# Patient Record
Sex: FEMALE | Race: BLACK OR AFRICAN AMERICAN | NOT HISPANIC OR LATINO | Employment: OTHER | URBAN - METROPOLITAN AREA
[De-identification: names, ages, dates, MRNs, and addresses within clinical notes are randomized per-mention and may not be internally consistent; named-entity substitution may affect disease eponyms.]

---

## 2017-01-25 ENCOUNTER — TELEPHONE (OUTPATIENT)
Dept: FAMILY MEDICINE | Facility: CLINIC | Age: 67
End: 2017-01-25

## 2017-01-25 NOTE — TELEPHONE ENCOUNTER
----- Message from Hanh Britton sent at 1/24/2017 10:37 AM CST -----  Contact: self  [phone]  Pt is requesting to speak to you, she is wanting a work note. Pls call pt [phone]. Thanks.......Erika

## 2017-01-25 NOTE — TELEPHONE ENCOUNTER
Spoke with patient. Work excuse from 11-28-16 and 12- reprinted     She will  letters from the office

## 2017-01-26 ENCOUNTER — TELEPHONE (OUTPATIENT)
Dept: FAMILY MEDICINE | Facility: CLINIC | Age: 67
End: 2017-01-26

## 2017-01-26 NOTE — TELEPHONE ENCOUNTER
----- Message from Lia Matamoros sent at 1/26/2017 12:35 PM CST -----  Contact: self  Pt is requesting for the letter to be faxed to her.     [phone] fn [phone] ph

## 2017-04-13 ENCOUNTER — OFFICE VISIT (OUTPATIENT)
Dept: FAMILY MEDICINE | Facility: CLINIC | Age: 67
End: 2017-04-13
Payer: MEDICARE

## 2017-04-13 ENCOUNTER — TELEPHONE (OUTPATIENT)
Dept: FAMILY MEDICINE | Facility: CLINIC | Age: 67
End: 2017-04-13

## 2017-04-13 ENCOUNTER — APPOINTMENT (OUTPATIENT)
Dept: RADIOLOGY | Facility: HOSPITAL | Age: 67
End: 2017-04-13
Attending: FAMILY MEDICINE
Payer: MEDICARE

## 2017-04-13 VITALS
BODY MASS INDEX: 30.1 KG/M2 | HEART RATE: 73 BPM | OXYGEN SATURATION: 98 % | HEIGHT: 62 IN | SYSTOLIC BLOOD PRESSURE: 150 MMHG | TEMPERATURE: 98 F | DIASTOLIC BLOOD PRESSURE: 78 MMHG | WEIGHT: 163.56 LBS

## 2017-04-13 DIAGNOSIS — M79.674 TOE PAIN, RIGHT: ICD-10-CM

## 2017-04-13 DIAGNOSIS — M79.674 TOE PAIN, RIGHT: Primary | ICD-10-CM

## 2017-04-13 PROCEDURE — 73630 X-RAY EXAM OF FOOT: CPT | Mod: TC,PN,RT

## 2017-04-13 PROCEDURE — 1160F RVW MEDS BY RX/DR IN RCRD: CPT | Mod: S$GLB,,, | Performed by: FAMILY MEDICINE

## 2017-04-13 PROCEDURE — 1125F AMNT PAIN NOTED PAIN PRSNT: CPT | Mod: S$GLB,,, | Performed by: FAMILY MEDICINE

## 2017-04-13 PROCEDURE — 73630 X-RAY EXAM OF FOOT: CPT | Mod: 26,RT,, | Performed by: RADIOLOGY

## 2017-04-13 PROCEDURE — 99214 OFFICE O/P EST MOD 30 MIN: CPT | Mod: S$GLB,,, | Performed by: FAMILY MEDICINE

## 2017-04-13 PROCEDURE — 1159F MED LIST DOCD IN RCRD: CPT | Mod: S$GLB,,, | Performed by: FAMILY MEDICINE

## 2017-04-13 PROCEDURE — 99999 PR PBB SHADOW E&M-EST. PATIENT-LVL III: CPT | Mod: PBBFAC,,, | Performed by: FAMILY MEDICINE

## 2017-04-13 RX ORDER — IBUPROFEN 800 MG/1
800 TABLET ORAL EVERY 6 HOURS PRN
Qty: 90 TABLET | Refills: 0 | Status: SHIPPED | OUTPATIENT
Start: 2017-04-13 | End: 2017-09-01

## 2017-04-13 NOTE — TELEPHONE ENCOUNTER
----- Message from Thee Chow MD sent at 4/13/2017 12:36 PM CDT -----  Please let patient know that there is no fracture in her toe.      Thanks,  Dr. Chow

## 2017-04-13 NOTE — MR AVS SNAPSHOT
[facility]  [address]  [place] [address]  Phone: [phone]                  Zohra Paulino   2017 10:00 AM   Office Visit    Description:  Female : 1950   Provider:  Thee Cohw MD   Department:  [facility]           Reason for Visit     Toe Pain           Diagnoses this Visit        Comments    Toe pain, right    -  Primary            To Do List           Goals (5 Years of Data)     None      Follow-Up and Disposition     Return if symptoms worsen or fail to improve.       These Medications        Disp Refills Start End    ibuprofen (ADVIL,MOTRIN) 800 MG tablet 90 tablet 0 2017     Take 1 tablet (800 mg total) by mouth every 6 (six) hours as needed for Pain. - Oral    Pharmacy: [facility] [address] #: [phone]         [facility]     [facility] On [facility] Nurse Care Line -  Assistance  Unless otherwise directed by your provider, please contact [facility], our nurse care line that is available for  assistance.     Registered nurses in the [facility] provide: appointment scheduling, clinical advisement, health education, and other advisory services.  Call: [phone] (toll free)               Medications           Message regarding Medications     Verify the changes and/or additions to your medication regime listed below are the same as discussed with your clinician today.  If any of these changes or additions are incorrect, please notify your healthcare provider.        START taking these NEW medications        Refills    ibuprofen (ADVIL,MOTRIN) 800 MG tablet 0    Sig: Take 1 tablet (800 mg total) by mouth every 6 (six) hours as needed for Pain.    Class: Normal    Route: Oral      STOP taking these medications     fluticasone (FLONASE) 50 mcg/actuation nasal spray 1 spray by Each Nare route 2 (two) times daily.           Verify that the below list of  "medications is an accurate representation of the medications you are currently taking.  If none reported, the list may be blank. If incorrect, please contact your healthcare provider. Carry this list with you in case of emergency.           Current Medications     cetirizine (ZYRTEC) 10 MG tablet Take 1 tablet (10 mg total) by mouth once daily.    ibuprofen (ADVIL,MOTRIN) 800 MG tablet Take 1 tablet (800 mg total) by mouth every 6 (six) hours as needed for Pain.           Clinical Reference Information           Your Vitals Were     BP Pulse Temp Height Weight SpO2    150/78 (BP Location: Left arm, Patient Position: Sitting, BP Method: Manual) 73 98.1 °F (36.7 °C) (Oral) 5' 2" (1.575 m) 74.2 kg (163 lb 9.3 oz) 98%    BMI                29.92 kg/m2          Blood Pressure          Most Recent Value    BP  (!)  150/78      Allergies as of 4/13/2017     Codeine      Immunizations Administered on Date of Encounter - 4/13/2017     None      Orders Placed During Today's Visit     Future Labs/Procedures Expected by Expires    X-Ray Foot Complete 3 view Right  4/13/2017 4/13/2018      Language Assistance Services     ATTENTION: Language assistance services are available, free of charge. Please call [phone].      ATENCIÓN: Si habla pj, tiene a katz disposición servicios gratuitos de asistencia lingüística. Llame al [phone].     CHÚ Ý: N?u b?n nói Ti?ng Vi?t, có các d?ch v? h? tr? ngôn ng? mi?n phí dành cho b?n. G?i s? [phone].         [facility] complies with applicable Federal civil rights laws and does not discriminate on the basis of race, color, national origin, age, disability, or sex.        "

## 2017-04-30 NOTE — PROGRESS NOTES
Routine Office Visit    Patient Name: Zohra Paulino    : 1950  MRN: [id number]    Subjective:  Zohra is a 67 y.o. female who presents today for:    1. Right toe pain  Patient presenting today for right foot pain that occurred spontaneously.  There was no trauma to the foot.  She states that the pain is an aching pain that is worse when she walks.  The pain is improved with rest.  There has been no swelling or redness.  She has never been diagnosed with gout.      Past Medical History  History reviewed. No pertinent past medical history.    Past Surgical History  Past Surgical History:   Procedure Laterality Date    CERVICAL BIOPSY  W/ LOOP ELECTRODE EXCISION      [facility]         Family History  Family History   Problem Relation Age of Onset    No Known Problems Mother        Social History  Social History     Social History    Marital status: Single     Spouse name: N/A    Number of children: N/A    Years of education: N/A     Occupational History    Not on file.     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       Current Medications  Current Outpatient Prescriptions on File Prior to Visit   Medication Sig Dispense Refill    cetirizine (ZYRTEC) 10 MG tablet Take 1 tablet (10 mg total) by mouth once daily. 30 tablet 2     No current facility-administered medications on file prior to visit.        Allergies   Review of patient's allergies indicates:   Allergen Reactions    Codeine Palpitations       Review of Systems (Pertinent positives)  Review of Systems   Constitutional: Negative.    HENT: Negative.    Eyes: Negative.    Respiratory: Negative.    Cardiovascular: Negative.    Gastrointestinal: Negative.   "  Genitourinary: Negative.    Musculoskeletal: Positive for joint pain and myalgias.   Skin: Negative.          BP (!) 150/78 (BP Location: Left arm, Patient Position: Sitting, BP Method: Manual)  Pulse 73  Temp 98.1 °F (36.7 °C) (Oral)   Ht 5' 2" (1.575 m)  Wt 74.2 kg (163 lb 9.3 oz)  SpO2 98%  BMI 29.92 kg/m2    GENERAL APPEARANCE: in no apparent distress and well developed and well nourished  HEENT: PERRL, EOMI, Sclera clear, anicteric, Oropharynx clear, no lesions, Neck supple with midline trachea  NECK: normal, supple, no adenopathy, thyroid normal in size  RESPIRATORY: appears well, vitals normal, no respiratory distress, acyanotic, normal RR, chest clear, no wheezing, crepitations, rhonchi, normal symmetric air entry  HEART: regular rate and rhythm, S1, S2 normal, no murmur, click, rub or gallop.    ABDOMEN: abdomen is soft without tenderness, no masses, no hernias, no organomegaly, no rebound, no guarding. Suprapubic tenderness absent. No CVA tenderness.  MS  Mild pain on palpation of right lateral foot.  No redness or swelling noted  SKIN: no rashes, no wounds, no other lesions  PSYCH: Alert, oriented x 3, thought content appropriate, speech normal, pleasant and cooperative, good eye contact, well groomed    Assessment/Plan:  Zohra Paulino is a 67 y.o. female who presents today for :    Zohra was seen today for toe pain.    Diagnoses and all orders for this visit:    Toe pain, right  -     X-Ray Foot Complete 3 view Right; Future  -     ibuprofen (ADVIL,MOTRIN) 800 MG tablet; Take 1 tablet (800 mg total) by mouth every 6 (six) hours as needed for Pain.    Other orders  -     Cancel: DXA Bone Density Spine And Hip; Future  -     Cancel: Mammo Digital Screening Bilateral With CAD; Future      1.  Musculoskeletal pain  2.  Ibuprofen for pain  3.  Xray to r/o stress fx  4.  Follow up as needed  5.  Patient will follow up with PCP about mammogram and dexa scan    Thee Chow MD    "

## 2017-06-12 ENCOUNTER — HOSPITAL ENCOUNTER (OUTPATIENT)
Dept: RADIOLOGY | Facility: HOSPITAL | Age: 67
Discharge: HOME | End: 2017-06-12
Attending: OBSTETRICS & GYNECOLOGY
Payer: MEDICARE

## 2017-06-12 VITALS — WEIGHT: 163.19 LBS | HEIGHT: 62 IN | BODY MASS INDEX: 30.03 KG/M2

## 2017-06-12 DIAGNOSIS — Z12.31 VISIT FOR SCREENING MAMMOGRAM: ICD-10-CM

## 2017-06-12 PROCEDURE — 77063 BREAST TOMOSYNTHESIS BI: CPT | Mod: 26,,, | Performed by: RADIOLOGY

## 2017-06-12 PROCEDURE — 77067 SCR MAMMO BI INCL CAD: CPT | Mod: 26,,, | Performed by: RADIOLOGY

## 2017-06-12 PROCEDURE — 77067 SCR MAMMO BI INCL CAD: CPT | Mod: TC

## 2017-07-13 ENCOUNTER — OFFICE VISIT (OUTPATIENT)
Dept: INTERNAL MEDICINE | Facility: CLINIC | Age: 67
End: 2017-07-13
Payer: MEDICARE

## 2017-07-13 VITALS
BODY MASS INDEX: 27.85 KG/M2 | HEIGHT: 63 IN | SYSTOLIC BLOOD PRESSURE: 132 MMHG | OXYGEN SATURATION: 98 % | RESPIRATION RATE: 20 BRPM | HEART RATE: 60 BPM | DIASTOLIC BLOOD PRESSURE: 80 MMHG | WEIGHT: 157.19 LBS

## 2017-07-13 DIAGNOSIS — Z01.00 ROUTINE EYE EXAM: Primary | ICD-10-CM

## 2017-07-13 DIAGNOSIS — Z00.00 ENCOUNTER FOR PREVENTIVE HEALTH EXAMINATION: ICD-10-CM

## 2017-07-13 DIAGNOSIS — Z78.0 POST-MENOPAUSAL: ICD-10-CM

## 2017-07-13 PROCEDURE — G0439 PPPS, SUBSEQ VISIT: HCPCS | Mod: S$GLB,,, | Performed by: NURSE PRACTITIONER

## 2017-07-13 PROCEDURE — 99999 PR PBB SHADOW E&M-EST. PATIENT-LVL IV: CPT | Mod: PBBFAC,,, | Performed by: NURSE PRACTITIONER

## 2017-07-13 NOTE — PROGRESS NOTES
"Zohra Paulino presented for a  Medicare AWV and comprehensive Health Risk Assessment today. The following components were reviewed and updated:    · Medical history  · Family History  · Social history  · Allergies and Current Medications  · Health Risk Assessment  · Health Maintenance  · Care Team     ** See Completed Assessments for Annual Wellness Visit within the encounter summary.**       The following assessments were completed:  · Living Situation  · CAGE  · Depression Screening  · Timed Get Up and Go  · Whisper Test  · Cognitive Function Screening  ·   · Nutrition Screening  · ADL Screening  · PAQ Screening    Vitals:    07/13/17 1040   BP: 132/80   BP Location: Left arm   Patient Position: Sitting   BP Method: Manual   Pulse: 60   Resp: 20   SpO2: 98%   Weight: 71.3 kg (157 lb 3 oz)   Height: 5' 3" (1.6 m)     Body mass index is 27.84 kg/m².  Physical Exam   Constitutional: She is oriented to person, place, and time. She appears well-developed and well-nourished. No distress.   HENT:   Head: Normocephalic.   Right Ear: External ear normal.   Left Ear: External ear normal.   Nose: Nose normal.   Eyes: Conjunctivae are normal. No scleral icterus.   Neck: Normal range of motion. Neck supple.   Cardiovascular: Normal rate, regular rhythm, normal heart sounds and intact distal pulses.  Exam reveals no gallop and no friction rub.    No murmur heard.  Pulmonary/Chest: Effort normal and breath sounds normal. No respiratory distress. She has no wheezes. She has no rales. She exhibits no tenderness.   Abdominal: Soft. Bowel sounds are normal.   Musculoskeletal: Normal range of motion. She exhibits no edema.   Neurological: She is alert and oriented to person, place, and time.   Skin: Skin is warm and dry. She is not diaphoretic. No erythema.   Psychiatric: She has a normal mood and affect. Her behavior is normal. Judgment and thought content normal.   Vitals reviewed.        Diagnoses and health risks identified " today and associated recommendations/orders:    1. Encounter for preventive health examination  Annual Health Risk Assessment (HRA) visit today.  Counseling and referral of health maintenance and preventative health measures performed.  Patient given annual wellness paperwork to take home.  Encouraged to return in 1 year for subsequent HRA visit.  - Declined pneumococcal, zoster, and tetanus immunizations.  - DXA Bone Density ordered and scheduled.    - Up to date on all other health maintenance measures.     2. Routine eye exam  She has not had an eye exam in the past year.  Encouraged her to go for routine eye exam.  She declined scheduling today due to copay.  She stated she will contact her outside optometrist to schedule.    - Ambulatory consult to Optometry    3. Post-menopausal  - DXA Bone Density Spine And Hip; Future  - Scheduled    Provided Zohra with a 5-10 year written screening schedule and personal prevention plan. Recommendations were developed using the USPSTF age appropriate recommendations. Education, counseling, and referrals were provided as needed. After Visit Summary printed and given to patient which includes a list of additional screenings\tests needed.    Return in about 1 year (around 7/13/2018) for your next Health Risk Assessment visit.    Nasima Gar, NP

## 2017-07-13 NOTE — PATIENT INSTRUCTIONS
Exercise for a Healthier Heart  You may wonder how you can improve the health of your heart. If youre thinking about exercise, youre on the right track. You dont need to become an athlete, but you do need a certain amount of brisk exercise to help strengthen your heart. If you have been diagnosed with a heart condition, your doctor may recommend exercise to help stabilize your condition. To help make exercise a habit, choose safe, fun activities.     Exercise with a friend. When activity is fun, you're more likely to stick with it.     Be sure to check with your healthcare provider before starting an exercise program.   Why exercise?  Exercising regularly offers many healthy rewards. It can help you do all of the following:  · Improve your blood cholesterol level to help prevent further heart trouble  · Lower your blood pressure to help prevent a stroke or heart attack  · Control diabetes, or reduce your risk of getting this disease  · Improve your heart and lung function  · Reach and maintain a healthy weight  · Make your muscles stronger and more limber so you can stay active  · Prevent falls and fractures by slowing the loss of bone mass (osteoporosis)  · Manage stress better  · Reduce your blood pressure  · Improve your sense of self and your body image  Exercise tips  Ease into your routine. Set small goals. Then build on them.  Exercise on most days. Aim for a total of 150 or more minutes of moderate to  vigorous intensity activity each week. Consider 40 minutes, 3 to 4 times a week. For best results, activity should last for 40 minutes on average. It is OK to work up to the 40 minute period over time. Examples of moderate-intensity activity is walking 1 mile in 15 minutes or 30 to 45 minutes of yard work.  Step up your daily activity level. Along with your exercise program, try being more active throughout the day. Walk instead of drive. Do more household tasks or yard work.  Choose one or more  activities you enjoy. Walking is one of the easiest things you can do. You can also try swimming, riding a bike, dancing, or taking an exercise class.  Stop exercising and call your doctor if you:  · Have chest pain or feel dizzy or lightheaded  · Feel burning, tightness, pressure, or heaviness in your chest, neck, shoulders, back, or arms  · Have unusual shortness of breath  · Have increased joint or muscle pain  · Have palpitations or an irregular heartbeat   Date Last Reviewed: 5/1/2016  © 7420-1725 [employer]. [address] [place] [address]. All rights reserved. This information is not intended as a substitute for professional medical care. Always follow your healthcare professional's instructions.          Counseling and Referral of Other Preventative  (Italic type indicates deductible and co-insurance are waived)    Patient Name: Zohra Paulino  Today's Date: 7/13/2017      SERVICE LIMITATIONS RECOMMENDATION    Vaccines    · Pneumococcal (once after 65)    · Influenza (annually)    · Hepatitis B (if medium/high risk)    · Prevnar 13      Hepatitis B medium/high risk factors:       - End-stage renal disease       - Hemophiliacs who received Factor VII or         IX concentrates       - Clients of institutions for the mentally             retarded       - Persons who live in the same house as          a HepB carrier       - Homosexual men       - Illicit injectable drug abusers     Pneumococcal: Recommended to patient, declined     Influenza: N/A     Hepatitis B: N/A     Prevnar 13: Recommended to patient, declined    Mammogram (biennial age 50-74)  Annually (age 40 or over)  Done this year, repeat every year    Pap (up to age 70 and after 70 if unknown history or abnormal study last 10 years)    Last done 6/2016, recommend to repeat as recommended by Gynecology.     The USPSTF recommends against screening for cervical cancer in women older than age 65 years who have had adequate  prior screening and are not otherwise at high risk for cervical cancer.      Colorectal cancer screening (to age 75)    · Fecal occult blood test (annual)  · Flexible sigmoidoscopy (5y)  · Screening colonoscopy (10y)  · Barium enema   Last done 2/2014, recommend to repeat every 5-10  years or as recommended by provider.    Diabetes self-management training (no USPSTF recommendations)  Requires referral by treating physician for patient with diabetes or renal disease. 10 hours of initial DSMT sessions of no less than 30 minutes each in a continuous 12-month period. 2 hours of follow-up DSMT in subsequent years.  N/A    Bone mass measurements (age 65 & older, biennial)  Requires diagnosis related to osteoporosis or estrogen deficiency. Biennial benefit unless patient has history of long-term glucocorticoid  Scheduled, see appointments    Glaucoma screening (no USPSTF recommendation)  Diabetes mellitus, family history   , age 50 or over    American, age 65 or over  N/A  She will call to schedule to see her outside optometrist.    Medical nutrition therapy for diabetes or renal disease (no recommended schedule)  Requires referral by treating physician for patient with diabetes or renal disease or kidney transplant within the past 3 years.  Can be provided in same year as diabetes self-management training (DSMT), and CMS recommends medical nutrition therapy take place after DSMT. Up to 3 hours for initial year and 2 hours in subsequent years.  N/A    Cardiovascular screening blood tests (every 5 years)  · Fasting lipid panel  Order as a panel if possible  Last done 11/2016, recommend to repeat every 5  years    Diabetes screening tests (at least every 3 years, Medicare covers annually or at 6-month intervals for prediabetic patients)  · Fasting blood sugar (FBS) or glucose tolerance test (GTT)  Patient must be diagnosed with one of the following:       - Hypertension       - Dyslipidemia       -  Obesity (BMI 30kg/m2)       - Previous elevated impaired FBS or GTT       ... or any two of the following:       - Overweight (BMI 25 but <30)       - Family history of diabetes       - Age 65 or older       - History of gestational diabetes or birth of baby weighing more than 9 pounds  Last done 1/2016, recommend to repeat every 3  years    Abdominal aortic aneurysm screening (once)  · Sonogram   Limited to patients who meet one of the following criteria:       - Men who are 65-75 years old and have smoked more than 100 cigarette in their lifetime       - Anyone with a family history of abdominal aortic aneurysm       - Anyone recommended for screening by the USPSTF  N/A    HIV screening (annually for increased risk patients)  · HIV-1 and HIV-2 by EIA, or JANEL, rapid antibody test or oral mucosa transudate  Patients must be at increased risk for HIV infection per USPSTF guidelines or pregnant. Tests covered annually for patient at increased risk or as requested by the patient. Pregnant patients may receive up to 3 tests during pregnancy.  Risks discussed, screening is not recommended    Smoking cessation counseling (up to 8 sessions per year)  Patients must be asymptomatic of tobacco-related conditions to receive as a preventative service.  Non-smoker    Subsequent annual wellness visit  At least 12 months since last AWV  Return in one year     The following information is provided to all patients.  This information is to help you find resources for any of the problems found today that may be affecting your health:                Living healthy guide: www.[facility].[place].gov      Understanding Diabetes: www.diabetes.org      Eating healthy: www.cdc.gov/healthyweight      CDC home safety checklist: www.cdc.gov/steadi/patient.html      Agency on Aging: www.goea.[place].[facility]      Alcoholics anonymous (AA): www.aa.org      Physical Activity: www.sheyla.nih.gov/oj6utkx      Tobacco use: www.quitwithusla.org

## 2017-07-18 ENCOUNTER — OFFICE VISIT (OUTPATIENT)
Dept: FAMILY MEDICINE | Facility: CLINIC | Age: 67
End: 2017-07-18
Payer: MEDICARE

## 2017-07-18 VITALS
SYSTOLIC BLOOD PRESSURE: 110 MMHG | OXYGEN SATURATION: 98 % | HEIGHT: 63 IN | BODY MASS INDEX: 28.29 KG/M2 | HEART RATE: 60 BPM | DIASTOLIC BLOOD PRESSURE: 78 MMHG | RESPIRATION RATE: 17 BRPM | TEMPERATURE: 98 F | WEIGHT: 159.63 LBS

## 2017-07-18 DIAGNOSIS — D25.1 INTRAMURAL LEIOMYOMA OF UTERUS: Primary | ICD-10-CM

## 2017-07-18 LAB
BILIRUB SERPL-MCNC: NORMAL MG/DL
BLOOD URINE, POC: 50
COLOR, POC UA: NORMAL
GLUCOSE UR QL STRIP: NORMAL
KETONES UR QL STRIP: NORMAL
LEUKOCYTE ESTERASE URINE, POC: NORMAL
NITRITE, POC UA: NORMAL
PH, POC UA: 5
PROTEIN, POC: NORMAL
SPECIFIC GRAVITY, POC UA: 1020
UROBILINOGEN, POC UA: NORMAL

## 2017-07-18 PROCEDURE — 1159F MED LIST DOCD IN RCRD: CPT | Mod: S$GLB,,, | Performed by: INTERNAL MEDICINE

## 2017-07-18 PROCEDURE — 99999 PR PBB SHADOW E&M-EST. PATIENT-LVL III: CPT | Mod: PBBFAC,,, | Performed by: INTERNAL MEDICINE

## 2017-07-18 PROCEDURE — 1125F AMNT PAIN NOTED PAIN PRSNT: CPT | Mod: S$GLB,,, | Performed by: INTERNAL MEDICINE

## 2017-07-18 PROCEDURE — 99214 OFFICE O/P EST MOD 30 MIN: CPT | Mod: 25,S$GLB,, | Performed by: INTERNAL MEDICINE

## 2017-07-18 PROCEDURE — 81002 URINALYSIS NONAUTO W/O SCOPE: CPT | Mod: S$GLB,,, | Performed by: INTERNAL MEDICINE

## 2017-07-18 NOTE — PROGRESS NOTES
"Subjective:       Patient ID: Zohra Paulino is a 67 y.o. female.    Chief Complaint: Abdominal Pain (lower left side)    Discuss abdominal pain    HPI: 66 y/o presents to discuss intermittent left lower quadrent abdominal pain. Describes pain as "burning" occurs every few days for last two months no change with PO intake no associated nausea or vomitting. No change in bowel or bladder denies constipation or diarrhea no melena or BRBPR. Does have history of uterine fibroids by ultrasound two years ago denies any vaginal bleeding/spotting or discharge. Weight is unchanged. Pain only last a few seconds and self resolves      Review of Systems   Constitutional: Negative for activity change, appetite change, fatigue, fever and unexpected weight change.   HENT: Negative for ear pain, rhinorrhea and sore throat.    Eyes: Negative for discharge and visual disturbance.   Respiratory: Negative for chest tightness, shortness of breath and wheezing.    Cardiovascular: Negative for chest pain, palpitations and leg swelling.   Gastrointestinal: Negative for abdominal pain, constipation and diarrhea.   Endocrine: Negative for cold intolerance and heat intolerance.   Genitourinary: Negative for dysuria and hematuria.   Musculoskeletal: Negative for joint swelling and neck stiffness.   Skin: Negative for rash.   Neurological: Negative for dizziness, syncope, weakness and headaches.   Psychiatric/Behavioral: Negative for suicidal ideas.       Objective:     Vitals:    07/18/17 1152   BP: 110/78   BP Location: Left arm   Patient Position: Sitting   BP Method: Manual   Pulse: 60   Resp: 17   Temp: 98.3 °F (36.8 °C)   TempSrc: Oral   SpO2: 98%   Weight: 72.4 kg (159 lb 9.8 oz)   Height: 5' 3" (1.6 m)          Physical Exam   Constitutional: She is oriented to person, place, and time. She appears well-developed and well-nourished.   HENT:   Head: Normocephalic and atraumatic.   Eyes: Conjunctivae are normal. Pupils are equal, " round, and reactive to light.   Neck: Normal range of motion.   Cardiovascular: Normal rate and regular rhythm.  Exam reveals no gallop and no friction rub.    No murmur heard.  Pulmonary/Chest: Effort normal and breath sounds normal. She has no wheezes. She has no rales.   Abdominal: Soft. Bowel sounds are normal. There is no tenderness. There is no rebound and no guarding.   Non palpable spleen. No rebound appreciated with deep palpation   Musculoskeletal: Normal range of motion. She exhibits no edema or tenderness.   Neurological: She is alert and oriented to person, place, and time. No cranial nerve deficit.   Skin: Skin is warm and dry.   Psychiatric: She has a normal mood and affect.       Assessment:       1. Intramural leiomyoma of uterus        Plan:    1. begnin abdominal exam pain is chronic in nature wtihout localizing features, check transvaginal ultrasound for evidence of progression of previous fibroid.

## 2017-07-26 ENCOUNTER — HOSPITAL ENCOUNTER (OUTPATIENT)
Dept: RADIOLOGY | Facility: HOSPITAL | Age: 67
Discharge: HOME | End: 2017-07-26
Attending: INTERNAL MEDICINE
Payer: MEDICARE

## 2017-07-26 ENCOUNTER — HOSPITAL ENCOUNTER (OUTPATIENT)
Dept: RADIOLOGY | Facility: HOSPITAL | Age: 67
Discharge: HOME | End: 2017-07-26
Attending: NURSE PRACTITIONER
Payer: MEDICARE

## 2017-07-26 DIAGNOSIS — D25.1 INTRAMURAL LEIOMYOMA OF UTERUS: ICD-10-CM

## 2017-07-26 DIAGNOSIS — Z78.0 POST-MENOPAUSAL: ICD-10-CM

## 2017-08-01 ENCOUNTER — TELEPHONE (OUTPATIENT)
Dept: FAMILY MEDICINE | Facility: CLINIC | Age: 67
End: 2017-08-01

## 2017-08-01 ENCOUNTER — HOSPITAL ENCOUNTER (OUTPATIENT)
Dept: RADIOLOGY | Facility: OTHER | Age: 67
Discharge: HOME | End: 2017-08-01
Attending: INTERNAL MEDICINE
Payer: MEDICARE

## 2017-08-01 ENCOUNTER — HOSPITAL ENCOUNTER (OUTPATIENT)
Dept: RADIOLOGY | Facility: OTHER | Age: 67
Discharge: HOME | End: 2017-08-01
Attending: NURSE PRACTITIONER
Payer: MEDICARE

## 2017-08-01 DIAGNOSIS — M85.80 OSTEOPENIA, UNSPECIFIED LOCATION: Primary | ICD-10-CM

## 2017-08-01 PROCEDURE — 76856 US EXAM PELVIC COMPLETE: CPT | Mod: TC

## 2017-08-01 PROCEDURE — 77080 DXA BONE DENSITY AXIAL: CPT | Mod: TC

## 2017-08-01 PROCEDURE — 76856 US EXAM PELVIC COMPLETE: CPT | Mod: 26,,, | Performed by: RADIOLOGY

## 2017-08-01 PROCEDURE — 76830 TRANSVAGINAL US NON-OB: CPT | Mod: 26,,, | Performed by: RADIOLOGY

## 2017-08-01 PROCEDURE — 77080 DXA BONE DENSITY AXIAL: CPT | Mod: 26,,, | Performed by: RADIOLOGY

## 2017-08-01 RX ORDER — ALENDRONATE SODIUM 70 MG/1
70 TABLET ORAL
Qty: 4 TABLET | Refills: 2 | Status: SHIPPED | OUTPATIENT
Start: 2017-08-01 | End: 2017-09-01

## 2017-09-01 ENCOUNTER — OFFICE VISIT (OUTPATIENT)
Dept: OBSTETRICS AND GYNECOLOGY | Facility: CLINIC | Age: 67
End: 2017-09-01
Payer: MEDICARE

## 2017-09-01 VITALS
WEIGHT: 156.88 LBS | DIASTOLIC BLOOD PRESSURE: 74 MMHG | BODY MASS INDEX: 27.8 KG/M2 | SYSTOLIC BLOOD PRESSURE: 138 MMHG | HEIGHT: 63 IN

## 2017-09-01 DIAGNOSIS — R93.89 THICKENED ENDOMETRIUM: ICD-10-CM

## 2017-09-01 DIAGNOSIS — N89.8 VAGINAL DISCHARGE: ICD-10-CM

## 2017-09-01 DIAGNOSIS — K64.4 ANAL SKIN TAG: ICD-10-CM

## 2017-09-01 DIAGNOSIS — D25.2 SUBSEROUS LEIOMYOMA OF UTERUS: Primary | ICD-10-CM

## 2017-09-01 DIAGNOSIS — N94.89 OTHER SPECIFIED CONDITIONS ASSOCIATED WITH FEMALE GENITAL ORGANS AND MENSTRUAL CYCLE: ICD-10-CM

## 2017-09-01 PROCEDURE — 99214 OFFICE O/P EST MOD 30 MIN: CPT | Mod: S$GLB,,, | Performed by: OBSTETRICS & GYNECOLOGY

## 2017-09-01 PROCEDURE — 87480 CANDIDA DNA DIR PROBE: CPT

## 2017-09-01 PROCEDURE — 1126F AMNT PAIN NOTED NONE PRSNT: CPT | Mod: S$GLB,,, | Performed by: OBSTETRICS & GYNECOLOGY

## 2017-09-01 PROCEDURE — 99999 PR PBB SHADOW E&M-EST. PATIENT-LVL III: CPT | Mod: PBBFAC,,, | Performed by: OBSTETRICS & GYNECOLOGY

## 2017-09-01 PROCEDURE — 87660 TRICHOMONAS VAGIN DIR PROBE: CPT

## 2017-09-01 PROCEDURE — 3008F BODY MASS INDEX DOCD: CPT | Mod: S$GLB,,, | Performed by: OBSTETRICS & GYNECOLOGY

## 2017-09-01 PROCEDURE — 87591 N.GONORRHOEAE DNA AMP PROB: CPT

## 2017-09-01 PROCEDURE — 1159F MED LIST DOCD IN RCRD: CPT | Mod: S$GLB,,, | Performed by: OBSTETRICS & GYNECOLOGY

## 2017-09-01 NOTE — PROGRESS NOTES
"SUBJECTIVE:   67 y.o. female   for f/u on pelvic US.     Pt reported she has always had fibroids all her life.   H/o heavy menstrual bleeding during premenopausal years.  Denied any hormonal use to control bleeding.  Currently had lower abdominal pain, both lower abdomen.  Pain onset 2 years ago. Pain is intermittent, lasting 30 minutes.  "burning sensation."  Pain 8/10. No radiation. RLQ > LLQ.  Pain is self limited. Not sure if ibuprofen alleviated the pain as pain was self-limited sometimes.  Pain aggravated by nothing.   Denied n/v/changes in bowel habits.   Menopause in her mid 50s.  Denied vasomotor symptoms.  Denies HRT use.    Reported h/o PMB few years ago.  Was supposed to have EMB at [facility] but  " I have scar tissue, they were not able to do it."  Per Dr. Priyank Waggoner's note, pt had D&C in  but not enough tissue obtained.    Reported rectal bleeding last week - very sure the bleeding came from the rectum.  Saw blood in her stool.  Last colonoscopy was within the last 4 years, normal per pt.  Denies h/o constipation.     Also noticed intermittent clear vaginal discharge, associated with itching.  Ho chlamydia infection.     History: Uterine leiomyoma.    Procedure: Transabdominal and transvaginal sonographic images of the pelvis are performed.    Comparison study: Ultrasound 3/18/2015.    Findings:    There is a anteverted uterus that measures approximately 6.4 x 4 x 4.5 cm (previously 6.4 cm).  The endometrial stripe measures 6.7 mm and is within normal limits.    At least 4 uterine fibroids are visualized.  In the fundus of the uterus s a left there is a uterine fibroid that measures 1 x 0.9 x 0.9 cm (previously 1 cm).  There is a submucosal fibroid measuring 2.3 x 1.9 x 1.9 cm (previously 2. 3 cm).  There is also a posterior body subserosal fibroid that measures 2.3 x 2.5 cm slightly larger when compared to previous study (previously 1.2 cm).  The 4th uterine fibroid along the body " and is without significant change and measures approximately 1 cm.    The right ovary measures 1.7 x 1.2 x 1.9 cm with normal vascular flow.    Left ovary not visualized on the current study.  There is a trace amount of free fluid in the cul-de-sac.   Impression         Slight enlargement of one of the uterine fibroids along the posterior body of the uterus when compared with the previous study.  Rest of the examination without change.      Electronically signed by: REKHA HENRIQUEZ MD  Date: 17  Time: 10:43          OB History    Para Term  AB Living   5 4 4   1 4   SAB TAB Ectopic Multiple Live Births   1       4      # Outcome Date GA Lbr Prakash/2nd Weight Sex Delivery Anes PTL Lv   5 Term 04/10/77 40w0d  3.232 kg (7 lb 2 oz) M Vag-Spont EPI N ROMY   4 Term 12/15/75 40w0d  3.175 kg (7 lb) F Vag-Spont EPI N ROMY   3 Term 73 40w0d  3.317 kg (7 lb 5 oz) F Vag-Spont EPI N ROMY   2 SAB 1970           1 Term 69 40w0d  2.892 kg (6 lb 6 oz) M Vag-Spont None N ROMY      Obstetric Comments   Reg menses. Heavy. H/o fibroid   Denies abnl pap   Denies abnl MMG   Denies abnl c-scope     Past Medical History:   Diagnosis Date    Uterine fibroid      Past Surgical History:   Procedure Laterality Date    CERVICAL BIOPSY  W/ LOOP ELECTRODE EXCISION      [facility]      TUBAL LIGATION       Social History     Social History    Marital status: Single     Spouse name: N/A    Number of children: N/A    Years of education: N/A     Occupational History    Paraprofessional      Elementary school     Social History Main Topics    Smoking status: Never Smoker    Smokeless tobacco: Never Used    Alcohol use Yes      Comment: Rarely - a few drinks per year    Drug use: No    Sexual activity: Yes     Partners: Male     Birth control/ protection: None     Other Topics Concern    Not on file     Social History Narrative    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     Family History   Problem Relation Age of Onset  "   No Known Problems Mother     No Known Problems Father     Breast cancer Cousin 20    Arthritis Sister     No Known Problems Maternal Grandmother     No Known Problems Maternal Grandfather     Pancreatic cancer Paternal Grandmother 80    No Known Problems Paternal Grandfather     Diabetes Son     No Known Problems Son     No Known Problems Daughter     No Known Problems Daughter          Current Outpatient Prescriptions   Medication Sig Dispense Refill    alendronate (FOSAMAX) 70 MG tablet Take 1 tablet (70 mg total) by mouth every 7 days. 4 tablet 2    cetirizine (ZYRTEC) 10 MG tablet Take 1 tablet (10 mg total) by mouth once daily. (Patient taking differently: Take 10 mg by mouth daily as needed. ) 30 tablet 2    ibuprofen (ADVIL,MOTRIN) 800 MG tablet Take 1 tablet (800 mg total) by mouth every 6 (six) hours as needed for Pain. 90 tablet 0     No current facility-administered medications for this visit.      Allergies: Codeine     ROS:  GENERAL: Denies weight gain or weight loss. Feeling well overall.   SKIN: Denies rash or lesions.   HEAD: Denies head injury or headache.   NODES: Denies enlarged lymph nodes.   CHEST: Denies chest pain or shortness of breath.   CARDIOVASCULAR: Denies palpitations or left sided chest pain.   ABDOMEN: No constipation, diarrhea, nausea, vomiting . + rectal bleeding, + abdominal pain.  URINARY: No frequency, dysuria, hematuria, or burning on urination.  REPRODUCTIVE: Denies vaginal discharge, abnormal vaginal bleeding, lesions, pelvic pain  BREASTS: The patient performs breast self-examination and denies pain, lumps, or nipple discharge.   HEMATOLOGIC: No easy bruisability or excessive bleeding.  MUSCULOSKELETAL: Denies joint pain or swelling.   NEUROLOGIC: Denies syncope or weakness.   PSYCHIATRIC: Denies depression, anxiety or mood swings.      OBJECTIVE:   /74   Ht 5' 3" (1.6 m)   Wt 71.2 kg (156 lb 13.7 oz)   LMP  (LMP Unknown)   BMI 27.79 kg/m²     The " patient appears well, alert, oriented x 3, in no distress.  NECK: negative, no thyromegaly, trachea midline  SKIN: normal  BREAST EXAM: breasts appear normal, no suspicious masses, no skin or nipple changes or axillary nodes  ABDOMEN: soft, non-tender; bowel sounds normal; no masses,  no organomegaly and no hernias, masses, or hepatosplenomegaly  GENITALIA: atrophic external genitalia with no lesions  URETHRA: normal appearing urethra with no masses, tenderness or lesions and normal urethra, normal urethral meatus  VAGINA: Normal  CERVIX: no lesions or cervical motion tenderness, stenotic appearance and flushed with vaginal sidewall  UTERUS: mobile and slightly enlarged  ADNEXA: no mass, fullness, tenderness      ASSESSMENT:   1. Given patient with history of failed EMB/D&C,  thickened endometrium and increasing fibroid size in postmenopausal female, I recommend to have hysterectomy with BSO for diagnosis.  This can either be done with gynonc or her at [facility] with gyn onc to stand by, possible staging. Will send specimen off for frozen section. Pt verbalized understanding.   2.  Vaginal discharge:  GC/Ct and affirm sent. Suspect atrophic vaginitis.       Total time spent with patient 25 minutes, with >50% spent on counseling and coordinating of care.

## 2017-09-02 LAB
CANDIDA RRNA VAG QL PROBE: NEGATIVE
G VAGINALIS RRNA GENITAL QL PROBE: NEGATIVE
T VAGINALIS RRNA GENITAL QL PROBE: NEGATIVE

## 2017-09-03 LAB
C TRACH DNA SPEC QL NAA+PROBE: NOT DETECTED
N GONORRHOEA DNA SPEC QL NAA+PROBE: NOT DETECTED

## 2017-09-06 NOTE — PROGRESS NOTES
There is no gyn onc service on the [facility] location.  Discussed with patient over the phone that I will refer her to GYNONC at [facility].  Pt agreed.  All questions answered.

## 2017-09-19 ENCOUNTER — TELEPHONE (OUTPATIENT)
Dept: OBSTETRICS AND GYNECOLOGY | Facility: CLINIC | Age: 67
End: 2017-09-19

## 2017-09-19 NOTE — TELEPHONE ENCOUNTER
Pt stated she was given a referral to gyn/ onc and never received a call to be seen. Pt given number to call so that she can schedule an appointment [phone]. Pt will call our office if she needs help scheduling appointment.

## 2017-09-19 NOTE — TELEPHONE ENCOUNTER
----- Message from Jennifer Shaffer sent at 9/19/2017 12:59 PM CDT -----  Patient states she was told to call Dr Crisostomo back in 2 weeks. Please call at [phone] Thank you!

## 2017-09-25 ENCOUNTER — TELEPHONE (OUTPATIENT)
Dept: GYNECOLOGIC ONCOLOGY | Facility: CLINIC | Age: 67
End: 2017-09-25

## 2017-09-25 ENCOUNTER — INITIAL CONSULT (OUTPATIENT)
Dept: GYNECOLOGIC ONCOLOGY | Facility: CLINIC | Age: 67
End: 2017-09-25
Payer: MEDICARE

## 2017-09-25 VITALS
DIASTOLIC BLOOD PRESSURE: 77 MMHG | WEIGHT: 154 LBS | HEART RATE: 66 BPM | SYSTOLIC BLOOD PRESSURE: 141 MMHG | BODY MASS INDEX: 27.28 KG/M2

## 2017-09-25 DIAGNOSIS — N95.0 PMB (POSTMENOPAUSAL BLEEDING): Primary | ICD-10-CM

## 2017-09-25 DIAGNOSIS — R93.89 THICKENED ENDOMETRIUM: Primary | ICD-10-CM

## 2017-09-25 PROCEDURE — 1159F MED LIST DOCD IN RCRD: CPT | Mod: S$GLB,,, | Performed by: OBSTETRICS & GYNECOLOGY

## 2017-09-25 PROCEDURE — 99205 OFFICE O/P NEW HI 60 MIN: CPT | Mod: S$GLB,,, | Performed by: OBSTETRICS & GYNECOLOGY

## 2017-09-25 PROCEDURE — 3008F BODY MASS INDEX DOCD: CPT | Mod: S$GLB,,, | Performed by: OBSTETRICS & GYNECOLOGY

## 2017-09-25 PROCEDURE — 99999 PR PBB SHADOW E&M-EST. PATIENT-LVL II: CPT | Mod: PBBFAC,,, | Performed by: OBSTETRICS & GYNECOLOGY

## 2017-09-25 PROCEDURE — 1126F AMNT PAIN NOTED NONE PRSNT: CPT | Mod: S$GLB,,, | Performed by: OBSTETRICS & GYNECOLOGY

## 2017-09-25 NOTE — LETTER
September 25, 2017      Davin Crisostomo MD  [address] [facility] [address]  [address] [place] [address]           [facility]  [address], [address]  [facility] [address]  Phone: [phone]  Fax: [phone]          Patient: Zohra Paulino   MR Number: [id number]   YOB: 1950   Date of Visit: 9/25/2017       Dear Dr. Davin Crisostomo:    Thank you for referring Zohra Paulino to me for evaluation. Attached you will find relevant portions of my assessment and plan of care.    If you have questions, please do not hesitate to call me. I look forward to following Zohra Paulino along with you.    Sincerely,    Xavier Ornelas MD    Enclosure  CC:  No Recipients    If you would like to receive this communication electronically, please contact [email or web address] or [phone] to request more information on [employer] access.    For providers and/or their staff who would like to refer a patient to [facility], please contact us through our one-stop-shop provider referral line, [facility], at [phone].    If you feel you have received this communication in error or would no longer like to receive these types of communications, please e-mail [email or web address]

## 2017-09-25 NOTE — PROGRESS NOTES
Subjective:      Patient ID: Zohra Paulino is a 67 y.o. female.    Chief Complaint: No chief complaint on file.      HPI  Presents today due to thickened endometrial stripe and enlarging fibroids in the post-menopausal state.  Patient has a known h/o fibroids.  States  since her early 50's.  Has had a known thickened endometrium and is s/p D&C in 2014 with no endometrial tissue obtained.  Denies PMB currently.  TVUS showed multiple small fibroids and a 6 cm uterus with 6.7 mm endometrial stripe.  Ovaries were normal.  PSH significant for BTL and questionable Dx scope for endometriosis.  Denies FH of gyn cancers.  Review of Systems   Constitutional: Negative for activity change, appetite change, chills, fatigue and fever.   HENT: Negative for hearing loss, mouth sores, nosebleeds, sore throat and tinnitus.    Eyes: Negative for visual disturbance.   Respiratory: Negative for cough, chest tightness, shortness of breath and wheezing.    Cardiovascular: Negative for chest pain and leg swelling.   Gastrointestinal: Negative for abdominal distention, abdominal pain, blood in stool, constipation, diarrhea, nausea and vomiting.   Genitourinary: Negative for dysuria, flank pain, frequency, hematuria, pelvic pain, vaginal bleeding, vaginal discharge and vaginal pain.   Musculoskeletal: Negative for arthralgias and back pain.   Skin: Negative for rash.   Neurological: Negative for dizziness, seizures, syncope, weakness and numbness.   Hematological: Does not bruise/bleed easily.   Psychiatric/Behavioral: Negative for confusion and sleep disturbance. The patient is not nervous/anxious.         Past Medical History:   Diagnosis Date    Uterine fibroid      Past Surgical History:   Procedure Laterality Date    CERVICAL BIOPSY  W/ LOOP ELECTRODE EXCISION      [facility]  [facility]    TUBAL LIGATION       Family History   Problem Relation Age of Onset    No Known Problems Mother     No Known Problems Father     Breast cancer  Cousin 20    Arthritis Sister     No Known Problems Maternal Grandmother     No Known Problems Maternal Grandfather     Pancreatic cancer Paternal Grandmother 80    No Known Problems Paternal Grandfather     Diabetes Son     No Known Problems Son     No Known Problems Daughter     No Known Problems Daughter      Social History     Social History    Marital status: Single     Spouse name: N/A    Number of children: N/A    Years of education: N/A     Occupational History    Paraprofessional      Elementary school     Social History Main Topics    Smoking status: Never Smoker    Smokeless tobacco: Never Used    Alcohol use Yes      Comment: Rarely - a few drinks per year    Drug use: No    Sexual activity: Yes     Partners: Male     Birth control/ protection: None     Other Topics Concern    Not on file     Social History Narrative    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     Current Outpatient Prescriptions   Medication Sig    cetirizine (ZYRTEC) 10 MG tablet Take 1 tablet (10 mg total) by mouth once daily. (Patient taking differently: Take 10 mg by mouth daily as needed. )     No current facility-administered medications for this visit.      Review of patient's allergies indicates:   Allergen Reactions    Codeine Palpitations       Objective:   Physical Exam:   Constitutional: She is oriented to person, place, and time. She appears well-developed and well-nourished. No distress.    HENT:   Head: Normocephalic and atraumatic.    Eyes: No scleral icterus.    Neck: Normal range of motion. Neck supple.    Cardiovascular: Normal rate and intact distal pulses.  Exam reveals no cyanosis and no edema.     Pulmonary/Chest: Effort normal. No respiratory distress. She exhibits no tenderness.        Abdominal: Soft. Normal appearance. She exhibits no distension, no fluid wave, no ascites and no mass. There is no tenderness. There is no rigidity, no rebound and no guarding. No hernia.         Genitourinary:  Rectum normal, vagina normal and uterus normal. Pelvic exam was performed with patient supine. There is no rash, tenderness or lesion on the right labia. There is no rash, tenderness or lesion on the left labia. Uterus is hosting fibroids. Uterus is not deviated, not enlarged, not fixed, not tender and not experiencing uterine prolapse. Cervix is normal. Right adnexum displays no mass, no tenderness and no fullness. Left adnexum displays no mass, no tenderness and no fullness. No bleeding in the vagina. No vaginal discharge found. Labial bartholins normal.Cervix exhibits no motion tenderness, no discharge and no friability.        Uterus Size: 8 cm   Musculoskeletal: Normal range of motion and moves all extremeties. She exhibits no edema.      Lymphadenopathy:     She has no cervical adenopathy.        Right: No inguinal adenopathy present.        Left: No inguinal adenopathy present.    Neurological: She is alert and oriented to person, place, and time.    Skin: Skin is warm. No rash noted. No cyanosis or erythema.    Psychiatric: She has a normal mood and affect. Thought content normal.       Assessment:     1. PMB (postmenopausal bleeding)        Plan:       Counseled the patient on the need for hyst.  Recommended RALH/BSO/Poss staging depending on what the endometrium demonstrated at surgery.  The risks, benefits, and indications of the procedure were discussed with the patient .  These included bleeding, infection, damage to surrounding tissues, conversion to laparotomy, and the possibility of major complications including death.  She voiced understanding, all questions were answered and consents were signed.  Would like to do closer to Thanksgiving.

## 2017-11-06 ENCOUNTER — TELEPHONE (OUTPATIENT)
Dept: GYNECOLOGIC ONCOLOGY | Facility: CLINIC | Age: 67
End: 2017-11-06

## 2017-11-06 NOTE — TELEPHONE ENCOUNTER
----- Message from Peg Sunshine sent at 11/6/2017  1:17 PM CST -----  x_  1st Request  _  2nd Request  _  3rd Request        Who: chris    Why: pt.calling to see if office received fax. Please call to discuss    What Number to Call Back:[phone]    When to Expect a call back: (Before the end of the day)   -- if the call is after 12:00, the call back will be tomorrow.

## 2017-11-09 ENCOUNTER — ANESTHESIA EVENT (OUTPATIENT)
Dept: SURGERY | Facility: OTHER | Age: 67
DRG: 742 | End: 2017-11-09
Payer: MEDICARE

## 2017-11-09 ENCOUNTER — HOSPITAL ENCOUNTER (OUTPATIENT)
Dept: PREADMISSION TESTING | Facility: OTHER | Age: 67
Discharge: HOME | End: 2017-11-09
Attending: OBSTETRICS & GYNECOLOGY
Payer: MEDICARE

## 2017-11-09 VITALS
WEIGHT: 139.75 LBS | HEIGHT: 62 IN | OXYGEN SATURATION: 98 % | HEART RATE: 65 BPM | BODY MASS INDEX: 25.72 KG/M2 | DIASTOLIC BLOOD PRESSURE: 66 MMHG | SYSTOLIC BLOOD PRESSURE: 126 MMHG | TEMPERATURE: 98 F

## 2017-11-09 LAB
ABO + RH BLD: NORMAL
BLD GP AB SCN CELLS X3 SERPL QL: NORMAL
HCT VFR BLD AUTO: 38.1 %
HGB BLD-MCNC: 12.4 G/DL

## 2017-11-09 PROCEDURE — 36415 COLL VENOUS BLD VENIPUNCTURE: CPT

## 2017-11-09 PROCEDURE — 85014 HEMATOCRIT: CPT

## 2017-11-09 PROCEDURE — 85018 HEMOGLOBIN: CPT

## 2017-11-09 PROCEDURE — 86900 BLOOD TYPING SEROLOGIC ABO: CPT

## 2017-11-09 PROCEDURE — 86850 RBC ANTIBODY SCREEN: CPT

## 2017-11-09 RX ORDER — OXYCODONE HYDROCHLORIDE 5 MG/1
5 TABLET ORAL ONCE AS NEEDED
Status: CANCELLED | OUTPATIENT
Start: 2017-11-09 | End: 2017-11-09

## 2017-11-09 RX ORDER — LIDOCAINE HYDROCHLORIDE 10 MG/ML
1 INJECTION, SOLUTION EPIDURAL; INFILTRATION; INTRACAUDAL; PERINEURAL ONCE
Status: CANCELLED | OUTPATIENT
Start: 2017-11-09 | End: 2017-11-09

## 2017-11-09 RX ORDER — FAMOTIDINE 20 MG/1
20 TABLET, FILM COATED ORAL
Status: CANCELLED | OUTPATIENT
Start: 2017-11-09 | End: 2017-11-09

## 2017-11-09 RX ORDER — MIDAZOLAM HYDROCHLORIDE 5 MG/ML
4 INJECTION INTRAMUSCULAR; INTRAVENOUS
Status: CANCELLED | OUTPATIENT
Start: 2017-11-09

## 2017-11-09 RX ORDER — CHOLECALCIFEROL (VITAMIN D3) 25 MCG
1000 TABLET ORAL DAILY
COMMUNITY

## 2017-11-09 RX ORDER — SODIUM CHLORIDE, SODIUM LACTATE, POTASSIUM CHLORIDE, CALCIUM CHLORIDE 600; 310; 30; 20 MG/100ML; MG/100ML; MG/100ML; MG/100ML
INJECTION, SOLUTION INTRAVENOUS CONTINUOUS
Status: CANCELLED | OUTPATIENT
Start: 2017-11-09

## 2017-11-09 NOTE — DISCHARGE INSTRUCTIONS
PRE-ADMIT TESTING -  [phone]    [address]  [place] [facility]          Your surgery has been scheduled at [facility]. We are pleased to have the opportunity to serve you. For Further Information please call [phone].    On the day of surgery please report to the Information Desk on the 1st floor.    · CONTACT YOUR PHYSICIAN'S OFFICE THE DAY PRIOR TO YOUR SURGERY TO OBTAIN YOUR ARRIVAL TIME.     · The evening before surgery do not eat anything after 9 p.m. ( this includes hard candy, chewing gum and mints).  You may only have GATORADE, POWERADE AND WATER  from 9 p.m. until you leave your home.   DO NOT DRINK ANY LIQUIDS ON THE WAY TO THE HOSPITAL.      SPECIAL MEDICATION INSTRUCTIONS: TAKE medications checked off by the Anesthesiologist on your Medication List.    Angiogram Patients: Take medications as instructed by your physician, including aspirin.     Surgery Patients:    If you take ASPIRIN - Your PHYSICIAN/SURGEON will need to inform you IF/OR when you need to stop taking aspirin prior to your surgery.     Do Not take any medications containing IBUPROFEN.  Do Not Wear any make-up or dark nail polish   (especially eye make-up) to surgery. If you come to surgery with makeup on you will be required to remove the makeup or nail polish.    Do not shave your surgical area at least 5 days prior to your surgery. The surgical prep will be performed at the hospital according to Infection Control regulations.    Leave all valuables at home.   Do Not wear any jewelry or watches, including any metal in body piercings.  Contact Lens must be removed before surgery. Either do not wear the contact lens or bring a case and solution for storage.  Please bring a container for eyeglasses or dentures as required.  Bring any paperwork your physician has provided, such as consent forms,  history and physicals, doctor's orders, etc.   Bring comfortable clothes that are loose fitting to wear upon  discharge. Take into consideration the type of surgery being performed.  Maintain your diet as advised per your physician the day prior to surgery.      Adequate rest the night before surgery is advised.   Park in the Parking lot behind the hospital or in the [facility] across the street from the parking lot. Parking is complimentary.  If you will be discharged the same day as your procedure, please arrange for a responsible adult to drive you home or to accompany you if traveling by taxi.   YOU WILL NOT BE PERMITTED TO DRIVE OR TO LEAVE THE HOSPITAL ALONE AFTER SURGERY.   It is strongly recommended that you arrange for someone to remain with you for the first 24 hrs following your surgery.       Thank you for your cooperation.  The Staff of [facility].        Bathing Instructions                                                                 Please shower the evening before and morning of your procedure with    ANTIBACTERIAL SOAP. ( DIAL, etc )  Concentrate on the surgical area   for at least 3 minutes and rinse completely. Dry off as usual.   Do not use any deodorant, powder, body lotions, perfume, after shave or    cologne.

## 2017-11-09 NOTE — ANESTHESIA PREPROCEDURE EVALUATION
11/09/2017  Zohra Paulino is a 67 y.o., female.    Anesthesia Evaluation    I have reviewed the Patient Summary Reports.    I have reviewed the Nursing Notes.   I have reviewed the Medications.     Review of Systems  Anesthesia Hx:  No problems with previous Anesthesia    Social:  Social Alcohol Use, Non-Smoker    Hematology/Oncology:  Hematology Normal   Oncology Normal     EENT/Dental:EENT/Dental Normal   Cardiovascular:  Cardiovascular Normal Exercise tolerance: good     Pulmonary:  Pulmonary Normal    Renal/:  Renal/ Normal     Hepatic/GI:  Hepatic/GI Normal    Musculoskeletal:  Musculoskeletal Normal    Neurological:  Neurology Normal    Endocrine:  Endocrine Normal    Dermatological:  Skin Normal    Psych:  Psychiatric Normal           Physical Exam  General:  Well nourished    Airway/Jaw/Neck:  Airway Findings: Tongue: Normal General Airway Assessment: Adult, Good  Mallampati: I  TM Distance: Normal, at least 6 cm  Jaw/Neck Findings:  Neck ROM: Normal ROM      Dental:  Dental Findings: Upper Dentures        Mental Status:  Mental Status Findings:  Cooperative, Alert and Oriented         Anesthesia Plan  Type of Anesthesia, risks & benefits discussed:  Anesthesia Type:  general  Patient's Preference:   Intra-op Monitoring Plan: standard ASA monitors  Intra-op Monitoring Plan Comments:   Post Op Pain Control Plan:   Post Op Pain Control Plan Comments:   Induction:    Beta Blocker:         Informed Consent: Patient understands risks and agrees with Anesthesia plan.  Questions answered. Anesthesia consent signed with patient.  ASA Score: 1     Day of Surgery Review of History & Physical:    H&P update referred to the surgeon.     Anesthesia Plan Notes: H/H and T&S.        Ready For Surgery From Anesthesia Perspective.

## 2017-11-13 ENCOUNTER — TELEPHONE (OUTPATIENT)
Dept: GYNECOLOGIC ONCOLOGY | Facility: CLINIC | Age: 67
End: 2017-11-13

## 2017-11-13 NOTE — TELEPHONE ENCOUNTER
11/13/17 advised pt papers rec'd and will be sent to disability dept to process. Verfied arrival time for surg on 11/19/17 is 5:30 am [facility]. Pt verbalixed understanding. TA/MA

## 2017-11-13 NOTE — TELEPHONE ENCOUNTER
----- Message from Sarath Garcia sent at 11/13/2017 11:17 AM CST -----  X  1st Request  _  2nd Request  _  3rd Request        Who: KRISTINA JOHNSON [[id number]]    Why: Requesting a call back in regards to her leave paperwork that was faxed over. She would like to know if the form was received and sent over to her job. She also states her surgery date is 11/16 and she doesn't know what time she needs to arrive to the hospital. Please call to discuss.    What Number to Call Back:[phone]    When to Expect a call back: (Within 24 hours)    Please return the call at earliest convenience. Thanks!

## 2017-11-14 DIAGNOSIS — M81.0 OSTEOPOROSIS, UNSPECIFIED OSTEOPOROSIS TYPE, UNSPECIFIED PATHOLOGICAL FRACTURE PRESENCE: Primary | ICD-10-CM

## 2017-11-14 RX ORDER — CETIRIZINE HYDROCHLORIDE 10 MG/1
10 TABLET ORAL DAILY
Qty: 90 TABLET | Refills: 3 | Status: SHIPPED | OUTPATIENT
Start: 2017-11-14 | End: 2017-11-17 | Stop reason: SDUPTHER

## 2017-11-14 NOTE — TELEPHONE ENCOUNTER
----- Message from Opal Valerio sent at 11/14/2017 10:25 AM CST -----  Contact: self  Refill request for---- alendronate (FOSAMAX) 40 mg tablet---and--cetirizine (ZYRTEC) 10 MG tablet--- to [facility] in [place].  Pt call back  [phone]

## 2017-11-15 ENCOUNTER — TELEPHONE (OUTPATIENT)
Dept: GYNECOLOGIC ONCOLOGY | Facility: CLINIC | Age: 67
End: 2017-11-15

## 2017-11-15 NOTE — TELEPHONE ENCOUNTER
11/15/17 advised pt to check with disability department to check status of disability paper work. TA/MA

## 2017-11-15 NOTE — TELEPHONE ENCOUNTER
----- Message from April Raymond sent at 11/15/2017  2:15 PM CST -----  Contact: KRISTINA JOHNSON [[id number]]  x_  1st Request  _  2nd Request  _  3rd Request        Who: KRISTINA JOHNSON [[id number]]    Why: patient states she would like to know if Dr. Ornelas completed her work leave form for her surgery. Please advise     What Number to Call Back: [phone]    When to Expect a call back: (Before the end of the day)   -- if call after 3:00 call back will be tomorrow.

## 2017-11-16 ENCOUNTER — ANESTHESIA (OUTPATIENT)
Dept: SURGERY | Facility: OTHER | Age: 67
DRG: 742 | End: 2017-11-16
Payer: MEDICARE

## 2017-11-16 ENCOUNTER — HOSPITAL ENCOUNTER (INPATIENT)
Facility: OTHER | Age: 67
LOS: 1 days | Discharge: HOME | DRG: 742 | End: 2017-11-17
Attending: OBSTETRICS & GYNECOLOGY | Admitting: OBSTETRICS & GYNECOLOGY
Payer: MEDICARE

## 2017-11-16 DIAGNOSIS — Z90.710 S/P HYSTERECTOMY WITH OOPHORECTOMY: ICD-10-CM

## 2017-11-16 DIAGNOSIS — N95.0 PMB (POSTMENOPAUSAL BLEEDING): Primary | ICD-10-CM

## 2017-11-16 DIAGNOSIS — Z90.721 S/P HYSTERECTOMY WITH OOPHORECTOMY: ICD-10-CM

## 2017-11-16 DIAGNOSIS — D21.9 FIBROIDS: ICD-10-CM

## 2017-11-16 LAB — POCT GLUCOSE: 91 MG/DL (ref 70–110)

## 2017-11-16 PROCEDURE — 37000008 HC ANESTHESIA 1ST 15 MINUTES: Performed by: OBSTETRICS & GYNECOLOGY

## 2017-11-16 PROCEDURE — 36000712 HC OR TIME LEV V 1ST 15 MIN: Performed by: OBSTETRICS & GYNECOLOGY

## 2017-11-16 PROCEDURE — 63600175 PHARM REV CODE 636 W HCPCS: Performed by: OBSTETRICS & GYNECOLOGY

## 2017-11-16 PROCEDURE — 58571 TLH W/T/O 250 G OR LESS: CPT | Mod: ,,, | Performed by: OBSTETRICS & GYNECOLOGY

## 2017-11-16 PROCEDURE — 63600175 PHARM REV CODE 636 W HCPCS: Performed by: NURSE ANESTHETIST, CERTIFIED REGISTERED

## 2017-11-16 PROCEDURE — 0UT24ZZ RESECTION OF BILATERAL OVARIES, PERCUTANEOUS ENDOSCOPIC APPROACH: ICD-10-PCS | Performed by: OBSTETRICS & GYNECOLOGY

## 2017-11-16 PROCEDURE — 0UT74ZZ RESECTION OF BILATERAL FALLOPIAN TUBES, PERCUTANEOUS ENDOSCOPIC APPROACH: ICD-10-PCS | Performed by: OBSTETRICS & GYNECOLOGY

## 2017-11-16 PROCEDURE — 63600175 PHARM REV CODE 636 W HCPCS: Performed by: SPECIALIST

## 2017-11-16 PROCEDURE — 25000003 PHARM REV CODE 250: Performed by: STUDENT IN AN ORGANIZED HEALTH CARE EDUCATION/TRAINING PROGRAM

## 2017-11-16 PROCEDURE — 11000001 HC ACUTE MED/SURG PRIVATE ROOM

## 2017-11-16 PROCEDURE — 25000003 PHARM REV CODE 250: Performed by: SPECIALIST

## 2017-11-16 PROCEDURE — 36000713 HC OR TIME LEV V EA ADD 15 MIN: Performed by: OBSTETRICS & GYNECOLOGY

## 2017-11-16 PROCEDURE — 88307 TISSUE EXAM BY PATHOLOGIST: CPT | Mod: 26,,, | Performed by: PATHOLOGY

## 2017-11-16 PROCEDURE — 71000039 HC RECOVERY, EACH ADD'L HOUR: Performed by: OBSTETRICS & GYNECOLOGY

## 2017-11-16 PROCEDURE — 58571 TLH W/T/O 250 G OR LESS: CPT | Mod: 80,,, | Performed by: NURSE PRACTITIONER

## 2017-11-16 PROCEDURE — 0UT94ZZ RESECTION OF UTERUS, PERCUTANEOUS ENDOSCOPIC APPROACH: ICD-10-PCS | Performed by: OBSTETRICS & GYNECOLOGY

## 2017-11-16 PROCEDURE — 82962 GLUCOSE BLOOD TEST: CPT | Performed by: OBSTETRICS & GYNECOLOGY

## 2017-11-16 PROCEDURE — 27201423 OPTIME MED/SURG SUP & DEVICES STERILE SUPPLY: Performed by: OBSTETRICS & GYNECOLOGY

## 2017-11-16 PROCEDURE — 71000033 HC RECOVERY, INTIAL HOUR: Performed by: OBSTETRICS & GYNECOLOGY

## 2017-11-16 PROCEDURE — 8E0W4CZ ROBOTIC ASSISTED PROCEDURE OF TRUNK REGION, PERCUTANEOUS ENDOSCOPIC APPROACH: ICD-10-PCS | Performed by: OBSTETRICS & GYNECOLOGY

## 2017-11-16 PROCEDURE — 88307 TISSUE EXAM BY PATHOLOGIST: CPT | Performed by: PATHOLOGY

## 2017-11-16 PROCEDURE — 37000009 HC ANESTHESIA EA ADD 15 MINS: Performed by: OBSTETRICS & GYNECOLOGY

## 2017-11-16 PROCEDURE — 25000003 PHARM REV CODE 250: Performed by: NURSE ANESTHETIST, CERTIFIED REGISTERED

## 2017-11-16 RX ORDER — OXYCODONE HYDROCHLORIDE 5 MG/1
5 TABLET ORAL ONCE AS NEEDED
Status: DISCONTINUED | OUTPATIENT
Start: 2017-11-16 | End: 2017-11-16 | Stop reason: HOSPADM

## 2017-11-16 RX ORDER — DIPHENHYDRAMINE HCL 25 MG
25 CAPSULE ORAL EVERY 4 HOURS PRN
Status: DISCONTINUED | OUTPATIENT
Start: 2017-11-16 | End: 2017-11-17 | Stop reason: HOSPADM

## 2017-11-16 RX ORDER — ROCURONIUM BROMIDE 10 MG/ML
INJECTION, SOLUTION INTRAVENOUS
Status: DISCONTINUED | OUTPATIENT
Start: 2017-11-16 | End: 2017-11-16

## 2017-11-16 RX ORDER — PROPOFOL 10 MG/ML
VIAL (ML) INTRAVENOUS
Status: DISCONTINUED | OUTPATIENT
Start: 2017-11-16 | End: 2017-11-16

## 2017-11-16 RX ORDER — DEXAMETHASONE SODIUM PHOSPHATE 4 MG/ML
INJECTION, SOLUTION INTRA-ARTICULAR; INTRALESIONAL; INTRAMUSCULAR; INTRAVENOUS; SOFT TISSUE
Status: DISCONTINUED | OUTPATIENT
Start: 2017-11-16 | End: 2017-11-16

## 2017-11-16 RX ORDER — LIDOCAINE HYDROCHLORIDE 10 MG/ML
1 INJECTION, SOLUTION EPIDURAL; INFILTRATION; INTRACAUDAL; PERINEURAL ONCE
Status: DISCONTINUED | OUTPATIENT
Start: 2017-11-16 | End: 2017-11-16 | Stop reason: HOSPADM

## 2017-11-16 RX ORDER — FENTANYL CITRATE 50 UG/ML
25 INJECTION, SOLUTION INTRAMUSCULAR; INTRAVENOUS EVERY 5 MIN PRN
Status: DISCONTINUED | OUTPATIENT
Start: 2017-11-16 | End: 2017-11-16 | Stop reason: HOSPADM

## 2017-11-16 RX ORDER — MEPERIDINE HYDROCHLORIDE 50 MG/ML
12.5 INJECTION INTRAMUSCULAR; INTRAVENOUS; SUBCUTANEOUS ONCE AS NEEDED
Status: DISCONTINUED | OUTPATIENT
Start: 2017-11-16 | End: 2017-11-16 | Stop reason: HOSPADM

## 2017-11-16 RX ORDER — GLYCOPYRROLATE 0.2 MG/ML
INJECTION INTRAMUSCULAR; INTRAVENOUS
Status: DISCONTINUED | OUTPATIENT
Start: 2017-11-16 | End: 2017-11-16

## 2017-11-16 RX ORDER — BISACODYL 10 MG
10 SUPPOSITORY, RECTAL RECTAL DAILY PRN
Status: DISCONTINUED | OUTPATIENT
Start: 2017-11-16 | End: 2017-11-17 | Stop reason: HOSPADM

## 2017-11-16 RX ORDER — ONDANSETRON 2 MG/ML
4 INJECTION INTRAMUSCULAR; INTRAVENOUS DAILY PRN
Status: DISCONTINUED | OUTPATIENT
Start: 2017-11-16 | End: 2017-11-16 | Stop reason: HOSPADM

## 2017-11-16 RX ORDER — FAMOTIDINE 20 MG/1
20 TABLET, FILM COATED ORAL
Status: COMPLETED | OUTPATIENT
Start: 2017-11-16 | End: 2017-11-16

## 2017-11-16 RX ORDER — DEXTROSE MONOHYDRATE, SODIUM CHLORIDE, AND POTASSIUM CHLORIDE 50; 1.49; 9 G/1000ML; G/1000ML; G/1000ML
INJECTION, SOLUTION INTRAVENOUS CONTINUOUS
Status: DISCONTINUED | OUTPATIENT
Start: 2017-11-16 | End: 2017-11-17

## 2017-11-16 RX ORDER — ACETAMINOPHEN 10 MG/ML
INJECTION, SOLUTION INTRAVENOUS
Status: DISCONTINUED | OUTPATIENT
Start: 2017-11-16 | End: 2017-11-16

## 2017-11-16 RX ORDER — HYDROCODONE BITARTRATE AND ACETAMINOPHEN 5; 325 MG/1; MG/1
1 TABLET ORAL EVERY 4 HOURS PRN
Status: DISCONTINUED | OUTPATIENT
Start: 2017-11-16 | End: 2017-11-17 | Stop reason: HOSPADM

## 2017-11-16 RX ORDER — OXYCODONE HYDROCHLORIDE 5 MG/1
5 TABLET ORAL
Status: DISCONTINUED | OUTPATIENT
Start: 2017-11-16 | End: 2017-11-16 | Stop reason: HOSPADM

## 2017-11-16 RX ORDER — DIPHENHYDRAMINE HYDROCHLORIDE 50 MG/ML
25 INJECTION INTRAMUSCULAR; INTRAVENOUS EVERY 4 HOURS PRN
Status: DISCONTINUED | OUTPATIENT
Start: 2017-11-16 | End: 2017-11-17 | Stop reason: HOSPADM

## 2017-11-16 RX ORDER — ONDANSETRON 2 MG/ML
INJECTION INTRAMUSCULAR; INTRAVENOUS
Status: DISCONTINUED | OUTPATIENT
Start: 2017-11-16 | End: 2017-11-16

## 2017-11-16 RX ORDER — CEFAZOLIN SODIUM 2 G/50ML
2 SOLUTION INTRAVENOUS
Status: COMPLETED | OUTPATIENT
Start: 2017-11-16 | End: 2017-11-16

## 2017-11-16 RX ORDER — MIDAZOLAM HYDROCHLORIDE 5 MG/ML
4 INJECTION INTRAMUSCULAR; INTRAVENOUS
Status: DISCONTINUED | OUTPATIENT
Start: 2017-11-16 | End: 2017-11-16 | Stop reason: HOSPADM

## 2017-11-16 RX ORDER — LIDOCAINE HCL/PF 100 MG/5ML
SYRINGE (ML) INTRAVENOUS
Status: DISCONTINUED | OUTPATIENT
Start: 2017-11-16 | End: 2017-11-16

## 2017-11-16 RX ORDER — DIPHENHYDRAMINE HYDROCHLORIDE 50 MG/ML
25 INJECTION INTRAMUSCULAR; INTRAVENOUS EVERY 6 HOURS PRN
Status: DISCONTINUED | OUTPATIENT
Start: 2017-11-16 | End: 2017-11-16 | Stop reason: HOSPADM

## 2017-11-16 RX ORDER — SODIUM CHLORIDE, SODIUM LACTATE, POTASSIUM CHLORIDE, CALCIUM CHLORIDE 600; 310; 30; 20 MG/100ML; MG/100ML; MG/100ML; MG/100ML
INJECTION, SOLUTION INTRAVENOUS CONTINUOUS
Status: DISCONTINUED | OUTPATIENT
Start: 2017-11-16 | End: 2017-11-16

## 2017-11-16 RX ORDER — NEOSTIGMINE METHYLSULFATE 1 MG/ML
INJECTION, SOLUTION INTRAVENOUS
Status: DISCONTINUED | OUTPATIENT
Start: 2017-11-16 | End: 2017-11-16

## 2017-11-16 RX ORDER — IBUPROFEN 600 MG/1
600 TABLET ORAL EVERY 6 HOURS
Status: DISCONTINUED | OUTPATIENT
Start: 2017-11-16 | End: 2017-11-17 | Stop reason: HOSPADM

## 2017-11-16 RX ORDER — ONDANSETRON 8 MG/1
8 TABLET, ORALLY DISINTEGRATING ORAL EVERY 8 HOURS PRN
Status: DISCONTINUED | OUTPATIENT
Start: 2017-11-16 | End: 2017-11-17 | Stop reason: HOSPADM

## 2017-11-16 RX ORDER — HYDROMORPHONE HYDROCHLORIDE 2 MG/ML
0.4 INJECTION, SOLUTION INTRAMUSCULAR; INTRAVENOUS; SUBCUTANEOUS EVERY 5 MIN PRN
Status: DISCONTINUED | OUTPATIENT
Start: 2017-11-16 | End: 2017-11-16 | Stop reason: HOSPADM

## 2017-11-16 RX ORDER — FENTANYL CITRATE 50 UG/ML
INJECTION, SOLUTION INTRAMUSCULAR; INTRAVENOUS
Status: DISCONTINUED | OUTPATIENT
Start: 2017-11-16 | End: 2017-11-16

## 2017-11-16 RX ORDER — FAMOTIDINE 20 MG/1
20 TABLET, FILM COATED ORAL 2 TIMES DAILY
Status: DISCONTINUED | OUTPATIENT
Start: 2017-11-16 | End: 2017-11-17 | Stop reason: HOSPADM

## 2017-11-16 RX ORDER — SODIUM CHLORIDE 0.9 % (FLUSH) 0.9 %
3 SYRINGE (ML) INJECTION
Status: DISCONTINUED | OUTPATIENT
Start: 2017-11-16 | End: 2017-11-16

## 2017-11-16 RX ORDER — HYDROCODONE BITARTRATE AND ACETAMINOPHEN 10; 325 MG/1; MG/1
1 TABLET ORAL EVERY 4 HOURS PRN
Status: DISCONTINUED | OUTPATIENT
Start: 2017-11-16 | End: 2017-11-17 | Stop reason: HOSPADM

## 2017-11-16 RX ADMIN — SODIUM CHLORIDE, SODIUM LACTATE, POTASSIUM CHLORIDE, AND CALCIUM CHLORIDE: 600; 310; 30; 20 INJECTION, SOLUTION INTRAVENOUS at 06:11

## 2017-11-16 RX ADMIN — FAMOTIDINE 20 MG: 20 TABLET, FILM COATED ORAL at 06:11

## 2017-11-16 RX ADMIN — GLYCOPYRROLATE 0.2 MG: 0.2 INJECTION, SOLUTION INTRAMUSCULAR; INTRAVENOUS at 07:11

## 2017-11-16 RX ADMIN — FAMOTIDINE 20 MG: 20 TABLET, FILM COATED ORAL at 09:11

## 2017-11-16 RX ADMIN — ONDANSETRON 4 MG: 2 INJECTION INTRAMUSCULAR; INTRAVENOUS at 08:11

## 2017-11-16 RX ADMIN — IBUPROFEN 600 MG: 600 TABLET, FILM COATED ORAL at 12:11

## 2017-11-16 RX ADMIN — ROCURONIUM BROMIDE 50 MG: 10 INJECTION, SOLUTION INTRAVENOUS at 07:11

## 2017-11-16 RX ADMIN — OXYCODONE HYDROCHLORIDE 5 MG: 5 TABLET ORAL at 09:11

## 2017-11-16 RX ADMIN — NEOSTIGMINE METHYLSULFATE 5 MG: 1 INJECTION INTRAVENOUS at 08:11

## 2017-11-16 RX ADMIN — FENTANYL CITRATE 100 MCG: 50 INJECTION, SOLUTION INTRAMUSCULAR; INTRAVENOUS at 07:11

## 2017-11-16 RX ADMIN — GLYCOPYRROLATE 0.8 MG: 0.2 INJECTION, SOLUTION INTRAMUSCULAR; INTRAVENOUS at 08:11

## 2017-11-16 RX ADMIN — DEXTROSE MONOHYDRATE, SODIUM CHLORIDE, AND POTASSIUM CHLORIDE: 50; 9; 1.49 INJECTION, SOLUTION INTRAVENOUS at 12:11

## 2017-11-16 RX ADMIN — IBUPROFEN 600 MG: 600 TABLET, FILM COATED ORAL at 06:11

## 2017-11-16 RX ADMIN — FENTANYL CITRATE 25 MCG: 50 INJECTION INTRAMUSCULAR; INTRAVENOUS at 09:11

## 2017-11-16 RX ADMIN — ACETAMINOPHEN 1000 MG: 10 INJECTION, SOLUTION INTRAVENOUS at 07:11

## 2017-11-16 RX ADMIN — MIDAZOLAM HYDROCHLORIDE 4 MG: 5 INJECTION, SOLUTION INTRAMUSCULAR; INTRAVENOUS at 06:11

## 2017-11-16 RX ADMIN — CARBOXYMETHYLCELLULOSE SODIUM 2 DROP: 2.5 SOLUTION/ DROPS OPHTHALMIC at 07:11

## 2017-11-16 RX ADMIN — SODIUM CHLORIDE, SODIUM LACTATE, POTASSIUM CHLORIDE, AND CALCIUM CHLORIDE: 600; 310; 30; 20 INJECTION, SOLUTION INTRAVENOUS at 08:11

## 2017-11-16 RX ADMIN — HYDROCODONE BITARTRATE AND ACETAMINOPHEN 1 TABLET: 5; 325 TABLET ORAL at 06:11

## 2017-11-16 RX ADMIN — DEXAMETHASONE SODIUM PHOSPHATE 8 MG: 4 INJECTION, SOLUTION INTRAMUSCULAR; INTRAVENOUS at 07:11

## 2017-11-16 RX ADMIN — PROPOFOL 200 MG: 10 INJECTION, EMULSION INTRAVENOUS at 07:11

## 2017-11-16 RX ADMIN — CEFAZOLIN SODIUM 2 G: 2 SOLUTION INTRAVENOUS at 07:11

## 2017-11-16 RX ADMIN — LIDOCAINE HYDROCHLORIDE 100 MG: 20 INJECTION, SOLUTION INTRAVENOUS at 06:11

## 2017-11-16 NOTE — H&P
"[facility]  Obstetrics & Gynecology  History & Physical    Patient Name: Zohra Paulino  MRN: [id number]  Admission Date: 2017  Primary Care Provider: Narendra Moreno MD    Subjective:     Chief Complaint/Reason for Admission: Scheduled Surgery    History of Present Illness:  Presents today for planned procedure.    From last progress note: " Patient presents today due to thickened endometrial stripe and enlarging fibroids in the post-menopausal state.  Patient has a known h/o fibroids.  States  since her early 50's.  Has had a known thickened endometrium and is s/p D&C in  with no endometrial tissue obtained.  Denies PMB currently.  TVUS showed multiple small fibroids and a 6 cm uterus with 6.7 mm endometrial stripe.  Ovaries were normal.  PSH significant for BTL and questionable Dx scope for endometriosis.  Denies FH of gyn cancers."        Obstetric History       T4      L4     SAB1   TAB0   Ectopic0   Multiple0   Live Births4       # Outcome Date GA Lbr Prakash/2nd Weight Sex Delivery Anes PTL Lv   5 Term 04/10/77 40w0d  3.232 kg (7 lb 2 oz) M Vag-Spont EPI N ROMY      Name: Jacquelin   4 Term 12/15/75 40w0d  3.175 kg (7 lb) F Vag-Spont EPI N ROMY      Name: Danny   3 Term 73 40w0d  3.317 kg (7 lb 5 oz) F Vag-Spont EPI N ROMY      Name: Jose Roberto   2 SAB 1970           1 Term 69 40w0d  2.892 kg (6 lb 6 oz) M Vag-Spont None N ROMY      Name: George      Obstetric Comments   Reg menses. Heavy. H/o fibroid   H/o chlamydia.   Denies abnl pap   Denies abnl MMG   Denies abnl c-scope     Past Medical History:   Diagnosis Date    Bronchitis     Fibroids     Osteopetrosis     Uterine fibroid      Past Surgical History:   Procedure Laterality Date    CERVICAL BIOPSY  W/ LOOP ELECTRODE EXCISION      ckc  2004    TUBAL LIGATION         PTA Medications   Medication Sig    cetirizine (ZYRTEC) 10 MG tablet Take 1 tablet (10 mg total) by mouth once daily.    vitamin D 1000 " units Tab Take 1,000 Units by mouth once daily.    alendronate (FOSAMAX) 40 mg tablet Take 1 tablet (40 mg total) by mouth once a week.       Review of patient's allergies indicates:   Allergen Reactions    Codeine Palpitations        Family History     Problem Relation (Age of Onset)    Arthritis Sister    Breast cancer Cousin (20)    Diabetes Son    No Known Problems Mother, Father, Maternal Grandmother, Maternal Grandfather, Paternal Grandfather, Son, Daughter, Daughter    Pancreatic cancer Paternal Grandmother (80)        Social History Main Topics    Smoking status: Never Smoker    Smokeless tobacco: Never Used    Alcohol use Yes      Comment: Rarely - a few drinks per year    Drug use: No    Sexual activity: Yes     Partners: Male     Birth control/ protection: None     Review of SystemsConstitutional: Negative for activity change, appetite change, chills, fatigue and fever.   HENT: Negative for hearing loss, mouth sores, nosebleeds, sore throat and tinnitus.    Eyes: Negative for visual disturbance.   Respiratory: Negative for cough, chest tightness, shortness of breath and wheezing.    Cardiovascular: Negative for chest pain and leg swelling.   Gastrointestinal: Negative for abdominal distention, abdominal pain, blood in stool, constipation, diarrhea, nausea and vomiting.   Genitourinary: Negative for dysuria, flank pain, frequency, hematuria, pelvic pain, vaginal bleeding, vaginal discharge and vaginal pain.   Musculoskeletal: Negative for arthralgias and back pain.   Skin: Negative for rash.   Neurological: Negative for dizziness, seizures, syncope, weakness and numbness.   Hematological: Does not bruise/bleed easily.   Psychiatric/Behavioral: Negative for confusion and sleep disturbance. The patient is not nervous/anxious.    Objective:     Vital Signs (Most Recent):  Temp: 98.3 °F (36.8 °C) (11/16/17 0600)  Pulse: 76 (11/16/17 0600)  Resp: 18 (11/16/17 0600)  BP: (!) 141/69 (11/16/17 0600)  SpO2:  99 % (11/16/17 0600) Vital Signs (24h Range):  Temp:  [98.3 °F (36.8 °C)] 98.3 °F (36.8 °C)  Pulse:  [76] 76  Resp:  [18] 18  SpO2:  [99 %] 99 %  BP: (141)/(69) 141/69     Weight: 63.4 kg (139 lb 12.4 oz)  Body mass index is 25.56 kg/m².    No LMP recorded (lmp unknown). Patient is postmenopausal.    Physical Exam  Constitutional: She is oriented to person, place, and time. She appears well-developed and well-nourished. No distress.    HENT:   Head: Normocephalic and atraumatic.    Eyes: No scleral icterus.    Neck: Normal range of motion. Neck supple.    Cardiovascular: Normal rate and intact distal pulses.  Exam reveals no cyanosis and no edema.     Pulmonary/Chest: Effort normal. No respiratory distress. She exhibits no tenderness.      Abdominal: Soft. Normal appearance. She exhibits no distension, no fluid wave, no ascites and no mass. There is no tenderness. There is no rigidity, no rebound and no guarding. No hernia.    Laboratory:  CBC: No results for input(s): WBC, RBC, HGB, HCT, PLT, MCV, MCH, MCHC in the last 48 hours.  CMP: No results for input(s): GLU, CALCIUM, ALBUMIN, PROT, NA, K, CO2, CL, BUN, CREATININE, ALKPHOS, ALT, AST, BILITOT in the last 48 hours.    Diagnostic Results:  US: Reviewed    Assessment/Plan:     PMB (postmenopausal bleeding)    To OR today for RATLH/BSO/possible staging            Carly Turner MD  Obstetrics & Gynecology  [facility]

## 2017-11-16 NOTE — TRANSFER OF CARE
"Anesthesia Transfer of Care Note    Patient: Zohra Paulino    Procedure(s) Performed: Procedure(s) (LRB):  XI ROBOTIC ASSISTED LAPAROSCOPIC HYSTERECTOMY (N/A)  XI ROBOT ASSISTED LAPAROSCOPIC SALPINGO-OOPHERECTOMY (Bilateral)    Patient location: PACU    Anesthesia Type: general    Transport from OR: Transported from OR on 2-3 L/min O2 by NC with adequate spontaneous ventilation    Post pain: adequate analgesia    Post assessment: no apparent anesthetic complications and tolerated procedure well    Post vital signs: stable    Level of consciousness: awake    Nausea/Vomiting: no nausea/vomiting    Complications: none    Transfer of care protocol was followed      Last vitals:   Visit Vitals  BP (!) 141/69 (BP Location: Right arm, Patient Position: Lying)   Pulse 76   Temp 36.8 °C (98.3 °F) (Oral)   Resp 18   Ht 5' 2" (1.575 m)   Wt 63.4 kg (139 lb 12.4 oz)   LMP  (LMP Unknown)   SpO2 99%   Breastfeeding? No   BMI 25.56 kg/m²     "

## 2017-11-16 NOTE — OP NOTE
DATE OF PROCEDURE:  11/16/2017    SURGEON:  Xavier Ornelas M.D.    ASSISTANTS:  Carly Turner M.D. (RES) and Jazmyne Enamorado, nurse practitioner   served as first assistant.    PREOPERATIVE DIAGNOSES:  Questionable enlarging uterine fibroids in the   postmenopausal state.    POSTOPERATIVE DIAGNOSIS:  Uterine fibroids.    PROCEDURE:  Robotic-assisted laparoscopic hysterectomy, bilateral   salpingo-oophorectomy.    COMPLICATIONS:  None.    ESTIMATED BLOOD LOSS:  Less than 50 mL    ANESTHESIA:  General.    INTRAOPERATIVE FINDINGS:  Included normal appearing bilateral tubes and ovaries,   an 8 cm uterus.  The patient had otherwise normal appearing abdominal and   pelvic anatomy.    PROCEDURE IN DETAIL:  After informed consent was obtained, the patient was taken   to the Operating Suite.  General anesthesia was administered and once this was   felt to be adequate, she was placed in dorsal lithotomy position with arms   tucked.  The abdomen and pelvis were prepped and draped in the usual sterile   fashion and a speculum was placed in the vagina.  Cervix was visualized, grasped   with single-tooth tenaculum and the cervix was stenotic, so it was initially   dilated.  It sounded to approximately 6 cm.  Serial dilation was performed and a   medium VCare manipulator was placed without difficulty.  I suspected that I   perforated the fundus of the uterus and this was confirmed upon intraperitoneal   placement of the camera.  A Wang catheter was placed to gravity drainage and   attention was turned to the abdominal portion of the procedure.  Veress needle   was placed through the umbilicus.  Opening pressure was noted to be 2.    Pneumoperitoneum was obtained with carbon dioxide and once this was felt to be   adequate, an 8 mm incision was made above the umbilicus and a robotic trocar was   placed through this.  Intraperitoneal placement was confirmed with the camera   and lateral robotic trocars x3 were placed through 8 mm  incisions.  A right   upper quadrant AirSeal 8 mm accessory port was placed and the patient was placed   in steep Trendelenburg.  The robot was docked and instruments were passed in   the operative field.  The above stated findings were noted.  The patient did   have a perforation through the uterine fundus.  The colon was inspected and   noted to be fine.  Attention was first turned to the left side where the left   round ligament was isolated, sacrificed with bipolar cautery and transected.    The anterior and posterior leaflets of the broad ligament were opened.    Pararectal space was developed.  The ureter was identified and a window was made   beneath the infundibulopelvic ligament.  This was sacrificed with bipolar   cautery and transected.  The medial fold of the peritoneum was then taken down   to the uterosacral.  The right side was then dissected in an identical manner.    Anteriorly, the vesicouterine peritoneum was incised and the bladder was   mobilized inferiorly below the ring.  A 10 o'clock to 2 o'clock colpotomy was   performed.  Posteriorly, a 4 o'clock to 8 o'clock colpotomy was performed.    Bilateral cardinal ligaments and uterine vessels skeletonized their peritoneal   attachments sacrificed with bipolar cautery and transected.  The circumferential   colpotomy was then completed.  Uterus, cervix, bilateral tubes and ovaries were   removed.  Vaginal cuff was made hemostatic and was closed with a running   nonlocking 0 Vicryl suture tied in the midline.  Pelvic structures were then   inspected and the pelvis was irrigated.  All pedicles were seen and noted to be   hemostatic.  Vitagel was applied.  At this point in time, the instruments were   removed, the robot was undocked and pneumoperitoneum was evacuated.  The patient   was flattened and all trocars were removed.  Port sites were inspected and made   hemostatic with electrocautery and closed with subcuticular 4-0 Monocryl   suture.   Steri-Strips were applied, and the patient was awoken and taken to   Recovery Room in stable condition.  Of note, I was present for and performed all   key aspects of procedure.  Jazmyne Enamorado's expertise was needed as there was   no qualified resident available.      SEFERINO/IN  dd: 11/16/2017 08:33:44 (CST)  td: 11/16/2017 09:33:11 (CST)  Doc ID   #[id number]  Job ID #[id number]    CC:

## 2017-11-16 NOTE — PLAN OF CARE
Problem: Patient Care Overview  Goal: Plan of Care Review  Outcome: Ongoing (interventions implemented as appropriate)  Pt on RA sat's 95% oxygen not needed.

## 2017-11-16 NOTE — SUBJECTIVE & OBJECTIVE
Obstetric History       T4      L4     SAB1   TAB0   Ectopic0   Multiple0   Live Births4       # Outcome Date GA Lbr Prakash/2nd Weight Sex Delivery Anes PTL Lv   5 Term 04/10/77 40w0d  3.232 kg (7 lb 2 oz) M Vag-Spont EPI N ROMY      Name: Jacquelin   4 Term 12/15/75 40w0d  3.175 kg (7 lb) F Vag-Spont EPI N ROMY      Name: Danny   3 Term 73 40w0d  3.317 kg (7 lb 5 oz) F Vag-Spont EPI N ROMY      Name: Jose Roberto   2 SAB 1970           1 Term 69 40w0d  2.892 kg (6 lb 6 oz) M Vag-Spont None N ROMY      Name: George      Obstetric Comments   Reg menses. Heavy. H/o fibroid   H/o chlamydia.   Denies abnl pap   Denies abnl MMG   Denies abnl c-scope     Past Medical History:   Diagnosis Date    Bronchitis     Fibroids     Osteopetrosis     Uterine fibroid      Past Surgical History:   Procedure Laterality Date    CERVICAL BIOPSY  W/ LOOP ELECTRODE EXCISION      ckc      TUBAL LIGATION         PTA Medications   Medication Sig    cetirizine (ZYRTEC) 10 MG tablet Take 1 tablet (10 mg total) by mouth once daily.    vitamin D 1000 units Tab Take 1,000 Units by mouth once daily.    alendronate (FOSAMAX) 40 mg tablet Take 1 tablet (40 mg total) by mouth once a week.       Review of patient's allergies indicates:   Allergen Reactions    Codeine Palpitations        Family History     Problem Relation (Age of Onset)    Arthritis Sister    Breast cancer Cousin (20)    Diabetes Son    No Known Problems Mother, Father, Maternal Grandmother, Maternal Grandfather, Paternal Grandfather, Son, Daughter, Daughter    Pancreatic cancer Paternal Grandmother (80)        Social History Main Topics    Smoking status: Never Smoker    Smokeless tobacco: Never Used    Alcohol use Yes      Comment: Rarely - a few drinks per year    Drug use: No    Sexual activity: Yes     Partners: Male     Birth control/ protection: None     Review of SystemsConstitutional: Negative for activity change, appetite change, chills, fatigue  and fever.   HENT: Negative for hearing loss, mouth sores, nosebleeds, sore throat and tinnitus.    Eyes: Negative for visual disturbance.   Respiratory: Negative for cough, chest tightness, shortness of breath and wheezing.    Cardiovascular: Negative for chest pain and leg swelling.   Gastrointestinal: Negative for abdominal distention, abdominal pain, blood in stool, constipation, diarrhea, nausea and vomiting.   Genitourinary: Negative for dysuria, flank pain, frequency, hematuria, pelvic pain, vaginal bleeding, vaginal discharge and vaginal pain.   Musculoskeletal: Negative for arthralgias and back pain.   Skin: Negative for rash.   Neurological: Negative for dizziness, seizures, syncope, weakness and numbness.   Hematological: Does not bruise/bleed easily.   Psychiatric/Behavioral: Negative for confusion and sleep disturbance. The patient is not nervous/anxious.    Objective:     Vital Signs (Most Recent):  Temp: 98.3 °F (36.8 °C) (11/16/17 0600)  Pulse: 76 (11/16/17 0600)  Resp: 18 (11/16/17 0600)  BP: (!) 141/69 (11/16/17 0600)  SpO2: 99 % (11/16/17 0600) Vital Signs (24h Range):  Temp:  [98.3 °F (36.8 °C)] 98.3 °F (36.8 °C)  Pulse:  [76] 76  Resp:  [18] 18  SpO2:  [99 %] 99 %  BP: (141)/(69) 141/69     Weight: 63.4 kg (139 lb 12.4 oz)  Body mass index is 25.56 kg/m².    No LMP recorded (lmp unknown). Patient is postmenopausal.    Physical Exam  Constitutional: She is oriented to person, place, and time. She appears well-developed and well-nourished. No distress.    HENT:   Head: Normocephalic and atraumatic.    Eyes: No scleral icterus.    Neck: Normal range of motion. Neck supple.    Cardiovascular: Normal rate and intact distal pulses.  Exam reveals no cyanosis and no edema.     Pulmonary/Chest: Effort normal. No respiratory distress. She exhibits no tenderness.      Abdominal: Soft. Normal appearance. She exhibits no distension, no fluid wave, no ascites and no mass. There is no tenderness. There is no  rigidity, no rebound and no guarding. No hernia.    Laboratory:  CBC: No results for input(s): WBC, RBC, HGB, HCT, PLT, MCV, MCH, MCHC in the last 48 hours.  CMP: No results for input(s): GLU, CALCIUM, ALBUMIN, PROT, NA, K, CO2, CL, BUN, CREATININE, ALKPHOS, ALT, AST, BILITOT in the last 48 hours.    Diagnostic Results:  US: Reviewed

## 2017-11-16 NOTE — HPI
"Presents today for planned procedure.    From last progress note: " Patient presents today due to thickened endometrial stripe and enlarging fibroids in the post-menopausal state.  Patient has a known h/o fibroids.  States  since her early 50's.  Has had a known thickened endometrium and is s/p D&C in 2014 with no endometrial tissue obtained.  Denies PMB currently.  TVUS showed multiple small fibroids and a 6 cm uterus with 6.7 mm endometrial stripe.  Ovaries were normal.  PSH significant for BTL and questionable Dx scope for endometriosis.  Denies FH of gyn cancers."  "

## 2017-11-16 NOTE — ANESTHESIA POSTPROCEDURE EVALUATION
"Anesthesia Post Evaluation    Patient: Zohra Paulino    Procedure(s) Performed: Procedure(s) (LRB):  XI ROBOTIC ASSISTED LAPAROSCOPIC HYSTERECTOMY (N/A)  XI ROBOT ASSISTED LAPAROSCOPIC SALPINGO-OOPHERECTOMY (Bilateral)    Final Anesthesia Type: general  Patient location during evaluation: PACU  Patient participation: Yes- Able to Participate  Level of consciousness: awake and alert  Post-procedure vital signs: reviewed and stable  Pain management: adequate  Airway patency: patent  PONV status at discharge: No PONV  Anesthetic complications: no      Cardiovascular status: blood pressure returned to baseline  Respiratory status: unassisted, spontaneous ventilation and room air  Hydration status: euvolemic  Follow-up not needed.        Visit Vitals  BP (!) 151/66   Pulse 65   Temp 36.7 °C (98 °F) (Oral)   Resp 18   Ht 5' 2" (1.575 m)   Wt 63.4 kg (139 lb 12.4 oz)   LMP  (LMP Unknown)   SpO2 100%   Breastfeeding? No   BMI 25.56 kg/m²       Pain/Suzanna Score: Pain Assessment Performed: Yes (11/16/2017  5:55 AM)  Presence of Pain: denies (11/16/2017  5:55 AM)  Pain Rating Prior to Med Admin: 4 (11/16/2017  9:54 AM)      "

## 2017-11-16 NOTE — BRIEF OP NOTE
[facility]  Brief Operative Note    SUMMARY     Surgery Date: 11/16/2017     Surgeon(s) and Role:     * Xavier Ornelas MD - Primary     * Carly Turner MD - Resident - Assisting        Pre-op Diagnosis:  Thickened endometrium [R93.8]    Post-op Diagnosis:  Post-Op Diagnosis Codes:     * Thickened endometrium [R93.8]    Procedure(s) (LRB):  XI ROBOTIC ASSISTED LAPAROSCOPIC HYSTERECTOMY (N/A)  XI ROBOT ASSISTED LAPAROSCOPIC SALPINGO-OOPHERECTOMY (Bilateral)    Anesthesia: General    Description of Procedure: Uterus, tubes, ovaries are normal in appearance. Survey of upper abdomen with normal appearing liver, gallbladder, stomach. Uterine perforation with manipulator on placement.     Estimated Blood Loss: 5 mL         Specimens:   Specimen (12h ago through future)    Start     Ordered    11/16/17 0819  Specimen to Pathology - Surgery  Once     Comments:  1. UTERUS CERVIX BILATERAL TUBES AND OVARIES      11/16/17 0820

## 2017-11-17 VITALS
TEMPERATURE: 98 F | BODY MASS INDEX: 25.72 KG/M2 | OXYGEN SATURATION: 100 % | RESPIRATION RATE: 18 BRPM | DIASTOLIC BLOOD PRESSURE: 63 MMHG | HEART RATE: 74 BPM | HEIGHT: 62 IN | WEIGHT: 139.75 LBS | SYSTOLIC BLOOD PRESSURE: 134 MMHG

## 2017-11-17 DIAGNOSIS — M81.0 OSTEOPOROSIS, UNSPECIFIED OSTEOPOROSIS TYPE, UNSPECIFIED PATHOLOGICAL FRACTURE PRESENCE: ICD-10-CM

## 2017-11-17 PROBLEM — R03.0 ELEVATED BLOOD PRESSURE READING: Status: ACTIVE | Noted: 2017-11-17

## 2017-11-17 PROCEDURE — 25000003 PHARM REV CODE 250: Performed by: STUDENT IN AN ORGANIZED HEALTH CARE EDUCATION/TRAINING PROGRAM

## 2017-11-17 RX ORDER — CETIRIZINE HYDROCHLORIDE 10 MG/1
10 TABLET ORAL DAILY
Qty: 90 TABLET | Refills: 3 | Status: SHIPPED | OUTPATIENT
Start: 2017-11-17 | End: 2018-05-16 | Stop reason: SDUPTHER

## 2017-11-17 RX ORDER — HYDROCODONE BITARTRATE AND ACETAMINOPHEN 5; 325 MG/1; MG/1
1 TABLET ORAL EVERY 4 HOURS PRN
Qty: 30 TABLET | Refills: 0 | Status: SHIPPED | OUTPATIENT
Start: 2017-11-17 | End: 2017-12-22

## 2017-11-17 RX ORDER — IBUPROFEN 600 MG/1
600 TABLET ORAL EVERY 6 HOURS
Qty: 30 TABLET | Refills: 0 | Status: SHIPPED | OUTPATIENT
Start: 2017-11-17 | End: 2018-05-01 | Stop reason: ALTCHOICE

## 2017-11-17 RX ADMIN — DEXTROSE MONOHYDRATE, SODIUM CHLORIDE, AND POTASSIUM CHLORIDE: 50; 9; 1.49 INJECTION, SOLUTION INTRAVENOUS at 03:11

## 2017-11-17 RX ADMIN — FAMOTIDINE 20 MG: 20 TABLET, FILM COATED ORAL at 09:11

## 2017-11-17 RX ADMIN — IBUPROFEN 600 MG: 600 TABLET, FILM COATED ORAL at 06:11

## 2017-11-17 RX ADMIN — IBUPROFEN 600 MG: 600 TABLET, FILM COATED ORAL at 12:11

## 2017-11-17 RX ADMIN — HYDROCODONE BITARTRATE AND ACETAMINOPHEN 1 TABLET: 5; 325 TABLET ORAL at 12:11

## 2017-11-17 RX ADMIN — HYDROCODONE BITARTRATE AND ACETAMINOPHEN 1 TABLET: 5; 325 TABLET ORAL at 06:11

## 2017-11-17 NOTE — NURSING
Pt left unit via wc with family, pt aaox3, verbalized understanding to f/u with dr in 4 weeks, rx given. Pt in no s/s of distress

## 2017-11-17 NOTE — NURSING
Pt pain well controlled with PO pain medicine, voiding clear yellow urine to sofia to gravity, no new drainage to dressings, bed in low locked position, call light within reach, family member at bedside, pt has slept most of the shift and tolerated diet well.  Purposeful hourly rounding performed throughout shift.

## 2017-11-17 NOTE — PROGRESS NOTES
"[facility]  Obstetrics & Gynecology  Progress Note    Patient Name: Zohra Paulino  MRN: [id number]  Admission Date: 11/16/2017  Primary Care Provider: Narendra Moreno MD  Principal Problem: <principal problem not specified>    Subjective:     HPI:  Presents today for planned procedure.    From last progress note: " Patient presents today due to thickened endometrial stripe and enlarging fibroids in the post-menopausal state.  Patient has a known h/o fibroids.  States  since her early 50's.  Has had a known thickened endometrium and is s/p D&C in 2014 with no endometrial tissue obtained.  Denies PMB currently.  TVUS showed multiple small fibroids and a 6 cm uterus with 6.7 mm endometrial stripe.  Ovaries were normal.  PSH significant for BTL and questionable Dx scope for endometriosis.  Denies FH of gyn cancers."    Interval History: POD#1. Pt is tolerating a regular diet, fish and peaches for dinner, without N/V. She has not ambulated or voided. Pain is well controlled.    Scheduled Meds:   famotidine  20 mg Oral BID    ibuprofen  600 mg Oral Q6H     Continuous Infusions:   dextrose 5 % and 0.9 % NaCl with KCl 20 mEq 125 mL/hr at 11/17/17 0305     PRN Meds:bisacodyl, diphenhydrAMINE, diphenhydrAMINE, hydrocodone-acetaminophen 10-325mg, hydrocodone-acetaminophen 5-325mg, ondansetron, promethazine (PHENERGAN) IVPB    Review of patient's allergies indicates:   Allergen Reactions    Codeine Palpitations       Objective:     Vital Signs (Most Recent):  Temp: 98.7 °F (37.1 °C) (11/17/17 0508)  Pulse: 77 (11/17/17 0508)  Resp: 18 (11/16/17 2133)  BP: 139/65 (11/17/17 0508)  SpO2: 95 % (11/17/17 0508) Vital Signs (24h Range):  Temp:  [97.9 °F (36.6 °C)-98.7 °F (37.1 °C)] 98.7 °F (37.1 °C)  Pulse:  [65-86] 77  Resp:  [14-18] 18  SpO2:  [95 %-100 %] 95 %  BP: (139-166)/(62-82) 139/65     Weight: 63.4 kg (139 lb 12.4 oz)  Body mass index is 25.56 kg/m².  No LMP recorded (lmp unknown). Patient is " postmenopausal.    I&O (Last 24H):      Intake/Output Summary (Last 24 hours) at 11/17/17 0643  Last data filed at 11/17/17 0624    <table CELLSPACING=0 CELLPADDING=0 BORDER=1>  <tr><td ZKFEY=335%></td>  <td ZPNAO=332%><b>Gross per 24 hour</b></td></tr>  <tr><td ARMYU=841%>Intake</td>  <td REPQA=473%>             3425 ml</td></tr>  <tr><td VULMG=452%>Output</td>  <td KONED=876%>             2385 ml</td></tr>  <tr><td HILNL=164%><b>Net</b></td>  <td WPDGR=597%>             1040 ml</td></tr>  </table>      Laboratory:  CBC: No results for input(s): WBC, RBC, HGB, HCT, PLT, MCV, MCH, MCHC in the last 48 hours.  CMP: No results for input(s): GLU, CALCIUM, ALBUMIN, PROT, NA, K, CO2, CL, BUN, CREATININE, ALKPHOS, ALT, AST, BILITOT in the last 48 hours.    Diagnostic Results:  None    Physical Exam:   Constitutional: She is oriented to person, place, and time. She appears well-developed and well-nourished. No distress.    HENT:   Head: Normocephalic and atraumatic.     Neck: Normal range of motion. Neck supple.    Cardiovascular: Normal rate, regular rhythm and normal heart sounds.     Pulmonary/Chest: Effort normal and breath sounds normal. No respiratory distress. She has no wheezes. She has no rales.        Abdominal: Soft. Bowel sounds are normal. She exhibits abdominal incision (c/d/i x5 port sites). She exhibits no distension. There is no tenderness. There is no guarding.             Musculoskeletal: She exhibits no edema or tenderness.       Neurological: She is alert and oriented to person, place, and time.    Skin: Skin is warm and dry.    Psychiatric: She has a normal mood and affect. Her behavior is normal.       Assessment/Plan:     Elevated blood pressure reading    - possibly undiagnosed HTN, follow up outpatient        S/P hysterectomy with oophorectomy, RATLH/BSO 11/16/17    POD#1 s/p RATLH/BSO  - Continue to ADAT  - d/c IVF  - Pain control with Ibuprofen and NORCO PRN, dilaudid for BTP  - Zofran/Phenergan  PRN nausea  - spontaneous void trial this am  - plan for dc today              Carly Turner MD  Obstetrics & Gynecology  [facility]

## 2017-11-17 NOTE — SUBJECTIVE & OBJECTIVE
Interval History: POD#1. Pt is tolerating a regular diet, fish and peaches for dinner, without N/V. She has not ambulated or voided. Pain is well controlled.    Scheduled Meds:   famotidine  20 mg Oral BID    ibuprofen  600 mg Oral Q6H     Continuous Infusions:   dextrose 5 % and 0.9 % NaCl with KCl 20 mEq 125 mL/hr at 11/17/17 0305     PRN Meds:bisacodyl, diphenhydrAMINE, diphenhydrAMINE, hydrocodone-acetaminophen 10-325mg, hydrocodone-acetaminophen 5-325mg, ondansetron, promethazine (PHENERGAN) IVPB    Review of patient's allergies indicates:   Allergen Reactions    Codeine Palpitations       Objective:     Vital Signs (Most Recent):  Temp: 98.7 °F (37.1 °C) (11/17/17 0508)  Pulse: 77 (11/17/17 0508)  Resp: 18 (11/16/17 2133)  BP: 139/65 (11/17/17 0508)  SpO2: 95 % (11/17/17 0508) Vital Signs (24h Range):  Temp:  [97.9 °F (36.6 °C)-98.7 °F (37.1 °C)] 98.7 °F (37.1 °C)  Pulse:  [65-86] 77  Resp:  [14-18] 18  SpO2:  [95 %-100 %] 95 %  BP: (139-166)/(62-82) 139/65     Weight: 63.4 kg (139 lb 12.4 oz)  Body mass index is 25.56 kg/m².  No LMP recorded (lmp unknown). Patient is postmenopausal.    I&O (Last 24H):      Intake/Output Summary (Last 24 hours) at 11/17/17 0643  Last data filed at 11/17/17 0624    <table CELLSPACING=0 CELLPADDING=0 BORDER=1>  <tr><td XQTLG=766%></td>  <td WXCMG=717%><b>Gross per 24 hour</b></td></tr>  <tr><td OTVKN=170%>Intake</td>  <td EDWSJ=587%>             3425 ml</td></tr>  <tr><td DXTGK=846%>Output</td>  <td PFXUS=970%>             2385 ml</td></tr>  <tr><td ZYTRQ=300%><b>Net</b></td>  <td PTBQK=885%>             1040 ml</td></tr>  </table>      Laboratory:  CBC: No results for input(s): WBC, RBC, HGB, HCT, PLT, MCV, MCH, MCHC in the last 48 hours.  CMP: No results for input(s): GLU, CALCIUM, ALBUMIN, PROT, NA, K, CO2, CL, BUN, CREATININE, ALKPHOS, ALT, AST, BILITOT in the last 48 hours.    Diagnostic Results:  None    Physical Exam:   Constitutional: She is oriented to person,  place, and time. She appears well-developed and well-nourished. No distress.    HENT:   Head: Normocephalic and atraumatic.     Neck: Normal range of motion. Neck supple.    Cardiovascular: Normal rate, regular rhythm and normal heart sounds.     Pulmonary/Chest: Effort normal and breath sounds normal. No respiratory distress. She has no wheezes. She has no rales.        Abdominal: Soft. Bowel sounds are normal. She exhibits abdominal incision (c/d/i x5 port sites). She exhibits no distension. There is no tenderness. There is no guarding.             Musculoskeletal: She exhibits no edema or tenderness.       Neurological: She is alert and oriented to person, place, and time.    Skin: Skin is warm and dry.    Psychiatric: She has a normal mood and affect. Her behavior is normal.

## 2017-11-17 NOTE — HOSPITAL COURSE
11/16/2017: Pt underwent uncomplicated RATLH/BSO and was transferred to the floor in stable condition.  11/17/2017: Pt meeting routine milestones and is ready for discharge.

## 2017-11-17 NOTE — ASSESSMENT & PLAN NOTE
POD#1 s/p RATLH/BSO  - Continue to ADAT  - d/c IVF  - Pain control with Ibuprofen and NORCO PRN, dilaudid for BTP  - Zofran/Phenergan PRN nausea  - spontaneous void trial this am  - plan for dc today

## 2017-11-17 NOTE — PLAN OF CARE
Problem: Patient Care Overview  Goal: Plan of Care Review  Outcome: Ongoing (interventions implemented as appropriate)  Plan of care reviewed with pt. NAD noted at this time. Denies any cp or sob. Resp even and unlabored. Incentive spirometer encouraged while awake. VSS, afebrile. Denies N/V. AAO x 4. PPP alex. SCD's remain in place. Pain controlled with current pain meds. Wang to bedside drainage. Remains free from injury. Safety precautions remain in place. Call light in reach. Will continue to monitor.

## 2017-11-17 NOTE — TELEPHONE ENCOUNTER
----- Message from Lelia Dodge sent at 11/17/2017 11:29 AM CST -----  Contact: self  Refill request for -- Zyrtec & Fosamax .   [facility] .     pts # [phone]     LL

## 2017-11-17 NOTE — PLAN OF CARE
SW met with pt to complete discharge assessment, daughterDanny present.  Pt lives with adult sons, 40 and 48 yo. No needs identified at this time.     11/17/17 0818   Discharge Assessment   Assessment Type Discharge Planning Assessment   Confirmed/corrected address and phone number on facesheet? Yes   Assessment information obtained from? Patient   Communicated expected length of stay with patient/caregiver no   Prior to hospitilization cognitive status: Alert/Oriented   Prior to hospitalization functional status: Independent   Current cognitive status: Alert/Oriented   Current Functional Status: Independent   Lives With child(antoni), adult  (sons, 40, 48 yo)   Able to Return to Prior Arrangements yes   Is patient able to care for self after discharge? Yes   Patient's perception of discharge disposition home or selfcare   Readmission Within The Last 30 Days no previous admission in last 30 days   Patient currently being followed by outpatient case management? No   Patient currently receives any other outside agency services? No   Equipment Currently Used at Home none   Do you have any problems affording any of your prescribed medications? No   Is the patient taking medications as prescribed? yes   Does the patient have transportation home? Yes   Transportation Available family or friend will provide   Does the patient receive services at the [facility]? No   Discharge Plan A Home   Patient/Family In Agreement With Plan yes

## 2017-11-18 ENCOUNTER — NURSE TRIAGE (OUTPATIENT)
Dept: ADMINISTRATIVE | Facility: CLINIC | Age: 67
End: 2017-11-18

## 2017-11-18 NOTE — TELEPHONE ENCOUNTER
Reason for Disposition   [1] Abdominal pain, moderate-severe AND [2] female   [1] SEVERE pain (e.g., excruciating) AND [2] present > 1 hour    Protocols used: ST GI MULTIPLE SYMPTOMS - GUIDELINE [id number],  ABDOMINAL PAIN - FEMALE-A-

## 2017-11-20 NOTE — OPERATIVE NOTE ADDENDUM
Certification of Assistant at Surgery       Surgery Date: 11/16/2017     Participating Surgeons:  Surgeon(s) and Role:     * Xavier Ornelas MD - Primary        Jazmyne Enamorado, ILEANA-C, First Assist     * Carly Turner MD - Resident - Assisting    Procedures:  Procedure(s) (LRB):  XI ROBOTIC ASSISTED LAPAROSCOPIC HYSTERECTOMY (N/A)  XI ROBOT ASSISTED LAPAROSCOPIC SALPINGO-OOPHERECTOMY (Bilateral)    Assistant Surgeon's Certification of Necessity:  I understand that section 1842 (b) (6) (d) of the Social Security Act generally prohibits Medicare Part B reasonable charge payment for the services of assistants at surgery in teaching hospitals when qualified residents are available to furnish such services. I certify that the services for which payment is claimed were medically necessary, and that no qualified resident was available to perform the services. I further understand that these services are subject to post-payment review by the Medicare carrier.      Jazmyne Enamorado NP    11/20/2017  12:48 PM

## 2017-11-21 ENCOUNTER — TELEPHONE (OUTPATIENT)
Dept: GYNECOLOGIC ONCOLOGY | Facility: CLINIC | Age: 67
End: 2017-11-21

## 2017-11-22 NOTE — DISCHARGE SUMMARY
"[facility]  Obstetrics & Gynecology  Discharge Summary    Patient Name: Zohra Paulino  MRN: [id number]  Admission Date: 11/16/2017  Hospital Length of Stay: 1 days  Discharge Date and Time:  11/22/2017 8:33 AM  Attending Physician: No att. providers found   Discharging Provider: Carly Turner MD  Primary Care Provider: Narendra Moreno MD    HPI:  Presents today for planned procedure.    From last progress note: " Patient presents today due to thickened endometrial stripe and enlarging fibroids in the post-menopausal state.  Patient has a known h/o fibroids.  States  since her early 50's.  Has had a known thickened endometrium and is s/p D&C in 2014 with no endometrial tissue obtained.  Denies PMB currently.  TVUS showed multiple small fibroids and a 6 cm uterus with 6.7 mm endometrial stripe.  Ovaries were normal.  PSH significant for BTL and questionable Dx scope for endometriosis.  Denies FH of gyn cancers."    Hospital Course:  11/16/2017: Pt underwent uncomplicated RATLH/BSO and was transferred to the floor in stable condition.  11/17/2017: Pt meeting routine milestones and is ready for discharge.    Procedure(s) (LRB):  XI ROBOTIC ASSISTED LAPAROSCOPIC HYSTERECTOMY (N/A)  XI ROBOT ASSISTED LAPAROSCOPIC SALPINGO-OOPHERECTOMY (Bilateral)         Significant Diagnostic Studies: Labs: CBC No results for input(s): WBC, HGB, HCT, PLT in the last 48 hours.    Pending Diagnostic Studies:     None        Final Active Diagnoses:    Diagnosis Date Noted POA    PRINCIPAL PROBLEM:  S/P hysterectomy with oophorectomy, RATLH/BSO 11/16/17 [Z90.710, Z90.721] 11/16/2017 No    Elevated blood pressure reading [R03.0] 11/17/2017 Yes    Fibroids [D25.9] 11/16/2017 Yes      Problems Resolved During this Admission:    Diagnosis Date Noted Date Resolved POA    Fibroids [D25.9] 11/16/2017 11/16/2017 Yes    PMB (postmenopausal bleeding) [N95.0] 09/25/2017 11/16/2017 Yes        Discharged Condition: " good    Disposition: Home or Self Care    Follow Up:  Follow-up Information     Xavier Ornelas MD In 4 weeks.    Specialties:  Obstetrics, Gynecology, Gynecologic Oncology  Why:  Post op visit  Contact information:  [address]  [facility] [address] [phone]                 Patient Instructions:     Diet general     Weight bearing restrictions (specify)   Order Comments: May not lift anything heavier than 10 pounds until 6 week post op visit     Other restrictions (specify):   Order Comments: Pelvic rest for at least 6 weeks, until post op visit, at which time we may recommend additional pelvic rest up to 10-12 weeks total, pelvic rest means nothing in the vagina, no tampons, no intercourse, no douching, etc.     Call MD for:  temperature >100.4     Call MD for:  persistent nausea and vomiting or diarrhea     Call MD for:  severe uncontrolled pain     Call MD for:  redness, tenderness, or signs of infection (pain, swelling, redness, odor or green/yellow discharge around incision site)     Call MD for:  difficulty breathing or increased cough     Call MD for:  severe persistent headache     Call MD for:  worsening rash     Call MD for:  persistent dizziness, light-headedness, or visual disturbances     Call MD for:  increased confusion or weakness     No dressing needed       Medications:  Reconciled Home Medications:   Discharge Medication List as of 11/17/2017 12:49 PM      START taking these medications    Details   hydrocodone-acetaminophen 5-325mg (NORCO) 5-325 mg per tablet Take 1 tablet by mouth every 4 (four) hours as needed., Starting Fri 11/17/2017, Print      ibuprofen (ADVIL,MOTRIN) 600 MG tablet Take 1 tablet (600 mg total) by mouth every 6 (six) hours., Starting Fri 11/17/2017, Normal         CONTINUE these medications which have NOT CHANGED    Details   vitamin D 1000 units Tab Take 1,000 Units by mouth once daily., Historical Med      alendronate (FOSAMAX) 40 mg tablet Take 1 tablet (40 mg  total) by mouth once a week., Starting Tue 11/14/2017, Normal      cetirizine (ZYRTEC) 10 MG tablet Take 1 tablet (10 mg total) by mouth once daily., Starting Tue 11/14/2017, Until Wed 11/14/2018, Normal             Carly Turner MD  Obstetrics & Gynecology  [facility]

## 2017-12-22 ENCOUNTER — TELEPHONE (OUTPATIENT)
Dept: GYNECOLOGIC ONCOLOGY | Facility: CLINIC | Age: 67
End: 2017-12-22

## 2017-12-22 ENCOUNTER — OFFICE VISIT (OUTPATIENT)
Dept: GYNECOLOGIC ONCOLOGY | Facility: CLINIC | Age: 67
End: 2017-12-22
Payer: MEDICARE

## 2017-12-22 VITALS
HEART RATE: 85 BPM | SYSTOLIC BLOOD PRESSURE: 129 MMHG | WEIGHT: 152 LBS | DIASTOLIC BLOOD PRESSURE: 60 MMHG | BODY MASS INDEX: 27.8 KG/M2

## 2017-12-22 DIAGNOSIS — Z90.710 S/P HYSTERECTOMY WITH OOPHORECTOMY: ICD-10-CM

## 2017-12-22 DIAGNOSIS — Z90.721 S/P HYSTERECTOMY WITH OOPHORECTOMY: ICD-10-CM

## 2017-12-22 DIAGNOSIS — D25.0 SUBMUCOUS LEIOMYOMA OF UTERUS: Primary | ICD-10-CM

## 2017-12-22 PROCEDURE — 99999 PR PBB SHADOW E&M-EST. PATIENT-LVL III: CPT | Mod: PBBFAC,,, | Performed by: OBSTETRICS & GYNECOLOGY

## 2017-12-22 PROCEDURE — 99024 POSTOP FOLLOW-UP VISIT: CPT | Mod: S$GLB,,, | Performed by: OBSTETRICS & GYNECOLOGY

## 2017-12-22 RX ORDER — ALENDRONATE SODIUM 70 MG/1
TABLET ORAL
COMMUNITY
Start: 2017-11-14 | End: 2018-01-16 | Stop reason: SDUPTHER

## 2017-12-22 NOTE — PROGRESS NOTES
Dictation #1  MRN:[id number]  [facility]:[id number]  Subjective:      Patient ID: Zohra Paulino is a 67 y.o. female.    Chief Complaint: Post-op Evaluation      HPI  Here today for post-op check after RALH/BSO for benign fibroids on 11/16/17.  Has recovered well from surgery.  Denies VB, F/C, N/V.  Reports normal bladder and bowel function.  Review of Systems   Constitutional: Negative for activity change, appetite change, chills, fatigue and fever.   HENT: Negative for hearing loss, mouth sores, nosebleeds, sore throat and tinnitus.    Eyes: Negative for visual disturbance.   Respiratory: Negative for cough, chest tightness, shortness of breath and wheezing.    Cardiovascular: Negative for chest pain and leg swelling.   Gastrointestinal: Negative for abdominal distention, abdominal pain, blood in stool, constipation, diarrhea, nausea and vomiting.   Genitourinary: Negative for dysuria, flank pain, frequency, hematuria, pelvic pain, vaginal bleeding, vaginal discharge and vaginal pain.   Musculoskeletal: Negative for arthralgias and back pain.   Skin: Negative for rash.   Neurological: Negative for dizziness, seizures, syncope, weakness and numbness.   Hematological: Does not bruise/bleed easily.   Psychiatric/Behavioral: Negative for confusion and sleep disturbance. The patient is not nervous/anxious.        Objective:   Physical Exam:   Constitutional: She appears well-developed and well-nourished. No distress.    HENT:   Head: Normocephalic and atraumatic.    Eyes: No scleral icterus.    Neck: Normal range of motion. Neck supple.    Cardiovascular: Normal rate and intact distal pulses.  Exam reveals no cyanosis and no edema.     Pulmonary/Chest: Effort normal. No respiratory distress. She exhibits no tenderness.        Abdominal: Soft. She exhibits no distension (incisions healing well), no fluid wave, no ascites and no mass. There is no tenderness. There is no rebound and no guarding. No hernia.     Genitourinary:  Rectum normal and vagina normal. Pelvic exam was performed with patient supine. There is no rash, tenderness or lesion on the right labia. There is no rash, tenderness or lesion on the left labia. Uterus is absent. There is an absent adnexa. Right adnexum displays no mass, no tenderness and no fullness. Left adnexum displays no mass, no tenderness and no fullness. No bleeding (cuff healing well) or unspecified prolapse of vaginal walls in the vagina. No vaginal discharge found. Vaginal cuff normal.Labial bartholins normal.Cervix exhibits absence.              Lymphadenopathy:     She has no cervical adenopathy.        Right: No inguinal adenopathy present.        Left: No inguinal adenopathy present.     Skin: No cyanosis.        Assessment:     1. Submucous leiomyoma of uterus    2. S/P hysterectomy with oophorectomy, RATLH/BSO 11/16/17        Plan:       Doing well post op. Pathology again relayed to her.  Ok to return to routine gyn care.  RTC prn.

## 2018-01-08 ENCOUNTER — TELEPHONE (OUTPATIENT)
Dept: GYNECOLOGIC ONCOLOGY | Facility: CLINIC | Age: 68
End: 2018-01-08

## 2018-01-08 NOTE — TELEPHONE ENCOUNTER
----- Message from Haydee Gauthier sent at 1/8/2018  8:20 AM CST -----  Zohra Paulino is needing note to return to work/pt said she can be reached at [phone]       [facility]# [id number]

## 2018-01-09 ENCOUNTER — TELEPHONE (OUTPATIENT)
Dept: GYNECOLOGIC ONCOLOGY | Facility: CLINIC | Age: 68
End: 2018-01-09

## 2018-01-09 NOTE — TELEPHONE ENCOUNTER
----- Message from uQe Kat sent at 1/9/2018  8:41 AM CST -----  Contact: Pt  X_ 1st Request  _ 2nd Request  _ 3rd Request    Who: KRISTINA JOHNSON [[id number]]    Why: Patient would like to speak with staff in regards to needing a return to work letter and would like to pick it up.     What Number to Call Back: [phone]    When to Expect a call back: (Before the end of the day)  -- if call after 3:00 call back will be tomorrow.

## 2018-01-12 ENCOUNTER — TELEPHONE (OUTPATIENT)
Dept: GYNECOLOGIC ONCOLOGY | Facility: CLINIC | Age: 68
End: 2018-01-12

## 2018-01-12 NOTE — TELEPHONE ENCOUNTER
----- Message from Josafat Shaffer LPN sent at 1/12/2018  2:05 PM CST -----   Please contact patient  ----- Message -----  From: Theo Rawls  Sent: 1/12/2018   2:02 PM  To: [facility] Staff    Pt says she drop a insurance form off on last Friday  when can she pick it up [phone]

## 2018-01-16 ENCOUNTER — OFFICE VISIT (OUTPATIENT)
Dept: OBSTETRICS AND GYNECOLOGY | Facility: CLINIC | Age: 68
End: 2018-01-16
Payer: MEDICARE

## 2018-01-16 VITALS
BODY MASS INDEX: 28.2 KG/M2 | SYSTOLIC BLOOD PRESSURE: 120 MMHG | HEIGHT: 62 IN | DIASTOLIC BLOOD PRESSURE: 78 MMHG | WEIGHT: 153.25 LBS | TEMPERATURE: 99 F

## 2018-01-16 DIAGNOSIS — Z01.419 WELL WOMAN EXAM WITH ROUTINE GYNECOLOGICAL EXAM: Primary | ICD-10-CM

## 2018-01-16 DIAGNOSIS — Z78.0 MENOPAUSE: ICD-10-CM

## 2018-01-16 DIAGNOSIS — Z12.31 VISIT FOR SCREENING MAMMOGRAM: ICD-10-CM

## 2018-01-16 PROCEDURE — G0101 CA SCREEN;PELVIC/BREAST EXAM: HCPCS | Mod: S$GLB,,, | Performed by: OBSTETRICS & GYNECOLOGY

## 2018-01-16 PROCEDURE — 99999 PR PBB SHADOW E&M-EST. PATIENT-LVL III: CPT | Mod: PBBFAC,,, | Performed by: OBSTETRICS & GYNECOLOGY

## 2018-01-16 NOTE — PROGRESS NOTES
Subjective:       Patient ID: Zohra Paulino is a 67 y.o. female.    Chief Complaint:  Follow-up (8 wk 5 day post Surgery)      History of Present Illness  HPI  Annual Exam-Postmenopausal  Patient presents for annual exam. The patient has no complaints today. The patient is not currently sexually active. GYN screening history: last pap: approximate date 10/20/2016 and was normal and last mammogram: approximate date 2017 and was normal. The patient is not taking hormone replacement therapy. Patient denies post-menopausal vaginal bleeding. The patient wears seatbelts: yes. The patient participates in regular exercise: no. Has the patient ever been transfused or tattooed?: no. The patient reports that there is not domestic violence in her life.    She is now status post robotic assisted laparoscopic hysterectomy with bilateral salpingo-oophorectomy 2017.       GYN & OB History  No LMP recorded (lmp unknown). Patient is postmenopausal.   Date of Last Pap: 10/27/2016    OB History    Para Term  AB Living   5 4 4   1 4   SAB TAB Ectopic Multiple Live Births   1       4      # Outcome Date GA Lbr Prakash/2nd Weight Sex Delivery Anes PTL Lv   5 Term 04/10/77 40w0d  3.232 kg (7 lb 2 oz) M Vag-Spont EPI N ROMY   4 Term 12/15/75 40w0d  3.175 kg (7 lb) F Vag-Spont EPI N ROMY   3 Term 73 40w0d  3.317 kg (7 lb 5 oz) F Vag-Spont EPI N ROMY   2 SAB 1970           1 Term 69 40w0d  2.892 kg (6 lb 6 oz) M Vag-Spont None N ROMY      Obstetric Comments   Reg menses. Heavy. H/o fibroid   H/o chlamydia.   Denies abnl pap   Denies abnl MMG   Denies abnl c-scope     Past Medical History:   Diagnosis Date    Bronchitis     Fibroids     Osteopetrosis     Uterine fibroid        Past Surgical History:   Procedure Laterality Date    CERVICAL BIOPSY  W/ LOOP ELECTRODE EXCISION      [facility]  2004    HYSTERECTOMY      ND REMOVAL OF OVARY/TUBE(S)      TUBAL LIGATION         Family History   Problem Relation  Age of Onset    No Known Problems Mother     No Known Problems Father     Breast cancer Cousin 20    Arthritis Sister     No Known Problems Maternal Grandmother     No Known Problems Maternal Grandfather     Pancreatic cancer Paternal Grandmother 80    No Known Problems Paternal Grandfather     Diabetes Son     No Known Problems Son     No Known Problems Daughter     No Known Problems Daughter        Social History     Social History    Marital status: Single     Spouse name: N/A    Number of children: N/A    Years of education: N/A     Occupational History    Paraprofessional      Elementary school     Social History Main Topics    Smoking status: Never Smoker    Smokeless tobacco: Never Used    Alcohol use Yes      Comment: Rarely - a few drinks per year    Drug use: No    Sexual activity: Yes     Partners: Male     Birth control/ protection: None     Other Topics Concern    None     Social History Narrative    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       Current Outpatient Prescriptions   Medication Sig Dispense Refill    alendronate (FOSAMAX) 40 mg tablet Take 1 tablet (40 mg total) by mouth once a week. 12 tablet 3    ibuprofen (ADVIL,MOTRIN) 600 MG tablet Take 1 tablet (600 mg total) by mouth every 6 (six) hours. 30 tablet 0    vitamin D 1000 units Tab Take 1,000 Units by mouth once daily.      cetirizine (ZYRTEC) 10 MG tablet Take 1 tablet (10 mg total) by mouth once daily. 90 tablet 3     No current facility-administered medications for this visit.        Review of patient's allergies indicates:   Allergen Reactions    Codeine Palpitations       Review of Systems  Review of Systems   Constitutional: Negative for activity change, appetite change, chills, fatigue, fever and unexpected weight change.   HENT: Negative for mouth sores.    Respiratory: Negative for cough, shortness of breath and wheezing.    Cardiovascular: Negative for chest pain and palpitations.   Gastrointestinal:  Negative for abdominal pain, bloating, blood in stool, constipation, nausea and vomiting.   Endocrine: Negative for diabetes and hot flashes.   Genitourinary: Negative for dyspareunia, dysuria, frequency, hematuria, menorrhagia, menstrual problem, pelvic pain, urgency, vaginal bleeding, vaginal discharge, vaginal pain, dysmenorrhea, urinary incontinence, postcoital bleeding and vaginal odor.   Musculoskeletal: Negative for back pain and myalgias.   Skin:  Negative for rash.   Neurological: Negative for seizures and headaches.   Psychiatric/Behavioral: Negative for depression and sleep disturbance. The patient is not nervous/anxious.    Breast: Negative for breast mass, breast pain and nipple discharge          Objective:    Physical Exam:   Constitutional: She appears well-developed and well-nourished. No distress.    HENT:   Head: Normocephalic and atraumatic.    Eyes: EOM are normal.    Neck: Normal range of motion.     Pulmonary/Chest: Effort normal. No respiratory distress.   Breasts: Non-tender, no engorgement, no masses, no retraction, no discharge. Negative for lymphadenopathy.         Abdominal: Soft. She exhibits no distension. There is no tenderness. There is no rebound and no guarding.   Trochar sites healed well     Genitourinary: Vagina normal. No vaginal discharge found.   Genitourinary Comments: Atrophy.  Vulva without any obvious lesions.  Vaginal vault with good support.  Minimal red discharge noted.  No obvious lesion.  Vaginal cuff well-supported with some granulation tissue.  Cervix and Uterus are surgically absent.  Adnexa is without any masses or tenderness.           Musculoskeletal: Normal range of motion.       Neurological: She is alert.    Skin: Skin is warm and dry.    Psychiatric: She has a normal mood and affect.          Assessment:        1. Well woman exam with routine gynecological exam    2. Menopause    3. Visit for screening mammogram              Plan:          I have discussed  with the patient her condition.  Monthly breast examination was instructed, discussed, and encouraged.  Patient was encouraged to consume a low-calorie, low fat diet, and to increase of physical activity.  Healthy habits encouraged.  A Pap smear was NOT performed.  Mammogram was ordered because of the combination of her age and risk factors.  Gonorrhea and Chlamydia testing not performed.  HIV test ordered.  Patient is to continue her medications as prescribed.  She will come back to see me in one year for her annual visit.  She can come back to see me sooner as necessary.  All of her questions were answered appropriately to her satisfaction.     No current need for hormone replacement  AgNO3 applied to granulation on vagina    Back as needed

## 2018-01-26 ENCOUNTER — TELEPHONE (OUTPATIENT)
Dept: GYNECOLOGIC ONCOLOGY | Facility: CLINIC | Age: 68
End: 2018-01-26

## 2018-01-26 NOTE — TELEPHONE ENCOUNTER
Spoke with patient regarding disability forms gave patient the number to disability to call in regards to forms [phone]. Thanks

## 2018-01-26 NOTE — TELEPHONE ENCOUNTER
----- Message from Gisele Hooks sent at 1/26/2018 12:37 PM CST -----  Contact: pt  _  1st Request  _  2nd Request  _  3rd Request        Who: pt    Why: Requesting a call back in regards to insurance form that was supposed to be filled out. Please call pt     What Number to Call Back:[phone]    When to Expect a call back: (Within 24 hours)    Please return the call at earliest convenience. Thanks!

## 2018-02-14 ENCOUNTER — TELEPHONE (OUTPATIENT)
Dept: OBSTETRICS AND GYNECOLOGY | Facility: CLINIC | Age: 68
End: 2018-02-14

## 2018-02-14 NOTE — TELEPHONE ENCOUNTER
----- Message from Royal Tineo sent at 2/14/2018 12:16 PM CST -----  Contact: Self/[phone]  Patient called stating that she's having a lot of problems regarding her surgery. She's requesting a sooner appointment. Thank you.    Patient has appt on 2/21/2018 to follow  Up on pelvic discomfort. sal

## 2018-02-21 ENCOUNTER — OFFICE VISIT (OUTPATIENT)
Dept: OBSTETRICS AND GYNECOLOGY | Facility: CLINIC | Age: 68
End: 2018-02-21
Payer: MEDICARE

## 2018-02-21 VITALS
SYSTOLIC BLOOD PRESSURE: 132 MMHG | WEIGHT: 158.5 LBS | HEIGHT: 62 IN | BODY MASS INDEX: 29.17 KG/M2 | TEMPERATURE: 99 F | DIASTOLIC BLOOD PRESSURE: 74 MMHG

## 2018-02-21 DIAGNOSIS — R10.9 ABDOMINAL PAIN, UNSPECIFIED ABDOMINAL LOCATION: Primary | ICD-10-CM

## 2018-02-21 DIAGNOSIS — Z90.710 STATUS POST HYSTERECTOMY: ICD-10-CM

## 2018-02-21 PROCEDURE — 99213 OFFICE O/P EST LOW 20 MIN: CPT | Mod: S$GLB,,, | Performed by: OBSTETRICS & GYNECOLOGY

## 2018-02-21 PROCEDURE — 99999 PR PBB SHADOW E&M-EST. PATIENT-LVL III: CPT | Mod: PBBFAC,,, | Performed by: OBSTETRICS & GYNECOLOGY

## 2018-02-21 PROCEDURE — 3008F BODY MASS INDEX DOCD: CPT | Mod: S$GLB,,, | Performed by: OBSTETRICS & GYNECOLOGY

## 2018-02-21 PROCEDURE — 1159F MED LIST DOCD IN RCRD: CPT | Mod: S$GLB,,, | Performed by: OBSTETRICS & GYNECOLOGY

## 2018-02-21 PROCEDURE — 1126F AMNT PAIN NOTED NONE PRSNT: CPT | Mod: S$GLB,,, | Performed by: OBSTETRICS & GYNECOLOGY

## 2018-02-22 NOTE — PROGRESS NOTES
Subjective:       Patient ID: Zohra Paulino is a 67 y.o. female.    Chief Complaint:  Pelvic Pain (Pt concern of pelvic pain that comes and goes for past 2 wks)      History of Present Illness  HPI  Patient comes in today complaining of occasional abdominal and pelvic pain  Status post robotically-assisted laparoscopic hysterectomy for ?enlarging uterine fibroids postmenopausal  Procedure performed by Dr Ornelas, GYN Onc on 2017 without any difficulty.    Having bilateral pain mid-abdomen occasionally.  4/10.  Resolves spontaneously.  No obvious aggravating factors.  No radiation.    Menopausal.  Not on ERT.        GYN & OB History  No LMP recorded (lmp unknown). Patient is postmenopausal.   Date of Last Pap: 10/27/2016    OB History    Para Term  AB Living   5 4 4   1 4   SAB TAB Ectopic Multiple Live Births   1       4      # Outcome Date GA Lbr Prakash/2nd Weight Sex Delivery Anes PTL Lv   5 Term 04/10/77 40w0d  3.232 kg (7 lb 2 oz) M Vag-Spont EPI N ROMY   4 Term 12/15/75 40w0d  3.175 kg (7 lb) F Vag-Spont EPI N ROMY   3 Term 73 40w0d  3.317 kg (7 lb 5 oz) F Vag-Spont EPI N ROMY   2 SAB 1970           1 Term 69 40w0d  2.892 kg (6 lb 6 oz) M Vag-Spont None N ROMY      Obstetric Comments   Reg menses. Heavy. H/o fibroid   H/o chlamydia.   Denies abnl pap   Denies abnl MMG   Denies abnl c-scope     Past Medical History:   Diagnosis Date    Bronchitis     Fibroids     Osteopetrosis     Uterine fibroid        Past Surgical History:   Procedure Laterality Date    CERVICAL BIOPSY  W/ LOOP ELECTRODE EXCISION      [facility]  2004    HYSTERECTOMY      MD REMOVAL OF OVARY/TUBE(S)      TUBAL LIGATION         Family History   Problem Relation Age of Onset    No Known Problems Mother     No Known Problems Father     Breast cancer Cousin 20    Arthritis Sister     No Known Problems Maternal Grandmother     No Known Problems Maternal Grandfather     Pancreatic cancer Paternal Grandmother  80    No Known Problems Paternal Grandfather     Diabetes Son     No Known Problems Son     No Known Problems Daughter     No Known Problems Daughter        Social History     Social History    Marital status: Single     Spouse name: N/A    Number of children: N/A    Years of education: N/A     Occupational History    Paraprofessional      Elementary school     Social History Main Topics    Smoking status: Never Smoker    Smokeless tobacco: Never Used    Alcohol use Yes      Comment: Rarely - a few drinks per year    Drug use: No    Sexual activity: Yes     Partners: Male     Birth control/ protection: None     Other Topics Concern    None     Social History Narrative    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       Current Outpatient Prescriptions   Medication Sig Dispense Refill    alendronate (FOSAMAX) 40 mg tablet Take 1 tablet (40 mg total) by mouth once a week. 12 tablet 3    cetirizine (ZYRTEC) 10 MG tablet Take 1 tablet (10 mg total) by mouth once daily. 90 tablet 3    ibuprofen (ADVIL,MOTRIN) 600 MG tablet Take 1 tablet (600 mg total) by mouth every 6 (six) hours. 30 tablet 0    vitamin D 1000 units Tab Take 1,000 Units by mouth once daily.       No current facility-administered medications for this visit.        Review of patient's allergies indicates:   Allergen Reactions    Codeine Palpitations       Review of Systems  Review of Systems   Constitutional: Negative for activity change, appetite change, chills, fatigue, fever and unexpected weight change.   HENT: Negative for mouth sores.    Respiratory: Negative for cough, shortness of breath and wheezing.    Cardiovascular: Negative for chest pain and palpitations.   Gastrointestinal: Positive for abdominal pain. Negative for bloating, blood in stool, constipation, nausea and vomiting.   Endocrine: Negative for diabetes and hot flashes.   Genitourinary: Positive for pelvic pain. Negative for dyspareunia, dysuria, frequency, hematuria,  menorrhagia, menstrual problem, urgency, vaginal bleeding, vaginal discharge, vaginal pain, dysmenorrhea, urinary incontinence, postcoital bleeding and vaginal odor.        Atrophy   Musculoskeletal: Negative for back pain and myalgias.   Skin:  Negative for rash.   Neurological: Negative for seizures and headaches.   Psychiatric/Behavioral: Negative for depression and sleep disturbance. The patient is not nervous/anxious.    Breast: Negative for breast mass, breast pain and nipple discharge          Objective:    Physical Exam:   Constitutional: She appears well-developed and well-nourished. No distress.    HENT:   Head: Normocephalic and atraumatic.    Eyes: EOM are normal.    Neck: Normal range of motion.     Pulmonary/Chest: Effort normal. No respiratory distress.        Abdominal: Soft. She exhibits no distension. There is no tenderness. There is no rebound and no guarding.     Genitourinary: Vagina normal. No vaginal discharge found.   Genitourinary Comments: Significant atrophy.  Vulva without any obvious lesions.  Vaginal vault with good support.  Minimal discharge noted.  No obvious lesion.  Vaginal cuff is intact and well-supported.  Cervix and Uterus are surgically absent.  Adnexa is without any masses or tenderness.    No obvious point tenderness.           Musculoskeletal: Normal range of motion.       Neurological: She is alert.    Skin: Skin is warm and dry.    Psychiatric: She has a normal mood and affect.          Assessment:        1. Abdominal pain, unspecified abdominal location    2. Status post hysterectomy    3.  Menopause         Plan:      I have extensively discussed with the patient regarding her condition  She does NOT have any pain today  Her exam is normal  Evidence of significant genital atrophy.  But she does not want to be on ERT    Patient would need colonoscopy soon. Does not remember when her last colonoscopy was  She will get in contact with her PCP to schedule this    Back as  needed.

## 2018-03-25 ENCOUNTER — OFFICE VISIT (OUTPATIENT)
Dept: URGENT CARE | Facility: CLINIC | Age: 68
End: 2018-03-25
Payer: MEDICARE

## 2018-03-25 VITALS
RESPIRATION RATE: 16 BRPM | HEART RATE: 84 BPM | DIASTOLIC BLOOD PRESSURE: 66 MMHG | WEIGHT: 158 LBS | HEIGHT: 62 IN | TEMPERATURE: 101 F | OXYGEN SATURATION: 95 % | BODY MASS INDEX: 29.08 KG/M2 | SYSTOLIC BLOOD PRESSURE: 113 MMHG

## 2018-03-25 DIAGNOSIS — J40 BRONCHITIS: Primary | ICD-10-CM

## 2018-03-25 LAB
CTP QC/QA: YES
FLUAV AG NPH QL: NEGATIVE
FLUBV AG NPH QL: NEGATIVE

## 2018-03-25 PROCEDURE — 99214 OFFICE O/P EST MOD 30 MIN: CPT | Mod: S$GLB,,, | Performed by: FAMILY MEDICINE

## 2018-03-25 PROCEDURE — 87804 INFLUENZA ASSAY W/OPTIC: CPT | Mod: 59,QW,S$GLB, | Performed by: FAMILY MEDICINE

## 2018-03-25 RX ORDER — DOXYCYCLINE 100 MG/1
100 CAPSULE ORAL EVERY 12 HOURS
Qty: 14 CAPSULE | Refills: 0 | Status: SHIPPED | OUTPATIENT
Start: 2018-03-25 | End: 2018-04-01

## 2018-03-25 RX ORDER — PROMETHAZINE HYDROCHLORIDE AND DEXTROMETHORPHAN HYDROBROMIDE 6.25; 15 MG/5ML; MG/5ML
5 SYRUP ORAL
Qty: 100 ML | Refills: 0 | Status: SHIPPED | OUTPATIENT
Start: 2018-03-25 | End: 2019-01-14

## 2018-03-25 NOTE — PROGRESS NOTES
Subjective:       Patient ID: Zohra Paulino is a 68 y.o. female.    Vitals:    03/25/18 1514   BP: 113/66   Pulse: 84   Resp: 16   Temp: (!) 100.9 °F (38.3 °C)   O2 95%    Chief Complaint: Cough    Pt states productive cough x 2 weeks.      Cough   This is a new problem. The current episode started 1 to 4 weeks ago. The problem has been unchanged. The cough is productive of sputum. Associated symptoms include chills. Pertinent negatives include no chest pain, ear pain, eye redness, fever, headaches, myalgias, sore throat, shortness of breath or wheezing. Nothing aggravates the symptoms. Treatments tried: thera flu, tylenol. The treatment provided mild relief. Her past medical history is significant for bronchitis and pneumonia.     Review of Systems   Constitution: Positive for chills. Negative for fever and malaise/fatigue.   HENT: Positive for congestion. Negative for ear pain, hoarse voice and sore throat.    Eyes: Negative for discharge and redness.   Cardiovascular: Negative for chest pain, dyspnea on exertion and leg swelling.   Respiratory: Positive for cough and sputum production. Negative for shortness of breath and wheezing.    Musculoskeletal: Negative for myalgias.   Gastrointestinal: Negative for abdominal pain and nausea.   Neurological: Negative for headaches.       Objective:      Physical Exam   Constitutional: She is oriented to person, place, and time. She appears well-developed and well-nourished. She is cooperative.  Non-toxic appearance. She does not appear ill. No distress.   HENT:   Head: Normocephalic and atraumatic.   Right Ear: Hearing, tympanic membrane, external ear and ear canal normal.   Left Ear: Hearing, tympanic membrane, external ear and ear canal normal.   Nose: Mucosal edema present. No rhinorrhea or nasal deformity. No epistaxis. Right sinus exhibits no maxillary sinus tenderness and no frontal sinus tenderness. Left sinus exhibits no maxillary sinus tenderness and no  frontal sinus tenderness.   Mouth/Throat: Uvula is midline, oropharynx is clear and moist and mucous membranes are normal. No trismus in the jaw. Normal dentition. No uvula swelling. No posterior oropharyngeal erythema.   Nasally sounding voice   Eyes: Conjunctivae and lids are normal. No scleral icterus.   Sclera clear bilat   Neck: Trachea normal, full passive range of motion without pain and phonation normal. Neck supple.   Cardiovascular: Normal rate, regular rhythm, normal heart sounds, intact distal pulses and normal pulses.    Pulmonary/Chest: Effort normal. No respiratory distress. She has decreased breath sounds in the right lower field.   Abdominal: Soft. Normal appearance and bowel sounds are normal. She exhibits no distension. There is no tenderness.   Musculoskeletal: Normal range of motion. She exhibits no edema or deformity.   Lymphadenopathy:     She has no cervical adenopathy.   Neurological: She is alert and oriented to person, place, and time. She exhibits normal muscle tone. Coordination normal.   Skin: Skin is warm, dry and intact. She is not diaphoretic. No pallor.   Psychiatric: She has a normal mood and affect. Her speech is normal and behavior is normal. Judgment and thought content normal. Cognition and memory are normal.   Nursing note and vitals reviewed.      Procedure:  Exam Date: Reason for Exam:   X-Ray Chest PA And Lateral 03/25/2018 None Specified             RESULTS:  EXAMINATION:  XR CHEST PA AND LATERAL     CLINICAL HISTORY:  Cough     TECHNIQUE:  PA and lateral views of the chest were performed.     COMPARISON:  October 20, 2016     FINDINGS:  Cardiac silhouette and mediastinal contours are normal.  Lungs are clear.  Osseous structures are intact.     IMPRESSION:      No acute cardiopulmonary process.        Electronically signed by: Ricky Soto MD  Date:                                            03/25/2018  Time:                                           15:44          Results for orders placed or performed in visit on 03/25/18   POCT Influenza A/B   Result Value Ref Range    Rapid Influenza A Ag Negative Negative    Rapid Influenza B Ag Negative Negative     Acceptable Yes        Assessment:       1. Bronchitis        Plan:       Zohra was seen today for cough.    Diagnoses and all orders for this visit:    Bronchitis  -     POCT Influenza A/B  -     X-Ray Chest PA And Lateral; Future  -     doxycycline (VIBRAMYCIN) 100 MG Cap; Take 1 capsule (100 mg total) by mouth every 12 (twelve) hours.  -     promethazine-dextromethorphan (PROMETHAZINE-DM) 6.25-15 mg/5 mL Syrp; Take 5 mLs by mouth every 4 to 6 hours as needed. maximum: 30 mL in 24 hours do not drive on this med      Patient Instructions   Please take your antibiotic to completion.   Do not drive while taking the cough syrup.     Over the counter recommendations:  Use over the counter flonase: one spray each nostril twice daily OR two sprays each nostril once daily.   Continue theraflu.   Tylenol/ibuprofen for pain and fever.   Mucinex for congestion and antihistamines (claritin, zyrtec, allegra).    Please return or see your primary care doctor if you develop new or worsening symptoms.     You must understand that you have received an Urgent Care treatment only and that you may be released before all of your medical problems are known or treated.      Bronchitis, Antibiotic Treatment (Adult)    Bronchitis is an infection of the air passages (bronchial tubes) in your lungs. It often occurs when you have a cold. This illness is contagious during the first few days and is spread through the air by coughing and sneezing, or by direct contact (touching the sick person and then touching your own eyes, nose, or mouth).  Symptoms of bronchitis include cough with mucus (phlegm) and low-grade fever. Bronchitis usually lasts 7 to 14 days. Mild cases can be treated with simple home remedies. More severe infection is  treated with an antibiotic.  Home care  Follow these guidelines when caring for yourself at home:  · If your symptoms are severe, rest at home for the first 2 to 3 days. When you go back to your usual activities, don't let yourself get too tired.  · Do not smoke. Also avoid being exposed to secondhand smoke.  · You may use over-the-counter medicines to control fever or pain, unless another medicine was prescribed. (Note: If you have chronic liver or kidney disease or have ever had a stomach ulcer or gastrointestinal bleeding, talk with your healthcare provider before using these medicines. Also talk to your provider if you are taking medicine to prevent blood clots.) Aspirin should never be given to anyone younger than 18 years of age who is ill with a viral infection or fever. It may cause severe liver or brain damage.  · Your appetite may be poor, so a light diet is fine. Avoid dehydration by drinking 6 to 8 glasses of fluids per day (such as water, soft drinks, sports drinks, juices, tea, or soup). Extra fluids will help loosen secretions in the nose and lungs.  · Over-the-counter cough, cold, and sore-throat medicines will not shorten the length of the illness, but they may be helpful to reduce symptoms. (Note: Do not use decongestants if you have high blood pressure.)  · Finish all antibiotic medicine. Do this even if you are feeling better after only a few days.  Follow-up care  Follow up with your healthcare provider, or as advised. If you had an X-ray or ECG (electrocardiogram), a specialist will review it. You will be notified of any new findings that may affect your care.  Note: If you are age 65 or older, or if you have a chronic lung disease or condition that affects your immune system, or you smoke, talk to your healthcare provider about having pneumococcal vaccinations and a yearly influenza vaccination (flu shot).  When to seek medical advice  Call your healthcare provider right away if any of these  occur:  · Fever of 100.4°F (38°C) or higher  · Coughing up increased amounts of colored sputum  · Weakness, drowsiness, headache, facial pain, ear pain, or a stiff neck  Call 911, or get immediate medical care  Contact emergency services right away if any of these occur.  · Coughing up blood  · Worsening weakness, drowsiness, headache, or stiff neck  · Trouble breathing, wheezing, or pain with breathing  Date Last Reviewed: 9/13/2015 © 2000-2017 [employer]. [address] [place] [address]. All rights reserved. This information is not intended as a substitute for professional medical care. Always follow your healthcare professional's instructions.

## 2018-03-25 NOTE — PATIENT INSTRUCTIONS
Please take your antibiotic to completion.   Do not drive while taking the cough syrup.     Over the counter recommendations:  Use over the counter flonase: one spray each nostril twice daily OR two sprays each nostril once daily.   Continue theraflu.   Tylenol/ibuprofen for pain and fever.   Mucinex for congestion and antihistamines (claritin, zyrtec, allegra).    Please return or see your primary care doctor if you develop new or worsening symptoms.     You must understand that you have received an Urgent Care treatment only and that you may be released before all of your medical problems are known or treated.      Bronchitis, Antibiotic Treatment (Adult)    Bronchitis is an infection of the air passages (bronchial tubes) in your lungs. It often occurs when you have a cold. This illness is contagious during the first few days and is spread through the air by coughing and sneezing, or by direct contact (touching the sick person and then touching your own eyes, nose, or mouth).  Symptoms of bronchitis include cough with mucus (phlegm) and low-grade fever. Bronchitis usually lasts 7 to 14 days. Mild cases can be treated with simple home remedies. More severe infection is treated with an antibiotic.  Home care  Follow these guidelines when caring for yourself at home:  · If your symptoms are severe, rest at home for the first 2 to 3 days. When you go back to your usual activities, don't let yourself get too tired.  · Do not smoke. Also avoid being exposed to secondhand smoke.  · You may use over-the-counter medicines to control fever or pain, unless another medicine was prescribed. (Note: If you have chronic liver or kidney disease or have ever had a stomach ulcer or gastrointestinal bleeding, talk with your healthcare provider before using these medicines. Also talk to your provider if you are taking medicine to prevent blood clots.) Aspirin should never be given to anyone younger than 18 years of age who is ill with  a viral infection or fever. It may cause severe liver or brain damage.  · Your appetite may be poor, so a light diet is fine. Avoid dehydration by drinking 6 to 8 glasses of fluids per day (such as water, soft drinks, sports drinks, juices, tea, or soup). Extra fluids will help loosen secretions in the nose and lungs.  · Over-the-counter cough, cold, and sore-throat medicines will not shorten the length of the illness, but they may be helpful to reduce symptoms. (Note: Do not use decongestants if you have high blood pressure.)  · Finish all antibiotic medicine. Do this even if you are feeling better after only a few days.  Follow-up care  Follow up with your healthcare provider, or as advised. If you had an X-ray or ECG (electrocardiogram), a specialist will review it. You will be notified of any new findings that may affect your care.  Note: If you are age 65 or older, or if you have a chronic lung disease or condition that affects your immune system, or you smoke, talk to your healthcare provider about having pneumococcal vaccinations and a yearly influenza vaccination (flu shot).  When to seek medical advice  Call your healthcare provider right away if any of these occur:  · Fever of 100.4°F (38°C) or higher  · Coughing up increased amounts of colored sputum  · Weakness, drowsiness, headache, facial pain, ear pain, or a stiff neck  Call 911, or get immediate medical care  Contact emergency services right away if any of these occur.  · Coughing up blood  · Worsening weakness, drowsiness, headache, or stiff neck  · Trouble breathing, wheezing, or pain with breathing  Date Last Reviewed: 9/13/2015 © 2000-2017 [employer]. [address] [place] [address], [place] [address]. All rights reserved. This information is not intended as a substitute for professional medical care. Always follow your healthcare professional's instructions.

## 2018-03-30 ENCOUNTER — TELEPHONE (OUTPATIENT)
Dept: URGENT CARE | Facility: CLINIC | Age: 68
End: 2018-03-30

## 2018-04-02 NOTE — PROGRESS NOTES
Subjective:       Patient ID: Zohra Paulino is a 68 y.o. female with a PMH significant for PMB (Fibroids s/p Hysterectomy - RATLH/BSO 11/2017), Elevated BP, Osteoporosis and last followed by Dr. Moreno (Family Medicine) on 7/18/2017 who presents today to establish care.    Chief Complaint: Establish Care and Cough    HPI Patient denies f/c, n/v/d.  No chest pain or SOB.  No abdominal pain or dysuria.  No headaches or change in vision.  No dizziness.  No significant  weight gain or weight loss.  Remaining ROS negative.    Review of Systems   Constitutional: Negative for appetite change, chills, diaphoresis, fatigue, fever and unexpected weight change.   HENT: Negative for ear pain, hearing loss, sinus pain, tinnitus, trouble swallowing and voice change.    Eyes: Negative for photophobia, pain and visual disturbance.   Respiratory: Positive for cough. Negative for chest tightness, shortness of breath and wheezing.    Cardiovascular: Positive for leg swelling (for past year). Negative for chest pain and palpitations.   Gastrointestinal: Negative for abdominal pain, blood in stool, constipation, diarrhea, nausea and vomiting.   Endocrine: Negative for cold intolerance, heat intolerance, polydipsia, polyphagia and polyuria.   Genitourinary: Negative for decreased urine volume, difficulty urinating, dysuria, flank pain, hematuria, pelvic pain, vaginal bleeding, vaginal discharge and vaginal pain.   Musculoskeletal: Positive for arthralgias (left ankle pain). Negative for gait problem, joint swelling, myalgias and neck pain.   Skin: Negative for rash.   Neurological: Negative for dizziness, tremors, syncope, weakness, numbness and headaches.   Hematological: Does not bruise/bleed easily.   Psychiatric/Behavioral: Negative for agitation, confusion and sleep disturbance. The patient is not nervous/anxious.        Objective:      Physical Exam   Constitutional: She is oriented to person, place, and time. She appears  well-developed and well-nourished. No distress.   HENT:   Head: Normocephalic and atraumatic.   Nose: Nose normal.   Mouth/Throat: Oropharynx is clear and moist.   Eyes: Conjunctivae and EOM are normal. Pupils are equal, round, and reactive to light. No scleral icterus.   Neck: Normal range of motion. Neck supple. No JVD present. No thyromegaly present.   Cardiovascular: Normal rate, regular rhythm and intact distal pulses.  Exam reveals no gallop and no friction rub.    No murmur heard.  Pulmonary/Chest: Effort normal and breath sounds normal. No respiratory distress. She has no wheezes. She has no rales.   Abdominal: Soft. Bowel sounds are normal. She exhibits no distension. There is no tenderness. There is no rebound and no guarding.   Musculoskeletal: Normal range of motion. She exhibits no edema.   Lymphadenopathy:     She has no cervical adenopathy.   Neurological: She is alert and oriented to person, place, and time. No cranial nerve deficit or sensory deficit.   Skin: Skin is warm and dry. No rash noted. No erythema.   Psychiatric: She has a normal mood and affect. Her behavior is normal. Thought content normal.       Assessment:       1. Encounter for wellness examination in adult    2. Elevated blood pressure reading    3. S/P hysterectomy with oophorectomy, RATLH/BSO 11/16/17    4. Osteopetrosis    5. Need for pneumococcal vaccination        Plan:   -Adult Wellness Exam - blood pressure and exam were stable.  BMI 29.56 - discussed diet modification and exercise.  I will order routine fasting labs.  Will order baseline EKG.  We discussed STD screening.  -Pulmonary - Bronchitis - treated at Urgent Care on 3/25/2018 - treated with Doxy.  Overall improving, but still productive of white sputum.  No further fevers.  No allergy symptoms.  Non-smoker.  Lungs clear - recommend Mucinex for now.  Consider Flonase.  -Cards - Elevated BP in past - EKG in 6/2016 showed suspected arm lead reversal, so need to repeat.   BP stable today at 132/78.  Thinks she has LE swelling, but exam normal with good peripheral pulses.  Recommend low Na intake and compression stockings during the day.  -GYN -  PMB (Fibroids s/p Hysterectomy - RATLH/BSO 11/2017.  Last Pap was negative on 10/12/2017).  Last Mammo was negative on 6/12/2017).  DXA screening for osteoporosis done 8/2017 and showed Osteopenia.  She is now on Fosamax and vitamin D.  -ENT - Seasonal Allergies - was on Zyrtec in past.  Not having allergy symptoms.  See above.  -HCM - We discussed Flu (declines) and Tdap (declines today) vaccinations.  We discussed Shingles (declines today) and Pneumococcal (declines today) vaccinations.  We discussed colon cancer screening - had a colonoscopy - thinks she had it within 10 years (she will get records)

## 2018-04-03 ENCOUNTER — HOSPITAL ENCOUNTER (OUTPATIENT)
Dept: CARDIOLOGY | Facility: OTHER | Age: 68
Discharge: HOME | End: 2018-04-03
Attending: INTERNAL MEDICINE
Payer: MEDICARE

## 2018-04-03 ENCOUNTER — PATIENT MESSAGE (OUTPATIENT)
Dept: INTERNAL MEDICINE | Facility: CLINIC | Age: 68
End: 2018-04-03

## 2018-04-03 ENCOUNTER — OFFICE VISIT (OUTPATIENT)
Dept: INTERNAL MEDICINE | Facility: CLINIC | Age: 68
End: 2018-04-03
Payer: MEDICARE

## 2018-04-03 VITALS
SYSTOLIC BLOOD PRESSURE: 132 MMHG | DIASTOLIC BLOOD PRESSURE: 78 MMHG | BODY MASS INDEX: 29.74 KG/M2 | HEART RATE: 78 BPM | HEIGHT: 62 IN | OXYGEN SATURATION: 98 % | TEMPERATURE: 99 F | WEIGHT: 161.63 LBS

## 2018-04-03 DIAGNOSIS — E78.5 HYPERLIPIDEMIA, UNSPECIFIED HYPERLIPIDEMIA TYPE: ICD-10-CM

## 2018-04-03 DIAGNOSIS — Z13.220 SCREENING CHOLESTEROL LEVEL: ICD-10-CM

## 2018-04-03 DIAGNOSIS — R03.0 ELEVATED BLOOD PRESSURE READING: ICD-10-CM

## 2018-04-03 DIAGNOSIS — Z90.710 S/P HYSTERECTOMY WITH OOPHORECTOMY: ICD-10-CM

## 2018-04-03 DIAGNOSIS — Z90.721 S/P HYSTERECTOMY WITH OOPHORECTOMY: ICD-10-CM

## 2018-04-03 DIAGNOSIS — M85.80 OSTEOPENIA, UNSPECIFIED LOCATION: ICD-10-CM

## 2018-04-03 DIAGNOSIS — Z00.00 ENCOUNTER FOR WELLNESS EXAMINATION IN ADULT: Primary | ICD-10-CM

## 2018-04-03 PROCEDURE — 93010 ELECTROCARDIOGRAM REPORT: CPT | Mod: ,,, | Performed by: INTERNAL MEDICINE

## 2018-04-03 PROCEDURE — 99499 UNLISTED E&M SERVICE: CPT | Mod: S$PBB,,, | Performed by: INTERNAL MEDICINE

## 2018-04-03 PROCEDURE — 93005 ELECTROCARDIOGRAM TRACING: CPT

## 2018-04-03 PROCEDURE — 99999 PR PBB SHADOW E&M-EST. PATIENT-LVL III: CPT | Mod: PBBFAC,,, | Performed by: INTERNAL MEDICINE

## 2018-04-03 PROCEDURE — 99397 PER PM REEVAL EST PAT 65+ YR: CPT | Mod: S$GLB,,, | Performed by: INTERNAL MEDICINE

## 2018-04-03 NOTE — PATIENT INSTRUCTIONS
Your exam was overall normal today.  Your weight is elevated.  Continue with diet modification and exercise.  Your blood pressure was good today.  I would like to repeat your EKG today.  I did not see much swelling in your legs and your pulses were good.  Decrease salt intake and use compression stockings as needed.  Your lungs were clear, so I suspect your cough is upper respiratory.  Try Mucinex for now and consider adding Flonase of any post-nasal drainage.  I will order routine fasting labs today - at least 6-8 hours of fasting.  Reconsider the Tdap and Pneumococcal vaccinations.  Return in 4 months - sooner if needed.  Please come at least 15-20 minutes before your scheduled appointment time.

## 2018-04-04 ENCOUNTER — PATIENT MESSAGE (OUTPATIENT)
Dept: INTERNAL MEDICINE | Facility: CLINIC | Age: 68
End: 2018-04-04

## 2018-04-04 DIAGNOSIS — R94.31 ABNORMAL EKG: Primary | ICD-10-CM

## 2018-04-06 ENCOUNTER — HOSPITAL ENCOUNTER (OUTPATIENT)
Dept: CARDIOLOGY | Facility: OTHER | Age: 68
Discharge: HOME | End: 2018-04-06
Attending: INTERNAL MEDICINE
Payer: MEDICARE

## 2018-04-06 DIAGNOSIS — R94.31 ABNORMAL EKG: ICD-10-CM

## 2018-04-06 PROCEDURE — 93306 TTE W/DOPPLER COMPLETE: CPT

## 2018-04-08 LAB
DIASTOLIC DYSFUNCTION: YES
ESTIMATED PA SYSTOLIC PRESSURE: 19.36
RETIRED EF AND QEF - SEE NOTES: 58 (ref 55–65)

## 2018-04-09 ENCOUNTER — PES CALL (OUTPATIENT)
Dept: ADMINISTRATIVE | Facility: CLINIC | Age: 68
End: 2018-04-09

## 2018-04-09 ENCOUNTER — PATIENT MESSAGE (OUTPATIENT)
Dept: INTERNAL MEDICINE | Facility: CLINIC | Age: 68
End: 2018-04-09

## 2018-04-16 ENCOUNTER — TELEPHONE (OUTPATIENT)
Dept: INTERNAL MEDICINE | Facility: CLINIC | Age: 68
End: 2018-04-16

## 2018-04-16 NOTE — TELEPHONE ENCOUNTER
Spoke with pt. Advised per Dr. Easton. Pt. verbalized understanding. She will return to lab for Urinalysis, and she has a Vit D 1000 unit supplement that she will start taking daily.

## 2018-04-16 NOTE — TELEPHONE ENCOUNTER
----- Message from Ryley Easton MD sent at 4/3/2018  7:46 PM CDT -----  Please let patient know that her labs overall look good.  Her vitamin D is low normal - would make sure she has a MVI with vitamin D.  He urinalysis is abnormal - can you ask her if she is having any dysuria, frequency, or urgency?  If not, I would like to repeat the UA in 2 weeks.  Thank you.

## 2018-05-01 ENCOUNTER — OFFICE VISIT (OUTPATIENT)
Dept: URGENT CARE | Facility: CLINIC | Age: 68
End: 2018-05-01
Payer: MEDICARE

## 2018-05-01 VITALS
RESPIRATION RATE: 18 BRPM | BODY MASS INDEX: 29.63 KG/M2 | HEART RATE: 90 BPM | OXYGEN SATURATION: 98 % | SYSTOLIC BLOOD PRESSURE: 147 MMHG | DIASTOLIC BLOOD PRESSURE: 77 MMHG | TEMPERATURE: 97 F | WEIGHT: 161 LBS | HEIGHT: 62 IN

## 2018-05-01 DIAGNOSIS — L03.113 CELLULITIS OF RIGHT UPPER EXTREMITY: ICD-10-CM

## 2018-05-01 DIAGNOSIS — M10.9 ACUTE GOUT OF RIGHT ELBOW, UNSPECIFIED CAUSE: Primary | ICD-10-CM

## 2018-05-01 PROCEDURE — 96372 THER/PROPH/DIAG INJ SC/IM: CPT | Mod: S$GLB,,, | Performed by: EMERGENCY MEDICINE

## 2018-05-01 PROCEDURE — 99214 OFFICE O/P EST MOD 30 MIN: CPT | Mod: 25,S$GLB,, | Performed by: NURSE PRACTITIONER

## 2018-05-01 RX ORDER — MELOXICAM 15 MG/1
15 TABLET ORAL DAILY
Qty: 15 TABLET | Refills: 0 | Status: SHIPPED | OUTPATIENT
Start: 2018-05-01 | End: 2019-01-14

## 2018-05-01 RX ORDER — CLINDAMYCIN HYDROCHLORIDE 300 MG/1
300 CAPSULE ORAL 4 TIMES DAILY
Qty: 28 CAPSULE | Refills: 0 | Status: SHIPPED | OUTPATIENT
Start: 2018-05-01 | End: 2018-05-08

## 2018-05-01 RX ORDER — KETOROLAC TROMETHAMINE 30 MG/ML
30 INJECTION, SOLUTION INTRAMUSCULAR; INTRAVENOUS ONCE
Status: COMPLETED | OUTPATIENT
Start: 2018-05-01 | End: 2018-05-01

## 2018-05-01 RX ORDER — CLINDAMYCIN PHOSPHATE 150 MG/ML
300 INJECTION, SOLUTION INTRAVENOUS
Status: COMPLETED | OUTPATIENT
Start: 2018-05-01 | End: 2018-05-01

## 2018-05-01 RX ADMIN — KETOROLAC TROMETHAMINE 30 MG: 30 INJECTION, SOLUTION INTRAMUSCULAR; INTRAVENOUS at 05:05

## 2018-05-01 RX ADMIN — CLINDAMYCIN PHOSPHATE 300 MG: 150 INJECTION, SOLUTION INTRAVENOUS at 05:05

## 2018-05-01 NOTE — PATIENT INSTRUCTIONS
Discharge Instructions for Cellulitis  You have been diagnosed with cellulitis. This is an infection in the deepest layer of the skin. In some cases, the infection also affects the muscle. Cellulitis is caused by bacteria. The bacteria can enter the body through broken skin. This can happen with a cut, scratch, animal bite, or an insect bite that has been scratched. You may have been treated in the hospital with antibiotics and fluids. You will likely be given a prescription for antibiotics to take at home. This sheet will help you take care of yourself at home.  Home care  When you are home:  · Take the prescribed antibiotic medicine you are given as directed until it is gone. Take it even if you feel better. It treats the infection and stops it from returning. Not taking all the medicine can make future infections hard to treat.  · Keep the infected area clean.  · When possible, raise the infected area above the level of your heart. This helps keep swelling down.  · Talk with your healthcare provider if you are in pain. Ask what kind of over-the-counter medicine you can take for pain.  · Apply clean bandages as advised.  · Take your temperature once a day for a week.  · Wash your hands often to prevent spreading the infection.  In the future, wash your hands before and after you touch cuts, scratches, or bandages. This will help prevent infection.   When to call your healthcare provider  Call your healthcare provider immediately if you have any of the following:  · Difficulty or pain when moving the joints above or below the infected area  · Discharge or pus draining from the area  · Fever of 100.4°F (38°C) or higher, or as directed by your healthcare provider  · Pain that gets worse in or around the infected   · Redness that gets worse in or around the infected area, particularly if the area of redness expands to a wider area  · Shaking chills  · Swelling of the infected area  · Vomiting   Date Last Reviewed:  8/1/2016 © 2000-2017 [employer]. [address] [place] [address], [place] [address]. All rights reserved. This information is not intended as a substitute for professional medical care. Always follow your healthcare professional's instructions.        Gout    Gout is an inflammation of a joint due to a build-up of gout crystals in the joint fluid. This occurs when there is an excess of uric acid (a normal waste product) in the body. Uric acid builds up in the body when the kidneys are unable to filter enough of it from the blood. This may occur with age. It is also associated with kidney disease. Gout occurs more often in people with obesity, diabetes, high blood pressure, or high levels of fats in the blood. It may run in families. Gout tends to come and go. A flare up of gout is called an attack. Drinking alcohol or eating certain foods (such as shellfish or foods with additives such as high-fructose corn syrup) may increase uric acid levels in the blood and cause a gout attack.  During a gout attack, the affected joint may become a hot, red, swollen and painful. If you have had one attack of gout, you are likely to have another. An attack of gout can be treated with medicine. If these attacks become frequent, a daily medicine may be prescribed to help the kidneys remove uric acid from the body.  Home care  During a gout attack:  · Rest painful joints. If gout affects the joints of your foot or leg, you may want to use crutches for the first few days to keep from bearing weight on the affected joint.  · When sitting or lying down, raise the painful joint to a level higher than your heart.  · Apply an ice pack (ice cubes in a plastic bag wrapped in a thin towel) over the injured area for 20 minutes every 1 to 2 hours the first day for pain relief. Continue this 3 to 4 times a day for swelling and pain.  · Avoid alcohol and foods listed below (see Preventing attacks) during a gout attack. Drink extra fluid to  help flush the uric acid through your kidneys.  · If you were prescribed a medicine to treat gout, take it as your healthcare provider has instructed. Don't skip doses.  · Take anti-inflammatory medicine as directed.   · If pain medicines have been prescribed, take them exactly as directed.    Preventing attacks  · Minimize or avoid alcohol use. Excess alcohol intake can cause a gout attack.  · Limit these foods and beverages:  ¨ Organ meats, such as kidneys and liver  ¨ Certain seafoods (anchovies, sardines, shrimp, scallops, herring, mackerel)  ¨ Wild game, meat extracts and meat gravies  ¨ Foods and beverages sweetened with high-fructose corn syrup, such as sodas  · Eat a healthy diet including low-fat and nonfat dairy, whole grains, and vegetables.  · If you are overweight, talk to your healthcare provider about a weight reduction plan. Avoid fasting or extreme low calorie diets (less than 900 calories per day). This will increase uric acid levels in the body.  · If you have diabetes or high blood pressure, work with your doctor to manage these conditions.  · Protect the joint from injury. Trauma can trigger a gout attack.  Follow-up care  Follow up with your healthcare provider, or as advised.   When to seek medical advice  Call your healthcare provider if you have any of the following:  · Fever over 100.4°F (38.ºC) with worsening joint pain  · Increasing redness around the joint  · Pain developing in another joint  · Repeated vomiting, abdominal pain, or blood in the vomit or stool (black or red color)  Date Last Reviewed: 3/1/2017  © 9551-3927 [employer]. [address] [place] [address], [place] [address]. All rights reserved. This information is not intended as a substitute for professional medical care. Always follow your healthcare professional's instructions.      Please arrange follow up with your primary medical clinic as soon as possible. You must understand that you've received an Urgent Care  treatment only and that you may be released before all of your medical problems are known or treated. You, the patient, will arrange for follow up as instructed. If your symptoms worsen or fail to improve you should go to the Emergency Room.

## 2018-05-01 NOTE — LETTER
May 1, 2018      [facility] [address] [place]  [facility] [address]  Phone: [phone]  Fax: [phone]       Patient: Zohra Paulino   YOB: 1950  Date of Visit: 05/01/2018    To Whom It May Concern:    Jennifer Paulino  was at [facility] on 05/01/2018. She may return to work/school on 05/03/18 with no restrictions. If you have any questions or concerns, or if I can be of further assistance, please do not hesitate to contact me.    Sincerely,    Maite Dyer NP

## 2018-05-01 NOTE — PROGRESS NOTES
"Subjective:       Patient ID: Zohra Paulino is a 68 y.o. female.    Vitals:    05/01/18 1656   BP: (!) 147/77   Pulse: 90   Resp: 18   Temp: 97.4 °F (36.3 °C)   SpO2: 98%   Weight: 73 kg (161 lb)   Height: 5' 2" (1.575 m)       Chief Complaint: Joint Swelling (RT ARM 3 DAYS )    Pt presents to clinic with c/o right elbow pain, redness and swelling x3-4 days. Pt denies fever. + increase in pain with extension and flexion. Pt denies injury, states she may have had Gout before, was never dx by physician, but had swelling and pain in great toe which resolved and never returned after making dietary changes. Pt has been take left over pain meds from hysterectomy to alleviate pain in elbow.       Elbow Injury   This is a new problem. The current episode started in the past 7 days. The problem occurs constantly. The problem has been gradually worsening. Associated symptoms include joint swelling. Pertinent negatives include no abdominal pain, chest pain, neck pain, numbness or weakness. The symptoms are aggravated by bending. She has tried oral narcotics for the symptoms. The treatment provided moderate relief.     Review of Systems   Constitution: Negative for weakness and malaise/fatigue.   HENT: Negative for nosebleeds.    Cardiovascular: Negative for chest pain and syncope.   Respiratory: Negative for shortness of breath.    Musculoskeletal: Positive for joint swelling. Negative for back pain, joint pain and neck pain.   Gastrointestinal: Negative for abdominal pain.   Genitourinary: Negative for hematuria.   Neurological: Negative for dizziness and numbness.   All other systems reviewed and are negative.      Objective:      Physical Exam   Constitutional: She is oriented to person, place, and time. She appears well-developed and well-nourished. She is cooperative.  Non-toxic appearance. She does not appear ill. No distress.   HENT:   Head: Normocephalic and atraumatic.   Right Ear: Hearing, tympanic membrane, " external ear and ear canal normal.   Left Ear: Hearing, tympanic membrane, external ear and ear canal normal.   Nose: Nose normal. No mucosal edema, rhinorrhea or nasal deformity. No epistaxis. Right sinus exhibits no maxillary sinus tenderness and no frontal sinus tenderness. Left sinus exhibits no maxillary sinus tenderness and no frontal sinus tenderness.   Mouth/Throat: Uvula is midline, oropharynx is clear and moist and mucous membranes are normal. No trismus in the jaw. Normal dentition. No uvula swelling. No posterior oropharyngeal erythema.   Eyes: Conjunctivae and lids are normal. Right eye exhibits no discharge. Left eye exhibits no discharge. No scleral icterus.   Sclera clear bilat   Neck: Trachea normal, normal range of motion, full passive range of motion without pain and phonation normal. Neck supple.   Cardiovascular: Normal rate, regular rhythm, normal heart sounds, intact distal pulses and normal pulses.    Pulmonary/Chest: Effort normal and breath sounds normal. No respiratory distress.   Abdominal: Soft. Normal appearance and bowel sounds are normal. She exhibits no distension, no pulsatile midline mass and no mass. There is no tenderness.   Musculoskeletal: She exhibits no edema or deformity.        Right elbow: She exhibits decreased range of motion and swelling. She exhibits no effusion, no deformity and no laceration. Tenderness found. Radial head tenderness noted.        Arms:  Lymphadenopathy:     She has no cervical adenopathy.     She has no axillary adenopathy.   Neurological: She is alert and oriented to person, place, and time. She exhibits normal muscle tone. Coordination normal.   Skin: Skin is warm, dry and intact. She is not diaphoretic. No pallor.   Psychiatric: She has a normal mood and affect. Her speech is normal and behavior is normal. Judgment and thought content normal. Cognition and memory are normal.   Nursing note and vitals reviewed.      Assessment:       1. Acute gout  of right elbow, unspecified cause    2. Cellulitis of right upper extremity        Plan:     If no improvement in symptoms over the next 24hrs, return to  for re-evaluation or go to ER for possible septic joint.  Zohra was seen today for joint swelling.    Diagnoses and all orders for this visit:    Acute gout of right elbow, unspecified cause  -     ketorolac injection 30 mg; Inject 1 mL (30 mg total) into the muscle once.  -     meloxicam (MOBIC) 15 MG tablet; Take 1 tablet (15 mg total) by mouth once daily.  -     SLING FOR HOME USE    Cellulitis of right upper extremity  -     clindamycin injection 300 mg; Inject 2 mLs (300 mg total) into the muscle one time.  -     clindamycin (CLEOCIN) 300 MG capsule; Take 1 capsule (300 mg total) by mouth 4 (four) times daily.

## 2018-05-15 NOTE — PROGRESS NOTES
Subjective:       Patient ID: Zohra Paulino is a 68 y.o. female with a PMH significant for PMB (Fibroids s/p Hysterectomy - RATLH/BSO 11/2017), Elevated BP, Osteoporosis who was seen initially by me on 4/3/2018.  Patient was seen in Urgent Care on 5/1/2018 for Acute Gout of right Elbow and Cellulitis - was treatment with Toradol Injection, Mobic, and Clindamycin.    Chief Complaint: Follow-up (elbow) and Abdominal Pain (hysterectomy in nov - still painful)    HPI  Left elbow overall improved - she completed the antibiotics.  Having intermittent lower abdominal pains at the site of her Lap. Surgery for hysterectomy - not chronic or severe.  Having normal flatus and BMs.  Thinks her bilateral legs are swollen - no pain or warmth.  Patient denies f/c, n/v/d.  No chest pain or SOB.  No abdominal pain or dysuria.  No headaches or change in vision.  No dizziness.  No significant  weight gain or weight loss.  Remaining ROS negative.    Review of Systems   Constitutional: Negative for appetite change, chills, diaphoresis, fatigue, fever and unexpected weight change.   HENT: Negative for ear pain, hearing loss, sinus pain, tinnitus, trouble swallowing and voice change.    Eyes: Negative for photophobia, pain and visual disturbance.   Respiratory: Negative for cough, chest tightness, shortness of breath and wheezing.    Cardiovascular: Positive for leg swelling (for past year). Negative for chest pain and palpitations.   Gastrointestinal: Negative for abdominal pain, blood in stool, constipation, diarrhea, nausea and vomiting.   Endocrine: Negative for cold intolerance, heat intolerance, polydipsia, polyphagia and polyuria.   Genitourinary: Negative for decreased urine volume, difficulty urinating, dysuria, flank pain, hematuria, pelvic pain, vaginal bleeding, vaginal discharge and vaginal pain.   Musculoskeletal: Negative for arthralgias (left ankle pain), gait problem, joint swelling, myalgias and neck pain.   Skin:  Negative for rash.   Neurological: Negative for dizziness, tremors, syncope, weakness, numbness and headaches.   Hematological: Does not bruise/bleed easily.   Psychiatric/Behavioral: Negative for agitation, confusion and sleep disturbance. The patient is not nervous/anxious.        Objective:      Physical Exam   Constitutional: She is oriented to person, place, and time. She appears well-developed and well-nourished. No distress.   HENT:   Head: Normocephalic and atraumatic.   Nose: Nose normal.   Mouth/Throat: Oropharynx is clear and moist.   Eyes: Conjunctivae and EOM are normal. Pupils are equal, round, and reactive to light. No scleral icterus.   Neck: Normal range of motion. Neck supple. No JVD present. No thyromegaly present.   Cardiovascular: Normal rate, regular rhythm and intact distal pulses.  Exam reveals no gallop and no friction rub.    No murmur heard.  Pulmonary/Chest: Effort normal and breath sounds normal. No respiratory distress. She has no wheezes. She has no rales.   Abdominal: Soft. Bowel sounds are normal. She exhibits no distension. There is no tenderness. There is no rebound and no guarding.   Musculoskeletal: Normal range of motion. She exhibits no edema, tenderness or deformity.   Right elbow with no erythema, warmth, edema.  Bilateral LE with no pitting edema.  Negative Homans sign.  Good peripheral pulses.   Lymphadenopathy:     She has no cervical adenopathy.   Neurological: She is alert and oriented to person, place, and time. No cranial nerve deficit or sensory deficit.   Skin: Skin is warm and dry. No rash noted. No erythema.   Psychiatric: She has a normal mood and affect. Her behavior is normal. Thought content normal.       Assessment:       1. Elevated blood pressure reading    2. Breast cancer screening    3. Gout of right elbow, unspecified cause, unspecified chronicity    4. Osteopetrosis    5. S/P hysterectomy with oophorectomy, RATLH/BSO 11/16/17    6. Seasonal allergic  rhinitis, unspecified trigger    7. Colon cancer screening        Plan:   -Today's Visit - BMI 29.56 in 4/2018 - discussed diet modification and exercise.  BMI down to 28.47.  School is ending today and will work on her weight more this summer.  -Rheum - seen in Urgent Care on 5/1/2018 for Acute Gout of right Elbow and Cellulitis - was treatment with Toradol Injection, Mobic, and Clindamycin.  No aspiration.  No history of Gout.  Will check Uric Acid.  -Cards - Elevated BP in past -  /78 last visit.  BP today is better at 110/70.  EKG in 4/2018 with NSR with Septal Infarct.  TTE in 4/2018 with normal LVSF with EF 55-60% and no WMA.  There is LVDD.  Normal RVSF.  Fasting Lipids in 4/2018 with , , HDL 48, LDL 85.  -MSK - Thinks she has LE swelling, but exam was normal last visit and today with good peripheral pulses, no pitting edema, no difference in leg sizes, negative Homans.   Recommended continued low Na intake and compression stockings during the day.  -GYN -  PMB (Fibroids s/p Hysterectomy - RATLH/BSO 11/2017.  Intermittent mild abdominal pain post-surgery, but normal flatus and BMs.  Last Pap was negative on 10/12/2017.  Last Mammo was negative on 6/12/2017.  Reschedule Mammo.  DXA screening for osteoporosis done 8/2017 and showed Osteopenia.  She is now on Fosamax and vitamin D.  -Pulmonary - Bronchitis - treated at Urgent Care on 3/25/2018 - treated with Doxy.  Resolved with no recurrent symptoms today.  -ENT - Seasonal Allergies - was on Zyrtec in past.  Not having allergy symptoms.  Overall stable and improved today.  She wants a prescription for Zyrtec.  -HCM - We again discussed Flu (declines) and Tdap (declines today) vaccinations.  We discussed Shingles (declines today) and Pneumococcal (declines today) vaccinations.  We discussed colon cancer screening - had a colonoscopy - thinks she had it within 10 years - will reschedule today with [facility].  Vitamin D normal at 30 in 4/2018 (low  normal and recommended to continue Vitamin D replacement.  TSH normal in 4/2018.  HCV Ab negative in 4/2018.  -Follow up 4 months

## 2018-05-16 ENCOUNTER — OFFICE VISIT (OUTPATIENT)
Dept: INTERNAL MEDICINE | Facility: CLINIC | Age: 68
End: 2018-05-16
Payer: MEDICARE

## 2018-05-16 VITALS
TEMPERATURE: 98 F | HEART RATE: 69 BPM | WEIGHT: 160.69 LBS | SYSTOLIC BLOOD PRESSURE: 110 MMHG | HEIGHT: 63 IN | OXYGEN SATURATION: 100 % | BODY MASS INDEX: 28.47 KG/M2 | DIASTOLIC BLOOD PRESSURE: 70 MMHG

## 2018-05-16 DIAGNOSIS — Z90.721 S/P HYSTERECTOMY WITH OOPHORECTOMY: ICD-10-CM

## 2018-05-16 DIAGNOSIS — M10.9 GOUT OF RIGHT ELBOW, UNSPECIFIED CAUSE, UNSPECIFIED CHRONICITY: ICD-10-CM

## 2018-05-16 DIAGNOSIS — Z12.39 BREAST CANCER SCREENING: ICD-10-CM

## 2018-05-16 DIAGNOSIS — R03.0 ELEVATED BLOOD PRESSURE READING: Primary | ICD-10-CM

## 2018-05-16 DIAGNOSIS — Z12.11 COLON CANCER SCREENING: ICD-10-CM

## 2018-05-16 DIAGNOSIS — Z90.710 S/P HYSTERECTOMY WITH OOPHORECTOMY: ICD-10-CM

## 2018-05-16 DIAGNOSIS — Q78.2 OSTEOPETROSIS: ICD-10-CM

## 2018-05-16 DIAGNOSIS — J30.2 SEASONAL ALLERGIC RHINITIS, UNSPECIFIED TRIGGER: ICD-10-CM

## 2018-05-16 PROCEDURE — 99999 PR PBB SHADOW E&M-EST. PATIENT-LVL IV: CPT | Mod: PBBFAC,,, | Performed by: INTERNAL MEDICINE

## 2018-05-16 PROCEDURE — 99499 UNLISTED E&M SERVICE: CPT | Mod: S$PBB,,, | Performed by: INTERNAL MEDICINE

## 2018-05-16 PROCEDURE — 99214 OFFICE O/P EST MOD 30 MIN: CPT | Mod: S$GLB,,, | Performed by: INTERNAL MEDICINE

## 2018-05-16 RX ORDER — CETIRIZINE HYDROCHLORIDE 10 MG/1
10 TABLET ORAL DAILY
Qty: 90 TABLET | Refills: 3 | Status: SHIPPED | OUTPATIENT
Start: 2018-05-16 | End: 2019-01-14

## 2018-05-16 NOTE — PATIENT INSTRUCTIONS
Your exam was overall normal today.  Your blood pressure was good.  Continue with low Na diet.  Continue to monitor at home.  I will order a blood test to check for Gout.  I will order a follow up Mammogram.  I will refer you to [facility] for screening colonoscopy.  I refilled your Zyrtec.  Reconsider Vaccinations.  Return in 4 months - sooner if needed.  Please come at least 15-20 minutes before your scheduled appointment time.

## 2018-05-21 ENCOUNTER — TELEPHONE (OUTPATIENT)
Dept: INTERNAL MEDICINE | Facility: CLINIC | Age: 68
End: 2018-05-21

## 2018-05-21 NOTE — TELEPHONE ENCOUNTER
----- Message from Natalie Altman sent at 5/21/2018  8:20 AM CDT -----  Name of Who is Calling: KRISTINA JOHNSON [[id number]]    What is the request in detail: Pt would like a copy of the after visit summary mailed to her home.       Can the clinic reply by [facility]:  Yes       What Number to Call Back if not in [facility]: [phone]

## 2018-05-21 NOTE — TELEPHONE ENCOUNTER
Spoke with pt and staff was able to give pt her up coming appointments and not mail out her AVS. 5/21 MANDY

## 2018-06-13 ENCOUNTER — PATIENT MESSAGE (OUTPATIENT)
Dept: INTERNAL MEDICINE | Facility: CLINIC | Age: 68
End: 2018-06-13

## 2018-06-13 ENCOUNTER — HOSPITAL ENCOUNTER (OUTPATIENT)
Dept: RADIOLOGY | Facility: OTHER | Age: 68
Discharge: HOME | End: 2018-06-13
Attending: OBSTETRICS & GYNECOLOGY
Payer: MEDICARE

## 2018-06-13 DIAGNOSIS — Z12.31 VISIT FOR SCREENING MAMMOGRAM: ICD-10-CM

## 2018-06-13 PROCEDURE — 77067 SCR MAMMO BI INCL CAD: CPT | Mod: 26,,, | Performed by: INTERNAL MEDICINE

## 2018-06-13 PROCEDURE — 77067 SCR MAMMO BI INCL CAD: CPT | Mod: TC

## 2018-06-13 PROCEDURE — 77063 BREAST TOMOSYNTHESIS BI: CPT | Mod: 26,,, | Performed by: INTERNAL MEDICINE

## 2018-06-15 NOTE — TELEPHONE ENCOUNTER
Spoke with pt and she had a verbal understanding that uric acid levels were low, and no medications are needed at this time. 6/15 MANDY

## 2018-07-27 NOTE — PROGRESS NOTES
Subjective:       Patient ID: Zohra Paulino is a 68 y.o. female with a PMH significant for PMB (Fibroids s/p Hysterectomy - RATLH/BSO 11/2017), Elevated BP, Osteoporosis who was seen initially by me on 4/3/2018.  Patient was seen in Urgent Care on 5/1/2018 for Acute Gout of right Elbow and Cellulitis - was treatment with Toradol Injection, Mobic, and Clindamycin.  She was seen on follow up with me on 5/16/2018 and here again for follow up.    Chief Complaint: Follow-up and Ankle Pain (still sore)    HPI  Patient with complaints of right ankle pain (has bilateral ankle pain at times) on and off.  Elbow pain and swelling resolved.  Patient denies f/c, n/v/d.  No chest pain or SOB.  No abdominal pain or dysuria.  No headaches or change in vision.  No dizziness.  No significant  weight gain or weight loss.  Remaining ROS negative.    Review of Systems   Constitutional: Negative for appetite change, chills, diaphoresis, fatigue, fever and unexpected weight change.   HENT: Negative for ear pain, hearing loss, sinus pain, tinnitus, trouble swallowing and voice change.    Eyes: Negative for photophobia, pain and visual disturbance.   Respiratory: Negative for cough, chest tightness, shortness of breath and wheezing.    Cardiovascular: Negative for chest pain, palpitations and leg swelling.   Gastrointestinal: Negative for abdominal pain, blood in stool, constipation, diarrhea, nausea and vomiting.   Endocrine: Negative for cold intolerance, heat intolerance, polydipsia, polyphagia and polyuria.   Genitourinary: Negative for decreased urine volume, difficulty urinating, dysuria, flank pain, hematuria, pelvic pain, vaginal bleeding, vaginal discharge and vaginal pain.   Musculoskeletal: Positive for arthralgias (right ankle pain). Negative for gait problem, joint swelling, myalgias and neck pain.   Skin: Negative for rash.   Neurological: Negative for dizziness, tremors, syncope, weakness, numbness and headaches.    Hematological: Does not bruise/bleed easily.   Psychiatric/Behavioral: Negative for agitation, confusion and sleep disturbance. The patient is not nervous/anxious.        Objective:      Physical Exam   Constitutional: She is oriented to person, place, and time. She appears well-developed and well-nourished. No distress.   HENT:   Head: Normocephalic and atraumatic.   Nose: Nose normal.   Mouth/Throat: Oropharynx is clear and moist.   Eyes: Conjunctivae and EOM are normal. Pupils are equal, round, and reactive to light. No scleral icterus.   Neck: Normal range of motion. Neck supple. No JVD present. No thyromegaly present.   Cardiovascular: Normal rate, regular rhythm and intact distal pulses.  Exam reveals no gallop and no friction rub.    No murmur heard.  Pulmonary/Chest: Effort normal and breath sounds normal. No respiratory distress. She has no wheezes. She has no rales.   Abdominal: Soft. Bowel sounds are normal. She exhibits no distension. There is no tenderness. There is no rebound and no guarding.   Musculoskeletal: Normal range of motion. She exhibits no edema, tenderness or deformity.   Right and left ankle with FROM and no erythema of edema.  No increased warmth.   Lymphadenopathy:     She has no cervical adenopathy.   Neurological: She is alert and oriented to person, place, and time. No cranial nerve deficit or sensory deficit.   Skin: Skin is warm and dry. No rash noted. No erythema.   Psychiatric: She has a normal mood and affect. Her behavior is normal. Thought content normal.       Assessment:       1. Elevated blood pressure reading    2. S/P hysterectomy with oophorectomy, RATLH/BSO 11/16/17    3. Osteopenia, unspecified location    4. Diastolic dysfunction, left ventricle    5. Allergic rhinitis, unspecified seasonality, unspecified trigger    6. Overweight (BMI 25.0-29.9)        Plan:   -Today's Visit - BMI 29.56 in 4/2018 - discussed diet modification and exercise.  BMI down to 28.47 in  5/2018, but up again to 19.38 today.      -Rheum - seen in Urgent Care on 5/1/2018 for Acute Gout of right Elbow and Cellulitis - was treatment with Toradol Injection, Mobic, and Clindamycin.  No aspiration.  No history of Gout.  Uric Acid was normal on 6/13/2018.  Will repeat to confirm.  Possible pseudogout - consider checking PTH, Fe studies, Mag (calcium, Alk phos normal).    -Cards - Elevated BP in past -  /78 in 4/2018.  BP in 5/2018 was better at 110/70.  BP today (auto) was at 143/62.  Will have patient monitor BP closely at home and if consistently over 130/80, will need to consider treatment, especially given her DD.   EKG in 4/2018 with NSR with Septal Infarct.  TTE in 4/2018 with normal LVSF with EF 55-60% and no WMA.  There is LVDD.  Normal RVSF.  Fasting Lipids in 4/2018 with , , HDL 48, LDL 85.  10 year CHD risk is 3% with comparative risk of 13%.    -MSK - Thinks she has LE swelling, but exam was normal in 4/2018 and 5/2018 with good peripheral pulses, no pitting edema, no difference in leg sizes, negative Homans.   Recommended continued low Na intake and compression stockings during the day. Intermittent right > left ankle pain - last was 2 days ago.  Resolved now.  Will check an x-ray of the right ankle to see if any degenerative changes.    -GYN -  PMB (Fibroids s/p Hysterectomy - RATLH/BSO 11/2017.  Intermittent mild abdominal pain post-surgery, but normal flatus and BMs.  Last Pap was negative on 10/12/2017.  Last Mammo was negative on 6/13/2018.   DXA screening for osteoporosis done 8/2017 and showed Osteopenia.  Vitamin D normal at 30 in 4/2018 (low normal and recommended to continue Vitamin D replacement.  She is now on Fosamax and vitamin D.     -ENT - Seasonal Allergies - was on Zyrtec in past.  Not having allergy symptoms.  Overall stable and improved today.  Restarted Zyrtec last visit.    -HCM - We again discussed Flu (declines) and Tdap (declines today) vaccinations.   We discussed Shingles (declines today) and Pneumococcal (declines today) vaccinations.  We discussed colon cancer screening - had a colonoscopy - was scheduled in 5/31//2018 and she canceled it.  She will reschedule when she has the time.  Will send with FIT kit today.   TSH normal in 4/2018.  HCV Ab negative in 4/2018.    -Follow up in 6 months

## 2018-07-31 ENCOUNTER — PATIENT MESSAGE (OUTPATIENT)
Dept: PRIMARY CARE CLINIC | Facility: CLINIC | Age: 68
End: 2018-07-31

## 2018-07-31 ENCOUNTER — OFFICE VISIT (OUTPATIENT)
Dept: PRIMARY CARE CLINIC | Facility: CLINIC | Age: 68
End: 2018-07-31
Payer: MEDICARE

## 2018-07-31 ENCOUNTER — DOCUMENTATION ONLY (OUTPATIENT)
Dept: PRIMARY CARE CLINIC | Facility: CLINIC | Age: 68
End: 2018-07-31

## 2018-07-31 ENCOUNTER — LAB VISIT (OUTPATIENT)
Dept: LAB | Facility: HOSPITAL | Age: 68
End: 2018-07-31
Attending: INTERNAL MEDICINE
Payer: MEDICARE

## 2018-07-31 VITALS
HEART RATE: 59 BPM | BODY MASS INDEX: 29.56 KG/M2 | OXYGEN SATURATION: 100 % | TEMPERATURE: 98 F | DIASTOLIC BLOOD PRESSURE: 62 MMHG | HEIGHT: 62 IN | SYSTOLIC BLOOD PRESSURE: 143 MMHG | WEIGHT: 160.63 LBS

## 2018-07-31 DIAGNOSIS — E66.3 OVERWEIGHT (BMI 25.0-29.9): ICD-10-CM

## 2018-07-31 DIAGNOSIS — M19.90 ARTHRITIS: ICD-10-CM

## 2018-07-31 DIAGNOSIS — R03.0 ELEVATED BLOOD PRESSURE READING: Primary | ICD-10-CM

## 2018-07-31 DIAGNOSIS — Z90.710 S/P HYSTERECTOMY WITH OOPHORECTOMY: ICD-10-CM

## 2018-07-31 DIAGNOSIS — J30.9 ALLERGIC RHINITIS, UNSPECIFIED SEASONALITY, UNSPECIFIED TRIGGER: ICD-10-CM

## 2018-07-31 DIAGNOSIS — Z90.721 S/P HYSTERECTOMY WITH OOPHORECTOMY: ICD-10-CM

## 2018-07-31 DIAGNOSIS — M85.80 OSTEOPENIA, UNSPECIFIED LOCATION: ICD-10-CM

## 2018-07-31 DIAGNOSIS — I51.9 DIASTOLIC DYSFUNCTION, LEFT VENTRICLE: ICD-10-CM

## 2018-07-31 DIAGNOSIS — G89.29 CHRONIC PAIN OF RIGHT ANKLE: ICD-10-CM

## 2018-07-31 DIAGNOSIS — Z12.11 COLON CANCER SCREENING: ICD-10-CM

## 2018-07-31 DIAGNOSIS — M25.571 CHRONIC PAIN OF RIGHT ANKLE: ICD-10-CM

## 2018-07-31 LAB — URATE SERPL-MCNC: 5.2 MG/DL

## 2018-07-31 PROCEDURE — 99999 PR PBB SHADOW E&M-EST. PATIENT-LVL III: CPT | Mod: PBBFAC,,, | Performed by: INTERNAL MEDICINE

## 2018-07-31 PROCEDURE — 84550 ASSAY OF BLOOD/URIC ACID: CPT

## 2018-07-31 PROCEDURE — 36415 COLL VENOUS BLD VENIPUNCTURE: CPT | Mod: PN

## 2018-07-31 PROCEDURE — 99214 OFFICE O/P EST MOD 30 MIN: CPT | Mod: S$GLB,,, | Performed by: INTERNAL MEDICINE

## 2018-07-31 NOTE — PATIENT INSTRUCTIONS
Your exam was overall normal today.  Your blood pressure is a little high today.  Please monitor at home with a digital blood pressure cuff when sitting and rested for at least 15 minutes or longer.  Record your values and bring these with you on your return. Target blood pressure is less than 130/80.    I would like to repeat a Uric Acid Level today - non-fasting.  I will check an x-ray of your right ankle today.  Please reconsider vaccinations.  Return in 6 months - sooner if needed.  Please come at least 15-20 minutes before your scheduled appointment time.

## 2018-08-10 ENCOUNTER — LAB VISIT (OUTPATIENT)
Dept: LAB | Facility: HOSPITAL | Age: 68
End: 2018-08-10
Attending: INTERNAL MEDICINE
Payer: MEDICARE

## 2018-08-10 DIAGNOSIS — Z12.11 COLON CANCER SCREENING: ICD-10-CM

## 2018-08-10 LAB — HEMOCCULT STL QL IA: POSITIVE

## 2018-08-10 PROCEDURE — 82274 ASSAY TEST FOR BLOOD FECAL: CPT

## 2018-08-14 ENCOUNTER — PATIENT MESSAGE (OUTPATIENT)
Dept: PRIMARY CARE CLINIC | Facility: CLINIC | Age: 68
End: 2018-08-14

## 2018-08-14 NOTE — TELEPHONE ENCOUNTER
Spoke with Dorina with [facility] and was able to reschedule pt  On 09/10/18 at 2:30. Spoke with pt and she had a verbal understanding that her appointment was rescheduled, pt was given above information. 8/14 RADAMES

## 2018-09-12 NOTE — PROGRESS NOTES
Subjective:       Patient ID: Zohra Paulino is a 68 y.o. female with a PMH significant for PMB (Fibroids s/p Hysterectomy - RATLH/BSO 11/2017), Elevated BP, Osteoporosis who was seen initially by me on 4/3/2018.  Patient was seen in Urgent Care on 5/1/2018 for Acute Gout of right Elbow and Cellulitis - was treatment with Toradol Injection, Mobic, and Clindamycin.  She was seen on follow up with me on 5/16/2018 and 7/31/2018.    Chief Complaint: Follow-up    HPI  Patient overall ok.  Still complaining of LE swelling and not using the compression stockings much.  Having frequent urination and foul smell of urine.  Slight burning.  Has been ongoing for a couple of weeks.  No vaginal discharge.  Patient denies f/c, n/v/d.  No chest pain or SOB.  No abdominal pain or dysuria.  No headaches or change in vision.  No dizziness.  No significant  weight gain or weight loss.  Remaining ROS negative.    Review of Systems   Constitutional: Negative for appetite change, chills, diaphoresis, fatigue, fever and unexpected weight change.   HENT: Negative for ear pain, hearing loss, sinus pain, tinnitus, trouble swallowing and voice change.    Eyes: Negative for photophobia, pain and visual disturbance.   Respiratory: Negative for cough, chest tightness, shortness of breath and wheezing.    Cardiovascular: Negative for chest pain, palpitations and leg swelling.   Gastrointestinal: Negative for abdominal pain, blood in stool, constipation, diarrhea, nausea and vomiting.   Endocrine: Negative for cold intolerance, heat intolerance, polydipsia, polyphagia and polyuria.   Genitourinary: Negative for decreased urine volume, difficulty urinating, dysuria, flank pain, hematuria, pelvic pain, vaginal bleeding, vaginal discharge and vaginal pain.   Musculoskeletal: Positive for arthralgias (right ankle pain). Negative for gait problem, joint swelling, myalgias and neck pain.   Skin: Negative for rash.   Neurological: Negative for  dizziness, tremors, syncope, weakness, numbness and headaches.   Hematological: Does not bruise/bleed easily.   Psychiatric/Behavioral: Negative for agitation, confusion and sleep disturbance. The patient is not nervous/anxious.        Objective:      Physical Exam   Constitutional: She is oriented to person, place, and time. She appears well-developed and well-nourished. No distress.   HENT:   Head: Normocephalic and atraumatic.   Nose: Nose normal.   Mouth/Throat: Oropharynx is clear and moist.   Eyes: Conjunctivae and EOM are normal. Pupils are equal, round, and reactive to light. No scleral icterus.   Neck: Normal range of motion. Neck supple. No JVD present. No thyromegaly present.   Cardiovascular: Normal rate, regular rhythm and intact distal pulses. Exam reveals no gallop and no friction rub.   No murmur heard.  Pulmonary/Chest: Effort normal and breath sounds normal. No respiratory distress. She has no wheezes. She has no rales.   Abdominal: Soft. Bowel sounds are normal. She exhibits no distension. There is no tenderness. There is no rebound and no guarding.   Musculoskeletal: Normal range of motion. She exhibits no edema, tenderness or deformity.   Right and left ankle with FROM and no erythema of edema.  No increased warmth.   Lymphadenopathy:     She has no cervical adenopathy.   Neurological: She is alert and oriented to person, place, and time. No cranial nerve deficit or sensory deficit.   Skin: Skin is warm and dry. No rash noted. No erythema.   Psychiatric: She has a normal mood and affect. Her behavior is normal. Thought content normal.       Assessment:       1. Urinary frequency    2. Elevated blood pressure reading    3. S/P hysterectomy with oophorectomy, RATLH/BSO 11/16/17    4. Osteopetrosis        Plan:   -Today's Visit - patient overall stable.  Check labs on follow up.    -Nutrition - BMI 29.56 in 4/2018 - discussed diet modification and exercise.  BMI down to 28.47 in 5/2018, but up  again to 29.38 in 7/2018.  BMI today is 28.81.  Not exercising - discussed again benefit.    -Cards - Elevated BP in past -  /78 in 4/2018.  BP in 5/2018 was better at 110/70.  BP in 7/2018 (auto) was 143/62.  BP stable at 130/70 today.    EKG in 4/2018 with NSR with Septal Infarct.  TTE in 4/2018 with normal LVSF with EF 55-60% and no WMA.  There is LVDD.  Normal RVSF.      Fasting Lipids in 4/2018 with , , HDL 48, LDL 85.  10 year CHD risk is 3% with comparative risk of 13%.    -MSK - Thinks she has LE swelling, but exam was normal in 4/2018 and 5/2018 with good peripheral pulses, no pitting edema, no difference in leg sizes, negative Homans.   Recommended continued low Na intake and compression stockings during the day. Intermittent right > left ankle pain -  resolved now.  X-ray of the right ankle negative.    -Rheum - seen in Urgent Care on 5/1/2018 for Acute Gout of right Elbow and Cellulitis - was treatment with Toradol Injection, Mobic, and Clindamycin.  No aspiration.  No history of Gout.  Uric Acid was normal on 6/13/2018 and 7/31/2018. Possible pseudogout - consider checking PTH, Fe studies, Mag (calcium, Alk phos normal).  No recurrence to date.    -GI - We discussed colon cancer screening - was scheduled in 5/31//2018 and she canceled it.  She will reschedule when she has the time.   FIT was positive in 8/2018.   She has an appointment this month.    -GYN -  PMB (Fibroids s/p Hysterectomy - RATLH/BSO 11/2017.  Intermittent mild abdominal pain post-surgery, but normal flatus and BMs.  Last Pap was negative on 10/12/2017.  Last Mammo was negative on 6/13/2018.   DXA screening for osteoporosis done 8/2017 and showed Osteopenia.  Vitamin D normal at 30 in 4/2018.  She is on Fosamax and vitamin D.     Having some urinary frequency with mild dysuria.  Will check UA and culture.  If negative, then will refer to GYN for further evaluation.    -ENT - Seasonal Allergies - was on Zyrtec in past.   Not having allergy symptoms.  Overall stable and improved today.  Restarted Zyrtec last visit and stable.    -HCM - We again discussed Flu (declined) and Tdap (declined) vaccinations.  We discussed Shingles (declined) and Pneumococcal (declined) vaccinations.    TSH normal in 4/2018.  HCV Ab negative in 4/2018.    -Follow up in 6 months

## 2018-09-17 ENCOUNTER — LAB VISIT (OUTPATIENT)
Dept: LAB | Facility: HOSPITAL | Age: 68
End: 2018-09-17
Attending: INTERNAL MEDICINE
Payer: MEDICARE

## 2018-09-17 ENCOUNTER — PATIENT MESSAGE (OUTPATIENT)
Dept: PRIMARY CARE CLINIC | Facility: CLINIC | Age: 68
End: 2018-09-17

## 2018-09-17 ENCOUNTER — OFFICE VISIT (OUTPATIENT)
Dept: PRIMARY CARE CLINIC | Facility: CLINIC | Age: 68
End: 2018-09-17
Payer: MEDICARE

## 2018-09-17 VITALS
HEIGHT: 62 IN | DIASTOLIC BLOOD PRESSURE: 70 MMHG | HEART RATE: 70 BPM | WEIGHT: 157.5 LBS | TEMPERATURE: 98 F | SYSTOLIC BLOOD PRESSURE: 130 MMHG | OXYGEN SATURATION: 99 % | BODY MASS INDEX: 28.98 KG/M2

## 2018-09-17 DIAGNOSIS — N30.01 ACUTE CYSTITIS WITH HEMATURIA: Primary | ICD-10-CM

## 2018-09-17 DIAGNOSIS — Z90.710 S/P HYSTERECTOMY WITH OOPHORECTOMY: ICD-10-CM

## 2018-09-17 DIAGNOSIS — Z90.721 S/P HYSTERECTOMY WITH OOPHORECTOMY: ICD-10-CM

## 2018-09-17 DIAGNOSIS — R35.0 URINARY FREQUENCY: ICD-10-CM

## 2018-09-17 DIAGNOSIS — R03.0 ELEVATED BLOOD PRESSURE READING: ICD-10-CM

## 2018-09-17 DIAGNOSIS — R35.0 URINARY FREQUENCY: Primary | ICD-10-CM

## 2018-09-17 DIAGNOSIS — Q78.2 OSTEOPETROSIS: ICD-10-CM

## 2018-09-17 LAB
BACTERIA #/AREA URNS AUTO: ABNORMAL /HPF
BILIRUB UR QL STRIP: NEGATIVE
CLARITY UR REFRACT.AUTO: ABNORMAL
COLOR UR AUTO: YELLOW
GLUCOSE UR QL STRIP: NEGATIVE
HGB UR QL STRIP: ABNORMAL
KETONES UR QL STRIP: NEGATIVE
LEUKOCYTE ESTERASE UR QL STRIP: ABNORMAL
MICROSCOPIC COMMENT: ABNORMAL
NITRITE UR QL STRIP: POSITIVE
PH UR STRIP: 5 [PH] (ref 5–8)
PROT UR QL STRIP: NEGATIVE
RBC #/AREA URNS AUTO: 7 /HPF (ref 0–4)
SP GR UR STRIP: 1.02 (ref 1–1.03)
SQUAMOUS #/AREA URNS AUTO: 1 /HPF
URN SPEC COLLECT METH UR: ABNORMAL
UROBILINOGEN UR STRIP-ACNC: NEGATIVE EU/DL
WBC #/AREA URNS AUTO: 5 /HPF (ref 0–5)

## 2018-09-17 PROCEDURE — 99999 PR PBB SHADOW E&M-EST. PATIENT-LVL III: CPT | Mod: PBBFAC,,, | Performed by: INTERNAL MEDICINE

## 2018-09-17 PROCEDURE — 81001 URINALYSIS AUTO W/SCOPE: CPT

## 2018-09-17 PROCEDURE — 99213 OFFICE O/P EST LOW 20 MIN: CPT | Mod: PBBFAC,PN | Performed by: INTERNAL MEDICINE

## 2018-09-17 PROCEDURE — 99214 OFFICE O/P EST MOD 30 MIN: CPT | Mod: S$PBB,,, | Performed by: INTERNAL MEDICINE

## 2018-09-17 PROCEDURE — 1101F PT FALLS ASSESS-DOCD LE1/YR: CPT | Mod: CPTII,,, | Performed by: INTERNAL MEDICINE

## 2018-09-17 RX ORDER — NITROFURANTOIN (MACROCRYSTALS) 100 MG/1
100 CAPSULE ORAL EVERY 12 HOURS
Qty: 10 CAPSULE | Refills: 0 | Status: SHIPPED | OUTPATIENT
Start: 2018-09-17 | End: 2018-09-22

## 2018-09-17 NOTE — PATIENT INSTRUCTIONS
Your exam was overall normal today.  Your blood pressure was good.    Return in 6 months - sooner if needed.  Please come at least 15-20 minutes before your scheduled appointment time.

## 2018-10-19 DIAGNOSIS — M81.0 OSTEOPOROSIS, UNSPECIFIED OSTEOPOROSIS TYPE, UNSPECIFIED PATHOLOGICAL FRACTURE PRESENCE: ICD-10-CM

## 2018-10-19 NOTE — TELEPHONE ENCOUNTER
----- Message from Trupti Hector sent at 10/19/2018  1:17 PM CDT -----  Contact: Pt   Name of Who is Calling: KRISTINA JOHNSON [[id number]]      What is the request in detail: Patient is requesting a call in regards to a medication she needs but can't confirm what medication. Please contact to further discuss and advise      Can the clinic reply by [facility]: No       What Number to Call Back if not in [facility]: [phone]

## 2018-10-23 ENCOUNTER — TELEPHONE (OUTPATIENT)
Dept: PRIMARY CARE CLINIC | Facility: CLINIC | Age: 68
End: 2018-10-23

## 2018-10-23 NOTE — TELEPHONE ENCOUNTER
----- Message from Linsey Worthington sent at 10/23/2018  2:46 PM CDT -----  Contact: Pt  Name of Who is Calling:KRISTINA JOHNSON [[id number]]    What is the request in detail: Patient would like a call back , patient need to know if she still needed to take medication UTI Please contact to further discuss and advise    Can the clinic reply by [facility]: No    What Number to Call Back if not in [facility]: [phone]

## 2018-10-23 NOTE — TELEPHONE ENCOUNTER
Spoke with pt and she asked if she need to take her medication that Dr. Easton sent to her pharmacy 9/17, that  she never taken for her UTI. I asked pt if she taken anything else for the UTI and she stated NO, I advise pt to pick her prescription up and start her medication as prescribed by Dr. Easton. Pt stated that she will pick her prescriptions up today from her pharmacy. 10*23 jdp

## 2018-11-27 ENCOUNTER — OFFICE VISIT (OUTPATIENT)
Dept: URGENT CARE | Facility: CLINIC | Age: 68
End: 2018-11-27
Payer: MEDICARE

## 2018-11-27 VITALS
DIASTOLIC BLOOD PRESSURE: 73 MMHG | OXYGEN SATURATION: 97 % | WEIGHT: 157 LBS | TEMPERATURE: 98 F | RESPIRATION RATE: 18 BRPM | HEART RATE: 74 BPM | SYSTOLIC BLOOD PRESSURE: 169 MMHG | HEIGHT: 62 IN | BODY MASS INDEX: 28.89 KG/M2

## 2018-11-27 DIAGNOSIS — M25.572 LEFT ANKLE PAIN, UNSPECIFIED CHRONICITY: ICD-10-CM

## 2018-11-27 DIAGNOSIS — J02.9 PHARYNGITIS, UNSPECIFIED ETIOLOGY: Primary | ICD-10-CM

## 2018-11-27 LAB
CTP QC/QA: YES
S PYO RRNA THROAT QL PROBE: NEGATIVE

## 2018-11-27 PROCEDURE — 87880 STREP A ASSAY W/OPTIC: CPT | Mod: QW,S$GLB,, | Performed by: NURSE PRACTITIONER

## 2018-11-27 PROCEDURE — 99214 OFFICE O/P EST MOD 30 MIN: CPT | Mod: S$GLB,,, | Performed by: NURSE PRACTITIONER

## 2018-11-27 PROCEDURE — 1101F PT FALLS ASSESS-DOCD LE1/YR: CPT | Mod: CPTII,S$GLB,, | Performed by: NURSE PRACTITIONER

## 2018-11-27 RX ORDER — NAPROXEN 500 MG/1
500 TABLET ORAL 2 TIMES DAILY WITH MEALS
Qty: 20 TABLET | Refills: 0 | Status: SHIPPED | OUTPATIENT
Start: 2018-11-27 | End: 2018-12-07

## 2018-11-27 RX ORDER — FLUTICASONE PROPIONATE 50 MCG
1 SPRAY, SUSPENSION (ML) NASAL DAILY
Qty: 1 BOTTLE | Refills: 0 | Status: SHIPPED | OUTPATIENT
Start: 2018-11-27 | End: 2019-01-14

## 2018-11-27 NOTE — LETTER
November 27, 2018      [facility] [address] [address]  Phone: [phone]  Fax: [phone]       Patient: Zohra Paulino   YOB: 1950  Date of Visit: 11/27/2018    To Whom It May Concern:    Jennifer Paulino  was at [facility] on 11/27/2018. She has been out with an ankle injury this week. She may return to work/school on 11/29/2018 with no restrictions. If you have any questions or concerns, or if I can be of further assistance, please do not hesitate to contact me.    Sincerely,        Lelia Red NP

## 2018-11-27 NOTE — PROGRESS NOTES
"Subjective:       Patient ID: Zohra Paulino is a 68 y.o. female.    Vitals:  height is 5' 2" (1.575 m) and weight is 71.2 kg (157 lb). Her temperature is 98 °F (36.7 °C). Her blood pressure is 169/73 (abnormal) and her pulse is 74. Her respiration is 18 and oxygen saturation is 97%.     Chief Complaint: Sore Throat    Sore throat for 4 days.       Sore Throat    This is a new problem. The current episode started in the past 7 days. The problem has been unchanged. There has been no fever. Associated symptoms include trouble swallowing. Pertinent negatives include no congestion, coughing, ear pain, shortness of breath, stridor or vomiting. She has tried nothing for the symptoms.       Constitution: Negative for chills, sweating, fatigue and fever.   HENT: Positive for sore throat and trouble swallowing. Negative for ear pain, facial swelling, congestion, sinus pain, sinus pressure and voice change.    Neck: Negative for painful lymph nodes.   Eyes: Negative for eye itching, eye redness and eyelid swelling.   Respiratory: Negative for chest tightness, cough, sputum production, bloody sputum, COPD, shortness of breath, stridor, wheezing and asthma.    Gastrointestinal: Negative for nausea and vomiting.   Musculoskeletal: Negative for joint pain, joint swelling and muscle ache.   Skin: Positive for rash. Negative for color change, pale, wound, abrasion, laceration, lesion, skin thickening/induration, puncture wound, erythema, bruising, abscess, avulsion and hives.   Allergic/Immunologic: Negative for environmental allergies, seasonal allergies, asthma, immunocompromised state and hives.   Hematologic/Lymphatic: Negative for swollen lymph nodes.       Objective:      Physical Exam   Constitutional: She is oriented to person, place, and time. She appears well-developed and well-nourished. She is cooperative.  Non-toxic appearance. She does not appear ill. No distress.   HENT:   Head: Normocephalic and atraumatic. "   Right Ear: Hearing, tympanic membrane, external ear and ear canal normal.   Left Ear: Hearing, tympanic membrane, external ear and ear canal normal.   Nose: Rhinorrhea present. No mucosal edema or nasal deformity. No epistaxis. Right sinus exhibits no maxillary sinus tenderness and no frontal sinus tenderness. Left sinus exhibits no maxillary sinus tenderness and no frontal sinus tenderness.   Mouth/Throat: Uvula is midline and mucous membranes are normal. No trismus in the jaw. Normal dentition. No uvula swelling. Posterior oropharyngeal edema and posterior oropharyngeal erythema present. No oropharyngeal exudate. No tonsillar exudate.   COBBLESTONING TO TONGUE   Eyes: Conjunctivae and lids are normal. No scleral icterus.   Sclera clear bilat   Neck: Trachea normal, full passive range of motion without pain and phonation normal. Neck supple.   Cardiovascular: Normal rate, regular rhythm, normal heart sounds, intact distal pulses and normal pulses.   Pulmonary/Chest: Effort normal and breath sounds normal. No respiratory distress.   Abdominal: Soft. Normal appearance and bowel sounds are normal. She exhibits no distension. There is no tenderness.   Musculoskeletal: She exhibits no edema or deformity.        Left ankle: She exhibits decreased range of motion and swelling. Tenderness. Medial malleolus tenderness found.        Feet:    Neurological: She is alert and oriented to person, place, and time. She exhibits normal muscle tone. Coordination normal.   Skin: Skin is warm, dry and intact. She is not diaphoretic. No erythema. No pallor.   Psychiatric: She has a normal mood and affect. Her speech is normal and behavior is normal. Judgment and thought content normal. Cognition and memory are normal.   Nursing note and vitals reviewed.      Results for orders placed or performed in visit on 11/27/18   POCT rapid strep A   Result Value Ref Range    Rapid Strep A Screen Negative Negative     Acceptable  Yes        Assessment:       1. Pharyngitis, unspecified etiology    2. Left ankle pain, unspecified chronicity        Plan:         Pharyngitis, unspecified etiology  -     POCT rapid strep A  -     fluticasone (FLONASE) 50 mcg/actuation nasal spray; 1 spray (50 mcg total) by Each Nare route once daily.  Dispense: 1 Bottle; Refill: 0    Left ankle pain, unspecified chronicity  -     naproxen (NAPROSYN) 500 MG tablet; Take 1 tablet (500 mg total) by mouth 2 (two) times daily with meals. for 10 days  Dispense: 20 tablet; Refill: 0  -     Ambulatory referral to Orthopedics      Patient Instructions     Follow up with your doctor in a few days.  Return to the urgent care or go to the ER if symptoms get worse.    ANKLE: KEEP WITH ANKLE COMPRESSION, COOL AND WARM COMPRESS, ELEVATE WHEN POSSIBLE, START NAPROXYN AS DIRECTED.  FOLLOW UP WITH ORTHOPEDICS. Please call [phone] if you do not hear from [facility] to get your referral appointment we discussed.          SORE THROAT:      Please drink plenty of fluids.     Please get plenty of rest.     Please return here or go to the Emergency Department for any concerns or worsening of condition.     Sore throat recommendations: Warm fluids, warm salt water gargles, throat lozenges, tea, honey, soup, rest, hydration.     Use over the counter flonase: one spray each nostril twice daily OR two sprays each nostril once daily.      If you do not have Hypertension or any history of palpitations, it is ok to take over the counter Sudafed or Mucinex D or Allegra-D or Claritin-D or Zyrtec-D.          Viral Pharyngitis (Sore Throat)    You (or your child, if your child is the patient) have pharyngitis (sore throat). This infection is caused by a virus. It can cause throat pain that is worse when swallowing, aching all over, headache, and fever. The infection may be spread by coughing, kissing, or touching others after touching your mouth or nose. Antibiotic medications do not work  against viruses, so they are not used for treating this condition.  Home care  · If your symptoms are severe, rest at home. Return to work or school when you feel well enough.   · Drink plenty of fluids to avoid dehydration.  · For children: Use acetaminophen for fever, fussiness or discomfort. In infants over six months of age, you may use ibuprofen instead of acetaminophen. (NOTE: If your child has chronic liver or kidney disease or ever had a stomach ulcer or GI bleeding, talk with your doctor before using these medicines.) (NOTE: Aspirin should never be used in anyone under 18 years of age who is ill with a fever. It may cause severe liver damage.)   · For adults: You may use acetaminophen or ibuprofen to control pain or fever, unless another medicine was prescribed for this. (NOTE: If you have chronic liver or kidney disease or ever had a stomach ulcer or GI bleeding, talk with your doctor before using these medicines.)  · Throat lozenges or numbing throat sprays can help reduce pain. Gargling with warm salt water will also help reduce throat pain. For this, dissolve 1/2 teaspoon of salt in 1 glass of warm water. To help soothe a sore throat, children can sip on juice or a popsicle. Children 5 years and older can also suck on a lollipop or hard candy.  · Avoid salty or spicy foods, which can be irritating to the throat.  Follow-up care  Follow up with your healthcare provider or our staff if you are not improving over the next week.  When to seek medical advice  Call your healthcare provider right away if any of these occur:  · Fever as directed by your doctor.  For children, seek care if:  ? Your child is of any age and has repeated fevers above 104°F (40°C).  ? Your child is younger than 2 years of age and has a fever of 100.4°F (38°C) that continues for more than 1 day.  ? Your child is 2 years old or older and has a fever of 100.4°F (38°C) that continues for more than 3 days.  · New or worsening ear pain,  sinus pain, or headache  · Painful lumps in the back of neck  · Stiff neck  · Lymph nodes are getting larger  · Inability to swallow liquids, excessive drooling, or inability to open mouth wide due to throat pain  · Signs of dehydration (very dark urine or no urine, sunken eyes, dizziness)  · Trouble breathing or noisy breathing  · Muffled voice  · New rash  · Child appears to be getting sicker  Date Last Reviewed: 4/13/2015  © 5828-2452 [employer]. [address] [place] [address], [place] [address]. All rights reserved. This information is not intended as a substitute for professional medical care. Always follow your healthcare professional's instructions.       Self-Care for Sore Throats    Sore throats happen for many reasons, such as colds, allergies, and infections caused by viruses or bacteria. In any case, your throat becomes red and sore. Your goal for self-care is to reduce your discomfort while giving your throat a chance to heal.  Moisten and soothe your throat  Tips include the following:  · Try a sip of water first thing after waking up.  · Keep your throat moist by drinking 6 or more glasses of clear liquids every day.  · Run a cool-air humidifier in your room overnight.  · Avoid cigarette smoke.   · Suck on throat lozenges, cough drops, hard candy, ice chips, or frozen fruit-juice bars. Use the sugar-free versions if your diet or medical condition requires them.  Gargle to ease irritation  Gargling every hour or 2 can ease irritation. Try gargling with 1 of these solutions:  · 1/4 teaspoon of salt in 1/2 cup of warm water  · An over-the-counter anesthetic gargle  Use medicine for more relief  Over-the-counter medicine can reduce sore throat symptoms. Ask your pharmacist if you have questions about which medicine to use:  · Ease pain with anesthetic sprays. Aspirin or an aspirin substitute also helps. Remember, never give aspirin to anyone 18 or younger, or if you are already taking blood  thinners.   · For sore throats caused by allergies, try antihistamines to block the allergic reaction.  · Remember: unless a sore throat is caused by a bacterial infection, antibiotics wont help you.  Prevent future sore throats  Prevention tips include the following:  · Stop smoking or reduce contact with secondhand smoke. Smoke irritates the tender throat lining.  · Limit contact with pets and with allergy-causing substances, such as pollen and mold.  · When youre around someone with a sore throat or cold, wash your hands often to keep viruses or bacteria from spreading.  · Dont strain your vocal cords.  Call your healthcare provider  Contact your healthcare provider if you have:  · A temperature over 101°F (38.3°C)  · White spots on the throat  · Great difficulty swallowing  · Trouble breathing  · A skin rash  · Recent exposure to someone else with strep bacteria  · Severe hoarseness and swollen glands in the neck or jaw   Date Last Reviewed: 8/1/2016  © 1578-7831 [employer]. [address] [place] [address], [place] [address]. All rights reserved. This information is not intended as a substitute for professional medical care. Always follow your healthcare professional's instructions.

## 2018-11-27 NOTE — PATIENT INSTRUCTIONS
Follow up with your doctor in a few days.  Return to the urgent care or go to the ER if symptoms get worse.    ANKLE: KEEP WITH ANKLE COMPRESSION, COOL AND WARM COMPRESS, ELEVATE WHEN POSSIBLE, START NAPROXYN AS DIRECTED.  FOLLOW UP WITH ORTHOPEDICS. Please call [phone] if you do not hear from [facility] to get your referral appointment we discussed.          SORE THROAT:      Please drink plenty of fluids.     Please get plenty of rest.     Please return here or go to the Emergency Department for any concerns or worsening of condition.     Sore throat recommendations: Warm fluids, warm salt water gargles, throat lozenges, tea, honey, soup, rest, hydration.     Use over the counter flonase: one spray each nostril twice daily OR two sprays each nostril once daily.      If you do not have Hypertension or any history of palpitations, it is ok to take over the counter Sudafed or Mucinex D or Allegra-D or Claritin-D or Zyrtec-D.          Viral Pharyngitis (Sore Throat)    You (or your child, if your child is the patient) have pharyngitis (sore throat). This infection is caused by a virus. It can cause throat pain that is worse when swallowing, aching all over, headache, and fever. The infection may be spread by coughing, kissing, or touching others after touching your mouth or nose. Antibiotic medications do not work against viruses, so they are not used for treating this condition.  Home care  · If your symptoms are severe, rest at home. Return to work or school when you feel well enough.   · Drink plenty of fluids to avoid dehydration.  · For children: Use acetaminophen for fever, fussiness or discomfort. In infants over six months of age, you may use ibuprofen instead of acetaminophen. (NOTE: If your child has chronic liver or kidney disease or ever had a stomach ulcer or GI bleeding, talk with your doctor before using these medicines.) (NOTE: Aspirin should never be used in anyone under 18 years of age who is  ill with a fever. It may cause severe liver damage.)   · For adults: You may use acetaminophen or ibuprofen to control pain or fever, unless another medicine was prescribed for this. (NOTE: If you have chronic liver or kidney disease or ever had a stomach ulcer or GI bleeding, talk with your doctor before using these medicines.)  · Throat lozenges or numbing throat sprays can help reduce pain. Gargling with warm salt water will also help reduce throat pain. For this, dissolve 1/2 teaspoon of salt in 1 glass of warm water. To help soothe a sore throat, children can sip on juice or a popsicle. Children 5 years and older can also suck on a lollipop or hard candy.  · Avoid salty or spicy foods, which can be irritating to the throat.  Follow-up care  Follow up with your healthcare provider or our staff if you are not improving over the next week.  When to seek medical advice  Call your healthcare provider right away if any of these occur:  · Fever as directed by your doctor.  For children, seek care if:  ? Your child is of any age and has repeated fevers above 104°F (40°C).  ? Your child is younger than 2 years of age and has a fever of 100.4°F (38°C) that continues for more than 1 day.  ? Your child is 2 years old or older and has a fever of 100.4°F (38°C) that continues for more than 3 days.  · New or worsening ear pain, sinus pain, or headache  · Painful lumps in the back of neck  · Stiff neck  · Lymph nodes are getting larger  · Inability to swallow liquids, excessive drooling, or inability to open mouth wide due to throat pain  · Signs of dehydration (very dark urine or no urine, sunken eyes, dizziness)  · Trouble breathing or noisy breathing  · Muffled voice  · New rash  · Child appears to be getting sicker  Date Last Reviewed: 4/13/2015  © 7722-4180 [employer]. [address] [place] [address], [place] [address]. All rights reserved. This information is not intended as a substitute for professional medical  care. Always follow your healthcare professional's instructions.       Self-Care for Sore Throats    Sore throats happen for many reasons, such as colds, allergies, and infections caused by viruses or bacteria. In any case, your throat becomes red and sore. Your goal for self-care is to reduce your discomfort while giving your throat a chance to heal.  Moisten and soothe your throat  Tips include the following:  · Try a sip of water first thing after waking up.  · Keep your throat moist by drinking 6 or more glasses of clear liquids every day.  · Run a cool-air humidifier in your room overnight.  · Avoid cigarette smoke.   · Suck on throat lozenges, cough drops, hard candy, ice chips, or frozen fruit-juice bars. Use the sugar-free versions if your diet or medical condition requires them.  Gargle to ease irritation  Gargling every hour or 2 can ease irritation. Try gargling with 1 of these solutions:  · 1/4 teaspoon of salt in 1/2 cup of warm water  · An over-the-counter anesthetic gargle  Use medicine for more relief  Over-the-counter medicine can reduce sore throat symptoms. Ask your pharmacist if you have questions about which medicine to use:  · Ease pain with anesthetic sprays. Aspirin or an aspirin substitute also helps. Remember, never give aspirin to anyone 18 or younger, or if you are already taking blood thinners.   · For sore throats caused by allergies, try antihistamines to block the allergic reaction.  · Remember: unless a sore throat is caused by a bacterial infection, antibiotics wont help you.  Prevent future sore throats  Prevention tips include the following:  · Stop smoking or reduce contact with secondhand smoke. Smoke irritates the tender throat lining.  · Limit contact with pets and with allergy-causing substances, such as pollen and mold.  · When youre around someone with a sore throat or cold, wash your hands often to keep viruses or bacteria from spreading.  · Dont strain your vocal  cords.  Call your healthcare provider  Contact your healthcare provider if you have:  · A temperature over 101°F (38.3°C)  · White spots on the throat  · Great difficulty swallowing  · Trouble breathing  · A skin rash  · Recent exposure to someone else with strep bacteria  · Severe hoarseness and swollen glands in the neck or jaw   Date Last Reviewed: 8/1/2016  © 6159-4565 [employer]. [address] [place] [address], [place] [address]. All rights reserved. This information is not intended as a substitute for professional medical care. Always follow your healthcare professional's instructions.

## 2018-12-27 NOTE — PROGRESS NOTES
Subjective:       Patient ID: Zohra Paulino is a 68 y.o. female with a PMH significant for PMB (Fibroids s/p Hysterectomy - RATLH/BSO 11/2017), Elevated BP, Osteoporosis who was seen initially by me on 4/3/2018.  Patient was seen in Urgent Care on 5/1/2018 for Acute Gout of right Elbow and Cellulitis - was treatment with Toradol Injection, Mobic, and Clindamycin.  She was seen on follow up with me on 5/16/2018, 7/31/2018, and 9/17/2018.  She was seen in Urgent Care on 11/27/2018 and diagnosed with viral pharyngitis.    Chief Complaint: Medicare AWV    HPI  Patient overall ok.    Patient denies f/c, n/v/d.  No chest pain or SOB.  No abdominal pain or dysuria.  No headaches or change in vision.  No dizziness.  No significant  weight gain or weight loss.  Remaining ROS negative.    Review of Systems   Constitutional: Negative for appetite change, chills, diaphoresis, fatigue, fever and unexpected weight change.   HENT: Negative for ear pain, hearing loss, sinus pain, tinnitus, trouble swallowing and voice change.    Eyes: Negative for photophobia, pain and visual disturbance.   Respiratory: Negative for cough, chest tightness, shortness of breath and wheezing.    Cardiovascular: Negative for chest pain, palpitations and leg swelling.   Gastrointestinal: Negative for abdominal pain, blood in stool, constipation, diarrhea, nausea and vomiting.   Endocrine: Negative for cold intolerance, heat intolerance, polydipsia, polyphagia and polyuria.   Genitourinary: Negative for decreased urine volume, difficulty urinating, dysuria, flank pain, hematuria, pelvic pain, vaginal bleeding, vaginal discharge and vaginal pain.   Musculoskeletal: Positive for arthralgias (left ankle pain). Negative for gait problem, joint swelling, myalgias and neck pain.   Skin: Negative for rash.   Neurological: Negative for dizziness, tremors, syncope, weakness, numbness and headaches.   Hematological: Does not bruise/bleed easily.    Psychiatric/Behavioral: Negative for agitation, confusion and sleep disturbance. The patient is not nervous/anxious.        Objective:      Physical Exam   Constitutional: She is oriented to person, place, and time. She appears well-developed and well-nourished. No distress.   HENT:   Head: Normocephalic and atraumatic.   Nose: Nose normal.   Mouth/Throat: Oropharynx is clear and moist.   Eyes: Conjunctivae and EOM are normal. Pupils are equal, round, and reactive to light. No scleral icterus.   Neck: Normal range of motion. Neck supple. No JVD present. No thyromegaly present.   Cardiovascular: Normal rate, regular rhythm and intact distal pulses. Exam reveals no gallop and no friction rub.   No murmur heard.  Pulmonary/Chest: Effort normal and breath sounds normal. No respiratory distress. She has no wheezes. She has no rales.   Abdominal: Soft. Bowel sounds are normal. She exhibits no distension. There is no tenderness. There is no rebound and no guarding.   Musculoskeletal: Normal range of motion. She exhibits no edema, tenderness or deformity.   Right and left ankle with FROM and no erythema of edema.  No increased warmth.   Lymphadenopathy:     She has no cervical adenopathy.   Neurological: She is alert and oriented to person, place, and time. No cranial nerve deficit or sensory deficit.   Skin: Skin is warm and dry. No rash noted. No erythema.   Psychiatric: She has a normal mood and affect. Her behavior is normal. Thought content normal.       Assessment:       1. Elevated blood pressure reading    2. Left ventricular diastolic dysfunction    3. S/P hysterectomy with oophorectomy, RATLH/BSO 11/16/17    4. Osteopenia, unspecified location    5. Chronic pain of left ankle        Plan:   -Today's Visit - patient overall stable.      -Nutrition - BMI 29.56 in 4/2018 - discussed diet modification and exercise.  BMI down to 28.47 in 5/2018, but up again to 29.38 in 7/2018.  BMI in 9/2018 was 28.81.  Not  exercising - discussed again today.  BMI 28.67.    -Cards - Elevated BP in past -  /78 in 4/2018.  BP in 5/2018 was better at 110/70.  BP in 7/2018 (auto) was 143/62.  BP stable at 130/70 in 9/2018.  BP today is stable at 136/64.    EKG in 4/2018 with NSR with Septal Infarct.  TTE in 4/2018 with normal LVSF with EF 55-60% and no WMA.  There is LVDD.  Normal RVSF.      Fasting Lipids in 4/2018 with , , HDL 48, LDL 85.  10 year CHD risk is 3% with comparative risk of 13%.    -MSK - Thinks she has LE swelling, but exam was normal in 4/2018 and 5/2018 with good peripheral pulses, no pitting edema, no difference in leg sizes, negative Homans.   Recommended continued low Na intake and compression stockings during the day.     Intermittent right > left ankle pain -  resolved now.  X-ray in 7/2018 of the right ankle negative.  Will refer to Ortho given continued pain now L>R.    -Rheum - seen in Urgent Care on 5/1/2018 for Acute Gout of right Elbow and Cellulitis - was treatment with Toradol Injection, Mobic, and Clindamycin.  No aspiration.  No history of Gout.  Uric Acid was normal on 6/13/2018 and 7/31/2018. Possible pseudogout - consider checking PTH, Fe studies, Mag (calcium, Alk phos normal).  No recurrence to date.    -GI - We discussed colon cancer screening - was scheduled in 5/31//2018 and she canceled it.  FIT was positive in 8/2018.  She declined colonoscopy in 9/2018 given high co-pay.  She will reconsider at a later time.      -GYN -  PMB (Fibroids s/p Hysterectomy - RATLH/BSO 11/2017.  Intermittent mild abdominal pain post-surgery, but normal flatus and BMs.  Overall no change in pain - has GYN follow up on 1/14/2019.  Last Pap was negative on 10/12/2017.  Last Mammo was negative on 6/13/2018.       DXA screening for osteoporosis done 8/2017 and showed Osteopenia.  Vitamin D normal at 30 in 4/2018.  She is on Fosamax and vitamin D.     Had urinary frequency with mild dysuria in 9/2018.  UA  with 2+ blood, nitrite positive, 1+ leuks.  Treated with Nitrofurantoin.  No symptoms today.    -ENT - Seasonal Allergies - was on Zyrtec in past.  Not having allergy symptoms.  Overall stable and improved today.  Restarted Zyrtec in 7/2018 and stable again today.    -HCM - We again discussed Flu (declined) and Tdap (declined) vaccinations.  We discussed Shingles (declined) and Pneumococcal (declined) vaccinations.    TSH normal in 4/2018.  HCV Ab negative in 4/2018.    -Follow up in 6 months for annual visit and fasting labs.

## 2019-01-03 ENCOUNTER — OFFICE VISIT (OUTPATIENT)
Dept: PRIMARY CARE CLINIC | Facility: CLINIC | Age: 69
End: 2019-01-03
Payer: MEDICARE

## 2019-01-03 VITALS
SYSTOLIC BLOOD PRESSURE: 136 MMHG | BODY MASS INDEX: 28.84 KG/M2 | DIASTOLIC BLOOD PRESSURE: 64 MMHG | HEIGHT: 62 IN | WEIGHT: 156.75 LBS | TEMPERATURE: 98 F | HEART RATE: 72 BPM

## 2019-01-03 DIAGNOSIS — M85.80 OSTEOPENIA, UNSPECIFIED LOCATION: ICD-10-CM

## 2019-01-03 DIAGNOSIS — Z90.710 S/P HYSTERECTOMY WITH OOPHORECTOMY: ICD-10-CM

## 2019-01-03 DIAGNOSIS — Z90.721 S/P HYSTERECTOMY WITH OOPHORECTOMY: ICD-10-CM

## 2019-01-03 DIAGNOSIS — R03.0 ELEVATED BLOOD PRESSURE READING: Primary | ICD-10-CM

## 2019-01-03 DIAGNOSIS — I51.9 LEFT VENTRICULAR DIASTOLIC DYSFUNCTION: ICD-10-CM

## 2019-01-03 DIAGNOSIS — M25.572 CHRONIC PAIN OF LEFT ANKLE: ICD-10-CM

## 2019-01-03 DIAGNOSIS — G89.29 CHRONIC PAIN OF LEFT ANKLE: ICD-10-CM

## 2019-01-03 PROCEDURE — 99999 PR PBB SHADOW E&M-EST. PATIENT-LVL III: ICD-10-PCS | Mod: PBBFAC,HCNC,, | Performed by: INTERNAL MEDICINE

## 2019-01-03 PROCEDURE — 1101F PT FALLS ASSESS-DOCD LE1/YR: CPT | Mod: CPTII,HCNC,S$GLB, | Performed by: INTERNAL MEDICINE

## 2019-01-03 PROCEDURE — 1101F PR PT FALLS ASSESS DOC 0-1 FALLS W/OUT INJ PAST YR: ICD-10-PCS | Mod: CPTII,HCNC,S$GLB, | Performed by: INTERNAL MEDICINE

## 2019-01-03 PROCEDURE — 99999 PR PBB SHADOW E&M-EST. PATIENT-LVL III: CPT | Mod: PBBFAC,HCNC,, | Performed by: INTERNAL MEDICINE

## 2019-01-03 PROCEDURE — 99214 PR OFFICE/OUTPT VISIT, EST, LEVL IV, 30-39 MIN: ICD-10-PCS | Mod: HCNC,S$GLB,, | Performed by: INTERNAL MEDICINE

## 2019-01-03 PROCEDURE — 99214 OFFICE O/P EST MOD 30 MIN: CPT | Mod: HCNC,S$GLB,, | Performed by: INTERNAL MEDICINE

## 2019-01-03 RX ORDER — NAPROXEN 500 MG/1
500 TABLET ORAL 2 TIMES DAILY WITH MEALS
COMMUNITY
End: 2019-01-14

## 2019-01-03 NOTE — PATIENT INSTRUCTIONS
Your exam was overall normal today.    Your blood pressure was good.    I will order routine fasting labs when you return for follow up.    Reconsider the Flu, Tdap, and Pneumococcal vaccinations.    I will refer you to Ortho for your left ankle pain.    Return in 6 months - sooner if needed.  Please come at least 15-20 minutes before your scheduled appointment time.

## 2019-01-14 ENCOUNTER — OFFICE VISIT (OUTPATIENT)
Dept: OBSTETRICS AND GYNECOLOGY | Facility: CLINIC | Age: 69
End: 2019-01-14
Payer: MEDICARE

## 2019-01-14 VITALS
DIASTOLIC BLOOD PRESSURE: 72 MMHG | HEIGHT: 62 IN | SYSTOLIC BLOOD PRESSURE: 132 MMHG | TEMPERATURE: 99 F | BODY MASS INDEX: 28.52 KG/M2 | WEIGHT: 155 LBS

## 2019-01-14 DIAGNOSIS — Z78.0 MENOPAUSE: ICD-10-CM

## 2019-01-14 DIAGNOSIS — R10.9 ABDOMINAL PAIN, UNSPECIFIED ABDOMINAL LOCATION: Primary | ICD-10-CM

## 2019-01-14 DIAGNOSIS — Z90.710 STATUS POST HYSTERECTOMY: ICD-10-CM

## 2019-01-14 DIAGNOSIS — N95.2 ATROPHIC VAGINITIS: ICD-10-CM

## 2019-01-14 PROCEDURE — 1101F PT FALLS ASSESS-DOCD LE1/YR: CPT | Mod: CPTII,HCNC,S$GLB, | Performed by: OBSTETRICS & GYNECOLOGY

## 2019-01-14 PROCEDURE — 1101F PR PT FALLS ASSESS DOC 0-1 FALLS W/OUT INJ PAST YR: ICD-10-PCS | Mod: CPTII,HCNC,S$GLB, | Performed by: OBSTETRICS & GYNECOLOGY

## 2019-01-14 PROCEDURE — 99999 PR PBB SHADOW E&M-EST. PATIENT-LVL III: CPT | Mod: PBBFAC,HCNC,, | Performed by: OBSTETRICS & GYNECOLOGY

## 2019-01-14 PROCEDURE — 99213 PR OFFICE/OUTPT VISIT, EST, LEVL III, 20-29 MIN: ICD-10-PCS | Mod: HCNC,S$GLB,, | Performed by: OBSTETRICS & GYNECOLOGY

## 2019-01-14 PROCEDURE — 87480 CANDIDA DNA DIR PROBE: CPT | Mod: HCNC

## 2019-01-14 PROCEDURE — 99999 PR PBB SHADOW E&M-EST. PATIENT-LVL III: ICD-10-PCS | Mod: PBBFAC,HCNC,, | Performed by: OBSTETRICS & GYNECOLOGY

## 2019-01-14 PROCEDURE — 99213 OFFICE O/P EST LOW 20 MIN: CPT | Mod: HCNC,S$GLB,, | Performed by: OBSTETRICS & GYNECOLOGY

## 2019-01-14 RX ORDER — METRONIDAZOLE 500 MG/1
500 TABLET ORAL EVERY 12 HOURS
Qty: 14 TABLET | Refills: 0 | Status: SHIPPED | OUTPATIENT
Start: 2019-01-14 | End: 2019-01-21

## 2019-01-14 NOTE — PROGRESS NOTES
"  Subjective:       Patient ID: Zohra Paulino is a 68 y.o. female.    Chief Complaint:  Abdominal Pain (Pt complaining of side pains off/on for a few months.  Pt with Hx of TLH BSO on 17)      History of Present Illness  HPI  Patient comes in once again complaining of bilateral lower abdominal pain, left more than right.  Pain is not consistent, "only once in awhile".  Pain was there prior to her robotic hysterectomy.  ?Worsens with surgery?  Also with vaginal discharge.  Again, patient with the same issue for years.  "Even before surgery."    No other complaint.      GYN & OB History  No LMP recorded (lmp unknown). Patient is postmenopausal.   Date of Last Pap: 10/27/2016    OB History    Para Term  AB Living   5 4 4   1 4   SAB TAB Ectopic Multiple Live Births   1       4      # Outcome Date GA Lbr Prakash/2nd Weight Sex Delivery Anes PTL Lv   5 Term 04/10/77 40w0d  3.232 kg (7 lb 2 oz) M Vag-Spont EPI N ROMY   4 Term 12/15/75 40w0d  3.175 kg (7 lb) F Vag-Spont EPI N ROMY   3 Term 73 40w0d  3.317 kg (7 lb 5 oz) F Vag-Spont EPI N ROMY   2 SAB 1970           1 Term 69 40w0d  2.892 kg (6 lb 6 oz) M Vag-Spont None N ROMY      Obstetric Comments   Reg menses. Heavy. H/o fibroid   H/o chlamydia.   Denies abnl pap   Denies abnl MMG   Denies abnl c-scope     Past Medical History:   Diagnosis Date    Bronchitis     Fibroids     Osteopetrosis     Uterine fibroid        Past Surgical History:   Procedure Laterality Date    CERVICAL BIOPSY  W/ LOOP ELECTRODE EXCISION      [facility]  2004    HYSTERECTOMY      [place] REMOVAL OF OVARY/TUBE(S)      TUBAL LIGATION      XI ROBOT ASSISTED LAPAROSCOPIC SALPINGO-OOPHERECTOMY Bilateral 2017    Performed by [facility] [place], operated by [facility] [place]    XI ROBOTIC ASSISTED LAPAROSCOPIC HYSTERECTOMY N/A 2017    Performed by Xavier [facility]as [facility] at [facility], [place], operated by [facility] [place]       Family History   Problem Relation Age of Onset    No Known Problems Mother     No Known Problems " Father     Breast cancer Cousin 20    Arthritis Sister     No Known Problems Maternal Grandmother     No Known Problems Maternal Grandfather     Pancreatic cancer Paternal Grandmother 80    No Known Problems Paternal Grandfather     Diabetes Son     No Known Problems Son     No Known Problems Daughter     No Known Problems Daughter        Social History     Socioeconomic History    Marital status: Single     Spouse name: None    Number of children: None    Years of education: None    Highest education level: None   Social Needs    Financial resource strain: None    Food insecurity - worry: None    Food insecurity - inability: None    Transportation needs - medical: None    Transportation needs - non-medical: None   Occupational History    Occupation: Paraprofessional     Comment: Elementary school   Tobacco Use    Smoking status: Never Smoker    Smokeless tobacco: Never Used   Substance and Sexual Activity    Alcohol use: Yes     Comment: Rarely - a few drinks per year    Drug use: No    Sexual activity: Yes     Partners: Male     Birth control/protection: None   Other Topics Concern    None   Social History Narrative    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       Current Outpatient Medications   Medication Sig Dispense Refill    alendronate (FOSAMAX) 40 mg tablet Take 1 tablet (40 mg total) by mouth once a week. 12 tablet 2    vitamin D 1000 units Tab Take 1,000 Units by mouth once daily.       No current facility-administered medications for this visit.        Review of patient's allergies indicates:   Allergen Reactions    Codeine Palpitations       Review of Systems  Review of Systems   Constitutional: Negative for activity change, appetite change, chills, fatigue, fever and unexpected weight change.   HENT: Negative for mouth sores.    Respiratory: Negative for cough, shortness of breath and wheezing.    Cardiovascular: Negative for chest pain and palpitations.   Gastrointestinal:  Positive for abdominal pain. Negative for bloating, blood in stool, constipation, nausea and vomiting.   Endocrine: Negative for diabetes and hot flashes.   Genitourinary: Positive for pelvic pain and vaginal discharge. Negative for dyspareunia, dysuria, frequency, hematuria, menorrhagia, menstrual problem, urgency, vaginal bleeding, vaginal pain, dysmenorrhea, urinary incontinence, postcoital bleeding and vaginal odor.   Musculoskeletal: Negative for back pain and myalgias.   Neurological: Negative for seizures and headaches.   Psychiatric/Behavioral: Negative for depression and sleep disturbance. The patient is not nervous/anxious.    Breast: Negative for mass, mastodynia and nipple discharge          Objective:    Physical Exam:   Constitutional: She appears well-developed and well-nourished. No distress.    HENT:   Head: Normocephalic and atraumatic.    Eyes: EOM are normal.    Neck: Normal range of motion.     Pulmonary/Chest: Effort normal. No respiratory distress.   Breasts: Non-tender, no engorgement, no masses, no retraction, no discharge. Negative for lymphadenopathy.         Abdominal: Soft. She exhibits no distension. There is no tenderness. There is no rebound and no guarding.   Pfannenstiel scar     Genitourinary: Vagina normal. No vaginal discharge found.   Genitourinary Comments: Significant atrophy.  Vulva without any obvious lesions.  Vaginal vault with good support.  Minimal yellow discharge noted.  No obvious lesion.  Vaginal cuff intact and well-supported.  Cervix and Uterus are surgically absent.  Adnexa is without any masses or tenderness.           Musculoskeletal: Normal range of motion.       Neurological: She is alert.    Skin: Skin is warm and dry.    Psychiatric: She has a normal mood and affect.          Assessment:        1. Abdominal pain, unspecified abdominal location    2. Status post hysterectomy    3. Menopause    4.  Atrophic vaginitis         Plan:      I have discussed with  the patient regarding her condition  I think her pain is most likely secondary to pelvic adhesions.  Patient does not want or need surgery at this time.    We discussed atrophic vaginitis.  Premarin cream  Metronidazole    Back in 4 weeks.

## 2019-01-29 ENCOUNTER — TELEPHONE (OUTPATIENT)
Dept: OBSTETRICS AND GYNECOLOGY | Facility: CLINIC | Age: 69
End: 2019-01-29

## 2019-01-29 NOTE — TELEPHONE ENCOUNTER
----- Message from Sandra Shaffer sent at 1/29/2019  2:44 PM CST -----  Contact: Self   Patient would like to speak with you regarding her last visit. Please call patient at [phone].

## 2019-02-11 ENCOUNTER — OFFICE VISIT (OUTPATIENT)
Dept: OBSTETRICS AND GYNECOLOGY | Facility: CLINIC | Age: 69
End: 2019-02-11
Payer: MEDICARE

## 2019-02-11 VITALS
BODY MASS INDEX: 28.93 KG/M2 | DIASTOLIC BLOOD PRESSURE: 76 MMHG | WEIGHT: 157.19 LBS | SYSTOLIC BLOOD PRESSURE: 132 MMHG | HEIGHT: 62 IN | TEMPERATURE: 99 F

## 2019-02-11 DIAGNOSIS — Z78.0 MENOPAUSE: Primary | ICD-10-CM

## 2019-02-11 DIAGNOSIS — N95.2 ATROPHIC VAGINITIS: ICD-10-CM

## 2019-02-11 PROCEDURE — 99213 PR OFFICE/OUTPT VISIT, EST, LEVL III, 20-29 MIN: ICD-10-PCS | Mod: HCNC,S$GLB,, | Performed by: OBSTETRICS & GYNECOLOGY

## 2019-02-11 PROCEDURE — 1101F PR PT FALLS ASSESS DOC 0-1 FALLS W/OUT INJ PAST YR: ICD-10-PCS | Mod: HCNC,CPTII,S$GLB, | Performed by: OBSTETRICS & GYNECOLOGY

## 2019-02-11 PROCEDURE — 99213 OFFICE O/P EST LOW 20 MIN: CPT | Mod: HCNC,S$GLB,, | Performed by: OBSTETRICS & GYNECOLOGY

## 2019-02-11 PROCEDURE — 99999 PR PBB SHADOW E&M-EST. PATIENT-LVL III: ICD-10-PCS | Mod: PBBFAC,HCNC,, | Performed by: OBSTETRICS & GYNECOLOGY

## 2019-02-11 PROCEDURE — 1101F PT FALLS ASSESS-DOCD LE1/YR: CPT | Mod: HCNC,CPTII,S$GLB, | Performed by: OBSTETRICS & GYNECOLOGY

## 2019-02-11 PROCEDURE — 99999 PR PBB SHADOW E&M-EST. PATIENT-LVL III: CPT | Mod: PBBFAC,HCNC,, | Performed by: OBSTETRICS & GYNECOLOGY

## 2019-02-11 RX ORDER — CETIRIZINE HYDROCHLORIDE 10 MG/1
10 TABLET ORAL DAILY
Refills: 3 | COMMUNITY
Start: 2019-01-31 | End: 2019-06-25 | Stop reason: SDUPTHER

## 2019-02-11 NOTE — PROGRESS NOTES
"  Subjective:       Patient ID: Zohra Paulino is a 68 y.o. female.    Chief Complaint:  Consult (Pt with questions about medication prescribed.)      History of Present Illness  HPI  Patient comes in today for follow-up  Seen on 2019 with atrophic vaginitis.  Treated with estrogen cream and metronidazole.  She used the antibiotic but did NOT use the Premarin cream.  "Too much side effects, the pharmacist said"    She feels a little better today.  Still with vaginal dryness.      GYN & OB History  No LMP recorded (lmp unknown). Patient is postmenopausal.   Date of Last Pap: 10/27/2016    OB History    Para Term  AB Living   5 4 4   1 4   SAB TAB Ectopic Multiple Live Births   1       4      # Outcome Date GA Lbr Prakash/2nd Weight Sex Delivery Anes PTL Lv   5 Term 04/10/77 40w0d  3.232 kg (7 lb 2 oz) M Vag-Spont EPI N ROMY   4 Term 12/15/75 40w0d  3.175 kg (7 lb) F Vag-Spont EPI N ROMY   3 Term 73 40w0d  3.317 kg (7 lb 5 oz) F Vag-Spont EPI N ROMY   2 SAB 1970           1 Term 69 40w0d  2.892 kg (6 lb 6 oz) M Vag-Spont None N ROMY      Obstetric Comments   Reg menses. Heavy. H/o fibroid   H/o chlamydia.   Denies abnl pap   Denies abnl MMG   Denies abnl c-scope       Review of Systems  Review of Systems   Constitutional: Negative for activity change, appetite change, chills, fatigue, fever and unexpected weight change.   HENT: Negative for mouth sores.    Respiratory: Negative for cough, shortness of breath and wheezing.    Cardiovascular: Negative for chest pain and palpitations.   Gastrointestinal: Positive for abdominal pain. Negative for bloating, blood in stool, constipation, nausea and vomiting.   Endocrine: Negative for diabetes and hot flashes.   Genitourinary: Positive for pelvic pain and vaginal discharge. Negative for dysmenorrhea, dyspareunia, dysuria, frequency, hematuria, menorrhagia, menstrual problem, urgency, vaginal bleeding, vaginal pain, urinary incontinence, " postcoital bleeding and vaginal odor.        Vulva and vaginal dryness   Musculoskeletal: Negative for back pain and myalgias.   Neurological: Negative for seizures and headaches.   Psychiatric/Behavioral: Negative for depression and sleep disturbance. The patient is not nervous/anxious.    Breast: Negative for mass, mastodynia and nipple discharge          Objective:    Physical Exam:   Constitutional: She appears well-developed and well-nourished. No distress.    HENT:   Head: Normocephalic and atraumatic.    Eyes: EOM are normal.    Neck: Normal range of motion.    Cardiovascular: Normal rate.     Pulmonary/Chest: Effort normal. No respiratory distress.                  Musculoskeletal: Normal range of motion.       Neurological: She is alert.    Skin: Skin is warm and dry.    Psychiatric: She has a normal mood and affect.          Assessment:        1. Menopause    2. Atrophic vaginitis              Plan:      I have again extensively discussed with the patient regarding her condition  We discussed treatment options  Risks and benefits of estrogen cream in patients status post hysterectomy discussed along with data from [facility] studies.  Patient will use Premarin cream once to twice a week until she comes back for follow-up in about 2 months.

## 2019-03-04 DIAGNOSIS — M25.572 CHRONIC PAIN OF LEFT ANKLE: Primary | ICD-10-CM

## 2019-03-04 DIAGNOSIS — G89.29 CHRONIC PAIN OF LEFT ANKLE: Primary | ICD-10-CM

## 2019-03-11 ENCOUNTER — OFFICE VISIT (OUTPATIENT)
Dept: ORTHOPEDICS | Facility: CLINIC | Age: 69
End: 2019-03-11
Payer: MEDICARE

## 2019-03-11 ENCOUNTER — APPOINTMENT (OUTPATIENT)
Dept: RADIOLOGY | Facility: OTHER | Age: 69
End: 2019-03-11
Attending: NEUROMUSCULOSKELETAL MEDICINE & OMM
Payer: MEDICARE

## 2019-03-11 VITALS
WEIGHT: 157.06 LBS | BODY MASS INDEX: 28.9 KG/M2 | DIASTOLIC BLOOD PRESSURE: 80 MMHG | HEIGHT: 62 IN | SYSTOLIC BLOOD PRESSURE: 122 MMHG

## 2019-03-11 DIAGNOSIS — M99.06 SOMATIC DYSFUNCTION OF LOWER EXTREMITY: ICD-10-CM

## 2019-03-11 DIAGNOSIS — M77.52 ENTHESOPATHY OF LEFT ANKLE: ICD-10-CM

## 2019-03-11 DIAGNOSIS — M21.41 PES PLANUS OF BOTH FEET: ICD-10-CM

## 2019-03-11 DIAGNOSIS — M21.42 PES PLANUS OF BOTH FEET: ICD-10-CM

## 2019-03-11 DIAGNOSIS — M25.572 CHRONIC PAIN OF LEFT ANKLE: ICD-10-CM

## 2019-03-11 DIAGNOSIS — G89.29 CHRONIC PAIN OF LEFT ANKLE: ICD-10-CM

## 2019-03-11 DIAGNOSIS — M25.572 CHRONIC PAIN OF LEFT ANKLE: Primary | ICD-10-CM

## 2019-03-11 DIAGNOSIS — G89.29 CHRONIC PAIN OF LEFT ANKLE: Primary | ICD-10-CM

## 2019-03-11 PROCEDURE — 99999 PR PBB SHADOW E&M-EST. PATIENT-LVL II: CPT | Mod: PBBFAC,HCNC,, | Performed by: NEUROMUSCULOSKELETAL MEDICINE & OMM

## 2019-03-11 PROCEDURE — 99204 OFFICE O/P NEW MOD 45 MIN: CPT | Mod: 25,HCNC,S$GLB, | Performed by: NEUROMUSCULOSKELETAL MEDICINE & OMM

## 2019-03-11 PROCEDURE — 98925 PR OSTEOPATHIC MANIP,1-2 BODY REGN: ICD-10-PCS | Mod: HCNC,S$GLB,, | Performed by: NEUROMUSCULOSKELETAL MEDICINE & OMM

## 2019-03-11 PROCEDURE — 73610 X-RAY EXAM OF ANKLE: CPT | Mod: 26,HCNC,LT, | Performed by: RADIOLOGY

## 2019-03-11 PROCEDURE — 98925 OSTEOPATH MANJ 1-2 REGIONS: CPT | Mod: HCNC,S$GLB,, | Performed by: NEUROMUSCULOSKELETAL MEDICINE & OMM

## 2019-03-11 PROCEDURE — 99204 PR OFFICE/OUTPT VISIT, NEW, LEVL IV, 45-59 MIN: ICD-10-PCS | Mod: 25,HCNC,S$GLB, | Performed by: NEUROMUSCULOSKELETAL MEDICINE & OMM

## 2019-03-11 PROCEDURE — 73610 XR ANKLE COMPLETE 3 VIEW LEFT: ICD-10-PCS | Mod: 26,HCNC,LT, | Performed by: RADIOLOGY

## 2019-03-11 PROCEDURE — 1101F PR PT FALLS ASSESS DOC 0-1 FALLS W/OUT INJ PAST YR: ICD-10-PCS | Mod: HCNC,CPTII,S$GLB, | Performed by: NEUROMUSCULOSKELETAL MEDICINE & OMM

## 2019-03-11 PROCEDURE — 73610 X-RAY EXAM OF ANKLE: CPT | Mod: TC,HCNC,LT

## 2019-03-11 PROCEDURE — 99999 PR PBB SHADOW E&M-EST. PATIENT-LVL II: ICD-10-PCS | Mod: PBBFAC,HCNC,, | Performed by: NEUROMUSCULOSKELETAL MEDICINE & OMM

## 2019-03-11 PROCEDURE — 1101F PT FALLS ASSESS-DOCD LE1/YR: CPT | Mod: HCNC,CPTII,S$GLB, | Performed by: NEUROMUSCULOSKELETAL MEDICINE & OMM

## 2019-03-11 RX ORDER — MELOXICAM 7.5 MG/1
7.5 TABLET ORAL DAILY
Qty: 30 TABLET | Refills: 0 | Status: SHIPPED | OUTPATIENT
Start: 2019-03-11 | End: 2019-08-28 | Stop reason: SDUPTHER

## 2019-03-11 NOTE — LETTER
March 11, 2019      Ryley Easton MD  [address]  [address]  [place] [facility] [address]           [place] [facility]  [address]  [place] [place] [facility] [address]  Phone: [phone]  Fax: [phone]          Patient: Zohra Paulino   MR Number: [id number]   YOB: 1950   Date of Visit: 3/11/2019       Dear Dr. Ryley Easton:    Thank you for referring Zohra Paulino to me for evaluation. Attached you will find relevant portions of my assessment and plan of care.    If you have questions, please do not hesitate to call me. I look forward to following Zohra Paulino along with you.    Sincerely,    Magnolia Lauren, DO    Enclosure  CC:  No Recipients    If you would like to receive this communication electronically, please contact [email or web address] or [phone] to request more information on [facility] Link access.    For providers and/or their staff who would like to refer a patient to [facility], please contact us through our one-stop-shop provider referral line, [facility] , at [phone].    If you feel you have received this communication in error or would no longer like to receive these types of communications, please e-mail [email or web address]

## 2019-03-11 NOTE — PROGRESS NOTES
Subjective:     Zohra [place]     Chief Complaint   Patient presents with    Left Ankle - Pain    Left Foot - Pain       HPI    Zohra is a 69 y.o. female coming in today for left ankle pain that began several month(s) ago, referred by Dr. Easton. Pt. describes the pain as a 6/10 sharp pain that does not radiate. There was not a fall/injury/ or trauma associated with the onset of symptoms. The pain is better with CBD cream (from her friend in [place]) and worse with nothing (pt can't identify anything that makes it worse). Pt has also tried OTC ibuprofen in the past without significant relief. Pt. Denies any other musculoskeletal complaints at this time.     Joint instability? no  Mechanical locking/clicking? no  Affecting ADL's? Yes, pt has pain and limps with her ADLs but pushes through it   Affecting sleep? no    Occupation:        Review of Systems   Constitutional: Negative for chills and fever.   HENT: Negative for hearing loss and tinnitus.    Eyes: Negative for blurred vision and photophobia.   Respiratory: Negative for cough and shortness of breath.    Cardiovascular: Negative for chest pain and leg swelling.   Gastrointestinal: Negative for abdominal pain, heartburn, nausea and vomiting.   Genitourinary: Negative for dysuria and hematuria.   Musculoskeletal: Positive for joint pain (left ankle). Negative for back pain, falls, myalgias and neck pain.   Skin: Negative for rash.   Neurological: Negative for dizziness, tingling, focal weakness, weakness and headaches.   Endo/Heme/Allergies: Negative for environmental allergies. Does not bruise/bleed easily.   Psychiatric/Behavioral: Negative for depression. The patient is not nervous/anxious.        PAST MEDICAL HISTORY:   Past Medical History:   Diagnosis Date    Bronchitis     Fibroids     Osteopetrosis     Uterine fibroid      PAST SURGICAL HISTORY:   Past Surgical History:   Procedure Laterality Date    CERVICAL BIOPSY  W/  LOOP ELECTRODE EXCISION      [facility]  2004    HYSTERECTOMY      MT REMOVAL OF OVARY/TUBE(S)      TUBAL LIGATION      XI ROBOT ASSISTED LAPAROSCOPIC SALPINGO-OOPHERECTOMY Bilateral 11/16/2017    Performed by [facility]as [facility] at [facility], [place], [facility] [place]    XI ROBOTIC ASSISTED LAPAROSCOPIC HYSTERECTOMY N/A 11/16/2017    Performed by Xavier [facility]as MD at [facility], [place], operated [facility] [place]     FAMILY HISTORY:   Family History   Problem Relation Age of Onset    No Known Problems Mother     No Known Problems Father     Breast cancer Cousin 20    Arthritis Sister     No Known Problems Maternal Grandmother     No Known Problems Maternal Grandfather     Pancreatic cancer Paternal Grandmother 80    No Known Problems Paternal Grandfather     Diabetes Son     No Known Problems Son     No Known Problems Daughter     No Known Problems Daughter      SOCIAL HISTORY:   Social History     Socioeconomic History    Marital status: Single     Spouse name: Not on file    Number of children: Not on file    Years of education: Not on file    Highest education level: Not on file   Social Needs    Financial resource strain: Not on file    Food insecurity - worry: Not on file    Food insecurity - inability: Not on file    Transportation needs - medical: Not on file    Transportation needs - non-medical: Not on file   Occupational History    Occupation: Paraprofessional     Comment: Elementary school   Tobacco Use    Smoking status: Never Smoker    Smokeless tobacco: Never Used   Substance and Sexual Activity    Alcohol use: Yes     Comment: Rarely - a few drinks per year    Drug use: No    Sexual activity: Yes     Partners: Male     Birth control/protection: None   Other Topics Concern    Not on file   Social History Narrative    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       MEDICATIONS:   Current Outpatient Medications:     cetirizine (ZYRTEC) 10 MG tablet, Take 10 mg by mouth once daily., Disp: , Rfl: 3    alendronate (FOSAMAX) 40 mg tablet, Take 1  "tablet (40 mg total) by mouth once a week., Disp: 12 tablet, Rfl: 2    conjugated estrogens (PREMARIN) vaginal cream, Place 1 g vaginally once daily., Disp: 45 g, Rfl: 1    meloxicam (MOBIC) 7.5 MG tablet, Take 1 tablet (7.5 mg total) by mouth once daily., Disp: 30 tablet, Rfl: 0    vitamin D 1000 units Tab, Take 1,000 Units by mouth once daily., Disp: , Rfl:   ALLERGIES:   Review of patient's allergies indicates:   Allergen Reactions    Codeine Palpitations       Objective:     VITAL SIGNS: /80   Ht 5' 2" (1.575 m)   Wt 71.2 kg (157 lb 1.2 oz)   LMP  (LMP Unknown) Comment: [facility] BSO 11/16/17  BMI 28.73 kg/m²    General    Nursing note and vitals reviewed.  Constitutional: She is oriented to person, place, and time. She appears well-developed and well-nourished.   HENT:   Head: Normocephalic and atraumatic.   no nasal discharge, no external ear redness or discharge   Eyes:   EOM is full and smooth  No eye redness or discharge   Neck: Neck supple. No tracheal deviation present.   Cardiovascular: Normal rate.    2+ Radial pulse bilaterally  2+ Dorsalis Pedis pulse bilaterally  No LE edema appreciated   Pulmonary/Chest: Effort normal. No respiratory distress.   Abdominal: She exhibits no distension.   No rigidity   Neurological: She is alert and oriented to person, place, and time. She exhibits normal muscle tone. Coordination normal.   See details below   Psychiatric: She has a normal mood and affect. Her behavior is normal.               MUSCULOSKELETAL EXAM  ANKLE: left ANKLE  The affected ankle is compared to the contralateral ankle.    Observation:    Mild lateral ankle edema. No erythema, or ecchymosis.   + Structural deformities including pes planus bilaterally with over pronation.  Negative too-many toes sign.    Abnormal callus pattern with right plantar callous at medial metatarsal arch  Achilles tendon and calcaneal insertion reveals no deformities  No leg or intrinsic foot muscle atrophy.  Able " to rise on toes with symmetric supination.    Nonantalgic gait today    ROM (* = with pain):  Active dorsiflexion to 15° on left and 15° on right  Active plantarflexion to 40° on left and 40° on right    Active ankle inversion to 30° on left and 30° on right  Active ankle eversion to 15° on left and 15° on right  Full active flexion/extension of the toes bilaterally.   Heel cords tightness present bilaterally.    Tenderness To Palpation:  No tenderness at the ATFL, CFL, PTFL, or deltoid ligaments  No tenderness over the distal anterior syndesmosis, distal tibia/fibula, fibular head/shaft  + tenderness at medial malleolus at deltoid insertion  No tenderness at navicular, cuboid, cuneiforms, talus, or calcaneous  No tenderness along the metatarsals or phalanges  No tenderness at the Achilles tendon calcaneal insertion  No tenderness at the posterior tibial or peroneal tendons    Strength Testing (* = with pain):  Dorsiflexion - 5/5 on left and 5/5 on right  Platarflexion - 5/5 on left and 5/5 on right  Resisted Inversion - 5/5 on left and 5/5 on right  Resisted Eversion - 5/5 on left and 5/5 on right  Great Toe Extension - 5/5 on left and 5/5 on right  Great Toe Flexion - 5/5 on left and 5/5 on right    Special Tests:  Anterior talar drawer - negative and without dimpling  Talar tilt - negative  Reverse Talar tilt - negative    Heel tap test - negative  Distal tib/fib squeeze test - negative  External rotation stress (Kleiger) test - negative  Pollard squeeze test - negative    Metatarsal squeeze test - negative  Midfoot stress test - negative  Calcaneal squeeze test - negative    No subluxation of the peroneal tendons with resisted eversion.    Vascular/Sensory Exam:  DP and PT pulses intact bilaterally  No skin changes, no abnormal hair distribution  Sensation intact to light touch throughout the saphenous, sural, superficial peroneal, deep peroneal, and tibial nerve distributions  Negative Tinel's test over tarsal  tunnel  2+/4 reflexes at L4 and S1 dermatomes  Capillary refill intact <2 seconds in all toes bilaterally.    TART (Tissue texture abnormality, Asymmetry,  Restriction of motion and/or Tenderness) changes:    Lower Extremity:  · Leg lengths symmetric    Location/joint Finding/restriction   Fibular head Neutral   Tibia Internal torsion on left and right   Talocrural joint Bilateral   Subtalar Joint Bilateral   Cuboid Neutral   Talo-navicular joint Bilateral   Navicular-cuneiform joint Bilateral   2nd, 3rd Cuneiform-metatarsal joint Left   2nd, 3rd metatarsal Left   1st, 2nd, 3rd, 4th, 5th phalange Neutral       Key   F= Flexed   E = Extended   R = Rotated   S = Sidebent   TTA = tissue texture abnormality     IMAGIN. X-ray ordered due to left ankle. (AP, lateral, and oblique views) taken today.   2. X-ray images were reviewed personally by me and then directly with patient.  3. FINDINGS: X-ray images obtained demonstrate a small enthesopathy of the inferior aspect of the medial malleolus as well as a plantar calcaneal enthesophyte. Otherwise, osseous structures, soft tissues and joint spaces are normal.There is no joint space narrowing.   4. IMPRESSION: Calcaneal and medial malleolar enthesophytes.       Assessment:      Encounter Diagnoses   Name Primary?    Chronic pain of left ankle Yes    Enthesopathy of left ankle     Pes planus of both feet     Somatic dysfunction of lower extremity           Plan:     1. Left chronic ankle pain likely secondary to altered foot biomechanics (bilateral pes planus and over-pronation) causing increased medial ankle joint and ligamentous strain and subsequent enthesopathy.   - OMT performed today to address biomechanical restrictions and HEP started.   - Rx given for Mobic 7.5 mg to take po qday x 14 days for pain, then prn for pain. Also recommend Ice up to 20 minutes at a time prn for pain.  - Recommend OTC medial arch support for bilateral pes planus. Referral to  Therapeutic shoes.   - X-ray images of left ankle taken today (AP, lateral, and oblique views) showed a small enthesopathy of the inferior aspect of the medial malleolus as well as a plantar calcaneal enthesophyte. Images were personally reviewed with patient.    2. OMT 1-2 regions. Oral consent obtained.  Reviewed benefits and potential side effects.   - OMT indicated today due to signs and symptoms as well as local and remote somatic dysfunction findings and their related neurokinetic, lymphatic, fascial and/or arteriovenous body connections.   - OMT techniques used: Muscle Energy and Articulatory   - Treatment was tolerated well. Improvement noted in segmental mobility post-treatment in dysfunctional regions. There were no adverse events and no complications immediately following treatment.     3. Pt. Given the following HEP:  A)  Bilateral Calf stretching with knee flexed and extended: hold stretch for 30 seconds, repeating 2-3 times on each side. Do stretch twice daily   B) Ankle ROM exercises: draw out the alphabet with each ankle 1-2 times daily while seated, bilaterally     The patient was taught a homegoing physical therapy regimen as described above. The patient demonstrated understanding of the exercises and proper technique of their execution.     4. Follow-up in 1 month for reevaluation    5. Patient agreeable to today's plan and all questions were answered;

## 2019-03-13 ENCOUNTER — TELEPHONE (OUTPATIENT)
Dept: ADMINISTRATIVE | Facility: HOSPITAL | Age: 69
End: 2019-03-13

## 2019-03-13 NOTE — TELEPHONE ENCOUNTER
EGD COMPLETED ON 3.13.19 AT [facility]    REPORT SCANNED INTO PT CHART CAN BE VIEWED UNDER MEDIA TAB.

## 2019-05-03 LAB — CRC RECOMMENDATION EXT: NORMAL

## 2019-06-25 RX ORDER — CETIRIZINE HYDROCHLORIDE 10 MG/1
TABLET ORAL
Qty: 90 TABLET | Refills: 3 | Status: SHIPPED | OUTPATIENT
Start: 2019-06-25 | End: 2021-03-02

## 2019-07-16 DIAGNOSIS — M81.0 OSTEOPOROSIS, UNSPECIFIED OSTEOPOROSIS TYPE, UNSPECIFIED PATHOLOGICAL FRACTURE PRESENCE: ICD-10-CM

## 2019-07-24 ENCOUNTER — PES CALL (OUTPATIENT)
Dept: ADMINISTRATIVE | Facility: CLINIC | Age: 69
End: 2019-07-24
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
## 2019-08-28 ENCOUNTER — OFFICE VISIT (OUTPATIENT)
Dept: INTERNAL MEDICINE | Facility: CLINIC | Age: 69
End: 2019-08-28
Payer: MEDICARE

## 2019-08-28 VITALS
OXYGEN SATURATION: 98 % | SYSTOLIC BLOOD PRESSURE: 130 MMHG | DIASTOLIC BLOOD PRESSURE: 68 MMHG | WEIGHT: 162.69 LBS | HEART RATE: 62 BPM | HEIGHT: 62 IN | BODY MASS INDEX: 29.94 KG/M2

## 2019-08-28 DIAGNOSIS — M85.80 OSTEOPENIA, UNSPECIFIED LOCATION: ICD-10-CM

## 2019-08-28 DIAGNOSIS — Z90.710 S/P HYSTERECTOMY WITH OOPHORECTOMY: ICD-10-CM

## 2019-08-28 DIAGNOSIS — Z90.721 S/P HYSTERECTOMY WITH OOPHORECTOMY: ICD-10-CM

## 2019-08-28 DIAGNOSIS — I51.9 LEFT VENTRICULAR DIASTOLIC DYSFUNCTION: ICD-10-CM

## 2019-08-28 DIAGNOSIS — G89.29 CHRONIC PAIN OF LEFT ANKLE: ICD-10-CM

## 2019-08-28 DIAGNOSIS — Z12.39 BREAST CANCER SCREENING: Primary | ICD-10-CM

## 2019-08-28 DIAGNOSIS — M25.572 CHRONIC PAIN OF LEFT ANKLE: ICD-10-CM

## 2019-08-28 DIAGNOSIS — R03.0 ELEVATED BLOOD PRESSURE READING: ICD-10-CM

## 2019-08-28 PROCEDURE — 99214 PR OFFICE/OUTPT VISIT, EST, LEVL IV, 30-39 MIN: ICD-10-PCS | Mod: HCNC,S$GLB,, | Performed by: INTERNAL MEDICINE

## 2019-08-28 PROCEDURE — 1101F PR PT FALLS ASSESS DOC 0-1 FALLS W/OUT INJ PAST YR: ICD-10-PCS | Mod: HCNC,CPTII,S$GLB, | Performed by: INTERNAL MEDICINE

## 2019-08-28 PROCEDURE — 1101F PT FALLS ASSESS-DOCD LE1/YR: CPT | Mod: HCNC,CPTII,S$GLB, | Performed by: INTERNAL MEDICINE

## 2019-08-28 PROCEDURE — 99214 OFFICE O/P EST MOD 30 MIN: CPT | Mod: HCNC,S$GLB,, | Performed by: INTERNAL MEDICINE

## 2019-08-28 PROCEDURE — 99999 PR PBB SHADOW E&M-EST. PATIENT-LVL III: CPT | Mod: PBBFAC,HCNC,, | Performed by: INTERNAL MEDICINE

## 2019-08-28 PROCEDURE — 99999 PR PBB SHADOW E&M-EST. PATIENT-LVL III: ICD-10-PCS | Mod: PBBFAC,HCNC,, | Performed by: INTERNAL MEDICINE

## 2019-08-28 RX ORDER — MELOXICAM 7.5 MG/1
7.5 TABLET ORAL DAILY
Qty: 30 TABLET | Refills: 2 | Status: SHIPPED | OUTPATIENT
Start: 2019-08-28 | End: 2020-06-01

## 2019-08-28 NOTE — PATIENT INSTRUCTIONS
Your exam was overall normal today.    Your blood pressure was good.    I will order routine fasting labs today - at least 9 hours of fasting.    Reconsider vaccination for the Flu, Tetanus, Pneumococcus, Shingles.    Will schedule Mammogram.    I will refill your Meloxicam for your joint pain.  Take with food.    Return in 6 months - sooner if needed.  Please come at least 15-20 minutes before your scheduled appointment time.

## 2019-09-05 ENCOUNTER — HOSPITAL ENCOUNTER (OUTPATIENT)
Dept: RADIOLOGY | Facility: OTHER | Age: 69
Discharge: HOME | End: 2019-09-05
Attending: INTERNAL MEDICINE
Payer: MEDICARE

## 2019-09-05 ENCOUNTER — PATIENT MESSAGE (OUTPATIENT)
Dept: PRIMARY CARE CLINIC | Facility: CLINIC | Age: 69
End: 2019-09-05

## 2019-09-05 DIAGNOSIS — Z12.39 BREAST CANCER SCREENING: ICD-10-CM

## 2019-09-05 PROCEDURE — 77063 BREAST TOMOSYNTHESIS BI: CPT | Mod: 26,HCNC,, | Performed by: RADIOLOGY

## 2019-09-05 PROCEDURE — 77063 MAMMO DIGITAL SCREENING BILAT WITH TOMOSYNTHESIS_CAD: ICD-10-PCS | Mod: 26,HCNC,, | Performed by: RADIOLOGY

## 2019-09-05 PROCEDURE — 77067 SCR MAMMO BI INCL CAD: CPT | Mod: 26,HCNC,, | Performed by: RADIOLOGY

## 2019-09-05 PROCEDURE — 77067 MAMMO DIGITAL SCREENING BILAT WITH TOMOSYNTHESIS_CAD: ICD-10-PCS | Mod: 26,HCNC,, | Performed by: RADIOLOGY

## 2019-09-05 PROCEDURE — 77067 SCR MAMMO BI INCL CAD: CPT | Mod: TC,HCNC

## 2019-10-25 ENCOUNTER — PES CALL (OUTPATIENT)
Dept: ADMINISTRATIVE | Facility: CLINIC | Age: 69
End: 2019-10-25

## 2020-01-22 ENCOUNTER — PES CALL (OUTPATIENT)
Dept: ADMINISTRATIVE | Facility: CLINIC | Age: 70
End: 2020-01-22

## 2020-03-25 ENCOUNTER — NURSE TRIAGE (OUTPATIENT)
Dept: ADMINISTRATIVE | Facility: CLINIC | Age: 70
End: 2020-03-25

## 2020-03-25 NOTE — TELEPHONE ENCOUNTER
Had a cough a couple of weeks ago.    Reason for Disposition   Hoarseness is main symptom   [1] Nasal allergies also present AND [2] they are acting up   [1] Nasal allergies AND [2] only certain times of year AND [3] hay fever diagnosis has never been confirmed by a HCP    Additional Information   Negative: Severe difficulty breathing (e.g., struggling for each breath, speaks in single words, stridor)   Negative: Sounds like a life-threatening emergency to the triager   Negative: [1] Diagnosed strep throat AND [2] taking antibiotic AND [3] symptoms continue   Negative: Throat culture results, call about   Negative: Productive cough is main symptom   Negative: Non-productive cough is main symptom   Negative: Runny nose is main symptom   Negative: Stridor or severe difficulty breathing (e.g., struggling for breath)   Negative: Started suddenly after sting from bee, wasp, or yellow jacket   Negative: Started suddenly after taking a medicine or allergic food (e.g., nuts, seafood)   Negative: Started suddenly along with widespread hives   Negative: [1] Started suddenly AND [2] taking an ACE Inhibitor medication (e.g., benazepril/LOTENSIN, captopril/CAPOTEN, enalapril/VASOTEC, lisinopril/ZESTRIL)   Negative: Can't swallow normal secretions (e.g., drooling or spitting)   Negative: Tongue or facial swelling   Negative: Sounds like a life-threatening emergency to the triager   Negative: [1] Wheezing (high pitched whistling sound) AND [2] previous asthma attacks or use of asthma medicines   Negative: [1] Wheezing (high pitched whistling sound) AND [2] no history of asthma   Negative: Eye redness and itching are the only symptoms   Negative: Doesn't match the SYMPTOMS for Hay Fever   Negative: Patient sounds very sick or weak to the triager   Negative: Lots of coughing   Negative: [1] Lots of yellow or green discharge from nose AND [2] present > 3 days   Negative: [1] Taking antihistamines > 2 days  AND [2] nasal allergy symptoms interfere with sleep, school, or work    Protocols used: SORE THROAT-A-AH, HBCKCAVVNS-H-JA, NASAL ALLERGIES (HAY FEVER)-A-AH

## 2020-05-31 NOTE — PROGRESS NOTES
Subjective:       Patient ID: Zohra Paulino is a 70 y.o. female.    Chief Complaint: Establish Care and Leg Swelling (both)    HPI  This patient is new to me.   Zohra Paulino is a 70 y.o. year old female with osteopenia, obesity who presents today to establish care.  She also complains of leg swelling.    Patient complains about 1 month or so of bilateral leg swelling.  Says she typically has some swelling, but this is worse than her baseline.  Says it does not improve much when she is lying flat.  Denies new shortness of breath or orthopnea.  Echocardiogram 2018 revealed diastolic dysfunction.     Osteopenia - used to take Fosamax, but stopped about 1 year ago. Was taking for less than a year.     OB/GYN History     s/p hysterectomy    Health Maintenance  Pap smear: 10/20/2016 - normal. s/p hysterectomy  Mammogram: 2019 - normal   Colon Cancer Screening: colonoscopy 5/3/19 - repeat in 5 years. [facility] (Dr. Pereira)  DEXA: 17 - osteopenia (femoral necks bilaterally and lumbar spine)  Hepatitis C screening: negative   Flu vaccine: due in fall   Tetanus vaccine: refused  PNA vaccine: refused  Shingles vaccine: refused    I personally reviewed Past Medical History, Past Surgical History, Social History, and Family History    Review of Systems   Constitutional: Negative for chills, fatigue, fever and unexpected weight change.   HENT: Negative for congestion, hearing loss, rhinorrhea and sore throat.    Eyes: Negative for visual disturbance.   Respiratory: Negative for cough, shortness of breath and wheezing.    Cardiovascular: Positive for leg swelling. Negative for chest pain and palpitations.   Gastrointestinal: Negative for abdominal pain, constipation, diarrhea, nausea and vomiting.   Genitourinary: Negative for dysuria, frequency, menstrual problem and urgency.   Musculoskeletal: Negative for arthralgias and myalgias.   Skin: Negative for rash.   Neurological: Negative for  "dizziness, syncope and headaches.   Psychiatric/Behavioral: Negative for dysphoric mood and sleep disturbance. The patient is not nervous/anxious.        Objective:      Vitals:    06/01/20 1346 06/01/20 1408   BP: (!) 147/68 (!) 150/80   Pulse: 75    SpO2: 98%    Weight: 76.7 kg (169 lb 1.5 oz)    Height: 5' 2" (1.575 m)      Physical Exam   Constitutional: She is oriented to person, place, and time. She appears well-developed and well-nourished. No distress.   HENT:   Head: Normocephalic and atraumatic.   Right Ear: Hearing normal.   Left Ear: Hearing normal.   Nose: Nose normal.   Mouth/Throat: Oropharynx is clear and moist and mucous membranes are normal. No oropharyngeal exudate.   Eyes: Pupils are equal, round, and reactive to light. Conjunctivae and lids are normal.   Neck: Normal range of motion. No thyroid mass and no thyromegaly present.   Cardiovascular: Normal rate, regular rhythm, S1 normal, S2 normal and intact distal pulses.   No murmur heard.  Nonpitting edema of lower extremities bilaterally.   Pulmonary/Chest: Effort normal and breath sounds normal. No respiratory distress.   Abdominal: Soft. Normal appearance and bowel sounds are normal. There is no tenderness.   Lymphadenopathy:     She has no cervical adenopathy.        Right: No supraclavicular adenopathy present.        Left: No supraclavicular adenopathy present.   Neurological: She is alert and oriented to person, place, and time.   Skin: Skin is warm and dry. No rash noted.   Psychiatric: She has a normal mood and affect. Her behavior is normal. Thought content normal.   Nursing note and vitals reviewed.      Assessment:       1. Encounter to establish care with new doctor    2. Encounter for lipid screening for cardiovascular disease    3. Bilateral lower extremity edema    4. Left ventricular diastolic dysfunction    5. Elevated blood pressure reading in office without diagnosis of hypertension    6. Osteopenia of necks of both femurs  "   7. Class 1 obesity due to excess calories with body mass index (BMI) of 30.0 to 30.9 in adult, unspecified whether serious comorbidity present        Plan:     Zohra was seen today for establish care and leg swelling.    Diagnoses and all orders for this visit:    Encounter to establish care with new doctor    Encounter for lipid screening for cardiovascular disease  -     Lipid Panel; Future    Bilateral lower extremity edema  Left ventricular diastolic dysfunction  Possibly due to diastolic dysfunction.  BNP order to evaluate.  Advised patient to cut back on salt, elevate legs, use compression stockings.  -     Comprehensive metabolic panel; Future  -     TSH; Future  -     Microalbumin/creatinine urine ratio; Future  -     Brain Natriuretic Peptide; Future    Elevated blood pressure reading in office without diagnosis of hypertension  Patient would like to return in a couple weeks for a blood pressure recheck rather than starting antihypertensive now.  Will consider starting hydrochlorothiazide as first-line agent, especially with associated swelling.    Osteopenia of necks of both femurs  -     CBC auto differential; Future  -     Comprehensive metabolic panel; Future  -     TSH; Future  -     Vitamin D; Future    Class 1 obesity due to excess calories with body mass index (BMI) of 30.0 to 30.9 in adult, unspecified whether serious comorbidity present  Discussed the importance of weight loss by making healthy dietary changes and increasing physical activity.     I personally reviewed the patient's medical record, including physician notes, imaging, and labs that were available to me today.

## 2020-06-01 ENCOUNTER — OFFICE VISIT (OUTPATIENT)
Dept: INTERNAL MEDICINE | Facility: CLINIC | Age: 70
End: 2020-06-01
Payer: MEDICARE

## 2020-06-01 VITALS
WEIGHT: 169.06 LBS | HEART RATE: 75 BPM | SYSTOLIC BLOOD PRESSURE: 150 MMHG | DIASTOLIC BLOOD PRESSURE: 80 MMHG | BODY MASS INDEX: 31.11 KG/M2 | OXYGEN SATURATION: 98 % | HEIGHT: 62 IN

## 2020-06-01 DIAGNOSIS — M85.851 OSTEOPENIA OF NECKS OF BOTH FEMURS: ICD-10-CM

## 2020-06-01 DIAGNOSIS — R60.0 BILATERAL LOWER EXTREMITY EDEMA: ICD-10-CM

## 2020-06-01 DIAGNOSIS — M85.852 OSTEOPENIA OF NECKS OF BOTH FEMURS: ICD-10-CM

## 2020-06-01 DIAGNOSIS — Z13.6 ENCOUNTER FOR LIPID SCREENING FOR CARDIOVASCULAR DISEASE: ICD-10-CM

## 2020-06-01 DIAGNOSIS — Z13.220 ENCOUNTER FOR LIPID SCREENING FOR CARDIOVASCULAR DISEASE: ICD-10-CM

## 2020-06-01 DIAGNOSIS — R03.0 ELEVATED BLOOD PRESSURE READING IN OFFICE WITHOUT DIAGNOSIS OF HYPERTENSION: ICD-10-CM

## 2020-06-01 DIAGNOSIS — I51.9 LEFT VENTRICULAR DIASTOLIC DYSFUNCTION: ICD-10-CM

## 2020-06-01 DIAGNOSIS — Z76.89 ENCOUNTER TO ESTABLISH CARE WITH NEW DOCTOR: Primary | ICD-10-CM

## 2020-06-01 DIAGNOSIS — E66.09 CLASS 1 OBESITY DUE TO EXCESS CALORIES WITH BODY MASS INDEX (BMI) OF 30.0 TO 30.9 IN ADULT, UNSPECIFIED WHETHER SERIOUS COMORBIDITY PRESENT: ICD-10-CM

## 2020-06-01 PROCEDURE — 1159F MED LIST DOCD IN RCRD: CPT | Mod: HCNC,S$GLB,, | Performed by: FAMILY MEDICINE

## 2020-06-01 PROCEDURE — 3288F PR FALLS RISK ASSESSMENT DOCUMENTED: ICD-10-PCS | Mod: HCNC,CPTII,S$GLB, | Performed by: FAMILY MEDICINE

## 2020-06-01 PROCEDURE — 1125F PR PAIN SEVERITY QUANTIFIED, PAIN PRESENT: ICD-10-PCS | Mod: HCNC,S$GLB,, | Performed by: FAMILY MEDICINE

## 2020-06-01 PROCEDURE — 1100F PR PT FALLS ASSESS DOC 2+ FALLS/FALL W/INJURY/YR: ICD-10-PCS | Mod: HCNC,CPTII,S$GLB, | Performed by: FAMILY MEDICINE

## 2020-06-01 PROCEDURE — 99999 PR PBB SHADOW E&M-EST. PATIENT-LVL III: CPT | Mod: PBBFAC,HCNC,, | Performed by: FAMILY MEDICINE

## 2020-06-01 PROCEDURE — 3288F FALL RISK ASSESSMENT DOCD: CPT | Mod: HCNC,CPTII,S$GLB, | Performed by: FAMILY MEDICINE

## 2020-06-01 PROCEDURE — 1159F PR MEDICATION LIST DOCUMENTED IN MEDICAL RECORD: ICD-10-PCS | Mod: HCNC,S$GLB,, | Performed by: FAMILY MEDICINE

## 2020-06-01 PROCEDURE — 1100F PTFALLS ASSESS-DOCD GE2>/YR: CPT | Mod: HCNC,CPTII,S$GLB, | Performed by: FAMILY MEDICINE

## 2020-06-01 PROCEDURE — 99999 PR PBB SHADOW E&M-EST. PATIENT-LVL III: ICD-10-PCS | Mod: PBBFAC,HCNC,, | Performed by: FAMILY MEDICINE

## 2020-06-01 PROCEDURE — 99214 PR OFFICE/OUTPT VISIT, EST, LEVL IV, 30-39 MIN: ICD-10-PCS | Mod: HCNC,S$GLB,, | Performed by: FAMILY MEDICINE

## 2020-06-01 PROCEDURE — 99214 OFFICE O/P EST MOD 30 MIN: CPT | Mod: HCNC,S$GLB,, | Performed by: FAMILY MEDICINE

## 2020-06-01 PROCEDURE — 1125F AMNT PAIN NOTED PAIN PRSNT: CPT | Mod: HCNC,S$GLB,, | Performed by: FAMILY MEDICINE

## 2020-06-03 PROBLEM — M85.852 OSTEOPENIA OF NECKS OF BOTH FEMURS: Status: ACTIVE | Noted: 2020-06-03

## 2020-06-03 PROBLEM — E66.09 CLASS 1 OBESITY DUE TO EXCESS CALORIES WITH BODY MASS INDEX (BMI) OF 30.0 TO 30.9 IN ADULT: Status: ACTIVE | Noted: 2020-06-03

## 2020-06-03 PROBLEM — M85.851 OSTEOPENIA OF NECKS OF BOTH FEMURS: Status: ACTIVE | Noted: 2020-06-03

## 2020-06-03 PROBLEM — E66.811 CLASS 1 OBESITY DUE TO EXCESS CALORIES WITH BODY MASS INDEX (BMI) OF 30.0 TO 30.9 IN ADULT: Status: ACTIVE | Noted: 2020-06-03

## 2020-06-03 PROBLEM — D21.9 FIBROIDS: Status: RESOLVED | Noted: 2017-11-16 | Resolved: 2020-06-03

## 2020-06-09 ENCOUNTER — LAB VISIT (OUTPATIENT)
Dept: LAB | Facility: OTHER | Age: 70
End: 2020-06-09
Attending: FAMILY MEDICINE
Payer: MEDICARE

## 2020-06-09 DIAGNOSIS — R60.0 BILATERAL LOWER EXTREMITY EDEMA: ICD-10-CM

## 2020-06-09 DIAGNOSIS — Z13.220 ENCOUNTER FOR LIPID SCREENING FOR CARDIOVASCULAR DISEASE: ICD-10-CM

## 2020-06-09 DIAGNOSIS — M85.851 OSTEOPENIA OF NECKS OF BOTH FEMURS: ICD-10-CM

## 2020-06-09 DIAGNOSIS — M85.852 OSTEOPENIA OF NECKS OF BOTH FEMURS: ICD-10-CM

## 2020-06-09 DIAGNOSIS — Z13.6 ENCOUNTER FOR LIPID SCREENING FOR CARDIOVASCULAR DISEASE: ICD-10-CM

## 2020-06-09 LAB
25(OH)D3+25(OH)D2 SERPL-MCNC: 30 NG/ML (ref 30–96)
ALBUMIN SERPL BCP-MCNC: 3.7 G/DL (ref 3.5–5.2)
ALP SERPL-CCNC: 89 U/L (ref 55–135)
ALT SERPL W/O P-5'-P-CCNC: 13 U/L (ref 10–44)
ANION GAP SERPL CALC-SCNC: 12 MMOL/L (ref 8–16)
AST SERPL-CCNC: 17 U/L (ref 10–40)
BASOPHILS # BLD AUTO: 0.04 K/UL (ref 0–0.2)
BASOPHILS NFR BLD: 0.7 % (ref 0–1.9)
BILIRUB SERPL-MCNC: 0.3 MG/DL (ref 0.1–1)
BNP SERPL-MCNC: <10 PG/ML (ref 0–99)
BUN SERPL-MCNC: 12 MG/DL (ref 8–23)
CALCIUM SERPL-MCNC: 9.5 MG/DL (ref 8.7–10.5)
CHLORIDE SERPL-SCNC: 105 MMOL/L (ref 95–110)
CHOLEST SERPL-MCNC: 137 MG/DL (ref 120–199)
CHOLEST/HDLC SERPL: 3 {RATIO} (ref 2–5)
CO2 SERPL-SCNC: 25 MMOL/L (ref 23–29)
CREAT SERPL-MCNC: 0.8 MG/DL (ref 0.5–1.4)
DIFFERENTIAL METHOD: ABNORMAL
EOSINOPHIL # BLD AUTO: 0.3 K/UL (ref 0–0.5)
EOSINOPHIL NFR BLD: 4.9 % (ref 0–8)
ERYTHROCYTE [DISTWIDTH] IN BLOOD BY AUTOMATED COUNT: 15.2 % (ref 11.5–14.5)
EST. GFR  (AFRICAN AMERICAN): >60 ML/MIN/1.73 M^2
EST. GFR  (NON AFRICAN AMERICAN): >60 ML/MIN/1.73 M^2
GLUCOSE SERPL-MCNC: 93 MG/DL (ref 70–110)
HCT VFR BLD AUTO: 36.7 % (ref 37–48.5)
HDLC SERPL-MCNC: 45 MG/DL (ref 40–75)
HDLC SERPL: 32.8 % (ref 20–50)
HGB BLD-MCNC: 11.4 G/DL (ref 12–16)
IMM GRANULOCYTES # BLD AUTO: 0.01 K/UL (ref 0–0.04)
IMM GRANULOCYTES NFR BLD AUTO: 0.2 % (ref 0–0.5)
LDLC SERPL CALC-MCNC: 75.6 MG/DL (ref 63–159)
LYMPHOCYTES # BLD AUTO: 2.3 K/UL (ref 1–4.8)
LYMPHOCYTES NFR BLD: 38.7 % (ref 18–48)
MCH RBC QN AUTO: 28.1 PG (ref 27–31)
MCHC RBC AUTO-ENTMCNC: 31.1 G/DL (ref 32–36)
MCV RBC AUTO: 91 FL (ref 82–98)
MONOCYTES # BLD AUTO: 0.6 K/UL (ref 0.3–1)
MONOCYTES NFR BLD: 10.2 % (ref 4–15)
NEUTROPHILS # BLD AUTO: 2.7 K/UL (ref 1.8–7.7)
NEUTROPHILS NFR BLD: 45.3 % (ref 38–73)
NONHDLC SERPL-MCNC: 92 MG/DL
NRBC BLD-RTO: 0 /100 WBC
PLATELET # BLD AUTO: 228 K/UL (ref 150–350)
PMV BLD AUTO: 11.4 FL (ref 9.2–12.9)
POTASSIUM SERPL-SCNC: 3.8 MMOL/L (ref 3.5–5.1)
PROT SERPL-MCNC: 7.8 G/DL (ref 6–8.4)
RBC # BLD AUTO: 4.05 M/UL (ref 4–5.4)
SODIUM SERPL-SCNC: 142 MMOL/L (ref 136–145)
TRIGL SERPL-MCNC: 82 MG/DL (ref 30–150)
TSH SERPL DL<=0.005 MIU/L-ACNC: 1.45 UIU/ML (ref 0.4–4)
WBC # BLD AUTO: 5.97 K/UL (ref 3.9–12.7)

## 2020-06-09 PROCEDURE — 80053 COMPREHEN METABOLIC PANEL: CPT | Mod: HCNC

## 2020-06-09 PROCEDURE — 84443 ASSAY THYROID STIM HORMONE: CPT | Mod: HCNC

## 2020-06-09 PROCEDURE — 83880 ASSAY OF NATRIURETIC PEPTIDE: CPT | Mod: HCNC

## 2020-06-09 PROCEDURE — 36415 COLL VENOUS BLD VENIPUNCTURE: CPT | Mod: HCNC

## 2020-06-09 PROCEDURE — 80061 LIPID PANEL: CPT | Mod: HCNC

## 2020-06-09 PROCEDURE — 82306 VITAMIN D 25 HYDROXY: CPT | Mod: HCNC

## 2020-06-09 PROCEDURE — 85025 COMPLETE CBC W/AUTO DIFF WBC: CPT | Mod: HCNC

## 2020-06-16 ENCOUNTER — TELEPHONE (OUTPATIENT)
Dept: INTERNAL MEDICINE | Facility: CLINIC | Age: 70
End: 2020-06-16

## 2020-06-16 ENCOUNTER — CLINICAL SUPPORT (OUTPATIENT)
Dept: INTERNAL MEDICINE | Facility: CLINIC | Age: 70
End: 2020-06-16
Payer: MEDICARE

## 2020-06-16 VITALS — OXYGEN SATURATION: 99 % | HEART RATE: 73 BPM | DIASTOLIC BLOOD PRESSURE: 80 MMHG | SYSTOLIC BLOOD PRESSURE: 138 MMHG

## 2020-06-16 PROCEDURE — 99999 PR PBB SHADOW E&M-EST. PATIENT-LVL II: ICD-10-PCS | Mod: PBBFAC,HCNC,,

## 2020-06-16 PROCEDURE — 99999 PR PBB SHADOW E&M-EST. PATIENT-LVL II: CPT | Mod: PBBFAC,HCNC,,

## 2020-06-16 NOTE — PROGRESS NOTES
Zohra Sun [place] 70 y.o. female is here today for Blood Pressure check.   History of HTN no.    Review of patient's allergies indicates:   Allergen Reactions    Codeine Palpitations     Creatinine   Date Value Ref Range Status   06/09/2020 0.8 0.5 - 1.4 mg/dL Final     Sodium   Date Value Ref Range Status   06/09/2020 142 136 - 145 mmol/L Final     Potassium   Date Value Ref Range Status   06/09/2020 3.8 3.5 - 5.1 mmol/L Final   ]      Current Outpatient Medications:     alendronate (FOSAMAX) 40 mg tablet, TAKE 1 TABLET (40 MG TOTAL) BY MOUTH ONCE A WEEK. (Patient not taking: Reported on 6/1/2020), Disp: 12 tablet, Rfl: 2    cetirizine (ZYRTEC) 10 MG tablet, TAKE ONE TABLET BY MOUTH EVERY DAY, Disp: 90 tablet, Rfl: 3    vitamin D 1000 units Tab, Take 1,000 Units by mouth once daily., Disp: , Rfl:   Does patient have record of home blood pressure readings no.  Patient is asymptomatic.   Complains of numbness and tingling has always happened for a long time, and denies diabetes.  B/P 132/72   BP: 138/80 , Pulse: 73.  Dr. Garber notified.

## 2020-06-16 NOTE — TELEPHONE ENCOUNTER
Does patient have record of home blood pressure readings no.  Patient is asymptomatic.   Complains of numbness and tingling has always happened for a long time, and denies diabetes.  B/P 132/72   BP: 138/80 , Pulse: 73.  Dr. Garber notified.

## 2020-06-29 ENCOUNTER — TELEPHONE (OUTPATIENT)
Dept: INTERNAL MEDICINE | Facility: CLINIC | Age: 70
End: 2020-06-29

## 2020-06-29 DIAGNOSIS — I10 ESSENTIAL HYPERTENSION: Primary | ICD-10-CM

## 2020-07-01 ENCOUNTER — TELEPHONE (OUTPATIENT)
Dept: INTERNAL MEDICINE | Facility: CLINIC | Age: 70
End: 2020-07-01

## 2020-07-01 DIAGNOSIS — D48.7 NEOPLASM OF UNCERTAIN BEHAVIOR OF OTHER SPECIFIED SITES: ICD-10-CM

## 2020-07-01 DIAGNOSIS — D49.0 PANCREAS NEOPLASM: ICD-10-CM

## 2020-07-01 DIAGNOSIS — C25.9 MALIGNANT NEOPLASM OF PANCREAS, UNSPECIFIED LOCATION OF MALIGNANCY: Primary | ICD-10-CM

## 2020-07-01 NOTE — TELEPHONE ENCOUNTER
Spoke to Dr. Camacho told him PCP is out this week    Dr. Camacho is a oncologist at [facility] and states the Pt has been going to [facility] but recently she changed to [facility]  States she has pancreatic cancer and is waiting to treat her in august due to insurance  Requests PCP orders the following pending:  Needs scans and blood work cbc, cmp, ca19-9, pet ct scan     Unable to pend PET

## 2020-07-01 NOTE — TELEPHONE ENCOUNTER
I am covering for Dr. Garber today.  I have ordered the requested tests for the patient and will forward the results to PCP.  Please contact patient to schedule labs and PET scan.  Thanks.

## 2020-07-01 NOTE — TELEPHONE ENCOUNTER
----- Message from Linsey Worthington sent at 7/1/2020 11:56 AM CDT -----    Name of Who is Calling:Dr. Camacho    What is the request in detail: Dr. Camacho would like a call back regarding mutual patient Please contact to further discuss and advise    Can the clinic reply by [facility]: No    [facility] [phone]

## 2020-07-02 ENCOUNTER — LAB VISIT (OUTPATIENT)
Dept: LAB | Facility: HOSPITAL | Age: 70
End: 2020-07-02
Attending: FAMILY MEDICINE
Payer: MEDICARE

## 2020-07-02 DIAGNOSIS — I10 ESSENTIAL HYPERTENSION: ICD-10-CM

## 2020-07-02 DIAGNOSIS — D49.0 PANCREAS NEOPLASM: ICD-10-CM

## 2020-07-02 DIAGNOSIS — C25.9 MALIGNANT NEOPLASM OF PANCREAS, UNSPECIFIED LOCATION OF MALIGNANCY: ICD-10-CM

## 2020-07-02 LAB
ALBUMIN SERPL BCP-MCNC: 3.4 G/DL (ref 3.5–5.2)
ALP SERPL-CCNC: 88 U/L (ref 55–135)
ALT SERPL W/O P-5'-P-CCNC: 9 U/L (ref 10–44)
ANION GAP SERPL CALC-SCNC: 5 MMOL/L (ref 8–16)
ANION GAP SERPL CALC-SCNC: 5 MMOL/L (ref 8–16)
AST SERPL-CCNC: 33 U/L (ref 10–40)
BASOPHILS # BLD AUTO: 0.06 K/UL (ref 0–0.2)
BASOPHILS NFR BLD: 1.2 % (ref 0–1.9)
BILIRUB SERPL-MCNC: 0.3 MG/DL (ref 0.1–1)
BUN SERPL-MCNC: 10 MG/DL (ref 8–23)
BUN SERPL-MCNC: 10 MG/DL (ref 8–23)
CALCIUM SERPL-MCNC: 9.5 MG/DL (ref 8.7–10.5)
CALCIUM SERPL-MCNC: 9.5 MG/DL (ref 8.7–10.5)
CANCER AG19-9 SERPL-ACNC: 3306 U/ML (ref 2–40)
CHLORIDE SERPL-SCNC: 111 MMOL/L (ref 95–110)
CHLORIDE SERPL-SCNC: 111 MMOL/L (ref 95–110)
CO2 SERPL-SCNC: 27 MMOL/L (ref 23–29)
CO2 SERPL-SCNC: 27 MMOL/L (ref 23–29)
CREAT SERPL-MCNC: 0.8 MG/DL (ref 0.5–1.4)
CREAT SERPL-MCNC: 0.8 MG/DL (ref 0.5–1.4)
DIFFERENTIAL METHOD: ABNORMAL
EOSINOPHIL # BLD AUTO: 0.1 K/UL (ref 0–0.5)
EOSINOPHIL NFR BLD: 2.4 % (ref 0–8)
ERYTHROCYTE [DISTWIDTH] IN BLOOD BY AUTOMATED COUNT: 15.2 % (ref 11.5–14.5)
EST. GFR  (AFRICAN AMERICAN): >60 ML/MIN/1.73 M^2
EST. GFR  (AFRICAN AMERICAN): >60 ML/MIN/1.73 M^2
EST. GFR  (NON AFRICAN AMERICAN): >60 ML/MIN/1.73 M^2
EST. GFR  (NON AFRICAN AMERICAN): >60 ML/MIN/1.73 M^2
GLUCOSE SERPL-MCNC: 112 MG/DL (ref 70–110)
GLUCOSE SERPL-MCNC: 112 MG/DL (ref 70–110)
HCT VFR BLD AUTO: 37.5 % (ref 37–48.5)
HGB BLD-MCNC: 11.8 G/DL (ref 12–16)
IMM GRANULOCYTES # BLD AUTO: 0.01 K/UL (ref 0–0.04)
IMM GRANULOCYTES NFR BLD AUTO: 0.2 % (ref 0–0.5)
LYMPHOCYTES # BLD AUTO: 2 K/UL (ref 1–4.8)
LYMPHOCYTES NFR BLD: 39 % (ref 18–48)
MCH RBC QN AUTO: 28.4 PG (ref 27–31)
MCHC RBC AUTO-ENTMCNC: 31.5 G/DL (ref 32–36)
MCV RBC AUTO: 90 FL (ref 82–98)
MONOCYTES # BLD AUTO: 0.6 K/UL (ref 0.3–1)
MONOCYTES NFR BLD: 11.6 % (ref 4–15)
NEUTROPHILS # BLD AUTO: 2.3 K/UL (ref 1.8–7.7)
NEUTROPHILS NFR BLD: 45.6 % (ref 38–73)
NRBC BLD-RTO: 0 /100 WBC
PLATELET # BLD AUTO: 313 K/UL (ref 150–350)
PMV BLD AUTO: 10.9 FL (ref 9.2–12.9)
POTASSIUM SERPL-SCNC: 4.2 MMOL/L (ref 3.5–5.1)
POTASSIUM SERPL-SCNC: 4.2 MMOL/L (ref 3.5–5.1)
PROT SERPL-MCNC: 7.5 G/DL (ref 6–8.4)
RBC # BLD AUTO: 4.16 M/UL (ref 4–5.4)
SODIUM SERPL-SCNC: 143 MMOL/L (ref 136–145)
SODIUM SERPL-SCNC: 143 MMOL/L (ref 136–145)
WBC # BLD AUTO: 5 K/UL (ref 3.9–12.7)

## 2020-07-02 PROCEDURE — 86301 IMMUNOASSAY TUMOR CA 19-9: CPT | Mod: HCNC

## 2020-07-02 PROCEDURE — 80053 COMPREHEN METABOLIC PANEL: CPT | Mod: HCNC

## 2020-07-02 PROCEDURE — 36415 COLL VENOUS BLD VENIPUNCTURE: CPT | Mod: HCNC

## 2020-07-02 PROCEDURE — 85025 COMPLETE CBC W/AUTO DIFF WBC: CPT | Mod: HCNC

## 2020-07-02 RX ORDER — HYDROCHLOROTHIAZIDE 12.5 MG/1
12.5 TABLET ORAL DAILY
Qty: 90 TABLET | Refills: 0 | Status: SHIPPED | OUTPATIENT
Start: 2020-07-02 | End: 2021-03-02

## 2020-07-02 NOTE — TELEPHONE ENCOUNTER
Spoke with Pt and she already schedueld her labs and PET, notified her that Dr. Camacho requested the orders, Pt was aware

## 2020-07-07 ENCOUNTER — HOSPITAL ENCOUNTER (OUTPATIENT)
Dept: RADIOLOGY | Facility: HOSPITAL | Age: 70
Discharge: HOME | End: 2020-07-07
Attending: FAMILY MEDICINE
Payer: MEDICARE

## 2020-07-07 DIAGNOSIS — D48.7 NEOPLASM OF UNCERTAIN BEHAVIOR OF OTHER SPECIFIED SITES: ICD-10-CM

## 2020-07-07 DIAGNOSIS — D49.0 PANCREAS NEOPLASM: ICD-10-CM

## 2020-07-07 DIAGNOSIS — C25.9 MALIGNANT NEOPLASM OF PANCREAS, UNSPECIFIED LOCATION OF MALIGNANCY: ICD-10-CM

## 2020-07-07 LAB — POCT GLUCOSE: 101 MG/DL (ref 70–110)

## 2020-07-07 PROCEDURE — 78815 PET IMAGE W/CT SKULL-THIGH: CPT | Mod: TC,HCNC,PI

## 2020-07-07 PROCEDURE — 78815 NM PET CT ROUTINE: ICD-10-PCS | Mod: 26,PI,HCNC, | Performed by: RADIOLOGY

## 2020-07-07 PROCEDURE — A9552 F18 FDG: HCPCS | Mod: HCNC

## 2020-07-07 PROCEDURE — 78815 PET IMAGE W/CT SKULL-THIGH: CPT | Mod: 26,PI,HCNC, | Performed by: RADIOLOGY

## 2020-07-08 ENCOUNTER — TELEPHONE (OUTPATIENT)
Dept: INTERNAL MEDICINE | Facility: CLINIC | Age: 70
End: 2020-07-08

## 2020-07-09 ENCOUNTER — TELEPHONE (OUTPATIENT)
Dept: NEUROLOGY | Facility: HOSPITAL | Age: 70
End: 2020-07-09

## 2020-07-09 NOTE — PROGRESS NOTES
"NOLANETS:  [facility]  A collaboration between [facility] and [facility]      PATIENT: Zohra Paulino  MRN: [id number]  DATE: 7/14/2020    Subjective:      Chief Complaint: Consult for pancreas mass.  Referred by Dr. Piotr Camacho.    Vitals: Blood pressure (!) 143/66, pulse 77, height 5' 2" (1.575 m), weight 75 kg (165 lb 7.3 oz).     ECOG Score: 1 - Symptomatic but completely ambulatory    Diagnosis:   1. Primary adenocarcinoma of pancreas         Interval History: Presented with transient pain in her side, she thought it was a diverticulitis attack.  She was sent to ER. A CT without contrast  revealed a mass on her pancreas.  She had an elevated lipase level.  She had an EGD which was unrevealing and had a FDG PET scan.  No biopsy performed. EUS with biopsy : Positive for pancreas AdenoCA.  FDG PET negative for metastases.  Daughter works in pharmacy here.  Wants an operation    Oncologic History:   Oncologic History Panc ductal adenocarcinoma- dx 6/2020   Oncologic Treatment    Pathology 6/2020- FNA panc- ductal adenocarcinoma     Past Medical History:  Past Medical History:   Diagnosis Date    Bronchitis     Fibroids     Osteopetrosis     Uterine fibroid        Past Surgical History:  Past Surgical History:   Procedure Laterality Date    CERVICAL BIOPSY  W/ LOOP ELECTRODE EXCISION      ckc  2004    HYSTERECTOMY      fibroids    NH REMOVAL OF OVARY/TUBE(S)      TUBAL LIGATION         Family History:  Family History   Problem Relation Age of Onset    No Known Problems Mother     No Known Problems Father     Breast cancer Cousin 20    Arthritis Sister     No Known Problems Maternal Grandmother     No Known Problems Maternal Grandfather     Pancreatic cancer Paternal Grandmother 80    No Known Problems Paternal Grandfather     Diabetes Son     No Known Problems Son     No Known Problems Daughter  "    No Known Problems Daughter        Allergies:  Codeine    Medications:  Current Outpatient Medications   Medication Sig    cetirizine (ZYRTEC) 10 MG tablet TAKE ONE TABLET BY MOUTH EVERY DAY    vitamin D 1000 units Tab Take 1,000 Units by mouth once daily.    alendronate (FOSAMAX) 40 mg tablet TAKE 1 TABLET (40 MG TOTAL) BY MOUTH ONCE A WEEK. (Patient not taking: Reported on 6/1/2020)    hydroCHLOROthiazide (HYDRODIURIL) 12.5 MG Tab Take 1 tablet (12.5 mg total) by mouth once daily. (Patient not taking: Reported on 7/14/2020)     No current facility-administered medications for this visit.         Review of Systems   Constitutional: Negative.  Negative for activity change, appetite change, chills, fatigue, fever and unexpected weight change.   HENT: Negative.  Negative for congestion, ear pain, rhinorrhea and sinus pressure.    Eyes: Negative.  Negative for photophobia, pain and redness.   Respiratory: Negative.  Negative for cough, chest tightness, shortness of breath and wheezing.    Cardiovascular: Negative for chest pain, palpitations and leg swelling.   Gastrointestinal: Positive for abdominal pain. Negative for abdominal distention, anal bleeding, blood in stool, constipation, diarrhea, nausea, rectal pain and vomiting.   Endocrine: Negative.  Negative for cold intolerance, heat intolerance, polydipsia, polyphagia and polyuria.   Genitourinary: Negative.  Negative for difficulty urinating, dysuria and frequency.   Musculoskeletal: Negative.  Negative for arthralgias, back pain, gait problem, myalgias, neck pain and neck stiffness.   Skin: Negative.  Negative for color change, pallor and rash.   Allergic/Immunologic: Negative.  Negative for environmental allergies, food allergies and immunocompromised state.   Neurological: Negative.  Negative for dizziness, seizures, syncope, weakness and light-headedness.   Hematological: Negative.  Negative for adenopathy. Does not bruise/bleed easily.    Psychiatric/Behavioral: Negative.  Negative for agitation, behavioral problems, confusion, decreased concentration, dysphoric mood, hallucinations, self-injury, sleep disturbance and suicidal ideas. The patient is not nervous/anxious and is not hyperactive.       Objective:      Physical Exam  Constitutional:       Appearance: She is well-developed.   HENT:      Head: Normocephalic and atraumatic.   Eyes:      Pupils: Pupils are equal, round, and reactive to light.   Neck:      Musculoskeletal: Normal range of motion and neck supple.      Thyroid: No thyromegaly.   Cardiovascular:      Rate and Rhythm: Normal rate and regular rhythm.      Heart sounds: Normal heart sounds.   Pulmonary:      Effort: Pulmonary effort is normal. No respiratory distress.      Breath sounds: Normal breath sounds. No wheezing or rales.   Abdominal:      General: Bowel sounds are normal. There is no distension.      Palpations: Abdomen is soft. There is no mass.      Tenderness: There is no abdominal tenderness. There is no guarding or rebound.      Hernia: No hernia is present. There is no hernia in the ventral area.   Musculoskeletal: Normal range of motion.         General: No tenderness.   Skin:     General: Skin is warm and dry.      Coloration: Skin is not pale.      Findings: No erythema or rash.   Neurological:      Mental Status: She is alert and oriented to person, place, and time.      Cranial Nerves: No cranial nerve deficit.      Deep Tendon Reflexes: Reflexes are normal and symmetric.   Psychiatric:         Behavior: Behavior normal.         Thought Content: Thought content normal.        Assessment:       1. Primary adenocarcinoma of pancreas        FNA panc mass- 06/19/2020                                  FINE NEEDLE ASPIRATION OF PANCREATIC MASS:   -  PANCREATIC DUCTAL ADENOCARCINOMA.    Laboratory Data:    Neuroendocrine Labs Latest Ref Rng & Units 7/2/2020 6/9/2020   CA 19-9 2.0 - 40.0 U/mL 3306.0 (H)    TSH 0.400 -  4.000 uIU/mL  1.453   WBC 3.90 - 12.70 K/uL 5.00 5.97   HGB 12.0 - 16.0 g/dL 11.8 (L) 11.4 (L)   HCT 37.0 - 48.5 % 37.5 36.7 (L)   PLATLETS 150 - 350 K/uL 313 228   GLUCOSE 70 - 110 mg/dL 112 (H) 93   BUN 8 - 23 mg/dL 10 12   CREATININE 0.5 - 1.4 mg/dL 0.8 0.8    - 145 mmol/L 143 142   K 3.5 - 5.1 mmol/L 4.2 3.8   CHLORIDE 95 - 110 mmol/L 111 (H) 105   CO2 23 - 29 mmol/L 27 25   CALCIUM 8.7 - 10.5 mg/dL 9.5 9.5   PROTEIN, TOTAL 6.0 - 8.4 g/dL 7.5 7.8   ALBUMIN 3.5 - 5.2 g/dL 3.4 (L) 3.7   URIC ACID 2.4 - 5.7 mg/dL     TOTAL BILIRUBIN 0.1 - 1.0 mg/dL 0.3 0.3   ALK PHOSPHATASE 55 - 135 U/L 88 89   SGOT (AST) 10 - 40 U/L 33 17   SGPT (ALT) 10 - 44 U/L 9 (L) 13   TRIGLYCERIDES 30 - 150 mg/dL  82   CHOLERSTEROL 120 - 199 mg/dL  137   HDL 40 - 75 mg/dL  45   LDL 63.0 - 159.0 mg/dL  75.6   24 HR CREATININE CLEARANCE 15.0 - 325.0 mg/dL  45.9       Scans:   CT abd/pelvis-w contrast   pending      NM PET CT Routine Skull to Mid Thigh -7/7/20  Narrative: EXAMINATION:  NM PET CT ROUTINE    CLINICAL HISTORY:  Neoplasm: pancreas, recurrence, suspected/known; Neoplasm of uncertain behavior of other specified sites    TECHNIQUE:  11.7 mCi of F18-FDG was administered intravenously in the right antecubital fossa.  After an approximately 60 min distribution time, PET/CT images were acquired from the skull base to the midthigh.  Transmission images were acquired to correct for attenuation using a whole body low-dose CT scan without contrast with the arms positioned above the head. Glycemia at the time of injection was 101 mg/dL.    COMPARISON:  None    FINDINGS:  Quality of the study: Adequate.    In the head and neck, there are no hypermetabolic lesions worrisome for malignancy. There are no hypermetabolic mucosal lesions, and there are no pathologically enlarged or hypermetabolic lymph nodes.    In the chest, there are no hypermetabolic lesions worrisome for malignancy.  There are no concerning pulmonary nodules or masses, and  there are no pathologically enlarged or hypermetabolic lymph nodes.    In the abdomen and pelvis, there is a focus of increased radiotracer uptake within pancreatic body without definite CT correlate on limited noncontrast images. Lesion demonstrates SUV max of 6.7 (axial fused image 91).  No obvious pancreatic duct dilatation or pancreatic tail atrophy.  There is physiologic tracer distribution within the abdominal organs and excretion into the genitourinary system.  Extensive colonic diverticulosis with calcification adjacent to the sigmoid colon, no active inflammatory changes to suggest acute diverticulitis.    In the bones, there are no hypermetabolic lesions worrisome for malignancy.  Impression: Hypermetabolic focus within the pancreatic body compatible with reported primary pancreatic neoplasm.      No definite CT correlate on noncontrast images. No evidence of distant metastasis.      CT ABDOMEN AND PELVIS WITH CONTRAST-5/19/19    HISTORY: Epigastric pain    CMS MANDATED QUALITY DATA - CT RADIATION   436    All CT scans at this facility utilize dose modulation, iterative reconstruction, and/or weight based dosing when appropriate to reduce radiation dose to as low as reasonably achievable    IMPRESSION:   FINDINGS:     Post infusion images were obtained from the lung bases to the pubic symphysis. 100 cc nonionic contrast was administered for the examination.    6 mm noncalcified nodule is noted at the lateral right lung base. Left lung base is unremarkable.    The liver has a normal appearance. The gallbladder and biliary tree are unremarkable. The spleen is normal. There is no evidence of pancreatic mass. Pancreatic duct is upper normal in caliber. The adrenal glands and kidneys are normal in appearance. The abdominal aorta is normal in caliber.    Note is made of mild wall thickening of the distal ileum extending to the ileocecal valve, with mild surrounding mesenteric edema. Findings are consistent with  nonspecific acute enteritis, likely infectious or inflammatory. Multiple colonic diverticula are noted without diverticulitis.    Images of the pelvis demonstrate innumerable sigmoid diverticula without diverticulitis. There is a small amount of free fluid. Urinary bladder is unremarkable.    IMPRESSION:  1. Mild wall thickening of a moderate length of distal ileum extending to the ileocecal valve, with surrounding mild mesenteric edema. Findings are compatible with nonspecific enteritis, likely infectious or inflammatory in nature.  2. Multiple colonic diverticula without diverticulitis.  3. 6 mm noncalcified nodule at the lateral right lung base, indeterminate. In the absence of prior studies which could confirm stability, CT follow-up in 3-6 months could be utilized.  4. Small amount of simple appearing pelvic free fluid.      Impression:  Adenocarcinoma pancreas.   need contrast CT to assess vessel proximity  Plan for distal pancreatectomy.  Patient is a good candidate for ICG green study and tissue/blood biobank study.  His scans revealed tumor is unresectable due to vascular invasion she will be a candidate for cliff knife ablation.  Suspect she will be unacceptable resection candidate.  Patient and daughter had many questions.  Went over anatomy of the pancreas and planned operation    Plan:        triple phase CT pancreas protocol / assess vessels  Pneumovax Prevnar and meningeal vaccines.  Tentative distal pancreatectomy for 07/30/2020  Risks/benefits explained and accepted.  All questions answered.  Research consents obtained.             GILMA Ellis MD, FACS  Professor of Surgery, [facility]  Neuroendocrine Surgery, Hepatic/Pancreatic & General Surgery  [address], [address]  [place]  [address]  ph. [phone]; [phone]  fax. [phone]

## 2020-07-09 NOTE — TELEPHONE ENCOUNTER
Patient referral from Dr. Piotr Camacho to Dr. Ellis for a pancreatic mass.      Presented with pain in her side, she thought it was a diverticulitis attack.  She was sent to ER. A CT without contrast then one with contrast.  It revealed a mass on her pancreas.  She had an EGD which was unrevealing and had a FDG PET scan.  No biopsy performed.  Awaiting records.  Appointment made with Dr. Ellis.  Pt will see appointment on Patient Portal.  All questions answered. Requested to bring cd;s of CTs to appointment.  She will  at [facility] all questions answered.

## 2020-07-09 NOTE — H&P (VIEW-ONLY)
"NOLANETS:  [facility]  A collaboration between [facility] and [facility]      PATIENT: Zohra Paulino  MRN: [id number]  DATE: 7/14/2020    Subjective:      Chief Complaint: Consult for pancreas mass.  Referred by Dr. Piotr Camacho.    Vitals: Blood pressure (!) 143/66, pulse 77, height 5' 2" (1.575 m), weight 75 kg (165 lb 7.3 oz).     ECOG Score: 1 - Symptomatic but completely ambulatory    Diagnosis:   1. Primary adenocarcinoma of pancreas         Interval History: Presented with transient pain in her side, she thought it was a diverticulitis attack.  She was sent to ER. A CT without contrast  revealed a mass on her pancreas.  She had an elevated lipase level.  She had an EGD which was unrevealing and had a FDG PET scan.  No biopsy performed. EUS with biopsy : Positive for pancreas AdenoCA.  FDG PET negative for metastases.  Daughter works in pharmacy here.  Wants an operation    Oncologic History:   Oncologic History Panc ductal adenocarcinoma- dx 6/2020   Oncologic Treatment    Pathology 6/2020- FNA panc- ductal adenocarcinoma     Past Medical History:  Past Medical History:   Diagnosis Date    Bronchitis     Fibroids     Osteopetrosis     Uterine fibroid        Past Surgical History:  Past Surgical History:   Procedure Laterality Date    CERVICAL BIOPSY  W/ LOOP ELECTRODE EXCISION      ckc  2004    HYSTERECTOMY      fibroids    [place] REMOVAL OF OVARY/TUBE(S)      TUBAL LIGATION         Family History:  Family History   Problem Relation Age of Onset    No Known Problems Mother     No Known Problems Father     Breast cancer Cousin 20    Arthritis Sister     No Known Problems Maternal Grandmother     No Known Problems Maternal Grandfather     Pancreatic cancer Paternal Grandmother 80    No Known Problems Paternal Grandfather     Diabetes Son     No Known Problems Son     No Known Problems Daughter  "    No Known Problems Daughter        Allergies:  Codeine    Medications:  Current Outpatient Medications   Medication Sig    cetirizine (ZYRTEC) 10 MG tablet TAKE ONE TABLET BY MOUTH EVERY DAY    vitamin D 1000 units Tab Take 1,000 Units by mouth once daily.    alendronate (FOSAMAX) 40 mg tablet TAKE 1 TABLET (40 MG TOTAL) BY MOUTH ONCE A WEEK. (Patient not taking: Reported on 6/1/2020)    hydroCHLOROthiazide (HYDRODIURIL) 12.5 MG Tab Take 1 tablet (12.5 mg total) by mouth once daily. (Patient not taking: Reported on 7/14/2020)     No current facility-administered medications for this visit.         Review of Systems   Constitutional: Negative.  Negative for activity change, appetite change, chills, fatigue, fever and unexpected weight change.   HENT: Negative.  Negative for congestion, ear pain, rhinorrhea and sinus pressure.    Eyes: Negative.  Negative for photophobia, pain and redness.   Respiratory: Negative.  Negative for cough, chest tightness, shortness of breath and wheezing.    Cardiovascular: Negative for chest pain, palpitations and leg swelling.   Gastrointestinal: Positive for abdominal pain. Negative for abdominal distention, anal bleeding, blood in stool, constipation, diarrhea, nausea, rectal pain and vomiting.   Endocrine: Negative.  Negative for cold intolerance, heat intolerance, polydipsia, polyphagia and polyuria.   Genitourinary: Negative.  Negative for difficulty urinating, dysuria and frequency.   Musculoskeletal: Negative.  Negative for arthralgias, back pain, gait problem, myalgias, neck pain and neck stiffness.   Skin: Negative.  Negative for color change, pallor and rash.   Allergic/Immunologic: Negative.  Negative for environmental allergies, food allergies and immunocompromised state.   Neurological: Negative.  Negative for dizziness, seizures, syncope, weakness and light-headedness.   Hematological: Negative.  Negative for adenopathy. Does not bruise/bleed easily.    Psychiatric/Behavioral: Negative.  Negative for agitation, behavioral problems, confusion, decreased concentration, dysphoric mood, hallucinations, self-injury, sleep disturbance and suicidal ideas. The patient is not nervous/anxious and is not hyperactive.       Objective:      Physical Exam  Constitutional:       Appearance: She is well-developed.   HENT:      Head: Normocephalic and atraumatic.   Eyes:      Pupils: Pupils are equal, round, and reactive to light.   Neck:      Musculoskeletal: Normal range of motion and neck supple.      Thyroid: No thyromegaly.   Cardiovascular:      Rate and Rhythm: Normal rate and regular rhythm.      Heart sounds: Normal heart sounds.   Pulmonary:      Effort: Pulmonary effort is normal. No respiratory distress.      Breath sounds: Normal breath sounds. No wheezing or rales.   Abdominal:      General: Bowel sounds are normal. There is no distension.      Palpations: Abdomen is soft. There is no mass.      Tenderness: There is no abdominal tenderness. There is no guarding or rebound.      Hernia: No hernia is present. There is no hernia in the ventral area.   Musculoskeletal: Normal range of motion.         General: No tenderness.   Skin:     General: Skin is warm and dry.      Coloration: Skin is not pale.      Findings: No erythema or rash.   Neurological:      Mental Status: She is alert and oriented to person, place, and time.      Cranial Nerves: No cranial nerve deficit.      Deep Tendon Reflexes: Reflexes are normal and symmetric.   Psychiatric:         Behavior: Behavior normal.         Thought Content: Thought content normal.        Assessment:       1. Primary adenocarcinoma of pancreas        FNA panc mass- 06/19/2020                                  FINE NEEDLE ASPIRATION OF PANCREATIC MASS:   -  PANCREATIC DUCTAL ADENOCARCINOMA.    Laboratory Data:    Neuroendocrine Labs Latest Ref Rng & Units 7/2/2020 6/9/2020   CA 19-9 2.0 - 40.0 U/mL 3306.0 (H)    TSH 0.400 -  4.000 uIU/mL  1.453   WBC 3.90 - 12.70 K/uL 5.00 5.97   HGB 12.0 - 16.0 g/dL 11.8 (L) 11.4 (L)   HCT 37.0 - 48.5 % 37.5 36.7 (L)   PLATLETS 150 - 350 K/uL 313 228   GLUCOSE 70 - 110 mg/dL 112 (H) 93   BUN 8 - 23 mg/dL 10 12   CREATININE 0.5 - 1.4 mg/dL 0.8 0.8    - 145 mmol/L 143 142   K 3.5 - 5.1 mmol/L 4.2 3.8   CHLORIDE 95 - 110 mmol/L 111 (H) 105   CO2 23 - 29 mmol/L 27 25   CALCIUM 8.7 - 10.5 mg/dL 9.5 9.5   PROTEIN, TOTAL 6.0 - 8.4 g/dL 7.5 7.8   ALBUMIN 3.5 - 5.2 g/dL 3.4 (L) 3.7   URIC ACID 2.4 - 5.7 mg/dL     TOTAL BILIRUBIN 0.1 - 1.0 mg/dL 0.3 0.3   ALK PHOSPHATASE 55 - 135 U/L 88 89   SGOT (AST) 10 - 40 U/L 33 17   SGPT (ALT) 10 - 44 U/L 9 (L) 13   TRIGLYCERIDES 30 - 150 mg/dL  82   CHOLERSTEROL 120 - 199 mg/dL  137   HDL 40 - 75 mg/dL  45   LDL 63.0 - 159.0 mg/dL  75.6   24 HR CREATININE CLEARANCE 15.0 - 325.0 mg/dL  45.9       Scans:   CT abd/pelvis-w contrast   pending      NM PET CT Routine Skull to Mid Thigh -7/7/20  Narrative: EXAMINATION:  NM PET CT ROUTINE    CLINICAL HISTORY:  Neoplasm: pancreas, recurrence, suspected/known; Neoplasm of uncertain behavior of other specified sites    TECHNIQUE:  11.7 mCi of F18-FDG was administered intravenously in the right antecubital fossa.  After an approximately 60 min distribution time, PET/CT images were acquired from the skull base to the midthigh.  Transmission images were acquired to correct for attenuation using a whole body low-dose CT scan without contrast with the arms positioned above the head. Glycemia at the time of injection was 101 mg/dL.    COMPARISON:  None    FINDINGS:  Quality of the study: Adequate.    In the head and neck, there are no hypermetabolic lesions worrisome for malignancy. There are no hypermetabolic mucosal lesions, and there are no pathologically enlarged or hypermetabolic lymph nodes.    In the chest, there are no hypermetabolic lesions worrisome for malignancy.  There are no concerning pulmonary nodules or masses, and  there are no pathologically enlarged or hypermetabolic lymph nodes.    In the abdomen and pelvis, there is a focus of increased radiotracer uptake within pancreatic body without definite CT correlate on limited noncontrast images. Lesion demonstrates SUV max of 6.7 (axial fused image 91).  No obvious pancreatic duct dilatation or pancreatic tail atrophy.  There is physiologic tracer distribution within the abdominal organs and excretion into the genitourinary system.  Extensive colonic diverticulosis with calcification adjacent to the sigmoid colon, no active inflammatory changes to suggest acute diverticulitis.    In the bones, there are no hypermetabolic lesions worrisome for malignancy.  Impression: Hypermetabolic focus within the pancreatic body compatible with reported primary pancreatic neoplasm.      No definite CT correlate on noncontrast images. No evidence of distant metastasis.      CT ABDOMEN AND PELVIS WITH CONTRAST-5/19/19    HISTORY: Epigastric pain    CMS MANDATED QUALITY DATA - CT RADIATION   436    All CT scans at this facility utilize dose modulation, iterative reconstruction, and/or weight based dosing when appropriate to reduce radiation dose to as low as reasonably achievable    IMPRESSION:   FINDINGS:     Post infusion images were obtained from the lung bases to the pubic symphysis. 100 cc nonionic contrast was administered for the examination.    6 mm noncalcified nodule is noted at the lateral right lung base. Left lung base is unremarkable.    The liver has a normal appearance. The gallbladder and biliary tree are unremarkable. The spleen is normal. There is no evidence of pancreatic mass. Pancreatic duct is upper normal in caliber. The adrenal glands and kidneys are normal in appearance. The abdominal aorta is normal in caliber.    Note is made of mild wall thickening of the distal ileum extending to the ileocecal valve, with mild surrounding mesenteric edema. Findings are consistent with  nonspecific acute enteritis, likely infectious or inflammatory. Multiple colonic diverticula are noted without diverticulitis.    Images of the pelvis demonstrate innumerable sigmoid diverticula without diverticulitis. There is a small amount of free fluid. Urinary bladder is unremarkable.    IMPRESSION:  1. Mild wall thickening of a moderate length of distal ileum extending to the ileocecal valve, with surrounding mild mesenteric edema. Findings are compatible with nonspecific enteritis, likely infectious or inflammatory in nature.  2. Multiple colonic diverticula without diverticulitis.  3. 6 mm noncalcified nodule at the lateral right lung base, indeterminate. In the absence of prior studies which could confirm stability, CT follow-up in 3-6 months could be utilized.  4. Small amount of simple appearing pelvic free fluid.      Impression:  Adenocarcinoma pancreas.   need contrast CT to assess vessel proximity  Plan for distal pancreatectomy.  Patient is a good candidate for ICG green study and tissue/blood biobank study.  His scans revealed tumor is unresectable due to vascular invasion she will be a candidate for cliff knife ablation.  Suspect she will be unacceptable resection candidate.  Patient and daughter had many questions.  Went over anatomy of the pancreas and planned operation    Plan:        triple phase CT pancreas protocol / assess vessels  Pneumovax Prevnar and meningeal vaccines.  Tentative distal pancreatectomy for 07/30/2020  Risks/benefits explained and accepted.  All questions answered.  Research consents obtained.             GILMA Ellis MD, FACS  Professor of Surgery, [facility]  Neuroendocrine Surgery, Hepatic/Pancreatic & General Surgery  [address], [address]  [place]  [address]  ph. [phone]; [phone]  fax. [phone]

## 2020-07-14 ENCOUNTER — OFFICE VISIT (OUTPATIENT)
Dept: NEUROLOGY | Facility: HOSPITAL | Age: 70
End: 2020-07-14
Attending: SURGERY
Payer: MEDICARE

## 2020-07-14 ENCOUNTER — HOSPITAL ENCOUNTER (OUTPATIENT)
Dept: RADIOLOGY | Facility: HOSPITAL | Age: 70
Discharge: HOME | End: 2020-07-14
Attending: SURGERY
Payer: MEDICARE

## 2020-07-14 ENCOUNTER — TELEPHONE (OUTPATIENT)
Dept: NEUROLOGY | Facility: HOSPITAL | Age: 70
End: 2020-07-14
Payer: MEDICAID

## 2020-07-14 VITALS
WEIGHT: 165.44 LBS | BODY MASS INDEX: 30.44 KG/M2 | HEIGHT: 62 IN | HEART RATE: 77 BPM | SYSTOLIC BLOOD PRESSURE: 143 MMHG | DIASTOLIC BLOOD PRESSURE: 66 MMHG

## 2020-07-14 DIAGNOSIS — C25.9 PRIMARY ADENOCARCINOMA OF PANCREAS: ICD-10-CM

## 2020-07-14 DIAGNOSIS — Z01.818 PREOP EXAMINATION: Primary | ICD-10-CM

## 2020-07-14 DIAGNOSIS — C25.9 PRIMARY ADENOCARCINOMA OF PANCREAS: Primary | ICD-10-CM

## 2020-07-14 PROCEDURE — 74177 CT ABDOMEN PELVIS WITH CONTRAST: ICD-10-PCS | Mod: 26,HCNC,, | Performed by: RADIOLOGY

## 2020-07-14 PROCEDURE — 74177 CT ABD & PELVIS W/CONTRAST: CPT | Mod: 26,HCNC,, | Performed by: RADIOLOGY

## 2020-07-14 PROCEDURE — 74177 CT ABD & PELVIS W/CONTRAST: CPT | Mod: TC,HCNC

## 2020-07-14 PROCEDURE — 99213 OFFICE O/P EST LOW 20 MIN: CPT | Mod: HCNC,25 | Performed by: SURGERY

## 2020-07-14 PROCEDURE — 25500020 PHARM REV CODE 255: Mod: HCNC | Performed by: SURGERY

## 2020-07-14 RX ADMIN — IOHEXOL 75 ML: 350 INJECTION, SOLUTION INTRAVENOUS at 02:07

## 2020-07-14 NOTE — PATIENT INSTRUCTIONS
prevnar and menveo vaccines to be administered by [facility]  Covid screening scheduled for   7/27/20  Pre- admit testing appt TBD                Patient Instructions    Name:   Zohra Paulino          Take a Hibiclens shower twice a day for 3 days prior to surgery, including the morning of surgery.   Gargle with Listerine twice a day for 2 days prior to surgery, including the morning of surgery.      ____7/14/20_____________     Appointment with dr. Ellis    Pre [place] for Hospital Admission - Go to 1st floor of the hospital-admitting desk. You will do paper work, get blood drawn,  get x-rays and see Anesthesia at this time.     _____7/29/20____________   CLEAR LIQUIDS all day   Start bowel prep  Ducolax Laxative tablets - 2 tablets at 12 noon  Magnesium Citrate - 1 bottle at 2 pm   Take antibiotics as prescribed   Do not eat or drink anything after midnight.                   _____7/30/20_____________   Hospital Admission for surgery.  Report to 2nd floor, Same Day Surgery desk at _7am_______   Surgery is scheduled for __9am_______        [facility] [address] [place]  [address]  [phone]      INFORMATION REGARDING YOUR PROCEDURE      We look forward to serving you and your family and appreciate that you have chosen [facility] for your healthcare needs. Before, during, and after your surgery, you will be cared for by skilled medical professionals. Our surgeons, anesthesiologists, nurses,  and other healthcare professionals will work with you and your family to ensure a safe, smooth and comfortable surgery and recovery.    In order to best meet your pre-admission needs, your surgeon or ordering physicians office will contact our Scheduling Office at [phone] and schedule a Pre-Procedure Appointment.  This should preferably be done 72 hours or greater before your scheduled procedure date.     During your pre-procedure visit your  insurance will be verified for your procedure. You will meet with a Registered Nurse and an Anesthesiologist or Nurse Anesthetist. If tests are required, they will be performed during your visit. Please allow about one and a half hours for this visit.      You will need to bring the following information or items with you to your Pre-Procedure Appointment:    1.  Picture Identification  2.  Insurance Card  3.  Current list of medications to include name and dosage  4.  List of allergies  5.  Orders and any other forms your doctor has given to you  6.  Copies of lab results performed at other facilities. This may include blood work, EKGs or chest xrays.   These results can be faxed to [phone].    The Pre-Op Center Registration Desk is located on the first floor of the [facility] ([address] [place] [address]) in the [facility].  The Pre-Operative Center is located on the second floor of the hospital. Someone will walk you from the registration area to the Pre-Operative Center.    Free parking is located in the front of the hospital and medical office building and is easily accessed from [address] and [address]. When you arrive, please check in at the main information desk of the hospital.    Please call Outpatient Surgery at [phone] after 12:00pm on the day before your procedure for your arrival time and updated procedure time.      Pre-Op Bathing Instructions    Before surgery, you can play an important role in your own health.    Because skin is not sterile, we need to be sure that your skin is as free of germs as possible. By following the instructions below, you can reduce the number of germs on your skin before surgery.    IMPORTANT: You will need to shower with a special soap called Hibiclens*, available at any pharmacy.  If you are allergic to Chlorhexidine (the antiseptic in Hibiclens), use an antibacterial soap such as Dial Soap for your preoperative shower.  You will shower with  Hibiclens both the night before your surgery and the morning of your surgery.  Do not use Hibiclens on the head, face or genitals to avoid injury to those areas.    STEP #1: THE NIGHT BEFORE YOUR SURGERY     1. Do not shave the area of your body where your surgery will be performed.  2. Shower and wash your hair and body as usual with your normal soap and shampoo.  3. Rinse your hair and body thoroughly after you shower to remove all soap residue.  4. With your hand, apply one packet of Hibiclens soap to the surgical site.   5. Wash the site gently for five (5) minutes. Do not scrub your skin too hard.   6. Do not wash with your regular soap after Hibiclens is used.  7. Rinse your body thoroughly.  8. Pat yourself dry with a clean, soft towel.  9. Do not use lotion, cream, or powder.  10. Wear clean clothes.    STEP #2: THE MORNING OF YOUR SURGERY     1. Repeat Step #1.    * Not to be used by people allergic to Chlorhexidine.

## 2020-07-15 ENCOUNTER — RESEARCH ENCOUNTER (OUTPATIENT)
Dept: RESEARCH | Facility: HOSPITAL | Age: 70
End: 2020-07-15

## 2020-07-15 NOTE — PROGRESS NOTES
Pt was approached in [facility] regarding participation in IRB protocol #2015.101.C, 3/3/2020 study. Pt was agreeable.     The Informed Consent Form (ICF) was reviewed with pt. The discussion included:   - participation is voluntary  - pt can change her mind about participating  - pt was informed that participation in this study would not preclude her from participating in any other research if offered  - specimens may be used by [facility] researchers, community researchers or research companies  - specimens collected include only those discussed with the patient at the time of consent and are indicated on the ICF   - specimens may be used for DNA, RNA or protein studies investigating biomarkers for diagnostic, prognostic or treatment purposes  - blood specimen will be collected from her after routine collections have been conducted  - excess  tissue will be collected from Pathology after their approval  - participation in Biobank study will not change amount of tissue removed  - all specimens released to researchers will be stripped of identifiers  - no personal medical information will be released to any parties outside of this research study  - there will be no other physical risks outside of those involved in standard of care procedure     Dr. Ellis approved of patient's participation in the Biobank study.  Pt did not have any questions. Pt willingly and independently signed ICF.    A copy of signed ICF was given to pt with instructions to call with any questions that may arise or if she should change her mind regarding participation in Biobank study.

## 2020-07-17 ENCOUNTER — IMMUNIZATION (OUTPATIENT)
Dept: PHARMACY | Facility: CLINIC | Age: 70
End: 2020-07-17
Payer: MEDICARE

## 2020-07-21 ENCOUNTER — IMMUNIZATION (OUTPATIENT)
Dept: PHARMACY | Facility: CLINIC | Age: 70
End: 2020-07-21
Payer: MEDICARE

## 2020-07-21 ENCOUNTER — ANESTHESIA EVENT (OUTPATIENT)
Dept: SURGERY | Facility: HOSPITAL | Age: 70
DRG: 407 | End: 2020-07-21
Payer: MEDICARE

## 2020-07-21 DIAGNOSIS — C25.9 ADENOCARCINOMA OF PANCREAS: Primary | ICD-10-CM

## 2020-07-21 RX ORDER — ENOXAPARIN SODIUM 100 MG/ML
30 INJECTION SUBCUTANEOUS
Status: DISCONTINUED | OUTPATIENT
Start: 2020-07-21 | End: 2020-07-29 | Stop reason: SDUPTHER

## 2020-07-21 RX ORDER — ONDANSETRON 2 MG/ML
8 INJECTION INTRAMUSCULAR; INTRAVENOUS
Status: DISCONTINUED | OUTPATIENT
Start: 2020-07-21 | End: 2020-07-29 | Stop reason: SDUPTHER

## 2020-07-21 RX ORDER — ACETAMINOPHEN 500 MG
1000 TABLET ORAL
Status: DISCONTINUED | OUTPATIENT
Start: 2020-07-21 | End: 2020-07-21 | Stop reason: SDUPTHER

## 2020-07-21 RX ORDER — KETOROLAC TROMETHAMINE 15 MG/ML
15 INJECTION, SOLUTION INTRAMUSCULAR; INTRAVENOUS
Status: DISCONTINUED | OUTPATIENT
Start: 2020-07-21 | End: 2020-07-24 | Stop reason: SDUPTHER

## 2020-07-21 RX ORDER — PREGABALIN 75 MG/1
75 CAPSULE ORAL
Status: DISCONTINUED | OUTPATIENT
Start: 2020-07-21 | End: 2020-07-29 | Stop reason: SDUPTHER

## 2020-07-21 RX ORDER — FAMOTIDINE 10 MG/ML
20 INJECTION INTRAVENOUS
Status: DISCONTINUED | OUTPATIENT
Start: 2020-07-21 | End: 2020-07-29 | Stop reason: SDUPTHER

## 2020-07-23 ENCOUNTER — TELEPHONE (OUTPATIENT)
Dept: HEMATOLOGY/ONCOLOGY | Facility: CLINIC | Age: 70
End: 2020-07-23

## 2020-07-23 NOTE — TELEPHONE ENCOUNTER
----- Message from Cortney Marvin sent at 7/23/2020  3:26 PM CDT -----  Contact: [phone]/patient  Zohra Washington calling to speak with Dr Ellis nurse  (personal)  Thank you

## 2020-07-23 NOTE — TELEPHONE ENCOUNTER
Spoke to pt about fmla forms. informed her that forms would be completed and faxed in the morning

## 2020-07-24 ENCOUNTER — HOSPITAL ENCOUNTER (OUTPATIENT)
Dept: PREADMISSION TESTING | Facility: HOSPITAL | Age: 70
Discharge: HOME | DRG: 407 | End: 2020-07-24
Attending: SURGERY
Payer: MEDICARE

## 2020-07-24 ENCOUNTER — CLINICAL SUPPORT (OUTPATIENT)
Dept: LAB | Facility: HOSPITAL | Age: 70
DRG: 407 | End: 2020-07-24
Attending: SURGERY
Payer: MEDICARE

## 2020-07-24 ENCOUNTER — LAB VISIT (OUTPATIENT)
Dept: FAMILY MEDICINE | Facility: CLINIC | Age: 70
End: 2020-07-24
Payer: MEDICARE

## 2020-07-24 VITALS
RESPIRATION RATE: 18 BRPM | DIASTOLIC BLOOD PRESSURE: 69 MMHG | HEART RATE: 78 BPM | HEIGHT: 62 IN | SYSTOLIC BLOOD PRESSURE: 146 MMHG | OXYGEN SATURATION: 98 % | WEIGHT: 166.31 LBS | TEMPERATURE: 98 F | BODY MASS INDEX: 30.61 KG/M2

## 2020-07-24 DIAGNOSIS — C25.9 PRIMARY ADENOCARCINOMA OF PANCREAS: ICD-10-CM

## 2020-07-24 DIAGNOSIS — Z01.818 PREOP EXAMINATION: Primary | ICD-10-CM

## 2020-07-24 DIAGNOSIS — Z01.818 PREOP EXAMINATION: ICD-10-CM

## 2020-07-24 PROCEDURE — U0003 INFECTIOUS AGENT DETECTION BY NUCLEIC ACID (DNA OR RNA); SEVERE ACUTE RESPIRATORY SYNDROME CORONAVIRUS 2 (SARS-COV-2) (CORONAVIRUS DISEASE [COVID-19]), AMPLIFIED PROBE TECHNIQUE, MAKING USE OF HIGH THROUGHPUT TECHNOLOGIES AS DESCRIBED BY CMS-2020-01-R: HCPCS | Mod: HCNC

## 2020-07-24 PROCEDURE — 93005 ELECTROCARDIOGRAM TRACING: CPT | Mod: HCNC

## 2020-07-24 RX ORDER — SODIUM CHLORIDE, SODIUM LACTATE, POTASSIUM CHLORIDE, CALCIUM CHLORIDE 600; 310; 30; 20 MG/100ML; MG/100ML; MG/100ML; MG/100ML
INJECTION, SOLUTION INTRAVENOUS CONTINUOUS
Status: CANCELLED | OUTPATIENT
Start: 2020-07-24

## 2020-07-24 RX ORDER — LIDOCAINE HYDROCHLORIDE 10 MG/ML
1 INJECTION, SOLUTION EPIDURAL; INFILTRATION; INTRACAUDAL; PERINEURAL ONCE
Status: CANCELLED | OUTPATIENT
Start: 2020-07-24 | End: 2020-07-24

## 2020-07-24 NOTE — DISCHARGE INSTRUCTIONS
Your surgery is scheduled for 7/27/2020    Please report to [facility] on the 1st Floor at 05:30 a.m.    THIS TIME IS SUBJECT TO CHANGE.  YOU WILL RECEIVE A PHONE CALL THE DAY BEFORE SURGERY BY 3:30 PM TO CONFIRM YOUR TIME OF ARRIVAL.  IF YOU HAVE NOT RECEIVED A PHONE CALL BY 3:30 PM THE DAY BEFORE YOUR SURGERY PLEASE CALL [phone]     INSTRUCTIONS IMPORTANT!!!  ¨ Do not eat or drink after 12 midnight-including water. OK to brush teeth, no   gum, candy or mints!    ¨ Take only these medicines with a small swallow of water-morning of surgery: follow  Office instructions      ____  Proceed to [facility] on  for additional testing.        ____  Do not wear makeup, including mascara.  ____  No powder, lotions or creams to surgical area.  ____  Please remove all jewelry, including piercings and leave at home.  ____  No money or valuables needed. Please leave at home.  ____  Please bring any documents given by your doctor.  ____  If going home the same day, arrange for a ride home. You will not be able to             drive if Anesthesia was used.  ____  Wear loose fitting clothing. Allow for dressings, bandages.  ____  Stop Aspirin, Ibuprofen, Motrin and Aleve at least 3-5 days before surgery, unless otherwise instructed by your doctor, or the nurse.   You MAY use Tylenol/acetaminophen until day of surgery.  ____  Wash the surgical area with Hibiclens the night before surgery, and again the             morning of surgery.  Be sure to rinse hibiclens off completely (if instructed by   nurse).  ____  If you take diabetic medication, do not take am of surgery unless instructed by Doctor.  ____  Call MD for temperature above 101 degrees or any other signs of infection such as Urinary (bladder) infection, Upper respiratory infection, skin boils, etc.  ____ Stop taking any Fish Oil supplement or any Vitamins that contain Vitamin E at least 5 days prior to surgery.  ____ Do Not wear your contact lenses the  day of your procedure.  You may wear your glasses.      ____Do not shave surgical site for 3 days prior to surgery.  ____ Practice Good hand washing before, during, and after procedure.      I have read or had read and explained to me, and understand the above information.  Additional comments or instructions:  For additional questions call [phone]          Anesthesia: General Anesthesia     You are watched continuously during your procedure by your anesthesia provider.     Youre due to have surgery. During surgery, youll be given medicine called anesthesia or anesthetic. This will keep you comfortable and pain-free. Your anesthesia provider will use general anesthesia.  What is general anesthesia?  General anesthesia puts you into a state like deep sleep. It goes into the bloodstream (IV anesthetics), into the lungs (gas anesthetics), or both. You feel nothing during the procedure. You will not remember it. During the procedure, the anesthesia provider monitors you continuously. He or she checks your heart rate and rhythm, blood pressure, breathing, and blood oxygen.  · IV anesthetics. IV anesthetics are given through an IV line in your arm. Theyre often given first. This is so you are asleep before a gas anesthetic is started. Some kinds of IV anesthetics relieve pain. Others relax you. Your doctor will decide which kind is best in your case.  · Gas anesthetics. Gas anesthetics are breathed into the lungs. They are often used to keep you asleep. They can be given through a facemask or a tube placed in your larynx or trachea (breathing tube).  ¨ If you have a facemask, your anesthesia provider will most likely place it over your nose and mouth while youre still awake. Youll breathe oxygen through the mask as your IV anesthetic is started. Gas anesthetic may be added through the mask.  ¨ If you have a tube in the larynx or trachea, it will be inserted into your throat after youre asleep.  Anesthesia tools and  medicines  You will likely have:  · IV anesthetics. These are put into an IV line into your bloodstream.  · Gas anesthetics. You breathe these anesthetics into your lungs, where they pass into your bloodstream.  · Pulse oximeter. This is a small clip that is attached to the end of your finger. This measures your blood oxygen level.  · Electrocardiography leads (electrodes). These are small sticky pads that are placed on your chest. They record your heart rate and rhythm.  · Blood pressure cuff. This reads your blood pressure.  Risks and possible complications  General anesthesia has some risks. These include:  · Breathing problems  · Nausea and vomiting  · Sore throat or hoarseness (usually temporary)  · Allergic reaction to the anesthetic  · Irregular heartbeat (rare)  · Cardiac arrest (rare)   Anesthesia safety  · Follow all instructions you are given for how long not to eat or drink before your procedure.  · Be sure your doctor knows what medicines and drugs you take. This includes over-the-counter medicines, herbs, supplements, alcohol or other drugs. You will be asked when those were last taken.  · Have an adult family member or friend drive you home after the procedure.  · For the first 24 hours after your surgery:  ¨ Do not drive or use heavy equipment.  ¨ Do not make important decisions or sign legal documents. If important decisions or signing legal documents is necessary during the first 24 hours after surgery, have a trusted family member or spouse act on your behalf.  ¨ Avoid alcohol.  ¨ Have a responsible adult stay with you. He or she can watch for problems and help keep you safe.  Date Last Reviewed: 12/1/2016 © 2000-2017 [employer]. [address] [place] [address], [place] [address]. All rights reserved. This information is not intended as a substitute for professional medical care. Always follow your healthcare professional's instructions.

## 2020-07-24 NOTE — ANESTHESIA PREPROCEDURE EVALUATION
07/24/2020  Zohra Paulino is a 70 y.o., female scheduled for distal subtotal pancreatectomy on 7/27/2020.    Past Medical History:   Diagnosis Date    Bronchitis     Fibroids     Osteopetrosis     Uterine fibroid      Past Surgical History:   Procedure Laterality Date    CERVICAL BIOPSY  W/ LOOP ELECTRODE EXCISION      ck  2004    HYSTERECTOMY      fibroids    SD REMOVAL OF OVARY/TUBE(S)      TUBAL LIGATION         Anesthesia Evaluation    I have reviewed the Patient Summary Reports.    I have reviewed the Nursing Notes. I have reviewed the NPO Status.   I have reviewed the Medications.     Review of Systems  Anesthesia Hx:  No problems with previous Anesthesia  Denies Family Hx of Anesthesia complications.    Social:  Non-Smoker, Social Alcohol Use    Hematology/Oncology:  Hematology Normal      Current/Recent Cancer. (Pancreatic Adenocarcinoma)   Cardiovascular:  Cardiovascular Normal Exercise tolerance: good   Denies Angina.        Pulmonary:  Pulmonary Normal    Renal/:  Renal/ Normal     Hepatic/GI:   Pancreatic CA   Neurological:  Neurology Normal    Endocrine:  Endocrine Normal        Physical Exam  General:  Obesity    Airway/Jaw/Neck:  Airway Findings: Mouth Opening: Normal Tongue: Normal  General Airway Assessment: Adult  Mallampati: I  TM Distance: Normal, at least 6 cm       Chest/Lungs:  Chest/Lungs Findings: Clear to auscultation, Normal Respiratory Rate     Heart/Vascular:  Heart Findings: Rate: Normal  Rhythm: Regular Rhythm  Sounds: Normal        Mental Status:  Mental Status Findings:  Cooperative, Alert and Oriented         Anesthesia Plan  Type of Anesthesia, risks & benefits discussed:  Anesthesia Type:  general  Patient's Preference:   Intra-op Monitoring Plan: standard ASA monitors  Intra-op Monitoring Plan Comments:   Post Op Pain Control Plan: multimodal  analgesia  Post Op Pain Control Plan Comments:   Induction:   IV  Beta Blocker:  Patient is not currently on a Beta-Blocker (No further documentation required).       Informed Consent:    ASA Score: 2     Day of Surgery Review of History & Physical: I have interviewed and examined the patient. I have reviewed the patient's H&P dated:        Anesthesia Plan Notes: Anesthesia consent to be signed prior to procedure on 7/27/2020          Ready For Surgery From Anesthesia Perspective.

## 2020-07-25 LAB — SARS-COV-2 RNA RESP QL NAA+PROBE: NOT DETECTED

## 2020-07-27 ENCOUNTER — HOSPITAL ENCOUNTER (INPATIENT)
Facility: HOSPITAL | Age: 70
LOS: 8 days | Discharge: HOME | DRG: 407 | End: 2020-08-04
Attending: SURGERY | Admitting: SURGERY
Payer: MEDICARE

## 2020-07-27 ENCOUNTER — ANESTHESIA (OUTPATIENT)
Dept: SURGERY | Facility: HOSPITAL | Age: 70
DRG: 407 | End: 2020-07-27
Payer: MEDICARE

## 2020-07-27 DIAGNOSIS — R33.9 URINARY RETENTION: ICD-10-CM

## 2020-07-27 DIAGNOSIS — C25.1 MALIGNANT NEOPLASM OF BODY OF PANCREAS: ICD-10-CM

## 2020-07-27 DIAGNOSIS — M85.851 OSTEOPENIA OF NECKS OF BOTH FEMURS: ICD-10-CM

## 2020-07-27 DIAGNOSIS — C25.9 PANCREAS CANCER: ICD-10-CM

## 2020-07-27 DIAGNOSIS — Z78.9 ON SUPPLEMENTAL OXYGEN BY NASAL CANNULA: ICD-10-CM

## 2020-07-27 DIAGNOSIS — Z91.89 AT RISK FOR MALNUTRITION: ICD-10-CM

## 2020-07-27 DIAGNOSIS — M85.852 OSTEOPENIA OF NECKS OF BOTH FEMURS: ICD-10-CM

## 2020-07-27 DIAGNOSIS — C25.9 PRIMARY ADENOCARCINOMA OF PANCREAS: Primary | ICD-10-CM

## 2020-07-27 DIAGNOSIS — R52 PAIN MANAGED USING PATIENT-CONTROLLED ANALGESIA (PCA): ICD-10-CM

## 2020-07-27 DIAGNOSIS — E87.70 LOCALIZED EDEMA DUE TO FLUID OVERLOAD: ICD-10-CM

## 2020-07-27 LAB
ANION GAP SERPL CALC-SCNC: 9 MMOL/L (ref 8–16)
BASOPHILS # BLD AUTO: 0.03 K/UL (ref 0–0.2)
BASOPHILS NFR BLD: 0.3 % (ref 0–1.9)
BUN SERPL-MCNC: 7 MG/DL (ref 8–23)
CALCIUM SERPL-MCNC: 7.9 MG/DL (ref 8.7–10.5)
CHLORIDE SERPL-SCNC: 110 MMOL/L (ref 95–110)
CO2 SERPL-SCNC: 21 MMOL/L (ref 23–29)
CREAT SERPL-MCNC: 0.7 MG/DL (ref 0.5–1.4)
DIFFERENTIAL METHOD: ABNORMAL
EOSINOPHIL # BLD AUTO: 0.1 K/UL (ref 0–0.5)
EOSINOPHIL NFR BLD: 0.9 % (ref 0–8)
ERYTHROCYTE [DISTWIDTH] IN BLOOD BY AUTOMATED COUNT: 14.8 % (ref 11.5–14.5)
EST. GFR  (AFRICAN AMERICAN): >60 ML/MIN/1.73 M^2
EST. GFR  (NON AFRICAN AMERICAN): >60 ML/MIN/1.73 M^2
GLUCOSE SERPL-MCNC: 139 MG/DL (ref 70–110)
GLUCOSE SERPL-MCNC: 170 MG/DL (ref 70–110)
GLUCOSE SERPL-MCNC: 94 MG/DL (ref 70–110)
HCO3 UR-SCNC: 18.4 MMOL/L (ref 24–28)
HCO3 UR-SCNC: 22.4 MMOL/L (ref 24–28)
HCT VFR BLD AUTO: 34 % (ref 37–48.5)
HCT VFR BLD CALC: 26 %PCV (ref 36–54)
HCT VFR BLD CALC: 35 %PCV (ref 36–54)
HGB BLD-MCNC: 11 G/DL (ref 12–16)
HGB BLD-MCNC: 12 G/DL
HGB BLD-MCNC: 9 G/DL
IMM GRANULOCYTES # BLD AUTO: 0.07 K/UL (ref 0–0.04)
IMM GRANULOCYTES NFR BLD AUTO: 0.7 % (ref 0–0.5)
LYMPHOCYTES # BLD AUTO: 1.8 K/UL (ref 1–4.8)
LYMPHOCYTES NFR BLD: 16.7 % (ref 18–48)
MCH RBC QN AUTO: 28.6 PG (ref 27–31)
MCHC RBC AUTO-ENTMCNC: 32.4 G/DL (ref 32–36)
MCV RBC AUTO: 88 FL (ref 82–98)
MONOCYTES # BLD AUTO: 0.7 K/UL (ref 0.3–1)
MONOCYTES NFR BLD: 6.9 % (ref 4–15)
NEUTROPHILS # BLD AUTO: 7.9 K/UL (ref 1.8–7.7)
NEUTROPHILS NFR BLD: 74.5 % (ref 38–73)
NRBC BLD-RTO: 0 /100 WBC
PCO2 BLDA: 35 MMHG (ref 35–45)
PCO2 BLDA: 41 MMHG (ref 35–45)
PH SMN: 7.33 [PH] (ref 7.35–7.45)
PH SMN: 7.34 [PH] (ref 7.35–7.45)
PLATELET # BLD AUTO: 240 K/UL (ref 150–350)
PMV BLD AUTO: 11.5 FL (ref 9.2–12.9)
PO2 BLDA: 352 MMHG (ref 80–100)
PO2 BLDA: 374 MMHG (ref 80–100)
POC BE: -3 MMOL/L
POC BE: -8 MMOL/L
POC IONIZED CALCIUM: 1.05 MMOL/L (ref 1.06–1.42)
POC IONIZED CALCIUM: 1.18 MMOL/L (ref 1.06–1.42)
POC SATURATED O2: 100 % (ref 95–100)
POC SATURATED O2: 100 % (ref 95–100)
POC TCO2: 19 MMOL/L (ref 23–27)
POC TCO2: 24 MMOL/L (ref 23–27)
POTASSIUM BLD-SCNC: 3.4 MMOL/L (ref 3.5–5.1)
POTASSIUM BLD-SCNC: 3.9 MMOL/L (ref 3.5–5.1)
POTASSIUM SERPL-SCNC: 3.8 MMOL/L (ref 3.5–5.1)
RBC # BLD AUTO: 3.85 M/UL (ref 4–5.4)
SAMPLE: ABNORMAL
SAMPLE: ABNORMAL
SODIUM BLD-SCNC: 140 MMOL/L (ref 136–145)
SODIUM BLD-SCNC: 142 MMOL/L (ref 136–145)
SODIUM SERPL-SCNC: 140 MMOL/L (ref 136–145)
WBC # BLD AUTO: 10.65 K/UL (ref 3.9–12.7)

## 2020-07-27 PROCEDURE — 27200703 HC ULTRASOUND NDL GUIDE: Mod: HCNC | Performed by: ANESTHESIOLOGY

## 2020-07-27 PROCEDURE — 88309 PR  SURG PATH,LEVEL VI: ICD-10-PCS | Mod: 26,HCNC,, | Performed by: PATHOLOGY

## 2020-07-27 PROCEDURE — 25000003 PHARM REV CODE 250: Mod: HCNC | Performed by: ANESTHESIOLOGY

## 2020-07-27 PROCEDURE — 27200664 HC NERVE BLOCK COMPLETE KIT: Mod: HCNC | Performed by: ANESTHESIOLOGY

## 2020-07-27 PROCEDURE — 99900035 HC TECH TIME PER 15 MIN (STAT): Mod: HCNC

## 2020-07-27 PROCEDURE — 37000008 HC ANESTHESIA 1ST 15 MINUTES: Mod: HCNC | Performed by: SURGERY

## 2020-07-27 PROCEDURE — V2790 AMNIOTIC MEMBRANE: HCPCS | Mod: HCNC | Performed by: SURGERY

## 2020-07-27 PROCEDURE — 88305 TISSUE EXAM BY PATHOLOGIST: ICD-10-PCS | Mod: 26,HCNC,, | Performed by: PATHOLOGY

## 2020-07-27 PROCEDURE — 63600175 PHARM REV CODE 636 W HCPCS: Mod: HCNC | Performed by: SURGERY

## 2020-07-27 PROCEDURE — 20000000 HC ICU ROOM: Mod: HCNC

## 2020-07-27 PROCEDURE — 88305 TISSUE EXAM BY PATHOLOGIST: CPT | Mod: 26,HCNC,, | Performed by: PATHOLOGY

## 2020-07-27 PROCEDURE — A4216 STERILE WATER/SALINE, 10 ML: HCPCS | Mod: HCNC | Performed by: ANESTHESIOLOGY

## 2020-07-27 PROCEDURE — 88331 PATH CONSLTJ SURG 1 BLK 1SPC: CPT | Mod: HCNC | Performed by: PATHOLOGY

## 2020-07-27 PROCEDURE — 88342 IMHCHEM/IMCYTCHM 1ST ANTB: CPT | Mod: 26,HCNC,, | Performed by: PATHOLOGY

## 2020-07-27 PROCEDURE — 63600175 PHARM REV CODE 636 W HCPCS: Mod: HCNC | Performed by: NURSE PRACTITIONER

## 2020-07-27 PROCEDURE — P9045 ALBUMIN (HUMAN), 5%, 250 ML: HCPCS | Mod: JG,HCNC | Performed by: NURSE ANESTHETIST, CERTIFIED REGISTERED

## 2020-07-27 PROCEDURE — 25000003 PHARM REV CODE 250: Mod: HCNC | Performed by: STUDENT IN AN ORGANIZED HEALTH CARE EDUCATION/TRAINING PROGRAM

## 2020-07-27 PROCEDURE — C9290 INJ, BUPIVACAINE LIPOSOME: HCPCS | Mod: HCNC | Performed by: ANESTHESIOLOGY

## 2020-07-27 PROCEDURE — 36000708 HC OR TIME LEV III 1ST 15 MIN: Mod: HCNC | Performed by: SURGERY

## 2020-07-27 PROCEDURE — 37000009 HC ANESTHESIA EA ADD 15 MINS: Mod: HCNC | Performed by: SURGERY

## 2020-07-27 PROCEDURE — S0028 INJECTION, FAMOTIDINE, 20 MG: HCPCS | Mod: HCNC | Performed by: SURGERY

## 2020-07-27 PROCEDURE — 88331 PATH CONSLTJ SURG 1 BLK 1SPC: CPT | Mod: 26,HCNC,, | Performed by: PATHOLOGY

## 2020-07-27 PROCEDURE — 76942 ECHO GUIDE FOR BIOPSY: CPT | Mod: HCNC | Performed by: ANESTHESIOLOGY

## 2020-07-27 PROCEDURE — 80048 BASIC METABOLIC PNL TOTAL CA: CPT | Mod: HCNC

## 2020-07-27 PROCEDURE — 27201423 OPTIME MED/SURG SUP & DEVICES STERILE SUPPLY: Mod: HCNC | Performed by: SURGERY

## 2020-07-27 PROCEDURE — 25000003 PHARM REV CODE 250: Mod: HCNC | Performed by: NURSE ANESTHETIST, CERTIFIED REGISTERED

## 2020-07-27 PROCEDURE — 88342 IMHCHEM/IMCYTCHM 1ST ANTB: CPT | Mod: HCNC | Performed by: PATHOLOGY

## 2020-07-27 PROCEDURE — 85025 COMPLETE CBC W/AUTO DIFF WBC: CPT | Mod: HCNC

## 2020-07-27 PROCEDURE — 63600175 PHARM REV CODE 636 W HCPCS: Mod: HCNC | Performed by: NURSE ANESTHETIST, CERTIFIED REGISTERED

## 2020-07-27 PROCEDURE — 25000003 PHARM REV CODE 250: Mod: HCNC | Performed by: SURGERY

## 2020-07-27 PROCEDURE — 94761 N-INVAS EAR/PLS OXIMETRY MLT: CPT | Mod: HCNC

## 2020-07-27 PROCEDURE — 88305 TISSUE EXAM BY PATHOLOGIST: CPT | Mod: 59,HCNC | Performed by: PATHOLOGY

## 2020-07-27 PROCEDURE — 27000221 HC OXYGEN, UP TO 24 HOURS: Mod: HCNC

## 2020-07-27 PROCEDURE — 94770 HC EXHALED C02 TEST: CPT | Mod: HCNC

## 2020-07-27 PROCEDURE — 88342 CHG IMMUNOCYTOCHEMISTRY: ICD-10-PCS | Mod: 26,HCNC,, | Performed by: PATHOLOGY

## 2020-07-27 PROCEDURE — 88331 PR  PATH CONSULT IN SURG,W FRZ SEC: ICD-10-PCS | Mod: 26,HCNC,, | Performed by: PATHOLOGY

## 2020-07-27 PROCEDURE — 88309 TISSUE EXAM BY PATHOLOGIST: CPT | Mod: HCNC | Performed by: PATHOLOGY

## 2020-07-27 PROCEDURE — 36000709 HC OR TIME LEV III EA ADD 15 MIN: Mod: HCNC | Performed by: SURGERY

## 2020-07-27 PROCEDURE — C1729 CATH, DRAINAGE: HCPCS | Mod: HCNC | Performed by: SURGERY

## 2020-07-27 PROCEDURE — 63600175 PHARM REV CODE 636 W HCPCS: Mod: HCNC | Performed by: ANESTHESIOLOGY

## 2020-07-27 PROCEDURE — 88309 TISSUE EXAM BY PATHOLOGIST: CPT | Mod: 26,HCNC,, | Performed by: PATHOLOGY

## 2020-07-27 DEVICE — MEMBRANE AMNIOFIX 2X12CM: Type: IMPLANTABLE DEVICE | Site: ABDOMEN | Status: FUNCTIONAL

## 2020-07-27 RX ORDER — GLYCOPYRROLATE 0.2 MG/ML
INJECTION INTRAMUSCULAR; INTRAVENOUS
Status: DISCONTINUED | OUTPATIENT
Start: 2020-07-27 | End: 2020-07-27

## 2020-07-27 RX ORDER — NEOSTIGMINE METHYLSULFATE 1 MG/ML
INJECTION, SOLUTION INTRAVENOUS
Status: DISCONTINUED | OUTPATIENT
Start: 2020-07-27 | End: 2020-07-27

## 2020-07-27 RX ORDER — SODIUM CHLORIDE 0.9 % (FLUSH) 0.9 %
3 SYRINGE (ML) INJECTION
Status: DISCONTINUED | OUTPATIENT
Start: 2020-07-27 | End: 2020-07-27

## 2020-07-27 RX ORDER — NICARDIPINE HYDROCHLORIDE 0.2 MG/ML
1 INJECTION INTRAVENOUS CONTINUOUS
Status: DISCONTINUED | OUTPATIENT
Start: 2020-07-27 | End: 2020-07-28

## 2020-07-27 RX ORDER — HYDRALAZINE HYDROCHLORIDE 20 MG/ML
5 INJECTION INTRAMUSCULAR; INTRAVENOUS
Status: DISCONTINUED | OUTPATIENT
Start: 2020-07-27 | End: 2020-08-04 | Stop reason: HOSPADM

## 2020-07-27 RX ORDER — SODIUM CHLORIDE 9 MG/ML
INJECTION, SOLUTION INTRAVENOUS CONTINUOUS PRN
Status: DISCONTINUED | OUTPATIENT
Start: 2020-07-27 | End: 2020-07-27

## 2020-07-27 RX ORDER — DEXTROSE MONOHYDRATE, SODIUM CHLORIDE, AND POTASSIUM CHLORIDE 50; 1.49; 4.5 G/1000ML; G/1000ML; G/1000ML
INJECTION, SOLUTION INTRAVENOUS CONTINUOUS
Status: DISCONTINUED | OUTPATIENT
Start: 2020-07-27 | End: 2020-08-03

## 2020-07-27 RX ORDER — BUPIVACAINE HYDROCHLORIDE 2.5 MG/ML
INJECTION, SOLUTION EPIDURAL; INFILTRATION; INTRACAUDAL
Status: DISCONTINUED | OUTPATIENT
Start: 2020-07-27 | End: 2020-07-27 | Stop reason: HOSPADM

## 2020-07-27 RX ORDER — KETOROLAC TROMETHAMINE 30 MG/ML
10 INJECTION, SOLUTION INTRAMUSCULAR; INTRAVENOUS EVERY 6 HOURS
Status: COMPLETED | OUTPATIENT
Start: 2020-07-27 | End: 2020-07-29

## 2020-07-27 RX ORDER — ONDANSETRON 2 MG/ML
4 INJECTION INTRAMUSCULAR; INTRAVENOUS DAILY PRN
Status: DISCONTINUED | OUTPATIENT
Start: 2020-07-27 | End: 2020-07-28

## 2020-07-27 RX ORDER — SODIUM CHLORIDE, SODIUM LACTATE, POTASSIUM CHLORIDE, CALCIUM CHLORIDE 600; 310; 30; 20 MG/100ML; MG/100ML; MG/100ML; MG/100ML
INJECTION, SOLUTION INTRAVENOUS CONTINUOUS
Status: DISCONTINUED | OUTPATIENT
Start: 2020-07-27 | End: 2020-07-27

## 2020-07-27 RX ORDER — ONDANSETRON 2 MG/ML
8 INJECTION INTRAMUSCULAR; INTRAVENOUS
Status: COMPLETED | OUTPATIENT
Start: 2020-07-27 | End: 2020-07-27

## 2020-07-27 RX ORDER — BUPIVACAINE HYDROCHLORIDE 2.5 MG/ML
INJECTION, SOLUTION EPIDURAL; INFILTRATION; INTRACAUDAL
Status: DISCONTINUED | OUTPATIENT
Start: 2020-07-27 | End: 2020-07-27

## 2020-07-27 RX ORDER — NALOXONE HCL 0.4 MG/ML
0.02 VIAL (ML) INJECTION
Status: DISCONTINUED | OUTPATIENT
Start: 2020-07-27 | End: 2020-08-04 | Stop reason: HOSPADM

## 2020-07-27 RX ORDER — PREGABALIN 75 MG/1
75 CAPSULE ORAL 2 TIMES DAILY
Status: DISCONTINUED | OUTPATIENT
Start: 2020-07-27 | End: 2020-08-04 | Stop reason: HOSPADM

## 2020-07-27 RX ORDER — HYDROMORPHONE HCL IN 0.9% NACL 6 MG/30 ML
PATIENT CONTROLLED ANALGESIA SYRINGE INTRAVENOUS CONTINUOUS
Status: DISCONTINUED | OUTPATIENT
Start: 2020-07-27 | End: 2020-07-30

## 2020-07-27 RX ORDER — KETOROLAC TROMETHAMINE 30 MG/ML
15 INJECTION, SOLUTION INTRAMUSCULAR; INTRAVENOUS
Status: DISCONTINUED | OUTPATIENT
Start: 2020-07-27 | End: 2020-07-27 | Stop reason: HOSPADM

## 2020-07-27 RX ORDER — ROCURONIUM BROMIDE 10 MG/ML
INJECTION, SOLUTION INTRAVENOUS
Status: DISCONTINUED | OUTPATIENT
Start: 2020-07-27 | End: 2020-07-27

## 2020-07-27 RX ORDER — LIDOCAINE HYDROCHLORIDE 10 MG/ML
1 INJECTION, SOLUTION EPIDURAL; INFILTRATION; INTRACAUDAL; PERINEURAL ONCE
Status: DISCONTINUED | OUTPATIENT
Start: 2020-07-27 | End: 2020-07-27 | Stop reason: HOSPADM

## 2020-07-27 RX ORDER — PROPOFOL 10 MG/ML
VIAL (ML) INTRAVENOUS
Status: DISCONTINUED | OUTPATIENT
Start: 2020-07-27 | End: 2020-07-27

## 2020-07-27 RX ORDER — INDOMETHACIN 25 MG/1
CAPSULE ORAL
Status: DISCONTINUED | OUTPATIENT
Start: 2020-07-27 | End: 2020-07-27

## 2020-07-27 RX ORDER — DIPHENHYDRAMINE HYDROCHLORIDE 50 MG/ML
12.5 INJECTION INTRAMUSCULAR; INTRAVENOUS EVERY 4 HOURS PRN
Status: DISCONTINUED | OUTPATIENT
Start: 2020-07-27 | End: 2020-08-04 | Stop reason: HOSPADM

## 2020-07-27 RX ORDER — ONDANSETRON HYDROCHLORIDE 2 MG/ML
INJECTION, SOLUTION INTRAMUSCULAR; INTRAVENOUS
Status: DISCONTINUED | OUTPATIENT
Start: 2020-07-27 | End: 2020-07-27

## 2020-07-27 RX ORDER — VECURONIUM BROMIDE FOR INJECTION 1 MG/ML
INJECTION, POWDER, LYOPHILIZED, FOR SOLUTION INTRAVENOUS
Status: DISCONTINUED | OUTPATIENT
Start: 2020-07-27 | End: 2020-07-27

## 2020-07-27 RX ORDER — ENOXAPARIN SODIUM 100 MG/ML
30 INJECTION SUBCUTANEOUS
Status: COMPLETED | OUTPATIENT
Start: 2020-07-27 | End: 2020-07-27

## 2020-07-27 RX ORDER — BACITRACIN 50000 [IU]/1
INJECTION, POWDER, FOR SOLUTION INTRAMUSCULAR
Status: DISCONTINUED | OUTPATIENT
Start: 2020-07-27 | End: 2020-07-27 | Stop reason: HOSPADM

## 2020-07-27 RX ORDER — DILTIAZEM HCL 1 MG/ML
5 INJECTION, SOLUTION INTRAVENOUS CONTINUOUS
Status: DISCONTINUED | OUTPATIENT
Start: 2020-07-27 | End: 2020-07-27

## 2020-07-27 RX ORDER — ALBUMIN HUMAN 50 G/1000ML
SOLUTION INTRAVENOUS CONTINUOUS PRN
Status: DISCONTINUED | OUTPATIENT
Start: 2020-07-27 | End: 2020-07-27

## 2020-07-27 RX ORDER — MIDAZOLAM HYDROCHLORIDE 1 MG/ML
INJECTION, SOLUTION INTRAMUSCULAR; INTRAVENOUS
Status: DISCONTINUED | OUTPATIENT
Start: 2020-07-27 | End: 2020-07-27

## 2020-07-27 RX ORDER — LIDOCAINE HCL/PF 100 MG/5ML
SYRINGE (ML) INTRAVENOUS
Status: DISCONTINUED | OUTPATIENT
Start: 2020-07-27 | End: 2020-07-27

## 2020-07-27 RX ORDER — HYDROMORPHONE HYDROCHLORIDE 1 MG/ML
0.5 INJECTION, SOLUTION INTRAMUSCULAR; INTRAVENOUS; SUBCUTANEOUS ONCE
Status: COMPLETED | OUTPATIENT
Start: 2020-07-27 | End: 2020-07-27

## 2020-07-27 RX ORDER — LABETALOL HYDROCHLORIDE 5 MG/ML
5 INJECTION, SOLUTION INTRAVENOUS
Status: DISCONTINUED | OUTPATIENT
Start: 2020-07-27 | End: 2020-08-04 | Stop reason: HOSPADM

## 2020-07-27 RX ORDER — SODIUM CHLORIDE 0.9 % (FLUSH) 0.9 %
3 SYRINGE (ML) INJECTION EVERY 8 HOURS
Status: DISCONTINUED | OUTPATIENT
Start: 2020-07-27 | End: 2020-07-28

## 2020-07-27 RX ORDER — ONDANSETRON 2 MG/ML
4 INJECTION INTRAMUSCULAR; INTRAVENOUS EVERY 6 HOURS PRN
Status: DISCONTINUED | OUTPATIENT
Start: 2020-07-27 | End: 2020-08-04 | Stop reason: HOSPADM

## 2020-07-27 RX ORDER — PREGABALIN 75 MG/1
75 CAPSULE ORAL
Status: DISCONTINUED | OUTPATIENT
Start: 2020-07-27 | End: 2020-07-27 | Stop reason: HOSPADM

## 2020-07-27 RX ORDER — MAGNESIUM SULFATE HEPTAHYDRATE 40 MG/ML
2 INJECTION, SOLUTION INTRAVENOUS ONCE
Status: COMPLETED | OUTPATIENT
Start: 2020-07-27 | End: 2020-07-27

## 2020-07-27 RX ORDER — FENTANYL CITRATE 50 UG/ML
INJECTION, SOLUTION INTRAMUSCULAR; INTRAVENOUS
Status: DISCONTINUED | OUTPATIENT
Start: 2020-07-27 | End: 2020-07-27

## 2020-07-27 RX ORDER — FAMOTIDINE 10 MG/ML
20 INJECTION INTRAVENOUS
Status: COMPLETED | OUTPATIENT
Start: 2020-07-27 | End: 2020-07-27

## 2020-07-27 RX ORDER — ACETAMINOPHEN 500 MG
1000 TABLET ORAL
Status: COMPLETED | OUTPATIENT
Start: 2020-07-27 | End: 2020-07-27

## 2020-07-27 RX ORDER — ALBUTEROL SULFATE 2.5 MG/.5ML
2.5 SOLUTION RESPIRATORY (INHALATION) EVERY 4 HOURS PRN
Status: DISCONTINUED | OUTPATIENT
Start: 2020-07-27 | End: 2020-08-04 | Stop reason: HOSPADM

## 2020-07-27 RX ORDER — NICARDIPINE HYDROCHLORIDE 0.2 MG/ML
1 INJECTION INTRAVENOUS CONTINUOUS
Status: DISCONTINUED | OUTPATIENT
Start: 2020-07-27 | End: 2020-07-27

## 2020-07-27 RX ORDER — DIPHENHYDRAMINE HYDROCHLORIDE 50 MG/ML
25 INJECTION INTRAMUSCULAR; INTRAVENOUS EVERY 6 HOURS PRN
Status: DISCONTINUED | OUTPATIENT
Start: 2020-07-27 | End: 2020-07-28

## 2020-07-27 RX ORDER — SUCCINYLCHOLINE CHLORIDE 20 MG/ML
INJECTION INTRAMUSCULAR; INTRAVENOUS
Status: DISCONTINUED | OUTPATIENT
Start: 2020-07-27 | End: 2020-07-27

## 2020-07-27 RX ADMIN — ALBUMIN (HUMAN): 12.5 SOLUTION INTRAVENOUS at 09:07

## 2020-07-27 RX ADMIN — HYDROMORPHONE HYDROCHLORIDE 0.5 MG: 1 INJECTION, SOLUTION INTRAMUSCULAR; INTRAVENOUS; SUBCUTANEOUS at 02:07

## 2020-07-27 RX ADMIN — ROCURONIUM BROMIDE 45 MG: 10 INJECTION, SOLUTION INTRAVENOUS at 07:07

## 2020-07-27 RX ADMIN — POTASSIUM CHLORIDE, DEXTROSE MONOHYDRATE AND SODIUM CHLORIDE: 150; 5; 450 INJECTION, SOLUTION INTRAVENOUS at 02:07

## 2020-07-27 RX ADMIN — KETOROLAC TROMETHAMINE 10 MG: 30 INJECTION, SOLUTION INTRAMUSCULAR at 05:07

## 2020-07-27 RX ADMIN — KETOROLAC TROMETHAMINE 10 MG: 30 INJECTION, SOLUTION INTRAMUSCULAR at 11:07

## 2020-07-27 RX ADMIN — CALCIUM GLUCONATE 1 G: 98 INJECTION, SOLUTION INTRAVENOUS at 03:07

## 2020-07-27 RX ADMIN — ALBUMIN (HUMAN): 12.5 SOLUTION INTRAVENOUS at 11:07

## 2020-07-27 RX ADMIN — IRON SUCROSE 100 MG: 20 INJECTION, SOLUTION INTRAVENOUS at 04:07

## 2020-07-27 RX ADMIN — AMPICILLIN SODIUM AND SULBACTAM SODIUM 3 G: 2; 1 INJECTION, POWDER, FOR SOLUTION INTRAMUSCULAR; INTRAVENOUS at 11:07

## 2020-07-27 RX ADMIN — LABETALOL HYDROCHLORIDE 5 MG: 5 INJECTION, SOLUTION INTRAVENOUS at 03:07

## 2020-07-27 RX ADMIN — SODIUM CHLORIDE: 0.9 INJECTION, SOLUTION INTRAVENOUS at 08:07

## 2020-07-27 RX ADMIN — ENOXAPARIN SODIUM 30 MG: 100 INJECTION SUBCUTANEOUS at 06:07

## 2020-07-27 RX ADMIN — FENTANYL CITRATE 50 MCG: 50 INJECTION, SOLUTION INTRAMUSCULAR; INTRAVENOUS at 08:07

## 2020-07-27 RX ADMIN — LABETALOL HYDROCHLORIDE 5 MG: 5 INJECTION, SOLUTION INTRAVENOUS at 02:07

## 2020-07-27 RX ADMIN — POTASSIUM CHLORIDE, DEXTROSE MONOHYDRATE AND SODIUM CHLORIDE: 150; 5; 450 INJECTION, SOLUTION INTRAVENOUS at 11:07

## 2020-07-27 RX ADMIN — ONDANSETRON 8 MG: 2 INJECTION, SOLUTION INTRAMUSCULAR; INTRAVENOUS at 01:07

## 2020-07-27 RX ADMIN — AMPICILLIN SODIUM AND SULBACTAM SODIUM 3 G: 2; 1 INJECTION, POWDER, FOR SOLUTION INTRAMUSCULAR; INTRAVENOUS at 04:07

## 2020-07-27 RX ADMIN — BUPIVACAINE 10 ML: 13.3 INJECTION, SUSPENSION, LIPOSOMAL INFILTRATION at 07:07

## 2020-07-27 RX ADMIN — PREGABALIN 75 MG: 75 CAPSULE ORAL at 06:07

## 2020-07-27 RX ADMIN — OCTREOTIDE ACETATE 250 MCG/HR: 1000 INJECTION, SOLUTION INTRAVENOUS; SUBCUTANEOUS at 07:07

## 2020-07-27 RX ADMIN — Medication: at 02:07

## 2020-07-27 RX ADMIN — ROCURONIUM BROMIDE 10 MG: 10 INJECTION, SOLUTION INTRAVENOUS at 08:07

## 2020-07-27 RX ADMIN — KETOROLAC TROMETHAMINE 15 MG: 30 INJECTION, SOLUTION INTRAMUSCULAR at 06:07

## 2020-07-27 RX ADMIN — Medication 3 ML: at 11:07

## 2020-07-27 RX ADMIN — LIDOCAINE HYDROCHLORIDE 60 MG: 20 INJECTION, SOLUTION INTRAVENOUS at 07:07

## 2020-07-27 RX ADMIN — FENTANYL CITRATE 50 MCG: 50 INJECTION, SOLUTION INTRAMUSCULAR; INTRAVENOUS at 09:07

## 2020-07-27 RX ADMIN — CALCIUM GLUCONATE 1 G: 98 INJECTION, SOLUTION INTRAVENOUS at 02:07

## 2020-07-27 RX ADMIN — Medication 3 ML: at 02:07

## 2020-07-27 RX ADMIN — Medication 16.57 MG: at 06:07

## 2020-07-27 RX ADMIN — MIDAZOLAM 4 MG: 1 INJECTION INTRAMUSCULAR; INTRAVENOUS at 07:07

## 2020-07-27 RX ADMIN — SUCCINYLCHOLINE CHLORIDE 100 MG: 20 INJECTION, SOLUTION INTRAMUSCULAR; INTRAVENOUS at 07:07

## 2020-07-27 RX ADMIN — NEOSTIGMINE METHYLSULFATE 5 MG: 1 INJECTION INTRAVENOUS at 01:07

## 2020-07-27 RX ADMIN — ONDANSETRON 8 MG: 2 INJECTION INTRAMUSCULAR; INTRAVENOUS at 06:07

## 2020-07-27 RX ADMIN — PREGABALIN 75 MG: 75 CAPSULE ORAL at 09:07

## 2020-07-27 RX ADMIN — PROPOFOL 100 MG: 10 INJECTION, EMULSION INTRAVENOUS at 07:07

## 2020-07-27 RX ADMIN — VECURONIUM BROMIDE 4 MG: 10 INJECTION, POWDER, LYOPHILIZED, FOR SOLUTION INTRAVENOUS at 08:07

## 2020-07-27 RX ADMIN — BUPIVACAINE HYDROCHLORIDE 10 ML: 2.5 INJECTION, SOLUTION EPIDURAL; INFILTRATION; INTRACAUDAL; PERINEURAL at 07:07

## 2020-07-27 RX ADMIN — SODIUM CHLORIDE, SODIUM LACTATE, POTASSIUM CHLORIDE, AND CALCIUM CHLORIDE: .6; .31; .03; .02 INJECTION, SOLUTION INTRAVENOUS at 07:07

## 2020-07-27 RX ADMIN — FENTANYL CITRATE 50 MCG: 50 INJECTION, SOLUTION INTRAMUSCULAR; INTRAVENOUS at 07:07

## 2020-07-27 RX ADMIN — ACETAMINOPHEN 1000 MG: 500 TABLET ORAL at 06:07

## 2020-07-27 RX ADMIN — ALBUMIN (HUMAN): 12.5 SOLUTION INTRAVENOUS at 10:07

## 2020-07-27 RX ADMIN — HYDRALAZINE HYDROCHLORIDE 5 MG: 20 INJECTION INTRAMUSCULAR; INTRAVENOUS at 02:07

## 2020-07-27 RX ADMIN — SODIUM BICARBONATE 50 MEQ: 84 INJECTION, SOLUTION INTRAVENOUS at 11:07

## 2020-07-27 RX ADMIN — MAGNESIUM SULFATE IN WATER 2 G: 40 INJECTION, SOLUTION INTRAVENOUS at 02:07

## 2020-07-27 RX ADMIN — OCTREOTIDE ACETATE: 500 INJECTION, SOLUTION INTRAVENOUS; SUBCUTANEOUS at 07:07

## 2020-07-27 RX ADMIN — NICARDIPINE HYDROCHLORIDE 1 MG/HR: 0.2 INJECTION, SOLUTION INTRAVENOUS at 04:07

## 2020-07-27 RX ADMIN — ROCURONIUM BROMIDE 5 MG: 10 INJECTION, SOLUTION INTRAVENOUS at 07:07

## 2020-07-27 RX ADMIN — FAMOTIDINE 20 MG: 10 INJECTION, SOLUTION INTRAVENOUS at 06:07

## 2020-07-27 RX ADMIN — AMPICILLIN SODIUM AND SULBACTAM SODIUM 3 G: 2; 1 INJECTION, POWDER, FOR SOLUTION INTRAMUSCULAR; INTRAVENOUS at 08:07

## 2020-07-27 RX ADMIN — GLYCOPYRROLATE 0.6 MG: 0.2 INJECTION, SOLUTION INTRAMUSCULAR; INTRAVENOUS at 01:07

## 2020-07-27 NOTE — ANESTHESIA PROCEDURE NOTES
Arterial    Diagnosis: exp lap    Patient location during procedure: done in OR  Procedure start time: 7/27/2020 7:57 AM  Timeout: 7/27/2020 7:56 AM  Procedure end time: 7/27/2020 7:59 AM    Staffing  Authorizing Provider: Dino Connolly MD  Performing Provider: Mai Arriaga CRNA    Anesthesiologist was present at the time of the procedure.    Preanesthetic Checklist  Completed: patient identified, site marked, surgical consent, pre-op evaluation, timeout performed, IV checked, risks and benefits discussed, monitors and equipment checked and anesthesia consent givenArterial  Skin Prep: chlorhexidine gluconate and isopropyl alcohol  Local Infiltration: none  Orientation: right  Location: radial  Catheter Size: 20 G  Catheter placement by Anatomical landmarks. Heme positive aspiration all ports.Insertion Attempts: 1  Assessment  Dressing: secured with tape and tegaderm  Patient: Tolerated well

## 2020-07-27 NOTE — ANESTHESIA POSTPROCEDURE EVALUATION
Anesthesia Post Evaluation    Patient: Zohra Paulino    Procedure(s) Performed: Procedure(s) (LRB):  PANCREATECTOMY, DISTAL, SUBTOTAL (N/A)    Final Anesthesia Type: general    Patient location during evaluation: ICU  Level of consciousness: awake  Post-procedure vital signs: reviewed and stable  Pain management: adequate  Airway patency: patent    PONV status at discharge: No PONV  Anesthetic complications: no      Cardiovascular status: stable  Respiratory status: spontaneous ventilation  Hydration status: euvolemic  Follow-up not needed.          Vitals Value Taken Time   /88 07/27/20 1606   Temp 36.2 07/27/20 1614   Pulse 86 07/27/20 1613   Resp 17 07/27/20 1613   SpO2 100 % 07/27/20 1613   Vitals shown include unvalidated device data.      No case tracking events are documented in the log.      Pain/Suzanna Score: Pain Rating Prior to Med Admin: 8 (7/27/2020  2:31 PM)  Suzanna Score: 9 (7/27/2020  7:25 AM)

## 2020-07-27 NOTE — ANESTHESIA PROCEDURE NOTES
Peripheral Block    Patient location during procedure: pre-op   Block not for primary anesthetic.  Reason for block: at surgeon's request and post-op pain management   Post-op Pain Location: abdominal pain  Start time: 7/27/2020 7:05 AM  Timeout: 7/27/2020 7:05 AM   End time: 7/27/2020 7:10 AM    Staffing  Authorizing Provider: Brody Trevino MD  Performing Provider: Brody Trevino MD    Preanesthetic Checklist  Completed: patient identified, site marked, surgical consent, pre-op evaluation, timeout performed, IV checked, risks and benefits discussed and monitors and equipment checked  Peripheral Block  Patient position: supine  Prep: ChloraPrep  Patient monitoring: heart rate, cardiac monitor, continuous pulse ox, continuous capnometry and frequent blood pressure checks  Block type: erector spinae plane  Laterality: bilateral  Injection technique: single shot  Location: T11-12 and T10-11  Needle  Needle type: Stimuplex   Needle gauge: 21 G  Needle length: 4 in  Needle localization: anatomical landmarks and ultrasound guidance   -ultrasound image captured on disc.  Assessment  Injection assessment: negative aspiration, negative parasthesia and local visualized surrounding nerve  Paresthesia pain: none  Heart rate change: no  Slow fractionated injection: yes  Additional Notes  VSS.  [facility] RN monitoring vitals throughout procedure.  Patient tolerated procedure well.

## 2020-07-27 NOTE — PLAN OF CARE
Arrived to ICU at 14:15. AAO x 4. NSR 80s-90s. BP elevated. Stable on 3L nc. Output good through sofia. Output low through ng and jenny drain. Cardene currently at 5.

## 2020-07-27 NOTE — INTERVAL H&P NOTE
The patient has been examined and the H&P has been reviewed:    I concur with the findings and no changes have occurred since H&P was written.    Anesthesia/Surgery risks, benefits and alternative options discussed and understood by patient/family.          Active Hospital Problems    Diagnosis  POA    Pancreas cancer [C25.9]  Yes      Resolved Hospital Problems   No resolved problems to display.

## 2020-07-27 NOTE — OP NOTE
[facility]  Operative Note    SUMMARY     Surgery Date: 7/27/2020     Surgeon(s) and Role:     * GILMA Ellis MD - Primary    Assisting Surgeon: None    Pre-op Diagnosis:  Adenocarcinoma of pancreas [C25.9]    Post-op Diagnosis:  Post-Op Diagnosis Codes:     * Adenocarcinoma of pancreas [C25.9] WITH INVASION OF SPLENIC VEIN.    Procedure(s) (LRB):  PANCREATECTOMY, DISTAL, SUBTOTAL (N/A) SPLEEN SPARING  I/O U/S  VENORRHAPHY IMV.  PLACEMENT OF AMNIOFIX MEMBRANE  MOBILIZED SPLENIC FLEXURE  Anesthesia: General    Description of Procedure:  With the patient supine the operating table under general tracheal anesthesia invasive monitoring place under sent Rogerio infusion after time-out patient was explored through an upper midline incision exploration revealed abnormalities confined to the lesser sac with there was a mass palpable the body of the pancreas just beyond the surgical neck.  The lesser sac was opened by dividing the gastro omentum the.  There were minimal adhesions to the posterior aspect which were lysed.  There appeared to be filmy and edematous and not grossly contain tumor.  The tumor appeared to be confined within the substance of the pancreas.  The surgical neck of the pancreas was identified the henok hepatis node was excised and sent as a separate specimen on the superior aspect of the pancreas grossly it appeared benign was no other evidence of myopathy in the celiac are retroperitoneum are mesenteric root.  Intraoperative ultrasound skilled tumor to be 2-3 cm in diameter it appeared to be intimate with the splenic vein.  The medial border the tumor appeared to be approximately 2 cm beyond the superior mesenteric vein.  The superior mesenteric artery was free.  The superior mesenteric vein and portal vein were dissected at the surgical resection was dissected and surrounded with a vessel loop.  Further dissection hernia allowed for physician of the pancreas it was not fixed to  the retroperitoneum.  It appeared amenable to.  It was 0 good distance from the splenic hilum and therefore it was felt reasonable the spleen sparing pancreatectomy could be performed the surgical neck of the pancreas was transected using the covered I drive stapler with amniofix biologic membrane added to the staple line prior to firing.  The pancreas transected.  The pancreas then dissected away from splenic vein up to the level of the entrance of the inferior mesenteric.  Immediately opposite this entrance the splenic vein appeared to be bound down into.  The inferior mesenteric vein appeared to be also fed with a large collateral and a confluence at the level the splenic vein it was felt possible to transect the splenic vein liver cuff of splenic vein on the IMV and its collateral perform of an nor free and save the flow to the IM the.  This was applying a vascular clamps proximally and distally and transecting the splenic vein over sewing the distal splenic vein in 2 layers with 3 0 Prolene suture.  Some tissue adherent to the posterior aspect of the pancreas near the tumor was sent as a separate specimen for frozen section returned no tumor seen. The proximal splenic vein stump was closed with 5 0 Prolene suture using a stone lodged at the terminus to prevent stenosis and stricture.  The clamps were removed IMV was the it and appeared draining into the large retroperitoneal collateral.  Next the splenic artery was identified ligated proximally and distally with 0 silk and then suture ligated proximally with 3 0 Prolene and transected.  The pancreas and dissected out of the retroperitoneum.  The the splenic flexure of the colon was mobilized and taken down to further expose the tail of the pancreas and splenic hilum.  The pancreas was dissected further out of the retroperitoneum the spleen was slightly mobilized out of the retroperitoneum by lysing some of the lateral and posterior to comments inferiorly.  This  again better exposure.  The short gastric vessels were spared.  The splenic hilar vessels were then transected flush with the tip of the tail of the pancreas with the 2 firings of the I drive stapler with the brown vascular loads.  The splenic vein was also transected proximally with the brown vascular load.  The specimen was removed from the field and inspected for hemostasis and was secured.  The spleen appeared to be adequately perfused on the short gastric vessels.  The liver was again inspected and found not to contain tumor grossly intraoperative ultrasound confirmed no tumors present in the liver or josé miguel hepatis.  Superior mesenteric vein was widely patent to transect stump of the pancreas was then covered with omentum and a Fidel drain in the retroperitoneum but the stab wound incision.  That again abdomen was irrigated copiously with antibiotic saline solution aspirated.  Grossly the tumor appeared to be confined within the substance of the pancreas it was sent for permanent section and special stains and studies.  The intestines were covered with the remaining omentum the son fascia closed 1.  PDS running suture.  Marcaine was injected scan ptosis with postoperative pain control.  The the skin was closed with 4 O Monocryl and Dermabond applied.  Estimated blood loss was less than 150 cc.  No and required.  Sponge needle counts were correct at the end the procedure the patient was taken the intensive care in stable condition.    Description of the findings of the procedure:  Pancreatic adenocarcinoma of the body of the pancreas without gross evidence of extra pancreatic spread.    Estimated Blood Loss: 100 mL         Specimens:   Specimen (12h ago, onward)    José Miguel hepatis superior pancreatic node, retroperitoneal tissue for frozen section, distal pancreas.

## 2020-07-28 LAB
ALBUMIN SERPL BCP-MCNC: 3.3 G/DL (ref 3.5–5.2)
ALP SERPL-CCNC: 70 U/L (ref 55–135)
ALT SERPL W/O P-5'-P-CCNC: 34 U/L (ref 10–44)
ANION GAP SERPL CALC-SCNC: 8 MMOL/L (ref 8–16)
AST SERPL-CCNC: 32 U/L (ref 10–40)
BASOPHILS # BLD AUTO: 0.05 K/UL (ref 0–0.2)
BASOPHILS NFR BLD: 0.4 % (ref 0–1.9)
BILIRUB SERPL-MCNC: 1 MG/DL (ref 0.1–1)
BUN SERPL-MCNC: 5 MG/DL (ref 8–23)
CALCIUM SERPL-MCNC: 8.2 MG/DL (ref 8.7–10.5)
CHLORIDE SERPL-SCNC: 105 MMOL/L (ref 95–110)
CO2 SERPL-SCNC: 23 MMOL/L (ref 23–29)
CREAT SERPL-MCNC: 0.9 MG/DL (ref 0.5–1.4)
DIFFERENTIAL METHOD: ABNORMAL
EOSINOPHIL # BLD AUTO: 0.1 K/UL (ref 0–0.5)
EOSINOPHIL NFR BLD: 0.9 % (ref 0–8)
ERYTHROCYTE [DISTWIDTH] IN BLOOD BY AUTOMATED COUNT: 14.7 % (ref 11.5–14.5)
EST. GFR  (AFRICAN AMERICAN): >60 ML/MIN/1.73 M^2
EST. GFR  (NON AFRICAN AMERICAN): >60 ML/MIN/1.73 M^2
GLUCOSE SERPL-MCNC: 336 MG/DL (ref 70–110)
HCT VFR BLD AUTO: 35.6 % (ref 37–48.5)
HGB BLD-MCNC: 11.6 G/DL (ref 12–16)
IMM GRANULOCYTES # BLD AUTO: 0.04 K/UL (ref 0–0.04)
IMM GRANULOCYTES NFR BLD AUTO: 0.3 % (ref 0–0.5)
LYMPHOCYTES # BLD AUTO: 1 K/UL (ref 1–4.8)
LYMPHOCYTES NFR BLD: 8.7 % (ref 18–48)
MAGNESIUM SERPL-MCNC: 2.1 MG/DL (ref 1.6–2.6)
MCH RBC QN AUTO: 28.5 PG (ref 27–31)
MCHC RBC AUTO-ENTMCNC: 32.6 G/DL (ref 32–36)
MCV RBC AUTO: 88 FL (ref 82–98)
MONOCYTES # BLD AUTO: 0.7 K/UL (ref 0.3–1)
MONOCYTES NFR BLD: 6.4 % (ref 4–15)
NEUTROPHILS # BLD AUTO: 9.6 K/UL (ref 1.8–7.7)
NEUTROPHILS NFR BLD: 83.3 % (ref 38–73)
NRBC BLD-RTO: 0 /100 WBC
PHOSPHATE SERPL-MCNC: 1.9 MG/DL (ref 2.7–4.5)
PLATELET # BLD AUTO: 282 K/UL (ref 150–350)
PMV BLD AUTO: 11.5 FL (ref 9.2–12.9)
POTASSIUM SERPL-SCNC: 4.3 MMOL/L (ref 3.5–5.1)
PROT SERPL-MCNC: 6.6 G/DL (ref 6–8.4)
RBC # BLD AUTO: 4.07 M/UL (ref 4–5.4)
SODIUM SERPL-SCNC: 136 MMOL/L (ref 136–145)
WBC # BLD AUTO: 11.55 K/UL (ref 3.9–12.7)

## 2020-07-28 PROCEDURE — 25000003 PHARM REV CODE 250: Mod: HCNC | Performed by: ANESTHESIOLOGY

## 2020-07-28 PROCEDURE — 25000003 PHARM REV CODE 250: Mod: HCNC | Performed by: STUDENT IN AN ORGANIZED HEALTH CARE EDUCATION/TRAINING PROGRAM

## 2020-07-28 PROCEDURE — 94761 N-INVAS EAR/PLS OXIMETRY MLT: CPT | Mod: HCNC

## 2020-07-28 PROCEDURE — 63600175 PHARM REV CODE 636 W HCPCS: Mod: JG,HCNC | Performed by: SURGERY

## 2020-07-28 PROCEDURE — 97161 PT EVAL LOW COMPLEX 20 MIN: CPT | Mod: HCNC

## 2020-07-28 PROCEDURE — 80053 COMPREHEN METABOLIC PANEL: CPT | Mod: HCNC

## 2020-07-28 PROCEDURE — 63600175 PHARM REV CODE 636 W HCPCS: Mod: HCNC | Performed by: STUDENT IN AN ORGANIZED HEALTH CARE EDUCATION/TRAINING PROGRAM

## 2020-07-28 PROCEDURE — 94770 HC EXHALED C02 TEST: CPT | Mod: HCNC

## 2020-07-28 PROCEDURE — 27000221 HC OXYGEN, UP TO 24 HOURS: Mod: HCNC

## 2020-07-28 PROCEDURE — 97530 THERAPEUTIC ACTIVITIES: CPT | Mod: HCNC

## 2020-07-28 PROCEDURE — 11000001 HC ACUTE MED/SURG PRIVATE ROOM: Mod: HCNC

## 2020-07-28 PROCEDURE — P9045 ALBUMIN (HUMAN), 5%, 250 ML: HCPCS | Mod: JG,HCNC | Performed by: SURGERY

## 2020-07-28 PROCEDURE — 83735 ASSAY OF MAGNESIUM: CPT | Mod: HCNC

## 2020-07-28 PROCEDURE — A4216 STERILE WATER/SALINE, 10 ML: HCPCS | Mod: HCNC | Performed by: ANESTHESIOLOGY

## 2020-07-28 PROCEDURE — 63600175 PHARM REV CODE 636 W HCPCS: Mod: HCNC | Performed by: SURGERY

## 2020-07-28 PROCEDURE — 99900035 HC TECH TIME PER 15 MIN (STAT): Mod: HCNC

## 2020-07-28 PROCEDURE — 97165 OT EVAL LOW COMPLEX 30 MIN: CPT | Mod: HCNC

## 2020-07-28 PROCEDURE — 94799 UNLISTED PULMONARY SVC/PX: CPT | Mod: HCNC

## 2020-07-28 PROCEDURE — 25000003 PHARM REV CODE 250: Mod: HCNC | Performed by: SURGERY

## 2020-07-28 PROCEDURE — 84100 ASSAY OF PHOSPHORUS: CPT | Mod: HCNC

## 2020-07-28 PROCEDURE — 85025 COMPLETE CBC W/AUTO DIFF WBC: CPT | Mod: HCNC

## 2020-07-28 RX ORDER — ALBUMIN HUMAN 50 G/1000ML
25 SOLUTION INTRAVENOUS ONCE
Status: COMPLETED | OUTPATIENT
Start: 2020-07-28 | End: 2020-07-28

## 2020-07-28 RX ADMIN — PREGABALIN 75 MG: 75 CAPSULE ORAL at 08:07

## 2020-07-28 RX ADMIN — KETOROLAC TROMETHAMINE 10 MG: 30 INJECTION, SOLUTION INTRAMUSCULAR at 11:07

## 2020-07-28 RX ADMIN — KETOROLAC TROMETHAMINE 10 MG: 30 INJECTION, SOLUTION INTRAMUSCULAR at 05:07

## 2020-07-28 RX ADMIN — OCTREOTIDE ACETATE 250 MCG/HR: 1000 INJECTION, SOLUTION INTRAVENOUS; SUBCUTANEOUS at 07:07

## 2020-07-28 RX ADMIN — AMPICILLIN SODIUM AND SULBACTAM SODIUM 3 G: 2; 1 INJECTION, POWDER, FOR SOLUTION INTRAMUSCULAR; INTRAVENOUS at 05:07

## 2020-07-28 RX ADMIN — Medication 3 ML: at 05:07

## 2020-07-28 RX ADMIN — POTASSIUM CHLORIDE, DEXTROSE MONOHYDRATE AND SODIUM CHLORIDE: 150; 5; 450 INJECTION, SOLUTION INTRAVENOUS at 09:07

## 2020-07-28 RX ADMIN — OCTREOTIDE ACETATE 250 MCG/HR: 1000 INJECTION, SOLUTION INTRAVENOUS; SUBCUTANEOUS at 12:07

## 2020-07-28 RX ADMIN — POTASSIUM CHLORIDE, DEXTROSE MONOHYDRATE AND SODIUM CHLORIDE: 150; 5; 450 INJECTION, SOLUTION INTRAVENOUS at 07:07

## 2020-07-28 RX ADMIN — SODIUM PHOSPHATE, MONOBASIC, MONOHYDRATE 20.01 MMOL: 276; 142 INJECTION, SOLUTION INTRAVENOUS at 09:07

## 2020-07-28 RX ADMIN — IRON SUCROSE 100 MG: 20 INJECTION, SOLUTION INTRAVENOUS at 08:07

## 2020-07-28 RX ADMIN — ALBUMIN (HUMAN) 25 G: 12.5 SOLUTION INTRAVENOUS at 03:07

## 2020-07-28 NOTE — NURSING TRANSFER
Nursing Transfer Note      7/28/2020     Transfer To: 504    Transfer via bed    Transported by Klarissa RN and Vicenta RN    Chart send with patient: Yes    Notified: daughter at bedside    Upon arrival to floor: patient oriented to room, call bell in reach and bed in lowest position

## 2020-07-28 NOTE — PLAN OF CARE
Pancreas cancer      The pt's currently in ICU. The Sw met with the pt to complete the assessment. The pt lives in [place] and her 2 sons live with her also. The pt's daughter Danny Hernandez [phone] will transport her home at d/c. The pt's independent with her adl's and doesn't use any dme at home. 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     The Sw gave the pt a d/c brochure with her name and contact info on it. The Sw encouraged her to call if she has any questions or concerns. The Sw will continue to follow the pt throughout her transitions of care and will assist with any d/c needs. The Sw met with the pt's daughter Danny who works in [employer] [employer] downstairs. The pt has a very supportive family. The pt has no needs currently.        07/28/20 1233   Discharge Assessment   Assessment Type Discharge Planning Assessment   Confirmed/corrected address and phone number on facesheet? Yes   Assessment information obtained from? Patient   Prior to hospitilization cognitive status: Alert/Oriented   Prior to hospitalization functional status: Independent   Current cognitive status: Alert/Oriented   Current Functional Status: Needs Assistance   Lives With child(antoni), adult   Able to Return to Prior Arrangements yes   Is patient able to care for self after discharge? Unable to determine at this time (comments)   Who are your caregiver(s) and their phone number(s)? Danny Hernandez([phone]   Patient's perception of discharge disposition home or selfcare   Readmission Within the Last 30 Days no previous admission in last 30 days   Patient currently being followed by outpatient case management? No   Patient currently receives any other outside agency services? No   Equipment Currently Used at Home none   Do you have any problems affording any of your prescribed medications? No  (the pt receioves her meds affordably at [facility] in [place])   Is the patient taking  medications as prescribed? yes   Does the patient have transportation home? Yes   Transportation Anticipated family or friend will provide   Does the patient receive services at the [facility]? No   Discharge Plan A Home with family   Discharge Plan B Home Health   DME Needed Upon Discharge  other (see comments)  (TBD)   Patient/Family in Agreement with Plan yes      no pelvic pain/no abnormal vaginal bleeding/no spotting

## 2020-07-28 NOTE — PROGRESS NOTES
Progress Note    Admit Date: 7/27/2020   LOS: 1 day   POD # 1 partial pancreatectomy with spleen sparing, lymph node excision and venorraphy     SUBJECTIVE:     No acute events overnight.    Pain controlled.    No nausea or vomiting.    No fevers or chills.    No flatus or BM yet.    Wang and NG tube in place.    Scheduled Meds:   iron sucrose (VENOFER) IVPB  100 mg Intravenous Daily    ketorolac  9.999 mg Intravenous Q6H    pregabalin  75 mg Oral BID    sodium phosphate IVPB  20.01 mmol Intravenous Once     Continuous Infusions:   dextrose 5 % and 0.45 % NaCl with KCl 20 mEq 100 mL/hr at 07/27/20 2326    hydromorphone in 0.9 % NaCl 6 mg/30 ml      niCARdipine Stopped (07/27/20 2330)    octreotide infusion (50 mcg/mL) 250 mcg/hr (07/28/20 0004)     PRN Meds:albuterol sulfate, diphenhydrAMINE, diphenhydrAMINE, hydrALAZINE, labetalol, naloxone, ondansetron, ondansetron    Review of patient's allergies indicates:   Allergen Reactions    Codeine Palpitations         OBJECTIVE:     Vital Signs (Most Recent)  Temp: 98 °F (36.7 °C) (07/28/20 0815)  Pulse: 95 (07/28/20 0807)  Resp: 15 (07/28/20 0807)  BP: 136/63 (07/28/20 0807)  SpO2: 95 % (07/28/20 0807)    Vital Signs Range (Last 24H):  Temp:  [97.8 °F (36.6 °C)-99.7 °F (37.6 °C)]   Pulse:  []   Resp:  [12-27]   BP: (119-197)/(55-88)   SpO2:  [88 %-100 %]   Arterial Line BP: ()/()     I & O (Last 24H):    Intake/Output Summary (Last 24 hours) at 7/28/2020 0904  Last data filed at 7/28/2020 0831  Gross per 24 hour   Intake 5139.18 ml   Output 4925 ml   Net 214.18 ml       Physical Exam:  General: well developed, well nourished   CV: RRR  Pulm: No increased work of breathing on room air  Abd: Soft, appropriate tenderness, midline incision clean dry and intact, non-distended, MANDY drain in place with 60 cc serosanguineous output  Neuro: No gross motor or sensory deficits     LABS  CBC  Recent Labs   Lab 07/24/20  1200  07/27/20  1130 07/27/20  1411  07/28/20  0607   WBC 6.20  --   --  10.65 11.55   RBC 3.97*  --   --  3.85* 4.07   HGB 11.2*  --   --  11.0* 11.6*   HCT 35.7*   < > 26* 34.0* 35.6*     --   --  240 282   MCV 90  --   --  88 88   MCH 28.2  --   --  28.6 28.5   MCHC 31.4*  --   --  32.4 32.6    < > = values in this interval not displayed.     CMP  Recent Labs   Lab 07/24/20  1200 07/27/20  1411 07/28/20  0607    170* 336*   CALCIUM 9.1 7.9* 8.2*   ALBUMIN 3.4*  --  3.3*   PROT 7.5  --  6.6    140 136   K 3.6 3.8 4.3   CO2 29 21* 23    110 105   BUN 14 7* 5*   CREATININE 0.8 0.7 0.9   ALKPHOS 92  --  70   ALT 13  --  34   AST 15  --  32   BILITOT 0.2  --  1.0       Recent Labs   Lab 07/24/20  1200 07/27/20  1411 07/28/20  0607   CALCIUM 9.1 7.9* 8.2*   MG  --   --  2.1   PHOS  --   --  1.9*       ABGs  Recent Labs   Lab 07/27/20  1130   PH 7.329*   PCO2 35.0   PO2 352*   HCO3 18.4*   POCSATURATED 100   BE -8     LAST HbA1c  Lab Results   Component Value Date    HGBA1C 5.7 01/18/2016       ASSESSMENT/PLAN:   70 year old female wit pancreatic adenocarcinoma of body of pancreas s/p partial pancreatectomy with spleen sparing, lymph node excision and venorraphy 7/27    NEURO - pain control PRN, transition to PO when able  RESP - encourage deep breathing/cough/IS  CVS - monitor hemodynamics, not on any pressors, Cardene discontinued  FEN/GI - NJ tube to low wall suction, NPO, D5 1/2NS w/ 20mEq K @ 100 ml/hr  RENAL - Cr 0.9, UOP adequate, continue to monitor and trend  ENDO - octreotide 250mcg/hr  HEME/ID - H/H stable, continue to monitor and trend, afebrile  MSK - encourage OOB and ambulation, PT/OT  PPX - DVT ppx with SCDs, GI ppx with famotidine  DISPO - continue ICU care    Chinedu Gentile MD  PGY-2

## 2020-07-28 NOTE — PT/OT/SLP EVAL
Physical Therapy Evaluation/Treatment    Patient Name:  Zohra Paulino   MRN:  [id number]    Recommendations:     Discharge Recommendations:  home   Discharge Equipment Recommendations: shower chair(other equip TBD)   Barriers to discharge: None    Assessment:     Zohra Paulino is a 70 y.o. female admitted with a medical diagnosis of Diagnoses of Pancreas cancer and Malignant neoplasm of body of pancreas were pertinent to this visit.   She presents with the following impairments/functional limitations:  weakness, impaired endurance, impaired sensation, impaired self care skills, impaired functional mobilty, gait instability, impaired balance, decreased upper extremity function, decreased lower extremity function, decreased ROM, impaired skin, impaired cardiopulmonary response to activity     Rehab Prognosis: Good; patient would benefit from acute skilled PT services to address these deficits and reach maximum level of function.    Recent Surgery: Procedure(s) (LRB):  PANCREATECTOMY, DISTAL, SUBTOTAL (N/A) 1 Day Post-Op    Plan:     During this hospitalization, patient to be seen 6 x/week to address the identified rehab impairments via gait training, therapeutic activities, therapeutic exercises, neuromuscular re-education and progress toward the following goals:    · Plan of Care Expires:  08/28/20    Subjective     Chief Complaint: pain  Patient/Family Comments/goals: my feet are always cold; to return to PLOF  Pain/Comfort:  · Pain Rating 1: 0/10  · Location 1: abdomen  · Pain Addressed 1: Pre-medicate for activity, Reposition, Distraction, Cessation of Activity(pain increased with movement-7/10 per mike baker scale)  · Pain Rating Post-Intervention 1: (pt did not rate but appeard comfortable once in place)    Patients cultural, spiritual, Rastafarian conflicts given the current situation: no    Living Environment:  Pt lives w/sons in [facility], no MARYLOU, T/S combo  Previous level of function: indep  Roles and  Routines: interventionist(counselor) pre-k, 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  Patient found with arterial line, blood pressure cuff, central line, sofia catheter, MANDY drain, SCD, telemetry, oxygen, peripheral IV, PCA, NG tube  upon PT entry to room.    General Precautions: Standard, fall, NPO   Orthopedic Precautions:N/A   Braces: N/A     Exams:  · Cognitive Exam:  Patient is oriented to Person, Place, Time, Situation and follows all commands  · Gross Motor Coordination:  decreased timing 2/2 pain  · Postural Exam:  Patient presented with the following abnormalities:    · -       No postural abnormalities identified  · Sensation: no paresthesias reported  · Skin Integrity/Edema:      · -       Skin integrity: Wound abdominal incision; drain  · RLE ROM: WFL  · RLE Strength: WFL  · LLE ROM: WFL  · LLE Strength: WFL    Functional Mobility:  · Bed Mobility:     · Rolling Left:  minimum assistance  · Scooting: minimum assistance  · Supine to Sit: minimum assistance  · Sit to Supine: minimum assistance and moderate assistance  · Transfers:     · Sit to Stand:  minimum assistance with no AD  · Gait: Patient amb laterally~3-4ft to HOB with therapist in front and pt using therapist's UEs for support; slow gaudencio, short step length; VCs for pursed lip breathing, relaxing shlds and erect stance  · Balance: sit~fair+ to good; stand~fair, amb~fair-    Therapeutic Activities and Exercises:   Patient educated in role of PT/POC, diaphrag/pursed lip breathing; performed 10 reps BLEs- APs, ankle circles, heel slides, hip abd/add, hip IR/ER, GS; pt instructed in log rolling and transitioned to EOB with VCs for technique and pursed lip breathing; scooted to EOB with Prasanna; sit to stand and side stepped to HOB as described above; pt scooted back in to bed and returned to supine with VCs and min/modA.    AM-PAC 6 CLICK  MOBILITY  Total Score:16     Patient left HOB elevated with all lines intact, call button in reach, bed alarm on, nurse notified and dtr present.    GOALS:   Multidisciplinary Problems     Physical Therapy Goals        Problem: Physical Therapy Goal    Goal Priority Disciplines Outcome Goal Variances Interventions   Physical Therapy Goal     PT, PT/OT Ongoing, Progressing     Description: Goals to be met by: 2020     Patient will increase functional independence with mobility by performin. Supine to sit with Modified [place]  2. Sit to supine with Modified [place]  3. Rolling with Modified [place].  4. Sit to stand transfer with Stand-by Assistance with or without AD  5. Bed to chair transfer with Stand-by Assistance with or without AD  6. Gait  x 150 feet with Stand-by Assistance with or without AD   7. Lower extremity exercise program x10 reps per handout, with independence                     History:     Past Medical History:   Diagnosis Date    Bronchitis     Fibroids     Osteopetrosis     Uterine fibroid        Past Surgical History:   Procedure Laterality Date    CERVICAL BIOPSY  W/ LOOP ELECTRODE EXCISION      [facility]      DISTAL PANCREATECTOMY N/A 2020    Procedure: PANCREATECTOMY, DISTAL, SUBTOTAL;  Surgeon: GILMA Ellis MD;  Location: [facility];  Service: General;  Laterality: N/A;    HYSTERECTOMY      fibroids    ME REMOVAL OF OVARY/TUBE(S)      TUBAL LIGATION         Time Tracking:     PT Received On: 20  PT Start Time: 1535     PT Stop Time: 1600  PT Total Time (min): 25 min     Billable Minutes: Evaluation 10 minutes and Therapeutic Activity 15 minutes      Klarissa Gibbons, PT  2020

## 2020-07-28 NOTE — PLAN OF CARE
Problem: Physical Therapy Goal  Goal: Physical Therapy Goal  Description: Goals to be met by: 2020     Patient will increase functional independence with mobility by performin. Supine to sit with Modified [place]  2. Sit to supine with Modified [place]  3. Rolling with Modified [place].  4. Sit to stand transfer with Stand-by Assistance with or without AD  5. Bed to chair transfer with Stand-by Assistance with or without AD  6. Gait  x 150 feet with Stand-by Assistance with or without AD   7. Lower extremity exercise program x10 reps per handout, with independence    Outcome: Ongoing, Progressing  Pt evaluated; full report to follow; cont with POC; no anticipated needs, shower chair per OT.

## 2020-07-28 NOTE — NURSING
Patient is a transfer from ICU.  Patient is awake and alert.  Midline abdominal incision is well-approximated with Dermabond.  MANDY Drain is intact and patent and draining small amount of serosanguinous drainage.  IVF and Sandostatin infusing via RIJ TLC.  Denies pain.  Patient is on PCA Dilaudid.  Daughter Danny is at bedside.  Safety is maintained with bed low, wheels locked and side rails up.  Bed alarm on.  Will continue to monitor.

## 2020-07-28 NOTE — PLAN OF CARE
Patient on oxygen with documented flow.  Will attempt to wean per O2 order protocol. Patient still unable to perform IS therapy at this time due to being drowsy. RN aware and will attempt with patient is more awake. Will continue to monitor.

## 2020-07-28 NOTE — PLAN OF CARE
Patient is awake and alert.  Wang is discontinued at 1400 and is due to void.  IVF and Sandostatin infusing via RIJ TLC.  Is on Dilaudid PCA for pain for pain control.  Daughter Danny was at bedside.  SCDS to bilateral lower extremities.  Midline abdominal incision is well-approximated with Dermabond.  MANDY Drain is intact and patent and has 10ml of drainage this shift.  Safety is maintained with bed low, wheels locked and side rails up.  Call light within reach.  Bed alarm on.  Will continue to monitor.

## 2020-07-28 NOTE — PLAN OF CARE
VN note: Patient chart, labs, and vitals reviewed. VN to continue to monitor and be available as needed.

## 2020-07-28 NOTE — PLAN OF CARE
OT eval performed, report to follow  No anticipated dc needs, except shower chair      Problem: Occupational Therapy Goal  Goal: Occupational Therapy Goal  Description: Goals to be met by: 8/28     Patient will increase functional independence with ADLs by performing:    UE Dressing with Modified [place].  LE Dressing with Modified [place].  Grooming while standing at sink with Modified [place].  Toileting from toilet with Modified [place] for hygiene and clothing management.   Toilet transfer to toilet with Modified [place].  Indep HEP    Outcome: Ongoing, Progressing

## 2020-07-28 NOTE — PT/OT/SLP EVAL
Occupational Therapy   Evaluation    Name: Zohra Paulino  MRN: [id number]  Admitting Diagnosis:  <principal problem not specified> 1 Day Post-Op    Recommendations:     Discharge Recommendations: home  Discharge Equipment Recommendations:  shower chair  Barriers to discharge:  None    Assessment:     Zohra Paulino is a 70 y.o. female with a medical diagnosis of <principal problem not specified>.  She presents with performance deficits affecting function: weakness, impaired endurance, impaired self care skills, impaired functional mobilty, gait instability, impaired balance, pain, decreased lower extremity function, decreased upper extremity function, impaired skin, edema, impaired cardiopulmonary response to activity, decreased ROM.      Rehab Prognosis: Good; patient would benefit from acute skilled OT services to address these deficits and reach maximum level of function.       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  Equipment Used at Home:  none  Assistance upon Discharge: family    Pain/Comfort:  · Pain Rating 1: 6/10  · Location - Orientation 1: generalized  · Location 1: abdomen  · Pain Addressed 1: Pre-medicate for activity, Reposition, Distraction, Cessation of Activity, Nurse notified  · Pain Rating Post-Intervention 1: 5/10  · Pain Rating 2: 6/10  · Location - Orientation 2: lower  · Location 2: back  · Pain Addressed 2: Pre-medicate for activity, Reposition, Distraction, Cessation of Activity, Nurse  notified  · Pain Rating Post-Intervention 2: 1/10    Patients cultural, spiritual, Yazidi conflicts given the current situation:      Objective:     Communicated with: nurse prior to session.  Patient found HOB elevated with arterial line, blood pressure cuff, central line, sofia catheter, MANDY drain, NG tube, SCD, telemetry, pulse ox (continuous), oxygen, PCA, peripheral IV upon OT entry to room.    General Precautions: Standard, fall, NPO   Orthopedic Precautions:    Braces:       Occupational Performance:    Bed Mobility:    · Patient completed Rolling/Turning to Right with minimum assistance  · Patient completed Scooting/Bridging with minimum assistance  · Patient completed Supine to Sit with minimum assistance and moderate assistance    Functional Mobility/Transfers:  · Patient completed Sit <> Stand Transfer with minimum assistance  with  hand-held assist   · Patient completed Bed <> Chair Transfer using Step Transfer technique with minimum assistance with hand-held assist  · Functional Mobility: min A 4 steps to chair    Activities of Daily Living:  · Grooming: stand by assistance    · Lower Body Dressing: total assistance socks    Cognitive/Visual Perceptual:  AO4    Physical Exam:  BUE AROM WFL, strength grossly 3+/5  Fair+ sit/stand balance    AMPAC 6 Click ADL:  AMPAC Total Score: 13    Treatment & Education:  Pt/fam educated on role of OT/POC, diaphragmatic/PL breathing, BUE/trunk AROM ex-reviewed and performed, written handout given  Education:    Patient left up in chair with all lines intact, call button in reach, nurse notified and daughter present    GOALS:   Multidisciplinary Problems     Occupational Therapy Goals        Problem: Occupational Therapy Goal    Goal Priority Disciplines Outcome Interventions   Occupational Therapy Goal     OT, PT/OT Ongoing, Progressing    Description: Goals to be met by: 8/28     Patient will increase functional independence with ADLs by performing:    UE Dressing  with Modified [place].  LE Dressing with Modified [place].  Grooming while standing at sink with Modified [place].  Toileting from toilet with Modified [place] for hygiene and clothing management.   Toilet transfer to toilet with Modified [place].  Indep HEP                     History:     Past Medical History:   Diagnosis Date    Bronchitis     Fibroids     Osteopetrosis     Uterine fibroid        Past Surgical History:   Procedure Laterality Date    CERVICAL BIOPSY  W/ LOOP ELECTRODE EXCISION      [facility]  2004    DISTAL PANCREATECTOMY N/A 7/27/2020    Procedure: PANCREATECTOMY, DISTAL, SUBTOTAL;  Surgeon: GILMA Ellis MD;  Location: [facility];  Service: General;  Laterality: N/A;    HYSTERECTOMY      fibroids    ME REMOVAL OF OVARY/TUBE(S)      TUBAL LIGATION         Time Tracking:     OT Date of Treatment: 07/28/20  OT Start Time: 0936  OT Stop Time: 1019  OT Total Time (min): 43 min    Billable Minutes:Evaluation 20  Therapeutic Activity 23    Rohit Keller OT  7/28/2020

## 2020-07-28 NOTE — PLAN OF CARE
Safety: call light in reach, patient oriented to room & instructed how to notify nurse if assistance is needed, current questions/concerns addressed, bed in lowest position with wheels locked & side rails up X 3. Pt educated regarding fall prevention and taking appropriate action to prevent falls Activity: ** Neurological: Oriented x4 , drowsy, responds to voice. Respiratory: On 2L of 02, sating 100% ** Cardiac: BP normotensive, pt no longer on Cardene gtt, HR stable. Afebrile this shift. Intake/Output: ** good output from sofia, MANDY drain serosanguinous output. no bm today, Diet: NPO** Pain: controlled with PCA dilaudid and prn medication, Skin: abd midline incision, dermaband open to air. Devices: IV pump Sandostatin gtt, D51/2 with 20 of K. ** Plan of care** monitor b/p, A-line and central line, pain control, input and output.

## 2020-07-29 LAB
ALBUMIN SERPL BCP-MCNC: 3.1 G/DL (ref 3.5–5.2)
ALP SERPL-CCNC: 56 U/L (ref 55–135)
ALT SERPL W/O P-5'-P-CCNC: 24 U/L (ref 10–44)
ANION GAP SERPL CALC-SCNC: 2 MMOL/L (ref 8–16)
AST SERPL-CCNC: 23 U/L (ref 10–40)
BASOPHILS # BLD AUTO: 0.02 K/UL (ref 0–0.2)
BASOPHILS NFR BLD: 0.2 % (ref 0–1.9)
BILIRUB SERPL-MCNC: 0.7 MG/DL (ref 0.1–1)
BUN SERPL-MCNC: 10 MG/DL (ref 8–23)
CALCIUM SERPL-MCNC: 7.8 MG/DL (ref 8.7–10.5)
CHLORIDE SERPL-SCNC: 107 MMOL/L (ref 95–110)
CO2 SERPL-SCNC: 28 MMOL/L (ref 23–29)
CREAT SERPL-MCNC: 1.6 MG/DL (ref 0.5–1.4)
DIFFERENTIAL METHOD: ABNORMAL
EOSINOPHIL # BLD AUTO: 0.4 K/UL (ref 0–0.5)
EOSINOPHIL NFR BLD: 4.5 % (ref 0–8)
ERYTHROCYTE [DISTWIDTH] IN BLOOD BY AUTOMATED COUNT: 15.2 % (ref 11.5–14.5)
EST. GFR  (AFRICAN AMERICAN): 37 ML/MIN/1.73 M^2
EST. GFR  (NON AFRICAN AMERICAN): 32 ML/MIN/1.73 M^2
GLUCOSE SERPL-MCNC: 171 MG/DL (ref 70–110)
HCT VFR BLD AUTO: 29.3 % (ref 37–48.5)
HGB BLD-MCNC: 9.3 G/DL (ref 12–16)
IMM GRANULOCYTES # BLD AUTO: 0.02 K/UL (ref 0–0.04)
IMM GRANULOCYTES NFR BLD AUTO: 0.2 % (ref 0–0.5)
LYMPHOCYTES # BLD AUTO: 1 K/UL (ref 1–4.8)
LYMPHOCYTES NFR BLD: 12 % (ref 18–48)
MAGNESIUM SERPL-MCNC: 2 MG/DL (ref 1.6–2.6)
MCH RBC QN AUTO: 28.6 PG (ref 27–31)
MCHC RBC AUTO-ENTMCNC: 31.7 G/DL (ref 32–36)
MCV RBC AUTO: 90 FL (ref 82–98)
MONOCYTES # BLD AUTO: 0.5 K/UL (ref 0.3–1)
MONOCYTES NFR BLD: 6.3 % (ref 4–15)
NEUTROPHILS # BLD AUTO: 6.3 K/UL (ref 1.8–7.7)
NEUTROPHILS NFR BLD: 76.8 % (ref 38–73)
NRBC BLD-RTO: 0 /100 WBC
PHOSPHATE SERPL-MCNC: 1.4 MG/DL (ref 2.7–4.5)
PLATELET # BLD AUTO: 220 K/UL (ref 150–350)
PMV BLD AUTO: 11.3 FL (ref 9.2–12.9)
POTASSIUM SERPL-SCNC: 4.4 MMOL/L (ref 3.5–5.1)
PROT SERPL-MCNC: 5.9 G/DL (ref 6–8.4)
RBC # BLD AUTO: 3.25 M/UL (ref 4–5.4)
SODIUM SERPL-SCNC: 137 MMOL/L (ref 136–145)
WBC # BLD AUTO: 8.24 K/UL (ref 3.9–12.7)

## 2020-07-29 PROCEDURE — 99900035 HC TECH TIME PER 15 MIN (STAT): Mod: HCNC

## 2020-07-29 PROCEDURE — 51798 US URINE CAPACITY MEASURE: CPT | Mod: HCNC

## 2020-07-29 PROCEDURE — 97116 GAIT TRAINING THERAPY: CPT | Mod: HCNC,CQ

## 2020-07-29 PROCEDURE — 83735 ASSAY OF MAGNESIUM: CPT | Mod: HCNC

## 2020-07-29 PROCEDURE — 94761 N-INVAS EAR/PLS OXIMETRY MLT: CPT | Mod: HCNC

## 2020-07-29 PROCEDURE — 11000001 HC ACUTE MED/SURG PRIVATE ROOM: Mod: HCNC

## 2020-07-29 PROCEDURE — 63600175 PHARM REV CODE 636 W HCPCS: Mod: HCNC | Performed by: STUDENT IN AN ORGANIZED HEALTH CARE EDUCATION/TRAINING PROGRAM

## 2020-07-29 PROCEDURE — 97110 THERAPEUTIC EXERCISES: CPT | Mod: HCNC,CQ

## 2020-07-29 PROCEDURE — 80053 COMPREHEN METABOLIC PANEL: CPT | Mod: HCNC

## 2020-07-29 PROCEDURE — 97530 THERAPEUTIC ACTIVITIES: CPT | Mod: HCNC

## 2020-07-29 PROCEDURE — 94770 HC EXHALED C02 TEST: CPT | Mod: HCNC

## 2020-07-29 PROCEDURE — 84100 ASSAY OF PHOSPHORUS: CPT | Mod: HCNC

## 2020-07-29 PROCEDURE — 25000003 PHARM REV CODE 250: Mod: HCNC | Performed by: STUDENT IN AN ORGANIZED HEALTH CARE EDUCATION/TRAINING PROGRAM

## 2020-07-29 PROCEDURE — 97530 THERAPEUTIC ACTIVITIES: CPT | Mod: HCNC,CQ

## 2020-07-29 PROCEDURE — 85025 COMPLETE CBC W/AUTO DIFF WBC: CPT | Mod: HCNC

## 2020-07-29 PROCEDURE — 51702 INSERT TEMP BLADDER CATH: CPT | Mod: HCNC

## 2020-07-29 RX ORDER — ENOXAPARIN SODIUM 100 MG/ML
40 INJECTION SUBCUTANEOUS EVERY 24 HOURS
Status: DISCONTINUED | OUTPATIENT
Start: 2020-07-29 | End: 2020-08-04 | Stop reason: HOSPADM

## 2020-07-29 RX ORDER — TAMSULOSIN HYDROCHLORIDE 0.4 MG/1
0.4 CAPSULE ORAL DAILY
Status: DISCONTINUED | OUTPATIENT
Start: 2020-07-29 | End: 2020-08-04 | Stop reason: HOSPADM

## 2020-07-29 RX ADMIN — KETOROLAC TROMETHAMINE 10 MG: 30 INJECTION, SOLUTION INTRAMUSCULAR at 11:07

## 2020-07-29 RX ADMIN — TAMSULOSIN HYDROCHLORIDE 0.4 MG: 0.4 CAPSULE ORAL at 09:07

## 2020-07-29 RX ADMIN — POTASSIUM CHLORIDE, DEXTROSE MONOHYDRATE AND SODIUM CHLORIDE: 150; 5; 450 INJECTION, SOLUTION INTRAVENOUS at 11:07

## 2020-07-29 RX ADMIN — ENOXAPARIN SODIUM 40 MG: 40 INJECTION SUBCUTANEOUS at 06:07

## 2020-07-29 RX ADMIN — KETOROLAC TROMETHAMINE 10 MG: 30 INJECTION, SOLUTION INTRAMUSCULAR at 05:07

## 2020-07-29 RX ADMIN — OCTREOTIDE ACETATE 250 MCG/HR: 1000 INJECTION, SOLUTION INTRAVENOUS; SUBCUTANEOUS at 10:07

## 2020-07-29 RX ADMIN — SODIUM PHOSPHATE, MONOBASIC, MONOHYDRATE 30 MMOL: 276; 142 INJECTION, SOLUTION INTRAVENOUS at 06:07

## 2020-07-29 RX ADMIN — IRON SUCROSE 100 MG: 20 INJECTION, SOLUTION INTRAVENOUS at 09:07

## 2020-07-29 RX ADMIN — PREGABALIN 75 MG: 75 CAPSULE ORAL at 09:07

## 2020-07-29 RX ADMIN — SODIUM PHOSPHATE, MONOBASIC, MONOHYDRATE 30 MMOL: 276; 142 INJECTION, SOLUTION INTRAVENOUS at 10:07

## 2020-07-29 NOTE — PLAN OF CARE
Patient resting in bed, AAOx4. IVF and layne infusing as ordered. Patient with some complaints of pain overnight, dilaudid PCA. Medications administered as ordered. Midline incision remains CDI. MANDY drain in place with minimal drainage. Patient asleep on and off through the night. Patient unable to void post sofia removal, 500cc of urine returned on straight cath. Encouraged to call with needs or concerns. Will continue to monitor.

## 2020-07-29 NOTE — PROGRESS NOTES
Progress Note    Admit Date: 7/27/2020   LOS: 2 days   POD # 2 s/p open spleen preserving distal pancreatectomy 7/27/2020    SUBJECTIVE:     Stepped down to floor yesterday.  No acute events overnight.  Pain controlled on current regimen.  No nausea or vomiting.  No fevers or chills.  No flatus or BM yet.  Wang and NG tube removed yesterday.  Did not void on her own, got straight cathed.      Scheduled Meds:   iron sucrose (VENOFER) IVPB  100 mg Intravenous Daily    ketorolac  9.999 mg Intravenous Q6H    pregabalin  75 mg Oral BID     Continuous Infusions:   dextrose 5 % and 0.45 % NaCl with KCl 20 mEq 100 mL/hr at 07/28/20 1949    hydromorphone in 0.9 % NaCl 6 mg/30 ml      octreotide infusion (50 mcg/mL) 250 mcg/hr (07/28/20 1949)     PRN Meds:albuterol sulfate, diphenhydrAMINE, hydrALAZINE, labetalol, naloxone, ondansetron    Review of patient's allergies indicates:   Allergen Reactions    Codeine Palpitations         OBJECTIVE:     Vital Signs (Most Recent)  Temp: 99 °F (37.2 °C) (07/29/20 0420)  Pulse: 89 (07/29/20 0420)  Resp: 18 (07/29/20 0420)  BP: (!) 118/59 (07/29/20 0420)  SpO2: 98 % (07/29/20 0420)    Vital Signs Range (Last 24H):  Temp:  [97.8 °F (36.6 °C)-99 °F (37.2 °C)]   Pulse:  [70-95]   Resp:  [11-27]   BP: ()/(53-63)   SpO2:  [90 %-100 %]   Arterial Line BP: (103-141)/(54-65)     I & O (Last 24H):    Intake/Output Summary (Last 24 hours) at 7/29/2020 0657  Last data filed at 7/29/2020 0500  Gross per 24 hour   Intake 2664.25 ml   Output 800 ml   Net 1864.25 ml       Physical Exam:  NAD, AAOx3, nontoxic appearing  Normal respiratory effort  RRR  Abdomen soft, appropriately tender, nondistended, midline abdominal incision c/d/i  Abdominal drain with serosanguinous output  Moving all extremities    LABS  CBC  Recent Labs   Lab 07/27/20  1411 07/28/20  0607 07/29/20  0525   WBC 10.65 11.55 8.24   RBC 3.85* 4.07 3.25*   HGB 11.0* 11.6* 9.3*   HCT 34.0* 35.6* 29.3*    282 220   MCV  88 88 90   MCH 28.6 28.5 28.6   MCHC 32.4 32.6 31.7*     CMP  Recent Labs   Lab 07/24/20  1200 07/27/20  1411 07/28/20  0607    170* 336*   CALCIUM 9.1 7.9* 8.2*   ALBUMIN 3.4*  --  3.3*   PROT 7.5  --  6.6    140 136   K 3.6 3.8 4.3   CO2 29 21* 23    110 105   BUN 14 7* 5*   CREATININE 0.8 0.7 0.9   ALKPHOS 92  --  70   ALT 13  --  34   AST 15  --  32   BILITOT 0.2  --  1.0       Recent Labs   Lab 07/24/20  1200 07/27/20  1411 07/28/20  0607   CALCIUM 9.1 7.9* 8.2*   MG  --   --  2.1   PHOS  --   --  1.9*         POCT-Glucose  No results found for: POCTGLUCOSE    COAGS  No results for input(s): PT, INR, APTT in the last 168 hours.    CE  No results for input(s): TROPONINI, CPK, CPKMB in the last 168 hours.  BNP  No results for input(s): BNP in the last 168 hours.    ABGs  No results for input(s): PH, PCO2, PO2, HCO3, POCSATURATED, BE in the last 24 hours.    UA  No results for input(s): COLORU, CLARITYU, SPECGRAV, PHUR, PROTEINUA, GLUCOSEU, BILIRUBINCON, BLOODU, WBCU, RBCU, BACTERIA, MUCUS, NITRITE, LEUKOCYTESUR, UROBILINOGEN, HYALINECASTS in the last 168 hours.    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

## 2020-07-29 NOTE — PROGRESS NOTES
Notified Dr. Ram that patient has not voided post sofia removal at 2pm. Patient has no urge to void, bladder scan shows 116cc. Will check again in a few hours and straight cath for bladder scan greater than 300cc per MD.

## 2020-07-29 NOTE — PLAN OF CARE
Problem: Physical Therapy Goal  Goal: Physical Therapy Goal  Description: Goals to be met by: 2020     Patient will increase functional independence with mobility by performin. Supine to sit with Modified [place]  2. Sit to supine with Modified [place]  3. Rolling with Modified [place].  4. Sit to stand transfer with Stand-by Assistance with or without AD  5. Bed to chair transfer with Stand-by Assistance with or without AD  6. Gait  x 150 feet with Stand-by Assistance with or without AD   7. Lower extremity exercise program x10 reps per handout, with independence    Outcome: Ongoing, Progressing   Pt continues to work and progress toward established goals. Able to perform 4 sit to stand trials 3 from EOB and 1 from B/S commode with RW and min/CGA A plus extra time to complete secondary to increased pain in abdomen. Able to take ~5-6 turning steps by 2 trials with RW and min/CGA.

## 2020-07-29 NOTE — PT/OT/SLP PROGRESS
Occupational Therapy   Treatment    Name: Zohra Paulino  MRN: [id number]  Admitting Diagnosis:  <principal problem not specified>  2 Days Post-Op    Recommendations:     Discharge Recommendations: home  Discharge Equipment Recommendations:  shower chair  Barriers to discharge:  None    Assessment:     Zohra Paulino is a 70 y.o. female with a medical diagnosis of <principal problem not specified>.  She presents with performance deficits affecting function are weakness, gait instability, decreased upper extremity function, impaired endurance, impaired balance, decreased lower extremity function, decreased ROM, impaired self care skills, impaired functional mobilty, pain, impaired skin.     Rehab Prognosis:  Good; patient would benefit from acute skilled OT services to address these deficits and reach maximum level of function.       Plan:     Patient to be seen 5 x/week to address the above listed problems via self-care/home management, therapeutic activities, therapeutic exercises  · Plan of Care Expires: 08/28/20  · Plan of Care Reviewed with: patient    Subjective     Pain/Comfort:  · Pain Rating 1: 3/10  · Location - Orientation 1: generalized  · Location 1: abdomen  · Pain Addressed 1: Reposition, Distraction, Cessation of Activity  · Pain Rating Post-Intervention 1: 3/10    Objective:     Communicated with: nurse prior to session.  Patient found HOB elevated with bed alarm, peripheral IV, telemetry, SCD, oxygen upon OT entry to room.    General Precautions: Standard, fall   Orthopedic Precautions:    Braces:       Occupational Performance:     Bed Mobility:    · Patient completed Rolling/Turning to Left with  supervision  · Patient completed Scooting/Bridging with stand by assistance  · Patient completed Supine to Sit with contact guard assistance     Functional Mobility/Transfers:  · Patient completed Sit <> Stand Transfer with stand by assistance  with  no assistive device   · Functional Mobility:  NT    [facility] 6 Click ADL: 16    Treatment & Education:  Pt educated on bed level BUE/trunk/LE AROM/stretch, sleep hygiene, postural awareness, diaphragmatic breathing.    Patient left seated EOB with all lines intact, call button in reach and daughter and PTA presentEducation:      GOALS:   Multidisciplinary Problems     Occupational Therapy Goals        Problem: Occupational Therapy Goal    Goal Priority Disciplines Outcome Interventions   Occupational Therapy Goal     OT, PT/OT Ongoing, Progressing    Description: Goals to be met by: 8/28     Patient will increase functional independence with ADLs by performing:    UE Dressing with Modified [place].  LE Dressing with Modified [place].  Grooming while standing at sink with Modified [place].  Toileting from toilet with Modified [place] for hygiene and clothing management.   Toilet transfer to toilet with Modified [place].  Indep HEP                     Time Tracking:     OT Date of Treatment: 07/29/20  OT Start Time: 0948  OT Stop Time: 1012  OT Total Time (min): 24 min    Billable Minutes:Therapeutic Activity 24    Rohit Keller OT  7/29/2020

## 2020-07-29 NOTE — PT/OT/SLP PROGRESS
Physical Therapy Treatment    Patient Name:  Zohra Paulino   MRN:  [id number]    Recommendations:     Discharge Recommendations:  home   Discharge Equipment Recommendations: shower chair(Other equipment to be determined)   Barriers to discharge: None    Assessment:     Zohra Paulino is a 70 y.o. female admitted with a medical diagnosis of <principal problem not specified>.  She presents with the following impairments/functional limitations:  weakness, impaired endurance, impaired self care skills, impaired functional mobilty, gait instability, impaired balance, decreased coordination, decreased upper extremity function, decreased lower extremity function, decreased safety awareness, pain, impaired coordination, impaired skin. Pt able to perform 4 sit to stand trials with RW and min/CGA. Also able to take ~5-6 turning steps with RW and min/CGA. Stated she was dizzy after 1st standing trial with BP taken in sitting 117/58, at 2nd standing trial in standing 107/64. In sitting 132/63 and again at 3rd standing trial in standing 133/62. Would benefit from continued PT services to increase pt's out of bed therapeutic activity and exercise.    Rehab Prognosis: Good; patient would benefit from acute skilled PT services to address these deficits and reach maximum level of function.    Recent Surgery: Procedure(s) (LRB):  PANCREATECTOMY, DISTAL, SUBTOTAL (N/A) 2 Days Post-Op    Plan:     During this hospitalization, patient to be seen 6 x/week to address the identified rehab impairments via gait training, therapeutic activities, therapeutic exercises, neuromuscular re-education and progress toward the following goals:    · Plan of Care Expires:  08/28/20    Subjective     Chief Complaint: None Expressed  Patient/Family Comments/goals: Abdominal pain with movement  Pain/Comfort:  ·        Objective:     Communicated with nurse prior to session.  Patient found  sitting at EOB with OT with bed alarm, peripheral IV,  "oxygen, SCD upon PT entry to room.     General Precautions: Standard, fall   Orthopedic Precautions:N/A   Braces: N/A     Functional Mobility:  · Transfers:     · Sit to Stand:  contact guard assistance, minimum assistance and  Extra time to complete with rolling walker  · Gait:  ~5-6 turning steps x 2 trials from EOB to B/S commode then again from B/S commode to B/S chair with RW and min/CGA      AM-PAC 6 CLICK MOBILITY  Turning over in bed (including adjusting bedclothes, sheets and blankets)?: 3  Sitting down on and standing up from a chair with arms (e.g., wheelchair, bedside commode, etc.): 3  Moving from lying on back to sitting on the side of the bed?: 3  Moving to and from a bed to a chair (including a wheelchair)?: 3  Need to walk in hospital room?: 3  Climbing 3-5 steps with a railing?: 1  Basic Mobility Total Score: 16       Therapeutic Activities and Exercises:   Pt sitting at EOB with OT and daughter present. Able to perform seated therapeutic exercises 1 x 10 reps BLE's consisting of hip add/abd and LAQ's. Also   1 x 10 reps of glut isometrics. Able to complete 4 sit to stand transfers with RW (which pt requested to use in attempts to ease abdominal pain with movement) all with Min/CGA plus extra time to complete. Required constant verbal/tactile cueing to decrease trunk flexion. Pt stating, "It hurts". Referencing to abdominal pain. Stated she felt dizzy after 1st stand. BP taken in sitting and throughout standing trials. Please see assessment note above for all readings. Able to perform ~5-6 turning steps form EOB to B/S commode. Pt unable to urinate. Able to take an additional ~5-6 turning steps from B/S commode to B/S chair both trials with RW and min/CGA. Slow gaudencio secondary to abdominal pain with verbal/tactile cueing to increase trunk flexion.     Patient left up in chair with all lines intact, call button in reach, chair alarm on,  nurse notified and  daughter present..    GOALS: "   Multidisciplinary Problems     Physical Therapy Goals        Problem: Physical Therapy Goal    Goal Priority Disciplines Outcome Goal Variances Interventions   Physical Therapy Goal     PT, PT/OT Ongoing, Progressing     Description: Goals to be met by: 2020     Patient will increase functional independence with mobility by performin. Supine to sit with Modified [place]  2. Sit to supine with Modified [place]  3. Rolling with Modified [place].  4. Sit to stand transfer with Stand-by Assistance with or without AD  5. Bed to chair transfer with Stand-by Assistance with or without AD  6. Gait  x 150 feet with Stand-by Assistance with or without AD   7. Lower extremity exercise program x10 reps per handout, with independence                     Time Tracking:     PT Received On: 20  PT Start Time: 1002     PT Stop Time: 1055  PT Total Time (min): 53 min     Billable Minutes: Gait Training  15, Therapeutic Activity  23 and Therapeutic Exercise  15    Treatment Type: Treatment  PT/PTA: PTA     PTA Visit Number: 1     Nasreen Turner, PTA  2020

## 2020-07-29 NOTE — PROGRESS NOTES
NET Team Rounds     Diagnosis this admission: Adenocarcinoma of pancreas [C25.9]    Admit Date: 7/27/2020   Discharge Date: TBD     Surgery/Procedure: Procedure(s):  7/27/20--PANCREATECTOMY, DISTAL, SUBTOTAL    NET Physician:  GILMA Ellis MD  Local Treating Physician:Vicenta Garber MD      Assessment  Diet: Clear Liquid,    Nutrition Support -  none  Appetite - poor  Nausea - No  Vomiting- No  BM- no bowel movements, - flatus   Abdomen- soft, nondistended and tender  Incision- no drainage, no swelling  Drain-  Krunal-Marroquin drain with closed bulb suction in the left abdomen, Sero-Sanguinous   Temp-last 24 hrs - Temp:  [97.8 °F (36.6 °C)-100.9 °F (38.3 °C)]    Urine-  voiding without difficulty  Activity-  up with assistance, working with PT  Pain-level of pain - 4/10- abd- PCA in place   Respiratory- encourage cough/deep breath/IS     IV Access- Central  & peripheral IV  Edema: none    Labs:   Recent Labs   Lab 07/24/20  1200  07/27/20  1411 07/28/20  0607 07/29/20  0525   WBC 6.20  --  10.65 11.55 8.24   HGB 11.2*  --  11.0* 11.6* 9.3*   HCT 35.7*   < > 34.0* 35.6* 29.3*     --  240 282 220   MCV 90  --  88 88 90   RDW 14.8*  --  14.8* 14.7* 15.2*     --  140 136 137   K 3.6  --  3.8 4.3 4.4     --  110 105 107   CO2 29  --  21* 23 28   BUN 14  --  7* 5* 10   CREATININE 0.8  --  0.7 0.9 1.6*     --  170* 336* 171*   PROT 7.5  --   --  6.6 5.9*   ALBUMIN 3.4*  --   --  3.3* 3.1*   BILITOT 0.2  --   --  1.0 0.7   AST 15  --   --  32 23   ALKPHOS 92  --   --  70 56   ALT 13  --   --  34 24    < > = values in this interval not displayed.        Scheduled Meds   enoxaparin  40 mg Subcutaneous Q24H    iron sucrose (VENOFER) IVPB  100 mg Intravenous Daily    ketorolac  9.999 mg Intravenous Q6H    pregabalin  75 mg Oral BID    tamsulosin  0.4 mg Oral Daily       Continuous Infusion   dextrose 5 % and 0.45 % NaCl with KCl 20 mEq 100 mL/hr at 07/28/20 1949    hydromorphone in  0.9 % NaCl 6 mg/30 ml      octreotide infusion (50 mcg/mL) 250 mcg/hr (07/28/20 1949)       PRN Meds  albuterol sulfate, diphenhydrAMINE, hydrALAZINE, labetalol, naloxone, ondansetron     Team Round Findings:   - octreotide drip at 250 mcg/hr  - dizziness this morning when sitting -with therapy  - on CLs now, no flatus  -on PCA & O2       Discharge Planning   Appointments- TBD  Home Health needs- TBD  Therapy needs- TBD  Nutritional needs - TBD  Home support system- son lives with patient, daughter - works here  Barriers- none    Reviewed follow up plan.  Patient verbalized understanding.    ___________________________________________  BROOKE MajanoN, RN, ONN-  Nurse Navigator [facility]    Tracie Weil Cavalier, RDN, LDN, CNSC  Registered Dietitian [facility]      Face to Face Time: 15 minutes

## 2020-07-29 NOTE — PLAN OF CARE
Pt progressing to goals. Cont POC      Problem: Occupational Therapy Goal  Goal: Occupational Therapy Goal  Description: Goals to be met by: 8/28     Patient will increase functional independence with ADLs by performing:    UE Dressing with Modified [place].  LE Dressing with Modified [place].  Grooming while standing at sink with Modified [place].  Toileting from toilet with Modified [place] for hygiene and clothing management.   Toilet transfer to toilet with Modified [place].  Indep HEP    Outcome: Ongoing, Progressing

## 2020-07-29 NOTE — PLAN OF CARE
Pt remains A+Ox4. IVF and IV layne to TLC per orders, dressing CDI. PCA in use, pt c/o mild pain to abd, well managed. Incision remains CDI. Wang catheter placed this PM r/t retention, bladder scan 457mLs. Moving well with PT. Tolerating CLD well with no complaints of nausea. MANDY drain with scant output. Safety precautions maintained, pt compliant with fall precautions.

## 2020-07-30 LAB
ALBUMIN SERPL BCP-MCNC: 2.9 G/DL (ref 3.5–5.2)
ALP SERPL-CCNC: 75 U/L (ref 55–135)
ALT SERPL W/O P-5'-P-CCNC: 22 U/L (ref 10–44)
AMYLASE, BODY FLUID: 907 U/L
ANION GAP SERPL CALC-SCNC: 4 MMOL/L (ref 8–16)
AST SERPL-CCNC: 19 U/L (ref 10–40)
BASOPHILS # BLD AUTO: 0.01 K/UL (ref 0–0.2)
BASOPHILS NFR BLD: 0.3 % (ref 0–1.9)
BILIRUB SERPL-MCNC: 0.6 MG/DL (ref 0.1–1)
BODY FLUID SOURCE AMYLASE: NORMAL
BUN SERPL-MCNC: 11 MG/DL (ref 8–23)
CALCIUM SERPL-MCNC: 7.9 MG/DL (ref 8.7–10.5)
CHLORIDE SERPL-SCNC: 108 MMOL/L (ref 95–110)
CO2 SERPL-SCNC: 25 MMOL/L (ref 23–29)
CREAT SERPL-MCNC: 1.7 MG/DL (ref 0.5–1.4)
DIFFERENTIAL METHOD: ABNORMAL
EOSINOPHIL # BLD AUTO: 0.3 K/UL (ref 0–0.5)
EOSINOPHIL NFR BLD: 7.9 % (ref 0–8)
ERYTHROCYTE [DISTWIDTH] IN BLOOD BY AUTOMATED COUNT: 15.1 % (ref 11.5–14.5)
EST. GFR  (AFRICAN AMERICAN): 35 ML/MIN/1.73 M^2
EST. GFR  (NON AFRICAN AMERICAN): 30 ML/MIN/1.73 M^2
FINAL PATHOLOGIC DIAGNOSIS: NORMAL
FROZEN SECTION DIAGNOSIS: NORMAL
FROZEN SECTION FOOTNOTE: NORMAL
GLUCOSE SERPL-MCNC: 183 MG/DL (ref 70–110)
GROSS: NORMAL
HCT VFR BLD AUTO: 29.2 % (ref 37–48.5)
HGB BLD-MCNC: 9.1 G/DL (ref 12–16)
IMM GRANULOCYTES # BLD AUTO: 0.01 K/UL (ref 0–0.04)
IMM GRANULOCYTES NFR BLD AUTO: 0.3 % (ref 0–0.5)
LYMPHOCYTES # BLD AUTO: 0.8 K/UL (ref 1–4.8)
LYMPHOCYTES NFR BLD: 21.3 % (ref 18–48)
MAGNESIUM SERPL-MCNC: 2 MG/DL (ref 1.6–2.6)
MCH RBC QN AUTO: 28.1 PG (ref 27–31)
MCHC RBC AUTO-ENTMCNC: 31.2 G/DL (ref 32–36)
MCV RBC AUTO: 90 FL (ref 82–98)
MONOCYTES # BLD AUTO: 0.4 K/UL (ref 0.3–1)
MONOCYTES NFR BLD: 12 % (ref 4–15)
NEUTROPHILS # BLD AUTO: 2.1 K/UL (ref 1.8–7.7)
NEUTROPHILS NFR BLD: 58.2 % (ref 38–73)
NRBC BLD-RTO: 0 /100 WBC
PHOSPHATE SERPL-MCNC: 4.5 MG/DL (ref 2.7–4.5)
PLATELET # BLD AUTO: 223 K/UL (ref 150–350)
PMV BLD AUTO: 11.4 FL (ref 9.2–12.9)
POTASSIUM SERPL-SCNC: 4.4 MMOL/L (ref 3.5–5.1)
PROT SERPL-MCNC: 6.1 G/DL (ref 6–8.4)
RBC # BLD AUTO: 3.24 M/UL (ref 4–5.4)
SODIUM SERPL-SCNC: 137 MMOL/L (ref 136–145)
WBC # BLD AUTO: 3.67 K/UL (ref 3.9–12.7)

## 2020-07-30 PROCEDURE — 94761 N-INVAS EAR/PLS OXIMETRY MLT: CPT | Mod: HCNC

## 2020-07-30 PROCEDURE — 94770 HC EXHALED C02 TEST: CPT | Mod: HCNC

## 2020-07-30 PROCEDURE — 80053 COMPREHEN METABOLIC PANEL: CPT | Mod: HCNC

## 2020-07-30 PROCEDURE — 97110 THERAPEUTIC EXERCISES: CPT | Mod: HCNC

## 2020-07-30 PROCEDURE — 25000003 PHARM REV CODE 250: Mod: HCNC | Performed by: STUDENT IN AN ORGANIZED HEALTH CARE EDUCATION/TRAINING PROGRAM

## 2020-07-30 PROCEDURE — 82150 ASSAY OF AMYLASE: CPT | Mod: HCNC

## 2020-07-30 PROCEDURE — 63600175 PHARM REV CODE 636 W HCPCS: Mod: HCNC | Performed by: STUDENT IN AN ORGANIZED HEALTH CARE EDUCATION/TRAINING PROGRAM

## 2020-07-30 PROCEDURE — 97535 SELF CARE MNGMENT TRAINING: CPT | Mod: HCNC

## 2020-07-30 PROCEDURE — 97530 THERAPEUTIC ACTIVITIES: CPT | Mod: HCNC,CQ

## 2020-07-30 PROCEDURE — 99900035 HC TECH TIME PER 15 MIN (STAT): Mod: HCNC

## 2020-07-30 PROCEDURE — 85025 COMPLETE CBC W/AUTO DIFF WBC: CPT | Mod: HCNC

## 2020-07-30 PROCEDURE — 11000001 HC ACUTE MED/SURG PRIVATE ROOM: Mod: HCNC

## 2020-07-30 PROCEDURE — 25000003 PHARM REV CODE 250: Mod: HCNC | Performed by: SURGERY

## 2020-07-30 PROCEDURE — 83735 ASSAY OF MAGNESIUM: CPT | Mod: HCNC

## 2020-07-30 PROCEDURE — 84100 ASSAY OF PHOSPHORUS: CPT | Mod: HCNC

## 2020-07-30 RX ORDER — TRAMADOL HYDROCHLORIDE 50 MG/1
50 TABLET ORAL EVERY 6 HOURS PRN
Status: DISCONTINUED | OUTPATIENT
Start: 2020-07-30 | End: 2020-08-04 | Stop reason: HOSPADM

## 2020-07-30 RX ORDER — ACETAMINOPHEN 325 MG/1
650 TABLET ORAL EVERY 6 HOURS PRN
Status: DISCONTINUED | OUTPATIENT
Start: 2020-07-30 | End: 2020-07-31

## 2020-07-30 RX ORDER — METHOCARBAMOL 100 MG/ML
500 INJECTION, SOLUTION INTRAMUSCULAR; INTRAVENOUS EVERY 8 HOURS
Status: COMPLETED | OUTPATIENT
Start: 2020-07-30 | End: 2020-08-02

## 2020-07-30 RX ORDER — SIMETHICONE 125 MG
125 TABLET,CHEWABLE ORAL EVERY 6 HOURS PRN
Status: DISCONTINUED | OUTPATIENT
Start: 2020-07-30 | End: 2020-08-04 | Stop reason: HOSPADM

## 2020-07-30 RX ADMIN — METHOCARBAMOL 500 MG: 1000 INJECTION, SOLUTION INTRAMUSCULAR; INTRAVENOUS at 10:07

## 2020-07-30 RX ADMIN — PREGABALIN 75 MG: 75 CAPSULE ORAL at 09:07

## 2020-07-30 RX ADMIN — SODIUM CHLORIDE 500 ML: 0.9 INJECTION, SOLUTION INTRAVENOUS at 09:07

## 2020-07-30 RX ADMIN — METHOCARBAMOL 500 MG: 1000 INJECTION, SOLUTION INTRAMUSCULAR; INTRAVENOUS at 02:07

## 2020-07-30 RX ADMIN — TRAMADOL HYDROCHLORIDE 50 MG: 50 TABLET, COATED ORAL at 05:07

## 2020-07-30 RX ADMIN — POTASSIUM CHLORIDE, DEXTROSE MONOHYDRATE AND SODIUM CHLORIDE: 150; 5; 450 INJECTION, SOLUTION INTRAVENOUS at 02:07

## 2020-07-30 RX ADMIN — OCTREOTIDE ACETATE 125 MCG/HR: 1000 INJECTION, SOLUTION INTRAVENOUS; SUBCUTANEOUS at 09:07

## 2020-07-30 RX ADMIN — PREGABALIN 75 MG: 75 CAPSULE ORAL at 08:07

## 2020-07-30 RX ADMIN — POTASSIUM CHLORIDE, DEXTROSE MONOHYDRATE AND SODIUM CHLORIDE: 150; 5; 450 INJECTION, SOLUTION INTRAVENOUS at 05:07

## 2020-07-30 RX ADMIN — ENOXAPARIN SODIUM 40 MG: 40 INJECTION SUBCUTANEOUS at 05:07

## 2020-07-30 RX ADMIN — IRON SUCROSE 100 MG: 20 INJECTION, SOLUTION INTRAVENOUS at 09:07

## 2020-07-30 RX ADMIN — TAMSULOSIN HYDROCHLORIDE 0.4 MG: 0.4 CAPSULE ORAL at 09:07

## 2020-07-30 RX ADMIN — SIMETHICONE 125 MG: 125 TABLET, CHEWABLE ORAL at 02:07

## 2020-07-30 RX ADMIN — ACETAMINOPHEN 650 MG: 325 TABLET ORAL at 11:07

## 2020-07-30 NOTE — PLAN OF CARE
AAOx4. Pain controlled by PCA dilaudid. Tolerating clear liquid diet. IVF @ 100 ml/hr. Jeanine @ 5 ml/hr. Midline incision well approximated w/dermabond. L MANDY drain intact, scant output. Wang intact. Bed locked in lowest position, bed alarm set and call bell within reach. Will continue to monitor.

## 2020-07-30 NOTE — PT/OT/SLP PROGRESS
"Physical Therapy Treatment    Patient Name:  Zohra Paulino   MRN:  [id number]    Recommendations:     Discharge Recommendations:  home   Discharge Equipment Recommendations: shower chair, other (see comments)(TBD)   Barriers to discharge: None    Assessment:     Zohra Paulino is a 70 y.o. female admitted with a medical diagnosis of <principal problem not specified>.  She presents with the following impairments/functional limitations:  weakness, impaired endurance, impaired self care skills, impaired functional mobilty, gait instability, impaired balance, decreased coordination, decreased upper extremity function, decreased lower extremity function, decreased safety awareness, pain, impaired skin, impaired coordination Patient tolerated treatment well focusing on bed mobility and transfers. Patient will continue to improve with skilled physical therapy services in order to return to functional baseline.    Rehab Prognosis: Good; patient would benefit from acute skilled PT services to address these deficits and reach maximum level of function.    Recent Surgery: Procedure(s) (LRB):  PANCREATECTOMY, DISTAL, SUBTOTAL (N/A) 3 Days Post-Op    Plan:     During this hospitalization, patient to be seen 6 x/week to address the identified rehab impairments via gait training, therapeutic activities, therapeutic exercises, neuromuscular re-education and progress toward the following goals:    · Plan of Care Expires:  08/28/20    Subjective     Chief Complaint: abdominal pain with coughing  Patient/Family Comments/goals: "I'll sit up for a while"  Pain/Comfort:  · Pain Rating 1: other (see comments)(0/10 at rest, 8/10 with coughing)  · Location - Side 1: Bilateral  · Location - Orientation 1: generalized  · Location 1: abdomen  · Pain Addressed 1: Distraction, Pre-medicate for activity  · Pain Rating Post-Intervention 1: other (see comments)(none mentioned "i'm alright")      Objective:     Communicated with nursing " prior to session.  Patient found HOB elevated with SCD, peripheral IV, bed alarm upon PT entry to room.     General Precautions: Standard, fall   Orthopedic Precautions:N/A   Braces:       Functional Mobility:  · Bed Mobility:     · Scooting: contact guard assistance  · Supine to Sit: moderate assistance  · Transfers:     · Sit to Stand:  contact guard assistance with rolling walker  · Bed to Chair: contact guard assistance with  rolling walker  using  Stand Pivot  · Gait: deferred on this date due to increased dizziness/fatigue with standing  · Balance: seated EOB S, static stand SBA no LOB noted      AM-PAC 6 CLICK MOBILITY  Turning over in bed (including adjusting bedclothes, sheets and blankets)?: 3  Sitting down on and standing up from a chair with arms (e.g., wheelchair, bedside commode, etc.): 3  Moving from lying on back to sitting on the side of the bed?: 3  Moving to and from a bed to a chair (including a wheelchair)?: 3  Need to walk in hospital room?: 3  Climbing 3-5 steps with a railing?: 1  Basic Mobility Total Score: 16       Therapeutic Activities and Exercises:  Patient educated on importance of increased time out of bed and EDSON throughout the day, including AP, LAQ, HF, abd/add, GS rest breaks as needed  discussed/educated on home environment safety, DME needs and HHPT pt verbalized understanding all questions answered within PTA scope of practice and all other questions directed to appropriate persons    Patient left up in chair with call button in reach, nursing notified and daughter present..    GOALS:   Multidisciplinary Problems     Physical Therapy Goals        Problem: Physical Therapy Goal    Goal Priority Disciplines Outcome Goal Variances Interventions   Physical Therapy Goal     PT, PT/OT Ongoing, Progressing     Description: Goals to be met by: 2020     Patient will increase functional independence with mobility by performin. Supine to sit with Modified [place]  2. Sit  to supine with Modified [place]  3. Rolling with Modified [place].  4. Sit to stand transfer with Stand-by Assistance with or without AD  5. Bed to chair transfer with Stand-by Assistance with or without AD  6. Gait  x 150 feet with Stand-by Assistance with or without AD   7. Lower extremity exercise program x10 reps per handout, with independence                     Time Tracking:     PT Received On: 07/30/20  PT Start Time: 1113     PT Stop Time: 1130  PT Total Time (min): 17 min     Billable Minutes: Therapeutic Activity 17    Treatment Type: Treatment  PT/PTA: PTA     PTA Visit Number: 2     Adia Tellez, PTA  07/30/2020

## 2020-07-30 NOTE — PROGRESS NOTES
NET Team Rounds    Diagnosis this admission: Adenocarcinoma of pancreas [C25.9]    Admit Date: 7/27/2020   Discharge Date: TBD     Surgery/Procedure: Procedure(s):  7/27/20--PANCREATECTOMY, DISTAL, SUBTOTAL, spleen preserving    NET Physician:  GILMA Ellis MD  Local Treating Physician:Vicenta Garber MD      Assessment  Diet: Regular,  Sipping on water - advance as tolerated  Nutrition Support -  none  Appetite - poor  Nausea - No  Vomiting- No  BM- x2 soft, loose - today   Abdomen- soft, nondistended and tender  Incision- no drainage, no swelling  Drain-  Krunal-Marroquin drain with closed bulb suction in the left abdomen, Sero-Sanguinous   Temp-last 24 hrs - Temp:  [98.2 °F (36.8 °C)-99 °F (37.2 °C)]    Urine-  Wang in place  Activity-  up with assistance, working with PT  Pain-level of pain - 3/10- slight headache-   Respiratory- encourage cough/deep breath/IS     IV Access- Central  & peripheral IV- right hand  Edema: none    Wt Readings from Last 3 Encounters:   07/29/20 79.1 kg (174 lb 6.1 oz)   07/24/20 75.4 kg (166 lb 5.4 oz)   07/14/20 75 kg (165 lb 7.3 oz)       Labs:   Recent Labs   Lab 07/28/20  0607 07/29/20  0525 07/30/20  0352   WBC 11.55 8.24 3.67*   HGB 11.6* 9.3* 9.1*   HCT 35.6* 29.3* 29.2*    220 223   MCV 88 90 90   RDW 14.7* 15.2* 15.1*    137 137   K 4.3 4.4 4.4    107 108   CO2 23 28 25   BUN 5* 10 11   CREATININE 0.9 1.6* 1.7*   * 171* 183*   PROT 6.6 5.9* 6.1   ALBUMIN 3.3* 3.1* 2.9*   BILITOT 1.0 0.7 0.6   AST 32 23 19   ALKPHOS 70 56 75   ALT 34 24 22        Scheduled Meds   enoxaparin  40 mg Subcutaneous Q24H    iron sucrose (VENOFER) IVPB  100 mg Intravenous Daily    methocarbamoL  500 mg Intravenous Q8H    pregabalin  75 mg Oral BID    tamsulosin  0.4 mg Oral Daily       Continuous Infusion   dextrose 5 % and 0.45 % NaCl with KCl 20 mEq 50 mL/hr at 07/30/20 0740    octreotide infusion (50 mcg/mL) 250 mcg/hr (07/29/20 7499)       PRN  Meds  acetaminophen, albuterol sulfate, diphenhydrAMINE, hydrALAZINE, labetalol, naloxone, ondansetron, traMADoL     Team Round Findings:   - octreotide drip at 250 mcg/hr  -PCA d/c'ed  -advancing diet   -sofia replaced yesterday     Discharge Planning   Appointments- TBD  Home Health needs- TBD  Therapy needs- TBD  Nutritional needs - TBD  Home support system- son lives with patient, daughter - works here  Barriers- none    Reviewed follow up plan.  Patient verbalized understanding.    ___________________________________________  BROOKE MajanoN, RN, ONN-CG  Nurse Navigator [facility]    Tracie Weil Cavalier, RDN, LDN, CNSC  Registered Dietitian [facility]      Face to Face Time: 15 minutes

## 2020-07-30 NOTE — PLAN OF CARE
Problem: Occupational Therapy Goal  Goal: Occupational Therapy Goal  Description: Goals to be met by: 8/28     Patient will increase functional independence with ADLs by performing:    UE Dressing with Modified [place].  LE Dressing with Modified [place].  Grooming while standing at sink with Modified [place].  Toileting from toilet with Modified [place] for hygiene and clothing management.   Toilet transfer to toilet with Modified [place].  Indep HEP    Outcome: Ongoing, Progressing

## 2020-07-30 NOTE — PROGRESS NOTES
Progress Note    Admit Date: 7/27/2020   LOS: 3 days   POD # 3 s/p open spleen preserving distal pancreatectomy 7/27/2020    SUBJECTIVE:     No acute events overnight.  Pain controlled on current regimen.  No nausea or vomiting.  No fevers or chills.  Had a bowel movement.  Wang replaced yesterday.     Scheduled Meds:   enoxaparin  40 mg Subcutaneous Q24H    iron sucrose (VENOFER) IVPB  100 mg Intravenous Daily    methocarbamoL  500 mg Intravenous Q8H    pregabalin  75 mg Oral BID    sodium chloride 0.9%  500 mL Intravenous Once    tamsulosin  0.4 mg Oral Daily     Continuous Infusions:   dextrose 5 % and 0.45 % NaCl with KCl 20 mEq 100 mL/hr at 07/30/20 0545    octreotide infusion (50 mcg/mL) 250 mcg/hr (07/29/20 2258)     PRN Meds:acetaminophen, albuterol sulfate, diphenhydrAMINE, hydrALAZINE, labetalol, naloxone, ondansetron, traMADoL    Review of patient's allergies indicates:   Allergen Reactions    Codeine Palpitations         OBJECTIVE:     Vital Signs (Most Recent)  Temp: 98.6 °F (37 °C) (07/30/20 0512)  Pulse: 86 (07/30/20 0512)  Resp: 18 (07/30/20 0512)  BP: (!) 128/59 (07/30/20 0512)  SpO2: 95 % (07/29/20 2050)    Vital Signs Range (Last 24H):  Temp:  [98.2 °F (36.8 °C)-99 °F (37.2 °C)]   Pulse:  [78-92]   Resp:  [15-18]   BP: (105-133)/(55-61)   SpO2:  [92 %-100 %]     I & O (Last 24H):    Intake/Output Summary (Last 24 hours) at 7/30/2020 0738  Last data filed at 7/30/2020 0546  Gross per 24 hour   Intake 3300.5 ml   Output 1550 ml   Net 1750.5 ml       Physical Exam:  NAD, AAOx3, nontoxic appearing  Normal respiratory effort  RRR  Abdomen soft, appropriately tender, nondistended, midline abdominal incision c/d/i  Abdominal drain with serosanguinous output  Moving all extremities    LABS  CBC  Recent Labs   Lab 07/28/20  0607 07/29/20  0525 07/30/20  0352   WBC 11.55 8.24 3.67*   RBC 4.07 3.25* 3.24*   HGB 11.6* 9.3* 9.1*   HCT 35.6* 29.3* 29.2*    220 223   MCV 88 90 90   MCH 28.5 28.6  28.1   MCHC 32.6 31.7* 31.2*     CMP  Recent Labs   Lab 07/28/20  0607 07/29/20  0525 07/30/20  0352   * 171* 183*   CALCIUM 8.2* 7.8* 7.9*   ALBUMIN 3.3* 3.1* 2.9*   PROT 6.6 5.9* 6.1    137 137   K 4.3 4.4 4.4   CO2 23 28 25    107 108   BUN 5* 10 11   CREATININE 0.9 1.6* 1.7*   ALKPHOS 70 56 75   ALT 34 24 22   AST 32 23 19   BILITOT 1.0 0.7 0.6       Recent Labs   Lab 07/28/20  0607 07/29/20  0525 07/30/20  0352   CALCIUM 8.2* 7.8* 7.9*   MG 2.1 2.0 2.0   PHOS 1.9* 1.4* 4.5         POCT-Glucose  No results found for: POCTGLUCOSE    COAGS  No results for input(s): PT, INR, APTT in the last 168 hours.    CE  No results for input(s): TROPONINI, CPK, CPKMB in the last 168 hours.  BNP  No results for input(s): BNP in the last 168 hours.    ABGs  No results for input(s): PH, PCO2, PO2, HCO3, POCSATURATED, BE in the last 24 hours.    UA  No results for input(s): COLORU, CLARITYU, SPECGRAV, PHUR, PROTEINUA, GLUCOSEU, BILIRUBINCON, BLOODU, WBCU, RBCU, BACTERIA, MUCUS, NITRITE, LEUKOCYTESUR, UROBILINOGEN, HYALINECASTS in the last 168 hours.    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

## 2020-07-30 NOTE — PT/OT/SLP PROGRESS
Occupational Therapy   Treatment    Name: Zohra Paulino  MRN: [id number]  Admitting Diagnosis:  <principal problem not specified>  3 Days Post-Op  Diagnoses of Pancreas cancer and Malignant neoplasm of body of pancreas were pertinent to this visit.  Pre-op Diagnosis: Adenocarcinoma of pancreas [C25.9] s/p Procedure(s):  PANCREATECTOMY, DISTAL, SUBTOTAL   Recommendations:     Discharge Recommendations: home  Discharge Equipment Recommendations:  shower chair  Barriers to discharge:  None    Assessment:     Zohra Paulino is a 70 y.o. female with a medical diagnosis of <principal problem not specified>.  She presents with Performance deficits affecting function are weakness, impaired endurance, impaired self care skills, impaired functional mobilty, gait instability, pain, impaired skin.     Pt. moves slowly and guarded with transfers and ambulation with Rw but no LOB 2/2 pain/anticipation of pain in abdominal incisional area.  Continue with OT POC.    Rehab Prognosis:  Good; patient would benefit from acute skilled OT services to address these deficits and reach maximum level of function.       Plan:     Patient to be seen 5 x/week to address the above listed problems via self-care/home management, therapeutic activities, therapeutic exercises  · Plan of Care Expires: 08/28/20  · Plan of Care Reviewed with: patient, son    Subjective     Pain/Comfort:  · Pain Rating 1: 5/10(4-5)  · Location - Side 1: Bilateral  · Location - Orientation 1: generalized  · Location 1: abdomen  · Pain Addressed 1: Pre-medicate for activity, Reposition, Distraction, Cessation of Activity  · Pain Rating Post-Intervention 1: 5/10(same)    Objective:     Communicated with: nurse prior to session.  Patient found up in chair with MANDY drain, sofia catheter, peripheral IV upon OT entry to room.    General Precautions: Standard, fall   Orthopedic Precautions:N/A   Braces: N/A     Occupational Performance:     Functional  Mobility/Transfers:  · Patient completed Sit <> Stand Transfer with contact guard assistance and minimum assistance  with  rolling walker   · Functional Mobility: ambulated slowly and guarded in pain/anticipated pain with CGA with RW short distance in room to sink and back to bed with 2 sitting rest breaks on bed en route to sink 2/2 feeling general weakness in legs.    Activities of Daily Living:  · Grooming: contact guard assistance washed face and hands standing at sink with Rw, declined brushing teeth 2/2 already did prior to OT      [facility] 6 Click ADL: 17    Treatment & Education:  --Purpose of OT visit explained to pt and son and verbalized understanding.  --BUE AROM ex for endurance and stretching:  10 x 1 set: shld flex/ext,chest press, hor abd./add, shld circles, elbow flex/ext with supervision seated BS chair.    Patient left up in chair with all lines intact, call button in reach and son presentEducation:      GOALS:   Multidisciplinary Problems     Occupational Therapy Goals        Problem: Occupational Therapy Goal    Goal Priority Disciplines Outcome Interventions   Occupational Therapy Goal     OT, PT/OT Ongoing, Progressing    Description: Goals to be met by: 8/28     Patient will increase functional independence with ADLs by performing:    UE Dressing with Modified [place].  LE Dressing with Modified [place].  Grooming while standing at sink with Modified [place].  Toileting from toilet with Modified [place] for hygiene and clothing management.   Toilet transfer to toilet with Modified [place].  Indep HEP                     Time Tracking:     OT Date of Treatment: 07/30/20  OT Start Time: 1620  OT Stop Time: 1645  OT Total Time (min): 25 min    Billable Minutes:Self Care/Home Management 15  Therapeutic Exercise 10  Total Time 25    Melany Lopez OT  7/30/2020

## 2020-07-30 NOTE — PLAN OF CARE
TN met with pt and pt's daughter Danny for continued d/c planning. Pt's daughter states patient's two sons will be helping patient at home upon d/c. Currently PT/OT recs are home and SC. Pt's agreeable to be billed for price of SC which is $65 plus tax. Pt prefers bedside delivery on d/c.CM will continue to follow for d/c planning.    This  put name on white board and explained blue discharge folder to patient. Discharge planning brochure and/or business card given to patient.  Patient verbalized understanding.       07/30/20 0849   Discharge Reassessment   Assessment Type Discharge Planning Reassessment   Provided patient/caregiver education on the expected discharge date and the discharge plan Yes   Do you have any problems affording any of your prescribed medications? No   Discharge Plan A Home with family   Discharge Plan B Home Health   DME Needed Upon Discharge  shower chair

## 2020-07-31 LAB
ALBUMIN SERPL BCP-MCNC: 2.9 G/DL (ref 3.5–5.2)
ALP SERPL-CCNC: 177 U/L (ref 55–135)
ALT SERPL W/O P-5'-P-CCNC: 17 U/L (ref 10–44)
ANION GAP SERPL CALC-SCNC: 5 MMOL/L (ref 8–16)
AST SERPL-CCNC: 18 U/L (ref 10–40)
BASOPHILS NFR BLD: 1 % (ref 0–1.9)
BILIRUB SERPL-MCNC: 0.6 MG/DL (ref 0.1–1)
BUN SERPL-MCNC: 9 MG/DL (ref 8–23)
CALCIUM SERPL-MCNC: 8.2 MG/DL (ref 8.7–10.5)
CHLORIDE SERPL-SCNC: 109 MMOL/L (ref 95–110)
CO2 SERPL-SCNC: 25 MMOL/L (ref 23–29)
CREAT SERPL-MCNC: 1.6 MG/DL (ref 0.5–1.4)
DIFFERENTIAL METHOD: ABNORMAL
EOSINOPHIL NFR BLD: 7 % (ref 0–8)
ERYTHROCYTE [DISTWIDTH] IN BLOOD BY AUTOMATED COUNT: 15.3 % (ref 11.5–14.5)
EST. GFR  (AFRICAN AMERICAN): 37 ML/MIN/1.73 M^2
EST. GFR  (NON AFRICAN AMERICAN): 32 ML/MIN/1.73 M^2
GLUCOSE SERPL-MCNC: 151 MG/DL (ref 70–110)
HCT VFR BLD AUTO: 28.7 % (ref 37–48.5)
HGB BLD-MCNC: 9 G/DL (ref 12–16)
HYPOCHROMIA BLD QL SMEAR: ABNORMAL
IMM GRANULOCYTES # BLD AUTO: ABNORMAL K/UL (ref 0–0.04)
IMM GRANULOCYTES NFR BLD AUTO: ABNORMAL % (ref 0–0.5)
LYMPHOCYTES NFR BLD: 21 % (ref 18–48)
MAGNESIUM SERPL-MCNC: 1.9 MG/DL (ref 1.6–2.6)
MCH RBC QN AUTO: 28.2 PG (ref 27–31)
MCHC RBC AUTO-ENTMCNC: 31.4 G/DL (ref 32–36)
MCV RBC AUTO: 90 FL (ref 82–98)
MONOCYTES NFR BLD: 5 % (ref 4–15)
NEUTROPHILS NFR BLD: 57 % (ref 38–73)
NEUTS BAND NFR BLD MANUAL: 9 %
NRBC BLD-RTO: 0 /100 WBC
PHOSPHATE SERPL-MCNC: 2 MG/DL (ref 2.7–4.5)
PLATELET # BLD AUTO: 233 K/UL (ref 150–350)
PMV BLD AUTO: 11.7 FL (ref 9.2–12.9)
POLYCHROMASIA BLD QL SMEAR: ABNORMAL
POTASSIUM SERPL-SCNC: 4.4 MMOL/L (ref 3.5–5.1)
PROT SERPL-MCNC: 6.3 G/DL (ref 6–8.4)
RBC # BLD AUTO: 3.19 M/UL (ref 4–5.4)
SODIUM SERPL-SCNC: 139 MMOL/L (ref 136–145)
WBC # BLD AUTO: 4.96 K/UL (ref 3.9–12.7)

## 2020-07-31 PROCEDURE — 97530 THERAPEUTIC ACTIVITIES: CPT | Mod: HCNC,CQ

## 2020-07-31 PROCEDURE — 80053 COMPREHEN METABOLIC PANEL: CPT | Mod: HCNC

## 2020-07-31 PROCEDURE — 25000003 PHARM REV CODE 250: Mod: HCNC | Performed by: SURGERY

## 2020-07-31 PROCEDURE — 25000003 PHARM REV CODE 250: Mod: HCNC | Performed by: STUDENT IN AN ORGANIZED HEALTH CARE EDUCATION/TRAINING PROGRAM

## 2020-07-31 PROCEDURE — 82150 ASSAY OF AMYLASE: CPT | Mod: HCNC

## 2020-07-31 PROCEDURE — 83735 ASSAY OF MAGNESIUM: CPT | Mod: HCNC

## 2020-07-31 PROCEDURE — 85027 COMPLETE CBC AUTOMATED: CPT | Mod: HCNC

## 2020-07-31 PROCEDURE — 11000001 HC ACUTE MED/SURG PRIVATE ROOM: Mod: HCNC

## 2020-07-31 PROCEDURE — 63600175 PHARM REV CODE 636 W HCPCS: Mod: HCNC | Performed by: STUDENT IN AN ORGANIZED HEALTH CARE EDUCATION/TRAINING PROGRAM

## 2020-07-31 PROCEDURE — 97110 THERAPEUTIC EXERCISES: CPT | Mod: HCNC,CQ

## 2020-07-31 PROCEDURE — 85007 BL SMEAR W/DIFF WBC COUNT: CPT | Mod: HCNC

## 2020-07-31 PROCEDURE — 84100 ASSAY OF PHOSPHORUS: CPT | Mod: HCNC

## 2020-07-31 PROCEDURE — 99900035 HC TECH TIME PER 15 MIN (STAT): Mod: HCNC

## 2020-07-31 RX ORDER — ACETAMINOPHEN 325 MG/1
650 TABLET ORAL EVERY 6 HOURS
Status: DISCONTINUED | OUTPATIENT
Start: 2020-07-31 | End: 2020-08-04 | Stop reason: HOSPADM

## 2020-07-31 RX ORDER — METHOCARBAMOL 500 MG/1
500 TABLET, FILM COATED ORAL 4 TIMES DAILY PRN
Status: DISCONTINUED | OUTPATIENT
Start: 2020-07-31 | End: 2020-08-04 | Stop reason: HOSPADM

## 2020-07-31 RX ADMIN — SIMETHICONE 125 MG: 125 TABLET, CHEWABLE ORAL at 01:07

## 2020-07-31 RX ADMIN — TRAMADOL HYDROCHLORIDE 50 MG: 50 TABLET, COATED ORAL at 01:07

## 2020-07-31 RX ADMIN — PREGABALIN 75 MG: 75 CAPSULE ORAL at 09:07

## 2020-07-31 RX ADMIN — TRAMADOL HYDROCHLORIDE 50 MG: 50 TABLET, COATED ORAL at 06:07

## 2020-07-31 RX ADMIN — ENOXAPARIN SODIUM 40 MG: 40 INJECTION SUBCUTANEOUS at 06:07

## 2020-07-31 RX ADMIN — ACETAMINOPHEN 650 MG: 325 TABLET ORAL at 06:07

## 2020-07-31 RX ADMIN — METHOCARBAMOL 500 MG: 1000 INJECTION, SOLUTION INTRAMUSCULAR; INTRAVENOUS at 05:07

## 2020-07-31 RX ADMIN — METHOCARBAMOL 500 MG: 1000 INJECTION, SOLUTION INTRAMUSCULAR; INTRAVENOUS at 01:07

## 2020-07-31 RX ADMIN — TAMSULOSIN HYDROCHLORIDE 0.4 MG: 0.4 CAPSULE ORAL at 09:07

## 2020-07-31 RX ADMIN — POTASSIUM CHLORIDE, DEXTROSE MONOHYDRATE AND SODIUM CHLORIDE: 150; 5; 450 INJECTION, SOLUTION INTRAVENOUS at 09:07

## 2020-07-31 RX ADMIN — METHOCARBAMOL TABLETS 500 MG: 500 TABLET, COATED ORAL at 09:07

## 2020-07-31 RX ADMIN — ONDANSETRON 4 MG: 2 INJECTION INTRAMUSCULAR; INTRAVENOUS at 01:07

## 2020-07-31 RX ADMIN — TRAMADOL HYDROCHLORIDE 50 MG: 50 TABLET, COATED ORAL at 11:07

## 2020-07-31 RX ADMIN — ACETAMINOPHEN 650 MG: 325 TABLET ORAL at 11:07

## 2020-07-31 RX ADMIN — IRON SUCROSE 100 MG: 20 INJECTION, SOLUTION INTRAVENOUS at 09:07

## 2020-07-31 RX ADMIN — METHOCARBAMOL 500 MG: 1000 INJECTION, SOLUTION INTRAMUSCULAR; INTRAVENOUS at 09:07

## 2020-07-31 NOTE — PT/OT/SLP PROGRESS
"Physical Therapy Treatment    Patient Name:  Zohra Paulino   MRN:  [id number]    Recommendations:     Discharge Recommendations:  home   Discharge Equipment Recommendations: shower chair, other (see comments)(TBD)   Barriers to discharge: None    Assessment:     Zohra Paulino is a 70 y.o. female admitted with a medical diagnosis of <principal problem not specified>.  She presents with the following impairments/functional limitations:  weakness, impaired endurance, impaired self care skills, impaired functional mobilty, gait instability, pain, impaired skin Patient tolerated treatment well focusing on bed mobility, transfers, therex, sitting balance/tolerance and standing balance/tolerance. Patient will continue to improve with skilled physical therapy services in order to return to functional baseline.    Rehab Prognosis: Good; patient would benefit from acute skilled PT services to address these deficits and reach maximum level of function.    Recent Surgery: Procedure(s) (LRB):  PANCREATECTOMY, DISTAL, SUBTOTAL (N/A) 4 Days Post-Op    Plan:     During this hospitalization, patient to be seen 6 x/week to address the identified rehab impairments via gait training, therapeutic activities, therapeutic exercises, neuromuscular re-education and progress toward the following goals:    · Plan of Care Expires:  08/28/20    Subjective     Chief Complaint: increased abdominal pain with mvmt  Patient/Family Comments/goals: "I'll sit up in the chair"  Pain/Comfort:  · Pain Rating 1: other (see comments)("okay" at rest)  · Location - Side 1: Bilateral  · Location - Orientation 1: generalized  · Location 1: abdomen  · Pain Addressed 1: Reposition, Distraction, Pre-medicate for activity, Nurse notified  · Pain Rating Post-Intervention 1: 5/10      Objective:     Communicated with nursing prior to session.  Patient found HOB elevated with peripheral IV, sofia catheter, MANDY drain upon PT entry to room.     General " Precautions: Standard, fall   Orthopedic Precautions:N/A   Braces:       Functional Mobility:  · Bed Mobility:     · Scooting: contact guard assistance  · Supine to Sit: contact guard assistance  · Transfers:     · Sit to Stand:  minimum assistance for stability with rolling walker  · Gait: deferred on this date due to increased fatigue and pain  · Balance: seated EOB S, static stand SBA RW BUE support ~1 minute      AM-PAC 6 CLICK MOBILITY  Turning over in bed (including adjusting bedclothes, sheets and blankets)?: 3  Sitting down on and standing up from a chair with arms (e.g., wheelchair, bedside commode, etc.): 3  Moving from lying on back to sitting on the side of the bed?: 3  Moving to and from a bed to a chair (including a wheelchair)?: 3  Need to walk in hospital room?: 3  Climbing 3-5 steps with a railing?: 1  Basic Mobility Total Score: 16       Therapeutic Activities and Exercises:  1 x 10 AP, LAQ, HF, abd/add, GS rest breaks as needed  Patient educated on importance of increased time out of bed and EDSON throughout the day  discussed/educated on home environment safety, DME needs and HHPT pt verbalized understanding all questions answered within PTA scope of practice and all other questions directed to appropriate persons    Patient left up in chair with call button in reach and nursing present..    GOALS:   Multidisciplinary Problems     Physical Therapy Goals        Problem: Physical Therapy Goal    Goal Priority Disciplines Outcome Goal Variances Interventions   Physical Therapy Goal     PT, PT/OT Ongoing, Progressing     Description: Goals to be met by: 2020     Patient will increase functional independence with mobility by performin. Supine to sit with Modified [place]  2. Sit to supine with Modified [place]  3. Rolling with Modified [place].  4. Sit to stand transfer with Stand-by Assistance with or without AD  5. Bed to chair transfer with Stand-by Assistance with or  without AD  6. Gait  x 150 feet with Stand-by Assistance with or without AD   7. Lower extremity exercise program x10 reps per handout, with independence                     Time Tracking:     PT Received On: 07/31/20  PT Start Time: 1130     PT Stop Time: 1155  PT Total Time (min): 25 min     Billable Minutes: Therapeutic Activity 15 and Therapeutic Exercise 10    Treatment Type: Treatment  PT/PTA: PTA     PTA Visit Number: 3     Adia Tellez, PTA  07/31/2020

## 2020-07-31 NOTE — PLAN OF CARE
Pt AAOx3. IVFs/Jeanine infusing. Medications administered per order. Drain/sofia catheter maintained, output monitored. Pt complained of nausea/gas pain once. PRN medications administered per order. Ambulates with assistance to bedside commode. Pt rested comfortably through the night. Encouraged to call with questions/concerns/assistance. Will continue to monitor. Safety maintained.

## 2020-07-31 NOTE — PT/OT/SLP PROGRESS
Occupational Therapy      Patient Name:  Zohra Paulino   MRN:  [id number]    Patient not seen today secondary to (pt sleeping per daughter pt had PT and sat up in chair and just back to bed. daughter asked if OT could come back after pt rests, OT will try if time permits before 6 pm otherwise will follow-up later date.    Melany Lopez, OT  7/31/2020

## 2020-07-31 NOTE — PT/OT/SLP PROGRESS
Occupational Therapy  Visit Attempt    Patient Name:  Zohra Paulino   MRN:  [id number]    Patient not seen today secondary to Patient fatigue, Pain(Patient reports she has been up all night /c increased pain (8/10). Requests therapy return later. Will follow-up as able.)    CRYSTAL Schuster  7/31/2020

## 2020-07-31 NOTE — PATIENT CARE CONFERENCE
NET Team Rounds with Dr. Leia Coffman MD    Diagnosis this admission: Adenocarcinoma of pancreas [C25.9]  Admit Date: 7/27/2020   Discharge Date: TBD     Surgery/Procedure: Procedure(s):  7/27/20 - PANCREATECTOMY, DISTAL, SUBTOTAL, spleen preserving      NET Physician:  GILMA Ellis MD  Local Treating Physician:Vicenta Garber MD      Assessment  Diet: Regular, 3 meals daily   Oral Supplement: none  Nutrition Support - none   Appetite - fair  Nausea - No  Vomiting- No  BM- brown soft and pasty  Abdomen- soft, nondistended and tender  Incision- no drainage, no swelling  Drain- (1) Krunal-Marroquin drain(s) with closed bulb suction in the left abdomen, Sero-Sanguinous   Urine-  voiding without difficulty  Activity-  up with assistance   Pain-level of pain - 7/10 and present - not adequately treated  Respiratory- encourage cough/deep breath/IS     IV Access- Central and Peripheral, date placed-   Edema: none    Vitals:   Patient Vitals for the past 24 hrs:   BP Temp Temp src Pulse Resp   07/31/20 1240 (!) 163/70 99.2 °F (37.3 °C) Oral 83 18   07/31/20 1153 -- -- -- 95 18   07/31/20 0728 (!) 166/78 98.6 °F (37 °C) Oral 97 18   07/31/20 0500 (!) 155/69 99.2 °F (37.3 °C) Oral 92 20   07/31/20 0108 -- -- -- -- 17   07/31/20 0105 (!) 145/66 99.2 °F (37.3 °C) Oral 83 16   07/30/20 2023 (!) 141/64 99 °F (37.2 °C) Oral 88 20   07/30/20 2015 -- -- -- -- --   07/30/20 1728 -- -- -- -- 16   07/30/20 1554 134/61 98 °F (36.7 °C) Oral 86 20       Patient Vitals for the past 72 hrs (Last 3 readings):   Weight   07/31/20 0500 83.6 kg (184 lb 4.9 oz)   07/29/20 0420 79.1 kg (174 lb 6.1 oz)       Labs:   Recent Labs   Lab 07/29/20  0525 07/30/20  0352 07/31/20  0541   WBC 8.24 3.67* 4.96   HGB 9.3* 9.1* 9.0*   HCT 29.3* 29.2* 28.7*    223 233   MCV 90 90 90   RDW 15.2* 15.1* 15.3*    137 139   K 4.4 4.4 4.4    108 109   CO2 28 25 25   BUN 10 11 9   CREATININE 1.6* 1.7* 1.6*   * 183* 151*    PROT 5.9* 6.1 6.3   ALBUMIN 3.1* 2.9* 2.9*   BILITOT 0.7 0.6 0.6   AST 23 19 18   ALKPHOS 56 75 177*   ALT 24 22 17      Urinalysis  No results for input(s): COLORU, CLARITYU, SPECGRAV, PHUR, PROTEINUA, GLUCOSEU, BILIRUBINCON, BLOODU, WBCU, RBCU, BACTERIA, MUCUS, NITRITE, LEUKOCYTESUR, UROBILINOGEN, HYALINECASTS in the last 24 hours.     Scheduled Meds   acetaminophen  650 mg Oral Q6H    enoxaparin  40 mg Subcutaneous Q24H    iron sucrose (VENOFER) IVPB  100 mg Intravenous Daily    methocarbamoL  500 mg Intravenous Q8H    pregabalin  75 mg Oral BID    tamsulosin  0.4 mg Oral Daily       Continuous Infusion   dextrose 5 % and 0.45 % NaCl with KCl 20 mEq 75 mL/hr at 07/31/20 0928    octreotide infusion (50 mcg/mL) 125 mcg/hr (07/30/20 2156)       PRN Meds  albuterol sulfate, diphenhydrAMINE, hydrALAZINE, labetalol, methocarbamoL, naloxone, ondansetron, simethicone, traMADoL     Assessment/Plan:  -Octreotide drip at 125 mcg/hr   -Oral Pain medication- adjusted per MD  -Regular diet  -Wang replaced yesterday  -Continue PT/OT     Discharge Planning   Appointments- GILMA Ellis MD- TBD  Home Health needs- Shower Chair ordered.   Therapy needs- none  Nutritional needs - Regular diet  Home support system- lives with son, daughters  Barriers- none    Reviewed follow up plan.  Patient verbalized understanding.    ___________________________________________  Tracie Weil Cavalier, RDN, BREN, CNSC  Registered Dietitian [facility]      Face to Face Time: 30 minutes

## 2020-07-31 NOTE — PROGRESS NOTES
Progress Note    Admit Date: 7/27/2020   LOS: 4 days   POD #4 s/p open spleen preserving distal pancreatectomy 7/27/2020    SUBJECTIVE:     No acute events overnight.  Pain controlled on current regimen.  No nausea or vomiting.  No fevers or chills.  Having bowel movements.    Scheduled Meds:   enoxaparin  40 mg Subcutaneous Q24H    iron sucrose (VENOFER) IVPB  100 mg Intravenous Daily    methocarbamoL  500 mg Intravenous Q8H    pregabalin  75 mg Oral BID    tamsulosin  0.4 mg Oral Daily     Continuous Infusions:   dextrose 5 % and 0.45 % NaCl with KCl 20 mEq 75 mL/hr at 07/30/20 1628    octreotide infusion (50 mcg/mL) 125 mcg/hr (07/30/20 2156)     PRN Meds:acetaminophen, albuterol sulfate, diphenhydrAMINE, hydrALAZINE, labetalol, naloxone, ondansetron, simethicone, traMADoL    Review of patient's allergies indicates:   Allergen Reactions    Codeine Palpitations         OBJECTIVE:     Vital Signs (Most Recent)  Temp: 98.6 °F (37 °C) (07/31/20 0728)  Pulse: 97 (07/31/20 0728)  Resp: 18 (07/31/20 0728)  BP: (!) 166/78 (07/31/20 0728)  SpO2: (!) 90 % (07/31/20 0728)    Vital Signs Range (Last 24H):  Temp:  [98 °F (36.7 °C)-99.5 °F (37.5 °C)]   Pulse:  [83-97]   Resp:  [16-20]   BP: (126-166)/(61-78)   SpO2:  [90 %-94 %]     I & O (Last 24H):    Intake/Output Summary (Last 24 hours) at 7/31/2020 0745  Last data filed at 7/31/2020 0602  Gross per 24 hour   Intake 2277.5 ml   Output 1213 ml   Net 1064.5 ml       Physical Exam:  NAD, AAOx3, nontoxic appearing  Normal respiratory effort  RRR  Abdomen soft, appropriately tender, nondistended, midline abdominal incision c/d/i  Abdominal drain with serosanguinous output  Moving all extremities    LABS  CBC  Recent Labs   Lab 07/29/20  0525 07/30/20  0352 07/31/20  0541   WBC 8.24 3.67* 4.96   RBC 3.25* 3.24* 3.19*   HGB 9.3* 9.1* 9.0*   HCT 29.3* 29.2* 28.7*    223 233   MCV 90 90 90   MCH 28.6 28.1 28.2   MCHC 31.7* 31.2* 31.4*     CMP  Recent Labs   Lab  07/29/20  0525 07/30/20  0352 07/31/20  0541   * 183* 151*   CALCIUM 7.8* 7.9* 8.2*   ALBUMIN 3.1* 2.9* 2.9*   PROT 5.9* 6.1 6.3    137 139   K 4.4 4.4 4.4   CO2 28 25 25    108 109   BUN 10 11 9   CREATININE 1.6* 1.7* 1.6*   ALKPHOS 56 75 177*   ALT 24 22 17   AST 23 19 18   BILITOT 0.7 0.6 0.6       Recent Labs   Lab 07/29/20  0525 07/30/20  0352 07/31/20  0541   CALCIUM 7.8* 7.9* 8.2*   MG 2.0 2.0 1.9   PHOS 1.4* 4.5 2.0*         POCT-Glucose  No results found for: POCTGLUCOSE    COAGS  No results for input(s): PT, INR, APTT in the last 168 hours.    CE  No results for input(s): TROPONINI, CPK, CPKMB in the last 168 hours.  BNP  No results for input(s): BNP in the last 168 hours.    ABGs  No results for input(s): PH, PCO2, PO2, HCO3, POCSATURATED, BE in the last 24 hours.    UA  No results for input(s): COLORU, CLARITYU, SPECGRAV, PHUR, PROTEINUA, GLUCOSEU, BILIRUBINCON, BLOODU, WBCU, RBCU, BACTERIA, MUCUS, NITRITE, LEUKOCYTESUR, UROBILINOGEN, HYALINECASTS in the last 168 hours.    LAST HbA1c  Lab Results   Component Value Date    HGBA1C 5.7 01/18/2016       ASSESSMENT/PLAN:     70 y.o.female with hx of pancreatic adenocarcinoma of the body of the pancreas now s/p open spleen preserving distal pancreatectomy 7/27/2020    - pain control and antiemetics PRN, transition to PO when able  - encourage deep breathing/cough/IS  - monitor hemodynamics  - regular diet   - D5 1/2NS +20mEq K @ 75mL/hr  - octreotide 125mcg/hr IV  - encourage OOB and ambulation  - DVT ppx with lovenox    Yury Ram MD  PGY-4

## 2020-07-31 NOTE — PLAN OF CARE
TN met with pt for continued d/c planning. Pt voices no d/c needs or concerns at this time. Pt still wants [facility] at discharge. CM will continue to follow for d/c planning    DME needed on d/c: shower chair  Dispo: Home with family

## 2020-08-01 LAB
ALBUMIN SERPL BCP-MCNC: 2.6 G/DL (ref 3.5–5.2)
ALP SERPL-CCNC: 170 U/L (ref 55–135)
ALT SERPL W/O P-5'-P-CCNC: 15 U/L (ref 10–44)
AMYLASE, BODY FLUID: 779 U/L
AMYLASE, BODY FLUID: 782 U/L
ANION GAP SERPL CALC-SCNC: 4 MMOL/L (ref 8–16)
AST SERPL-CCNC: 15 U/L (ref 10–40)
BASOPHILS # BLD AUTO: 0.02 K/UL (ref 0–0.2)
BASOPHILS NFR BLD: 0.4 % (ref 0–1.9)
BILIRUB SERPL-MCNC: 0.6 MG/DL (ref 0.1–1)
BODY FLUID SOURCE AMYLASE: NORMAL
BODY FLUID SOURCE AMYLASE: NORMAL
BUN SERPL-MCNC: 7 MG/DL (ref 8–23)
CALCIUM SERPL-MCNC: 8.1 MG/DL (ref 8.7–10.5)
CHLORIDE SERPL-SCNC: 108 MMOL/L (ref 95–110)
CO2 SERPL-SCNC: 25 MMOL/L (ref 23–29)
CREAT SERPL-MCNC: 1.4 MG/DL (ref 0.5–1.4)
DIFFERENTIAL METHOD: ABNORMAL
EOSINOPHIL # BLD AUTO: 0.4 K/UL (ref 0–0.5)
EOSINOPHIL NFR BLD: 8 % (ref 0–8)
ERYTHROCYTE [DISTWIDTH] IN BLOOD BY AUTOMATED COUNT: 15.4 % (ref 11.5–14.5)
EST. GFR  (AFRICAN AMERICAN): 44 ML/MIN/1.73 M^2
EST. GFR  (NON AFRICAN AMERICAN): 38 ML/MIN/1.73 M^2
GLUCOSE SERPL-MCNC: 131 MG/DL (ref 70–110)
HCT VFR BLD AUTO: 27 % (ref 37–48.5)
HGB BLD-MCNC: 8.3 G/DL (ref 12–16)
IMM GRANULOCYTES # BLD AUTO: 0.05 K/UL (ref 0–0.04)
IMM GRANULOCYTES NFR BLD AUTO: 1 % (ref 0–0.5)
LYMPHOCYTES # BLD AUTO: 1.1 K/UL (ref 1–4.8)
LYMPHOCYTES NFR BLD: 22.1 % (ref 18–48)
MAGNESIUM SERPL-MCNC: 1.9 MG/DL (ref 1.6–2.6)
MCH RBC QN AUTO: 28 PG (ref 27–31)
MCHC RBC AUTO-ENTMCNC: 30.7 G/DL (ref 32–36)
MCV RBC AUTO: 91 FL (ref 82–98)
MONOCYTES # BLD AUTO: 0.6 K/UL (ref 0.3–1)
MONOCYTES NFR BLD: 10.9 % (ref 4–15)
NEUTROPHILS # BLD AUTO: 2.9 K/UL (ref 1.8–7.7)
NEUTROPHILS NFR BLD: 57.6 % (ref 38–73)
NRBC BLD-RTO: 0 /100 WBC
PHOSPHATE SERPL-MCNC: 2.4 MG/DL (ref 2.7–4.5)
PLATELET # BLD AUTO: 227 K/UL (ref 150–350)
PMV BLD AUTO: 11.5 FL (ref 9.2–12.9)
POTASSIUM SERPL-SCNC: 4.3 MMOL/L (ref 3.5–5.1)
PROT SERPL-MCNC: 5.8 G/DL (ref 6–8.4)
RBC # BLD AUTO: 2.96 M/UL (ref 4–5.4)
SODIUM SERPL-SCNC: 137 MMOL/L (ref 136–145)
WBC # BLD AUTO: 5.03 K/UL (ref 3.9–12.7)

## 2020-08-01 PROCEDURE — 63600175 PHARM REV CODE 636 W HCPCS: Mod: HCNC | Performed by: STUDENT IN AN ORGANIZED HEALTH CARE EDUCATION/TRAINING PROGRAM

## 2020-08-01 PROCEDURE — 84100 ASSAY OF PHOSPHORUS: CPT | Mod: HCNC

## 2020-08-01 PROCEDURE — 99900035 HC TECH TIME PER 15 MIN (STAT): Mod: HCNC

## 2020-08-01 PROCEDURE — 25000003 PHARM REV CODE 250: Mod: HCNC | Performed by: STUDENT IN AN ORGANIZED HEALTH CARE EDUCATION/TRAINING PROGRAM

## 2020-08-01 PROCEDURE — 83735 ASSAY OF MAGNESIUM: CPT | Mod: HCNC

## 2020-08-01 PROCEDURE — 25000003 PHARM REV CODE 250: Mod: HCNC | Performed by: SURGERY

## 2020-08-01 PROCEDURE — 85025 COMPLETE CBC W/AUTO DIFF WBC: CPT | Mod: HCNC

## 2020-08-01 PROCEDURE — 94761 N-INVAS EAR/PLS OXIMETRY MLT: CPT | Mod: HCNC

## 2020-08-01 PROCEDURE — 82150 ASSAY OF AMYLASE: CPT | Mod: HCNC

## 2020-08-01 PROCEDURE — 94799 UNLISTED PULMONARY SVC/PX: CPT | Mod: HCNC

## 2020-08-01 PROCEDURE — 11000001 HC ACUTE MED/SURG PRIVATE ROOM: Mod: HCNC

## 2020-08-01 PROCEDURE — 97530 THERAPEUTIC ACTIVITIES: CPT | Mod: HCNC

## 2020-08-01 PROCEDURE — 97535 SELF CARE MNGMENT TRAINING: CPT | Mod: HCNC

## 2020-08-01 PROCEDURE — 27000221 HC OXYGEN, UP TO 24 HOURS: Mod: HCNC

## 2020-08-01 PROCEDURE — 97110 THERAPEUTIC EXERCISES: CPT | Mod: HCNC

## 2020-08-01 PROCEDURE — 80053 COMPREHEN METABOLIC PANEL: CPT | Mod: HCNC

## 2020-08-01 RX ADMIN — PREGABALIN 75 MG: 75 CAPSULE ORAL at 09:08

## 2020-08-01 RX ADMIN — PREGABALIN 75 MG: 75 CAPSULE ORAL at 08:08

## 2020-08-01 RX ADMIN — IRON SUCROSE 100 MG: 20 INJECTION, SOLUTION INTRAVENOUS at 09:08

## 2020-08-01 RX ADMIN — ENOXAPARIN SODIUM 40 MG: 40 INJECTION SUBCUTANEOUS at 05:08

## 2020-08-01 RX ADMIN — ACETAMINOPHEN 650 MG: 325 TABLET ORAL at 12:08

## 2020-08-01 RX ADMIN — METHOCARBAMOL 500 MG: 1000 INJECTION, SOLUTION INTRAMUSCULAR; INTRAVENOUS at 05:08

## 2020-08-01 RX ADMIN — ACETAMINOPHEN 650 MG: 325 TABLET ORAL at 11:08

## 2020-08-01 RX ADMIN — POTASSIUM CHLORIDE, DEXTROSE MONOHYDRATE AND SODIUM CHLORIDE: 150; 5; 450 INJECTION, SOLUTION INTRAVENOUS at 08:08

## 2020-08-01 RX ADMIN — METHOCARBAMOL 500 MG: 1000 INJECTION, SOLUTION INTRAMUSCULAR; INTRAVENOUS at 03:08

## 2020-08-01 RX ADMIN — SIMETHICONE 125 MG: 125 TABLET, CHEWABLE ORAL at 07:08

## 2020-08-01 RX ADMIN — TAMSULOSIN HYDROCHLORIDE 0.4 MG: 0.4 CAPSULE ORAL at 09:08

## 2020-08-01 RX ADMIN — ACETAMINOPHEN 650 MG: 325 TABLET ORAL at 05:08

## 2020-08-01 RX ADMIN — OCTREOTIDE ACETATE 125 MCG/HR: 1000 INJECTION, SOLUTION INTRAVENOUS; SUBCUTANEOUS at 07:08

## 2020-08-01 RX ADMIN — POTASSIUM CHLORIDE, DEXTROSE MONOHYDRATE AND SODIUM CHLORIDE: 150; 5; 450 INJECTION, SOLUTION INTRAVENOUS at 04:08

## 2020-08-01 RX ADMIN — METHOCARBAMOL 500 MG: 1000 INJECTION, SOLUTION INTRAMUSCULAR; INTRAVENOUS at 09:08

## 2020-08-01 NOTE — PROGRESS NOTES
Progress Note    Admit Date: 7/27/2020   LOS: 5 days   POD #4 s/p open spleen preserving distal pancreatectomy 7/27/2020    SUBJECTIVE:     No acute events overnight.  Pain controlled on current regimen.  No nausea or vomiting.  No fevers or chills.  Having bowel movements.    Scheduled Meds:   acetaminophen  650 mg Oral Q6H    enoxaparin  40 mg Subcutaneous Q24H    iron sucrose (VENOFER) IVPB  100 mg Intravenous Daily    methocarbamoL  500 mg Intravenous Q8H    pregabalin  75 mg Oral BID    tamsulosin  0.4 mg Oral Daily     Continuous Infusions:   dextrose 5 % and 0.45 % NaCl with KCl 20 mEq 50 mL/hr at 08/01/20 0417    octreotide infusion (50 mcg/mL) 125 mcg/hr (07/30/20 2156)     PRN Meds:albuterol sulfate, diphenhydrAMINE, hydrALAZINE, labetalol, methocarbamoL, naloxone, ondansetron, simethicone, traMADoL    Review of patient's allergies indicates:   Allergen Reactions    Codeine Palpitations         OBJECTIVE:     Vital Signs (Most Recent)  Temp: 98.8 °F (37.1 °C) (08/01/20 0841)  Pulse: 65 (08/01/20 0841)  Resp: 20 (08/01/20 0841)  BP: (!) 145/68 (08/01/20 0841)  SpO2: 100 % (08/01/20 0707)    Vital Signs Range (Last 24H):  Temp:  [98.1 °F (36.7 °C)-99.2 °F (37.3 °C)]   Pulse:  [65-95]   Resp:  [16-20]   BP: (122-163)/(58-83)   SpO2:  [90 %-100 %]     I & O (Last 24H):    Intake/Output Summary (Last 24 hours) at 8/1/2020 1011  Last data filed at 8/1/2020 0536  Gross per 24 hour   Intake 1744.75 ml   Output 1365 ml   Net 379.75 ml       Physical Exam:  NAD, AAOx3, nontoxic appearing  Normal respiratory effort  RRR  Abdomen soft, appropriately tender, nondistended, midline abdominal incision c/d/i  Abdominal drain with serosanguinous output  Moving all extremities    LABS  CBC  Recent Labs   Lab 07/30/20  0352 07/31/20  0541 08/01/20  0540   WBC 3.67* 4.96 5.03   RBC 3.24* 3.19* 2.96*   HGB 9.1* 9.0* 8.3*   HCT 29.2* 28.7* 27.0*    233 227   MCV 90 90 91   MCH 28.1 28.2 28.0   MCHC 31.2* 31.4*  30.7*     CMP  Recent Labs   Lab 07/30/20  0352 07/31/20  0541 08/01/20  0540   * 151* 131*   CALCIUM 7.9* 8.2* 8.1*   ALBUMIN 2.9* 2.9* 2.6*   PROT 6.1 6.3 5.8*    139 137   K 4.4 4.4 4.3   CO2 25 25 25    109 108   BUN 11 9 7*   CREATININE 1.7* 1.6* 1.4   ALKPHOS 75 177* 170*   ALT 22 17 15   AST 19 18 15   BILITOT 0.6 0.6 0.6       Recent Labs   Lab 07/30/20  0352 07/31/20  0541 08/01/20  0540   CALCIUM 7.9* 8.2* 8.1*   MG 2.0 1.9 1.9   PHOS 4.5 2.0* 2.4*         POCT-Glucose  No results found for: POCTGLUCOSE    COAGS  No results for input(s): PT, INR, APTT in the last 168 hours.    CE  No results for input(s): TROPONINI, CPK, CPKMB in the last 168 hours.  BNP  No results for input(s): BNP in the last 168 hours.    ABGs  No results for input(s): PH, PCO2, PO2, HCO3, POCSATURATED, BE in the last 24 hours.    UA  No results for input(s): COLORU, CLARITYU, SPECGRAV, PHUR, PROTEINUA, GLUCOSEU, BILIRUBINCON, BLOODU, WBCU, RBCU, BACTERIA, MUCUS, NITRITE, LEUKOCYTESUR, UROBILINOGEN, HYALINECASTS in the last 168 hours.    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     Bowel movement x1  Wean off PCA  KVO IVF  Wean off octreotide  Up and out of bed with OT PT  Will need repeat voiding trial verses urology input.  Will likely get MANDY drain out prior to discharge.  Will check MANDY amylase.      Opal Garcia MD

## 2020-08-01 NOTE — PLAN OF CARE
Pt AAOx3. IVFs/Jeanine infusing. Medications administered per order. Pain managed through the night. No complaints of discomfort or nausea. Incision CDI. MANDY drain intact, output monitored. Ambulates with assist to commode. Wang catheter maintained, output monitored. Encouraged to call with questions/concerns/assistance. Will continue to monitor. Safety maintained.

## 2020-08-01 NOTE — PT/OT/SLP PROGRESS
Occupational Therapy   Treatment    Name: Zohra Paulino  MRN: [id number]  Admitting Diagnosis:  <principal problem not specified>  5 Days Post-Op  Pre-op Diagnosis: Adenocarcinoma of pancreas [C25.9] s/p Procedure(s):  PANCREATECTOMY, DISTAL, SUBTOTAL   Recommendations:     Discharge Recommendations: home  Discharge Equipment Recommendations:  shower chair  Barriers to discharge:  None    Assessment:     Zohra Paulino is a 70 y.o. female with a medical diagnosis of <principal problem not specified>.  She presents with Performance deficits affecting function are decreased safety awareness, impaired self care skills, impaired skin, impaired functional mobilty, impaired endurance, weakness.     Pt showing increase independence in simple self care performance, fx mobility and transfers. She is however moving slowly and guarded in pain/anticipation of pain with transitional movements, pain rated 4-5/10 R upper abdomen, mod vc for deep breathing relaxation tech and muscle relaxation for upper trapezius tension. Pt is not eating much of her meals as observed lunch tray on several occasions untouched. Pt says she does not have an appetite. I offered pt alternative food (cereal and milk) to increased intake, nurse notified and ok with it.     Rehab Prognosis:  Good; patient would benefit from acute skilled OT services to address these deficits and reach maximum level of function.       Plan:     Patient to be seen 5 x/week to address the above listed problems via self-care/home management, therapeutic activities, therapeutic exercises  · Plan of Care Expires: 08/28/20  · Plan of Care Reviewed with: patient, daughter    Subjective     Pain/Comfort:  · Pain Rating 1: 4/10(3-4)  · Location - Side 1: Right  · Location - Orientation 1: upper  · Location 1: abdomen  · Pain Addressed 1: Pre-medicate for activity, Reposition, Distraction, Cessation of Activity  · Pain Rating Post-Intervention 1: 5/10(4-5)    Objective:      Communicated with: nurse prior to session.  Patient found HOB elevated with SCD, central line, bed alarm upon OT entry to room.    General Precautions: Standard, fall   Orthopedic Precautions:N/A   Braces: N/A     Occupational Performance:     Bed Mobility:    · Patient completed Rolling/Turning to Right with stand by assistance and with side rail  · Patient completed Scooting/Bridging with stand by assistance and with side rail  · Patient completed Supine to Sit with stand by assistance and with side rail     Functional Mobility/Transfers:  · Patient completed Sit <> Stand Transfer with contact guard assistance and minimum assistance  with  rolling walker   · Patient completed Bed <> Chair Transfer using Step Transfer technique with contact guard assistance with rolling walker  · Patient completed Toilet Transfer Step Transfer technique with contact guard assistance with  rolling walker and grab bars  · Functional Mobility: ambulated with CGA using RW short distance in room to bathroom, sink and back to BS chair on opposite side of room, slow pace, guarded, min-mod vc for upright posture, short steps 2/2 guarded.     Activities of Daily Living:  · Feeding:  independence wanted cereal instead of lunch tray and opened all containers  · Grooming: stand by assistance standing at sink washed face, hands, brushed teeth, still somewhat guarded but moved at slightly faster pace today as compared to yesterday.   · Toileting: contact guard assistance standing clothes management with RW      [facility] 6 Click ADL: 17    Treatment & Education:  --Purpose of visit explained to pt and daughter, verbalized understanding.  - On first attempt to to see pt, pt in bed nursing a nose bleed, nurse notified, instructed pt to lean slightly forward and pinch nostrils just below bridge of nose 10-15 min and OT to return later.    --OT returned later, nose stopped bleeding, see tx above.      --Pt instructed in HEP BUE AROM and stretch ex  10 x 1 set as per handout with Supervision, good pace but visibly tired after, pain 4-/5 R upper abdomen.  Pt changed gown after 2/2 blood from earlier nose bleed, able to continue on to start eating her cereal.       Patient left up in chair with all lines intact, call button in reach, nurse notified and daughter presentEducation:      GOALS:   Multidisciplinary Problems     Occupational Therapy Goals        Problem: Occupational Therapy Goal    Goal Priority Disciplines Outcome Interventions   Occupational Therapy Goal     OT, PT/OT Ongoing, Progressing    Description: Goals to be met by: 8/28     Patient will increase functional independence with ADLs by performing:    UE Dressing with Modified [place].  LE Dressing with Modified [place].  Grooming while standing at sink with Modified [place].  Toileting from toilet with Modified [place] for hygiene and clothing management.   Toilet transfer to toilet with Modified [place].  Indep HEP                     Time Tracking:     OT Date of Treatment: 08/01/20  OT Start Time: 1205(1010)  OT Stop Time: 1253(1015)  OT Total Time (min): 48 min + 5=53    Billable Minutes:Self Care/Home Management 25  Therapeutic Activity 13  Therapeutic Exercise 15  Total Time 53    Melany Lopez OT  8/1/2020

## 2020-08-01 NOTE — PLAN OF CARE
Problem: Occupational Therapy Goal  Goal: Occupational Therapy Goal  Description: Goals to be met by: 8/28     Patient will increase functional independence with ADLs by performing:    UE Dressing with Modified [place].  LE Dressing with Modified [place].  Grooming while standing at sink with Modified [place].  Toileting from toilet with Modified [place] for hygiene and clothing management.   Toilet transfer to toilet with Modified [place].  Indep HEP    Outcome: Ongoing, Progressing

## 2020-08-01 NOTE — NURSING
Pt noted to be experiencing epistaxis. Small amount of blood noted on tissue. PABLITO Hurst to make MD aware.

## 2020-08-02 LAB
ALBUMIN SERPL BCP-MCNC: 2.7 G/DL (ref 3.5–5.2)
ALP SERPL-CCNC: 264 U/L (ref 55–135)
ALT SERPL W/O P-5'-P-CCNC: 15 U/L (ref 10–44)
AMYLASE, BODY FLUID: 877 U/L
ANION GAP SERPL CALC-SCNC: 3 MMOL/L (ref 8–16)
AST SERPL-CCNC: 21 U/L (ref 10–40)
BASOPHILS # BLD AUTO: 0.03 K/UL (ref 0–0.2)
BASOPHILS NFR BLD: 0.5 % (ref 0–1.9)
BILIRUB SERPL-MCNC: 0.7 MG/DL (ref 0.1–1)
BODY FLUID SOURCE AMYLASE: NORMAL
BUN SERPL-MCNC: 6 MG/DL (ref 8–23)
CALCIUM SERPL-MCNC: 8.4 MG/DL (ref 8.7–10.5)
CHLORIDE SERPL-SCNC: 108 MMOL/L (ref 95–110)
CO2 SERPL-SCNC: 27 MMOL/L (ref 23–29)
CREAT SERPL-MCNC: 1.4 MG/DL (ref 0.5–1.4)
DIFFERENTIAL METHOD: ABNORMAL
EOSINOPHIL # BLD AUTO: 0.5 K/UL (ref 0–0.5)
EOSINOPHIL NFR BLD: 7.4 % (ref 0–8)
ERYTHROCYTE [DISTWIDTH] IN BLOOD BY AUTOMATED COUNT: 15.5 % (ref 11.5–14.5)
EST. GFR  (AFRICAN AMERICAN): 44 ML/MIN/1.73 M^2
EST. GFR  (NON AFRICAN AMERICAN): 38 ML/MIN/1.73 M^2
GLUCOSE SERPL-MCNC: 120 MG/DL (ref 70–110)
HCT VFR BLD AUTO: 27 % (ref 37–48.5)
HGB BLD-MCNC: 8.6 G/DL (ref 12–16)
IMM GRANULOCYTES # BLD AUTO: 0.08 K/UL (ref 0–0.04)
IMM GRANULOCYTES NFR BLD AUTO: 1.2 % (ref 0–0.5)
LYMPHOCYTES # BLD AUTO: 1.2 K/UL (ref 1–4.8)
LYMPHOCYTES NFR BLD: 18.8 % (ref 18–48)
MAGNESIUM SERPL-MCNC: 1.8 MG/DL (ref 1.6–2.6)
MCH RBC QN AUTO: 29 PG (ref 27–31)
MCHC RBC AUTO-ENTMCNC: 31.9 G/DL (ref 32–36)
MCV RBC AUTO: 91 FL (ref 82–98)
MONOCYTES # BLD AUTO: 0.8 K/UL (ref 0.3–1)
MONOCYTES NFR BLD: 11.6 % (ref 4–15)
NEUTROPHILS # BLD AUTO: 4 K/UL (ref 1.8–7.7)
NEUTROPHILS NFR BLD: 60.5 % (ref 38–73)
NRBC BLD-RTO: 0 /100 WBC
PHOSPHATE SERPL-MCNC: 2.9 MG/DL (ref 2.7–4.5)
PLATELET # BLD AUTO: 222 K/UL (ref 150–350)
PMV BLD AUTO: 11.3 FL (ref 9.2–12.9)
POTASSIUM SERPL-SCNC: 4.4 MMOL/L (ref 3.5–5.1)
PROT SERPL-MCNC: 6 G/DL (ref 6–8.4)
RBC # BLD AUTO: 2.97 M/UL (ref 4–5.4)
SODIUM SERPL-SCNC: 138 MMOL/L (ref 136–145)
WBC # BLD AUTO: 6.53 K/UL (ref 3.9–12.7)

## 2020-08-02 PROCEDURE — 99900035 HC TECH TIME PER 15 MIN (STAT): Mod: HCNC

## 2020-08-02 PROCEDURE — 97116 GAIT TRAINING THERAPY: CPT | Mod: HCNC | Performed by: PHYSICAL THERAPIST

## 2020-08-02 PROCEDURE — 85025 COMPLETE CBC W/AUTO DIFF WBC: CPT | Mod: HCNC

## 2020-08-02 PROCEDURE — 97530 THERAPEUTIC ACTIVITIES: CPT | Mod: HCNC

## 2020-08-02 PROCEDURE — 11000001 HC ACUTE MED/SURG PRIVATE ROOM: Mod: HCNC

## 2020-08-02 PROCEDURE — 94667 MNPJ CHEST WALL 1ST: CPT | Mod: HCNC

## 2020-08-02 PROCEDURE — 63600175 PHARM REV CODE 636 W HCPCS: Mod: HCNC | Performed by: STUDENT IN AN ORGANIZED HEALTH CARE EDUCATION/TRAINING PROGRAM

## 2020-08-02 PROCEDURE — 25000003 PHARM REV CODE 250: Mod: HCNC | Performed by: STUDENT IN AN ORGANIZED HEALTH CARE EDUCATION/TRAINING PROGRAM

## 2020-08-02 PROCEDURE — 82150 ASSAY OF AMYLASE: CPT | Mod: HCNC

## 2020-08-02 PROCEDURE — 83735 ASSAY OF MAGNESIUM: CPT | Mod: HCNC

## 2020-08-02 PROCEDURE — 84100 ASSAY OF PHOSPHORUS: CPT | Mod: HCNC

## 2020-08-02 PROCEDURE — 27000221 HC OXYGEN, UP TO 24 HOURS: Mod: HCNC

## 2020-08-02 PROCEDURE — 27000646 HC AEROBIKA DEVICE: Mod: HCNC

## 2020-08-02 PROCEDURE — 80053 COMPREHEN METABOLIC PANEL: CPT | Mod: HCNC

## 2020-08-02 PROCEDURE — 94799 UNLISTED PULMONARY SVC/PX: CPT | Mod: HCNC

## 2020-08-02 PROCEDURE — 25000003 PHARM REV CODE 250: Mod: HCNC | Performed by: SURGERY

## 2020-08-02 PROCEDURE — 97535 SELF CARE MNGMENT TRAINING: CPT | Mod: HCNC

## 2020-08-02 PROCEDURE — 94664 DEMO&/EVAL PT USE INHALER: CPT | Mod: HCNC

## 2020-08-02 PROCEDURE — 97110 THERAPEUTIC EXERCISES: CPT | Mod: HCNC | Performed by: PHYSICAL THERAPIST

## 2020-08-02 PROCEDURE — 94668 MNPJ CHEST WALL SBSQ: CPT | Mod: HCNC

## 2020-08-02 RX ORDER — FUROSEMIDE 10 MG/ML
20 INJECTION INTRAMUSCULAR; INTRAVENOUS ONCE
Status: COMPLETED | OUTPATIENT
Start: 2020-08-02 | End: 2020-08-02

## 2020-08-02 RX ADMIN — TRAMADOL HYDROCHLORIDE 50 MG: 50 TABLET, COATED ORAL at 12:08

## 2020-08-02 RX ADMIN — TAMSULOSIN HYDROCHLORIDE 0.4 MG: 0.4 CAPSULE ORAL at 08:08

## 2020-08-02 RX ADMIN — ACETAMINOPHEN 650 MG: 325 TABLET ORAL at 12:08

## 2020-08-02 RX ADMIN — METHOCARBAMOL 500 MG: 1000 INJECTION, SOLUTION INTRAMUSCULAR; INTRAVENOUS at 05:08

## 2020-08-02 RX ADMIN — ACETAMINOPHEN 650 MG: 325 TABLET ORAL at 05:08

## 2020-08-02 RX ADMIN — PREGABALIN 75 MG: 75 CAPSULE ORAL at 08:08

## 2020-08-02 RX ADMIN — IRON SUCROSE 100 MG: 20 INJECTION, SOLUTION INTRAVENOUS at 08:08

## 2020-08-02 RX ADMIN — FUROSEMIDE 20 MG: 10 INJECTION, SOLUTION INTRAMUSCULAR; INTRAVENOUS at 12:08

## 2020-08-02 RX ADMIN — ENOXAPARIN SODIUM 40 MG: 40 INJECTION SUBCUTANEOUS at 05:08

## 2020-08-02 RX ADMIN — SIMETHICONE 125 MG: 125 TABLET, CHEWABLE ORAL at 05:08

## 2020-08-02 NOTE — PLAN OF CARE
Permission attained to enter room via virtual system.  Virtual rounds completed as documented.  Today's lab values, notes, and vital signs up to now have been reviewed.  Following education topics reviewed during virtual rounds:   Pt states she has voided without difficulty   tolerating resp physiotherapy     central line has been removed

## 2020-08-02 NOTE — PLAN OF CARE
Problem: Occupational Therapy Goal  Goal: Occupational Therapy Goal  Description: Goals to be met by: 8/28     Patient will increase functional independence with ADLs by performing:    UE Dressing with Modified [place].  LE Dressing with Modified [place].  Grooming while standing at sink with Modified [place].  Toileting from toilet with Modified [place] for hygiene and clothing management.   Toilet transfer to toilet with Modified [place].  Indep HEP    Outcome: Ongoing, Progressing

## 2020-08-02 NOTE — PLAN OF CARE
Problem: Adult Inpatient Plan of Care  Goal: Plan of Care Review  Outcome: Ongoing, Progressing     VIRTUAL NURSE:  Cued into patient's room.  Patient not responding to introduction and permission inquiry.  Patient resting comfortably in bed with eyes closed; respirations even and unlabored.  No distress noted.    Labs, notes, orders, and careplan reviewed.

## 2020-08-02 NOTE — PT/OT/SLP PROGRESS
Occupational Therapy   Treatment    Name: Zohra Paulino  MRN: [id number]  Admitting Diagnosis:  <principal problem not specified>  6 Days Post-Op  Pre-op Diagnosis: Adenocarcinoma of pancreas [C25.9] s/p Procedure(s):  PANCREATECTOMY, DISTAL, SUBTOTAL   Recommendations:     Discharge Recommendations: home  Discharge Equipment Recommendations:  shower chair  Barriers to discharge:  None    Assessment:     Zohra Paulino is a 70 y.o. female with a medical diagnosis of <principal problem not specified>.  She presents with Performance deficits affecting function are weakness, impaired endurance, impaired self care skills, impaired functional mobilty, gait instability, impaired balance, decreased safety awareness, pain, impaired skin.     Pt. making steady gains toward goals, pt. shows increased independence in fx mobility and ambulatory ADLS with RW ~ 50 ft with CGA, back pain 7-8/10 at rest and down to 5/10 after urinating and ambulation.  Nurse made aware of pt's complaints.  O2 sats 100% after ambulation on RA, pt still however says she feels SOB with exertional movements, educated her in importance of her to continue with deep breathing and PLB tech, verbalized understanding.      Rehab Prognosis:  Good; patient would benefit from acute skilled OT services to address these deficits and reach maximum level of function.       Plan:     Patient to be seen 5 x/week to address the above listed problems via self-care/home management, therapeutic activities, therapeutic exercises  · Plan of Care Expires: 08/28/20  · Plan of Care Reviewed with: patient, daughter    Subjective     Pain/Comfort:  Pain Rating 1: 8/10(7-8)  Location - Side 1: Bilateral  Location - Orientation 1: medial  Location 1: back  Pain Addressed 1: Pre-medicate for activity, Reposition, Distraction, Cessation of Activity  Pain Rating Post-Intervention 1: 5/10    Objective:     Communicated with: nurse prior to session.  Patient found HOB  elevated with bed alarm, central line, SCD upon OT entry to room.    General Precautions: Standard, fall   Orthopedic Precautions:N/A   Braces: N/A     Occupational Performance:     Bed Mobility:    · Patient completed Rolling/Turning to Right with stand by assistance and with side rail  · Patient completed Scooting/Bridging with stand by assistance  · Patient completed Supine to Sit with stand by assistance and with side rail  · Patient completed Sit to Supine with contact guard assistance, with side rail and legs     Functional Mobility/Transfers:  · Patient completed Sit <> Stand Transfer with stand by assistance and contact guard assistance  with  rolling walker   · Patient completed Toilet Transfer Step Transfer technique with contact guard assistance with  rolling walker  · Functional Mobility: ambulated with CGA using RW in room for ADLS and out to hallway ~ 50ft with slow pace, mod vc and tactile cues to lower shld to release tension and guarding, back pain reported, felt tired per her report.     Activities of Daily Living:  · Grooming: stand by assistance standing with RW at sink to wash hands x 2 trials.  · Toileting: stand by assistance and contact guard assistance from toilet with hygiene seated and clothes management standing with RW from [facility] 2nd trial 2/2 on lasix      [facility] 6 Click ADL: 17    Treatment & Education:  Purpose of OT visit and POC for today with pt and daughter, verbalized understanding.    Patient left right sidelying with all lines intact, call button in reach, bed alarm on, nurse notified and daughter presentEducation:      GOALS:   Multidisciplinary Problems     Occupational Therapy Goals        Problem: Occupational Therapy Goal    Goal Priority Disciplines Outcome Interventions   Occupational Therapy Goal     OT, PT/OT Ongoing, Progressing    Description: Goals to be met by: 8/28     Patient will increase functional independence with ADLs by performing:    UE Dressing with Modified  [place].  LE Dressing with Modified [place].  Grooming while standing at sink with Modified [place].  Toileting from toilet with Modified [place] for hygiene and clothing management.   Toilet transfer to toilet with Modified [place].  Indep HEP                     Time Tracking:     OT Date of Treatment: 08/02/20  OT Start Time: 1321  OT Stop Time: 1359  OT Total Time (min): 38 min    Billable Minutes:Self Care/Home Management 15  Therapeutic Activity 23  Total Time 38    Melany Lopez OT  8/2/2020

## 2020-08-02 NOTE — PROGRESS NOTES
Progress Note    Admit Date: 7/27/2020   LOS: 6 days   POD #6 s/p open spleen preserving distal pancreatectomy 7/27/2020      SUBJECTIVE:     No acute events overnight.    Pain controlled on current regimen.    No nausea or vomiting.    No fevers or chills.    Having bowel movements.  Wang in place      Scheduled Meds:   acetaminophen  650 mg Oral Q6H    enoxaparin  40 mg Subcutaneous Q24H    iron sucrose (VENOFER) IVPB  100 mg Intravenous Daily    pregabalin  75 mg Oral BID    tamsulosin  0.4 mg Oral Daily     Continuous Infusions:   dextrose 5 % and 0.45 % NaCl with KCl 20 mEq 50 mL/hr at 08/01/20 2054    octreotide infusion (50 mcg/mL) 125 mcg/hr (08/01/20 1926)     PRN Meds:albuterol sulfate, diphenhydrAMINE, hydrALAZINE, labetalol, methocarbamoL, naloxone, ondansetron, simethicone, traMADoL    Review of patient's allergies indicates:   Allergen Reactions    Codeine Palpitations         OBJECTIVE:     Vital Signs (Most Recent)  Temp: 98.6 °F (37 °C) (08/02/20 0734)  Pulse: 72 (08/02/20 0734)  Resp: 18 (08/02/20 0734)  BP: (!) 141/67 (08/02/20 0734)  SpO2: 100 % (08/02/20 0659)    Vital Signs Range (Last 24H):  Temp:  [97.9 °F (36.6 °C)-99 °F (37.2 °C)]   Pulse:  [72-86]   Resp:  [16-20]   BP: (129-168)/(67-97)   SpO2:  [95 %-100 %]     I & O (Last 24H):    Intake/Output Summary (Last 24 hours) at 8/2/2020 0952  Last data filed at 8/2/2020 0800  Gross per 24 hour   Intake 2316 ml   Output 1975 ml   Net 341 ml       Physical Exam:  NAD, AAOx3, nontoxic appearing  On nasal cannula oxygen  Normal respiratory effort.  Having some difficulty with incentive spirometry  RRR  Abdomen soft, appropriately tender, nondistended, midline abdominal incision c/d/i  Abdominal drain with slightly murky output, low volume  Moving all extremities    LABS  CBC  Recent Labs   Lab 07/31/20  0541 08/01/20  0540 08/02/20  0526   WBC 4.96 5.03 6.53   RBC 3.19* 2.96* 2.97*   HGB 9.0* 8.3* 8.6*   HCT 28.7* 27.0* 27.0*     227 222   MCV 90 91 91   MCH 28.2 28.0 29.0   MCHC 31.4* 30.7* 31.9*     CMP  Recent Labs   Lab 07/31/20  0541 08/01/20  0540 08/02/20  0526   * 131* 120*   CALCIUM 8.2* 8.1* 8.4*   ALBUMIN 2.9* 2.6* 2.7*   PROT 6.3 5.8* 6.0    137 138   K 4.4 4.3 4.4   CO2 25 25 27    108 108   BUN 9 7* 6*   CREATININE 1.6* 1.4 1.4   ALKPHOS 177* 170* 264*   ALT 17 15 15   AST 18 15 21   BILITOT 0.6 0.6 0.7       Recent Labs   Lab 07/31/20  0541 08/01/20  0540 08/02/20  0526   CALCIUM 8.2* 8.1* 8.4*   MG 1.9 1.9 1.8   PHOS 2.0* 2.4* 2.9         POCT-Glucose  No results found for: POCTGLUCOSE    COAGS  No results for input(s): PT, INR, APTT in the last 168 hours.    CE  No results for input(s): TROPONINI, CPK, CPKMB in the last 168 hours.  BNP  No results for input(s): BNP in the last 168 hours.    ABGs  No results for input(s): PH, PCO2, PO2, HCO3, POCSATURATED, BE in the last 24 hours.    UA  No results for input(s): COLORU, CLARITYU, SPECGRAV, PHUR, PROTEINUA, GLUCOSEU, BILIRUBINCON, BLOODU, WBCU, RBCU, BACTERIA, MUCUS, NITRITE, LEUKOCYTESUR, UROBILINOGEN, HYALINECASTS in the last 168 hours.    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     Will try some diuresis with the goal of getting her off of supplemental oxygen  Consider taking out central line if peripheral access is obtained  Respiratory therapy to help with incentive spirometry  Transition to p.o. pain medication  Forty trial today  Wean off octreotide if MANDY amylase okay  Up and out of bed with OT PT        Opal Garcia MD

## 2020-08-02 NOTE — PT/OT/SLP PROGRESS
Physical Therapy Treatment    Patient Name:  Zohra Paulino   MRN:  [id number]    Recommendations:     Discharge Recommendations:  home   Discharge Equipment Recommendations: shower chair   Barriers to discharge: decreased functional mobility tolerance    Assessment:     Zohra Paulino is a 70 y.o. female admitted with a medical diagnosis of <principal problem not specified>.  She presents with the following impairments/functional limitations:  weakness, impaired endurance, impaired self care skills, impaired functional mobilty, gait instability, impaired balance, impaired cardiopulmonary response to activity.  Pt supine to sit with Min/Mod assist of 1.  Pt ambulated 18' with RW with Min assist with increased SOB and trunk flexed posture.  Pt required min assist of 1 for sit to supine and min assist to scoot to HOB.  Rehab Prognosis: Fair; patient would benefit from acute skilled PT services to address these deficits and reach maximum level of function.    Recent Surgery: Procedure(s) (LRB):  PANCREATECTOMY, DISTAL, SUBTOTAL (N/A) 6 Days Post-Op    Plan:     During this hospitalization, patient to be seen 6 x/week to address the identified rehab impairments via gait training, therapeutic activities, therapeutic exercises, neuromuscular re-education and progress toward the following goals:    · Plan of Care Expires:  09/02/20    Subjective     Chief Complaint: tired  Patient/Family Comments/goals: not get up again today  Pain/Comfort:  · Pain Rating 1: 0/10      Objective:     Communicated with nurse prior to session.  Patient found HOB elevated with bed alarm, central line, SCD, peripheral IV upon PT entry to room.     General Precautions: Standard, fall   Orthopedic Precautions:N/A   Braces:       Functional Mobility:   Pt supine to sit with Min/Mod assist of 1.  Pt ambulated 18' with RW with Min assist with increased SOB and trunk flexed posture.  Pt required min assist of 1 for sit to supine and min  assist to scoot to HOB.    AM-PAC 6 CLICK MOBILITY  Turning over in bed (including adjusting bedclothes, sheets and blankets)?: 3  Sitting down on and standing up from a chair with arms (e.g., wheelchair, bedside commode, etc.): 3  Moving from lying on back to sitting on the side of the bed?: 3  Moving to and from a bed to a chair (including a wheelchair)?: 3  Need to walk in hospital room?: 3  Climbing 3-5 steps with a railing?: 2  Basic Mobility Total Score: 17       Therapeutic Activities and Exercises:   Pt performed supine BLE AP x 20, heel slides, hip abd and SAQ x 10.    Patient left HOB elevated with all lines intact, call button in reach, bed alarm on and nurse notified..    GOALS:   Multidisciplinary Problems     Physical Therapy Goals        Problem: Physical Therapy Goal    Goal Priority Disciplines Outcome Goal Variances Interventions   Physical Therapy Goal     PT, PT/OT Ongoing, Progressing     Description: Goals to be met by: 2020     Patient will increase functional independence with mobility by performin. Supine to sit with Modified [place]  2. Sit to supine with Modified [place]  3. Rolling with Modified [place].  4. Sit to stand transfer with Stand-by Assistance with or without AD  5. Bed to chair transfer with Stand-by Assistance with or without AD  6. Gait  x 150 feet with Stand-by Assistance with or without AD   7. Lower extremity exercise program x10 reps per handout, with independence                     Time Tracking:     PT Received On: 20  PT Start Time: 1440     PT Stop Time: 1507  PT Total Time (min): 27 min     Billable Minutes: Gait Training 14 and Therapeutic Exercise 13    Treatment Type: Treatment  PT/PTA: PT     PTA Visit Number: 0     Edelmira Staples, PT  2020

## 2020-08-02 NOTE — PLAN OF CARE
Patient was on room air when entered to complete respiratory assessment.  She seemed a little short of breath when speaking to me, so told her that O2 was at the bedside just in case.  Patient requested to be placed on O2 due to having trouble catching her breath.  Patient is currently receiving 2L o2, and seems to be tolerating well. Informed RN.   Patient says she has been doing her IS independently.  She completed IS with RT 5 times with documented levels, but was unable to complete 10.  Will continue to monitor and wean as necessary.

## 2020-08-02 NOTE — PLAN OF CARE
Problem: Physical Therapy Goal  Goal: Physical Therapy Goal  Description: Goals to be met by: 2020     Patient will increase functional independence with mobility by performin. Supine to sit with Modified [place]  2. Sit to supine with Modified [place]  3. Rolling with Modified [place].  4. Sit to stand transfer with Stand-by Assistance with or without AD  5. Bed to chair transfer with Stand-by Assistance with or without AD  6. Gait  x 150 feet with Stand-by Assistance with or without AD   7. Lower extremity exercise program x10 reps per handout, with independence    Outcome: Ongoing, Progressing

## 2020-08-02 NOTE — PLAN OF CARE
Pts safety maintained, central line precautions continued. Fluids and Jeanine infusing, meds given per MAR. Incision CDI, MANDY drain draining serosanguinous fluid. Pain controlled with scheduled meds. Complaining of gas pains and asking for simethicone. No BM during night. No appetite. No N/V, ditress. Some SOB, encouraged to use IS.

## 2020-08-03 LAB
ALBUMIN SERPL BCP-MCNC: 3.2 G/DL (ref 3.5–5.2)
ALP SERPL-CCNC: 352 U/L (ref 55–135)
ALT SERPL W/O P-5'-P-CCNC: 24 U/L (ref 10–44)
AMYLASE, BODY FLUID: 1385 U/L
ANION GAP SERPL CALC-SCNC: 7 MMOL/L (ref 8–16)
AST SERPL-CCNC: 34 U/L (ref 10–40)
BASOPHILS # BLD AUTO: 0.04 K/UL (ref 0–0.2)
BASOPHILS NFR BLD: 0.6 % (ref 0–1.9)
BILIRUB SERPL-MCNC: 0.7 MG/DL (ref 0.1–1)
BODY FLUID SOURCE AMYLASE: NORMAL
BUN SERPL-MCNC: 8 MG/DL (ref 8–23)
CALCIUM SERPL-MCNC: 9.1 MG/DL (ref 8.7–10.5)
CHLORIDE SERPL-SCNC: 103 MMOL/L (ref 95–110)
CO2 SERPL-SCNC: 30 MMOL/L (ref 23–29)
CREAT SERPL-MCNC: 1.5 MG/DL (ref 0.5–1.4)
DIFFERENTIAL METHOD: ABNORMAL
EOSINOPHIL # BLD AUTO: 0.6 K/UL (ref 0–0.5)
EOSINOPHIL NFR BLD: 8.2 % (ref 0–8)
ERYTHROCYTE [DISTWIDTH] IN BLOOD BY AUTOMATED COUNT: 15.6 % (ref 11.5–14.5)
EST. GFR  (AFRICAN AMERICAN): 40 ML/MIN/1.73 M^2
EST. GFR  (NON AFRICAN AMERICAN): 35 ML/MIN/1.73 M^2
GLUCOSE SERPL-MCNC: 128 MG/DL (ref 70–110)
HCT VFR BLD AUTO: 34.1 % (ref 37–48.5)
HGB BLD-MCNC: 10.8 G/DL (ref 12–16)
IMM GRANULOCYTES # BLD AUTO: 0.11 K/UL (ref 0–0.04)
IMM GRANULOCYTES NFR BLD AUTO: 1.5 % (ref 0–0.5)
LYMPHOCYTES # BLD AUTO: 1.9 K/UL (ref 1–4.8)
LYMPHOCYTES NFR BLD: 26 % (ref 18–48)
MAGNESIUM SERPL-MCNC: 1.6 MG/DL (ref 1.6–2.6)
MCH RBC QN AUTO: 28.3 PG (ref 27–31)
MCHC RBC AUTO-ENTMCNC: 31.7 G/DL (ref 32–36)
MCV RBC AUTO: 90 FL (ref 82–98)
MONOCYTES # BLD AUTO: 0.7 K/UL (ref 0.3–1)
MONOCYTES NFR BLD: 10 % (ref 4–15)
NEUTROPHILS # BLD AUTO: 3.9 K/UL (ref 1.8–7.7)
NEUTROPHILS NFR BLD: 53.7 % (ref 38–73)
NRBC BLD-RTO: 0 /100 WBC
PHOSPHATE SERPL-MCNC: 2.8 MG/DL (ref 2.7–4.5)
PLATELET # BLD AUTO: 280 K/UL (ref 150–350)
PMV BLD AUTO: 11.6 FL (ref 9.2–12.9)
POTASSIUM SERPL-SCNC: 3.8 MMOL/L (ref 3.5–5.1)
PROT SERPL-MCNC: 7.3 G/DL (ref 6–8.4)
RBC # BLD AUTO: 3.81 M/UL (ref 4–5.4)
SODIUM SERPL-SCNC: 140 MMOL/L (ref 136–145)
WBC # BLD AUTO: 7.23 K/UL (ref 3.9–12.7)

## 2020-08-03 PROCEDURE — 94761 N-INVAS EAR/PLS OXIMETRY MLT: CPT | Mod: HCNC

## 2020-08-03 PROCEDURE — 80053 COMPREHEN METABOLIC PANEL: CPT | Mod: HCNC

## 2020-08-03 PROCEDURE — 94664 DEMO&/EVAL PT USE INHALER: CPT | Mod: HCNC

## 2020-08-03 PROCEDURE — 84100 ASSAY OF PHOSPHORUS: CPT | Mod: HCNC

## 2020-08-03 PROCEDURE — 25000003 PHARM REV CODE 250: Mod: HCNC | Performed by: STUDENT IN AN ORGANIZED HEALTH CARE EDUCATION/TRAINING PROGRAM

## 2020-08-03 PROCEDURE — 83735 ASSAY OF MAGNESIUM: CPT | Mod: HCNC

## 2020-08-03 PROCEDURE — 94799 UNLISTED PULMONARY SVC/PX: CPT | Mod: HCNC

## 2020-08-03 PROCEDURE — 11000001 HC ACUTE MED/SURG PRIVATE ROOM: Mod: HCNC

## 2020-08-03 PROCEDURE — 99900035 HC TECH TIME PER 15 MIN (STAT): Mod: HCNC

## 2020-08-03 PROCEDURE — 94668 MNPJ CHEST WALL SBSQ: CPT | Mod: HCNC

## 2020-08-03 PROCEDURE — 97116 GAIT TRAINING THERAPY: CPT | Mod: HCNC,CQ

## 2020-08-03 PROCEDURE — 85025 COMPLETE CBC W/AUTO DIFF WBC: CPT | Mod: HCNC

## 2020-08-03 PROCEDURE — 97535 SELF CARE MNGMENT TRAINING: CPT | Mod: HCNC,CO

## 2020-08-03 PROCEDURE — 36415 COLL VENOUS BLD VENIPUNCTURE: CPT | Mod: HCNC

## 2020-08-03 PROCEDURE — 82150 ASSAY OF AMYLASE: CPT | Mod: HCNC

## 2020-08-03 PROCEDURE — 63600175 PHARM REV CODE 636 W HCPCS: Mod: HCNC | Performed by: STUDENT IN AN ORGANIZED HEALTH CARE EDUCATION/TRAINING PROGRAM

## 2020-08-03 PROCEDURE — 27000646 HC AEROBIKA DEVICE: Mod: HCNC

## 2020-08-03 PROCEDURE — 25000003 PHARM REV CODE 250: Mod: HCNC | Performed by: SURGERY

## 2020-08-03 PROCEDURE — 97110 THERAPEUTIC EXERCISES: CPT | Mod: HCNC,CQ

## 2020-08-03 RX ORDER — AMOXICILLIN 250 MG
1 CAPSULE ORAL 2 TIMES DAILY
Status: DISCONTINUED | OUTPATIENT
Start: 2020-08-03 | End: 2020-08-04 | Stop reason: HOSPADM

## 2020-08-03 RX ADMIN — TAMSULOSIN HYDROCHLORIDE 0.4 MG: 0.4 CAPSULE ORAL at 08:08

## 2020-08-03 RX ADMIN — ACETAMINOPHEN 650 MG: 325 TABLET ORAL at 12:08

## 2020-08-03 RX ADMIN — HYDRALAZINE HYDROCHLORIDE 5 MG: 20 INJECTION INTRAMUSCULAR; INTRAVENOUS at 01:08

## 2020-08-03 RX ADMIN — SIMETHICONE 125 MG: 125 TABLET, CHEWABLE ORAL at 05:08

## 2020-08-03 RX ADMIN — PREGABALIN 75 MG: 75 CAPSULE ORAL at 09:08

## 2020-08-03 RX ADMIN — LABETALOL HYDROCHLORIDE 5 MG: 5 INJECTION, SOLUTION INTRAVENOUS at 05:08

## 2020-08-03 RX ADMIN — STANDARDIZED SENNA CONCENTRATE AND DOCUSATE SODIUM 1 TABLET: 8.6; 5 TABLET ORAL at 09:08

## 2020-08-03 RX ADMIN — ACETAMINOPHEN 650 MG: 325 TABLET ORAL at 05:08

## 2020-08-03 RX ADMIN — PREGABALIN 75 MG: 75 CAPSULE ORAL at 08:08

## 2020-08-03 RX ADMIN — TRAMADOL HYDROCHLORIDE 50 MG: 50 TABLET, COATED ORAL at 11:08

## 2020-08-03 RX ADMIN — ENOXAPARIN SODIUM 40 MG: 40 INJECTION SUBCUTANEOUS at 05:08

## 2020-08-03 RX ADMIN — LABETALOL HYDROCHLORIDE 5 MG: 5 INJECTION, SOLUTION INTRAVENOUS at 03:08

## 2020-08-03 RX ADMIN — IRON SUCROSE 100 MG: 20 INJECTION, SOLUTION INTRAVENOUS at 08:08

## 2020-08-03 RX ADMIN — POTASSIUM CHLORIDE, DEXTROSE MONOHYDRATE AND SODIUM CHLORIDE: 150; 5; 450 INJECTION, SOLUTION INTRAVENOUS at 12:08

## 2020-08-03 NOTE — PROGRESS NOTES
NET Team Rounds    Diagnosis this admission: Adenocarcinoma of pancreas [C25.9]    Admit Date: 7/27/2020   Discharge Date: TBD     Surgery/Procedure: Procedure(s):  7/27/20--PANCREATECTOMY, DISTAL, SUBTOTAL, spleen preserving    NET Physician:  GILMA Ellis MD  Local Treating Physician:Vicenta Garber MD      Assessment  Diet: Regular  Nutrition Support -  none  Appetite - fair  Nausea - No  Vomiting- No  BM- last Tues, staring colace  Abdomen- soft, nondistended and nontender  Incision- no drainage, no swelling  Drain-  Krunal-Marroquin drain with closed bulb suction in the left abdomen, Sero-Sanguinous   Temp-last 24 hrs - Temp:  [98.6 °F (37 °C)-98.8 °F (37.1 °C)]    Urine- voiding without difficulty  Activity-  up with assistance, working with PT  Pain- none  Respiratory- encourage cough/deep breath/IS     IV Access- Central  & peripheral IV- right hand  Edema: none    Wt Readings from Last 3 Encounters:   08/03/20 79.7 kg (175 lb 11.3 oz)   07/24/20 75.4 kg (166 lb 5.4 oz)   07/14/20 75 kg (165 lb 7.3 oz)       Labs:   Recent Labs   Lab 07/31/20  0541 08/01/20  0540 08/02/20  0526 08/03/20  0830   WBC 4.96 5.03 6.53 7.23   HGB 9.0* 8.3* 8.6* 10.8*   HCT 28.7* 27.0* 27.0* 34.1*    227 222 280   MCV 90 91 91 90   RDW 15.3* 15.4* 15.5* 15.6*    137 138  --    K 4.4 4.3 4.4  --     108 108  --    CO2 25 25 27  --    BUN 9 7* 6*  --    CREATININE 1.6* 1.4 1.4  --    * 131* 120*  --    PROT 6.3 5.8* 6.0  --    ALBUMIN 2.9* 2.6* 2.7*  --    BILITOT 0.6 0.6 0.7  --    AST 18 15 21  --    ALKPHOS 177* 170* 264*  --    ALT 17 15 15  --     Results for KRISTINA JOHNSON (MRN [id number]) as of 8/3/2020 09:48   Ref. Range 7/30/2020 15:00 7/31/2020 17:00 8/1/2020 05:41 8/2/2020 05:26   Amylase, Fluid Latest Ref Range: Not established U/L 907 [phone]   Last 24 hours  Drains 17.5       Intake/Output Summary (Last 24 hours) at 8/3/2020 0950  Last data filed at 8/3/2020 0600  Gross per 24  hour   Intake 1715 ml   Output 4167.5 ml   Net -2452.5 ml       Scheduled Meds   acetaminophen  650 mg Oral Q6H    enoxaparin  40 mg Subcutaneous Q24H    iron sucrose (VENOFER) IVPB  100 mg Intravenous Daily    pregabalin  75 mg Oral BID    tamsulosin  0.4 mg Oral Daily       Continuous Infusion   dextrose 5 % and 0.45 % NaCl with KCl 20 mEq 50 mL/hr at 08/03/20 0018       PRN Meds  albuterol sulfate, diphenhydrAMINE, hydrALAZINE, labetalol, methocarbamoL, naloxone, ondansetron, simethicone, traMADoL     Team Round Findings:   - octreotide drip at 250 mcg/hr- d/c today  -monitor drainage from MANDY  -possible discharge tomorrow       Discharge Planning   Appointments- Dr. Ellis  Home Health needs- none  Therapy needs- none  Nutritional needs - regular  Home support system- son lives with patient, daughter - works here  Barriers- none    Reviewed follow up plan.  Patient verbalized understanding.    ___________________________________________  BROOKE MajanoN, RN, ONN-CG  Nurse Navigator [facility]    Tracie Weil Cavalier, RDN, LDN, CNSC  Registered Dietitian [facility]      Face to Face Time: 15 minutes

## 2020-08-03 NOTE — PT/OT/SLP PROGRESS
Physical Therapy Treatment    Patient Name:  Zohra Paulino   MRN:  [id number]    Recommendations:     Discharge Recommendations:  home   Discharge Equipment Recommendations: shower chair   Barriers to discharge: decreased strength/endurance with SOB on exertioin    Assessment:     Zohra Paulino is a 70 y.o. female admitted with a medical diagnosis of Primary adenocarcinoma of pancreas.  She presents with the following impairments/functional limitations:  weakness, impaired endurance, impaired functional mobilty, gait instability, impaired balance, decreased lower extremity function, impaired cardiopulmonary response to activity.  Pt would continue to benefit from P.T. To address impairments listed above.  .    Rehab Prognosis: Fair+; patient would benefit from acute skilled PT services to address these deficits and reach maximum level of function.    Recent Surgery: Procedure(s) (LRB):  PANCREATECTOMY, DISTAL, SUBTOTAL (N/A) 7 Days Post-Op    Plan:     During this hospitalization, patient to be seen 6 x/week to address the identified rehab impairments via gait training, therapeutic activities, therapeutic exercises, neuromuscular re-education and progress toward the following goals:    · Plan of Care Expires:  09/02/20    Subjective       Patient/Family Comments/goals: Pt agreed to tx.  Pain/Comfort:  · Pain Rating 1: 5/10  · Location - Orientation 1: generalized  · Location 1: back  · Pain Addressed 1: Reposition, Distraction, Nurse notified  · Pain Rating Post-Intervention 1: 5/10      Objective:     Communicated with RN (Asmita) prior to session.  Patient found up in chair with bed alarm, peripheral IV, PureWick, telemetry upon PT entry to room.     General Precautions: Standard, fall   Orthopedic Precautions:N/A   Braces:       Functional Mobility:  · Transfers:     · Sit to Stand:  stand by assistance with rolling walker and x2 bouts  · Gait: 40ft x 2 with RW and CGA and assist for IV pole.  Pt  ambulates with decreased gaudencio, decreased step length and mild unsteadiness without LOB.  · Balance: sitting good, standing fair+, gait fair      AM-PAC 6 CLICK MOBILITY  Turning over in bed (including adjusting bedclothes, sheets and blankets)?: 3  Sitting down on and standing up from a chair with arms (e.g., wheelchair, bedside commode, etc.): 3  Moving from lying on back to sitting on the side of the bed?: 3  Moving to and from a bed to a chair (including a wheelchair)?: 3  Need to walk in hospital room?: 3  Climbing 3-5 steps with a railing?: 2  Basic Mobility Total Score: 17       Therapeutic Activities and Exercises:   Pt found sitting in b/s recliner with BLE elevated.  BLE therex:  AP, heelsides x 10 reps.  Seated LAQs x 10.  Pt was off O2 upon entering the room with O2 sats 97% on room air.  O2 sats 96-97% with LE therex and reset breaks secondary to SOB and pt instructed in PLB.  O2 sats with gait training 94%.  Pt's O2 sats stated above 90% off O2 throughout tx, but pt had many bouts of SOB with exertion and recovered normal breathing after bouts of PLB.  RN notified. Pt left up in b/s chair with lunch tray in front of her.    Patient left up in chair with all lines intact, call button in reach, chair alarm on and RN notified..    GOALS:   Multidisciplinary Problems     Physical Therapy Goals        Problem: Physical Therapy Goal    Goal Priority Disciplines Outcome Goal Variances Interventions   Physical Therapy Goal     PT, PT/OT Ongoing, Progressing     Description: Goals to be met by: 2020     Patient will increase functional independence with mobility by performin. Supine to sit with Modified [place]  2. Sit to supine with Modified [place]  3. Rolling with Modified [place].  4. Sit to stand transfer with Stand-by Assistance with or without AD  5. Bed to chair transfer with Stand-by Assistance with or without AD  6. Gait  x 150 feet with Stand-by Assistance with or without  AD   7. Lower extremity exercise program x10 reps per handout, with independence                     Time Tracking:     PT Received On: 08/03/20  PT Start Time: 1136     PT Stop Time: 1201  PT Total Time (min): 25 min     Billable Minutes: Gait Training 15 and Therapeutic Exercise 10    Treatment Type: Treatment  PT/PTA: PTA     PTA Visit Number: 1     Mila Pryor, TIM  08/03/2020

## 2020-08-03 NOTE — PLAN OF CARE
TN met with pt and pt's family in room for continued d/c planning. Pt possible d/c tomorrow per chart review. Pt still requesting SC on d/c. Pt's family to help pt at home upon d/c. Pt still prefers bedside delivery on d/c. CM will continue to follow for d/c needs.       08/03/20 3883   Discharge Reassessment   Assessment Type Discharge Planning Reassessment   Provided patient/caregiver education on the expected discharge date and the discharge plan Yes   Do you have any problems affording any of your prescribed medications? No   Discharge Plan A Home with family   Discharge Plan B Home Health   DME Needed Upon Discharge  shower chair   Patient choice form signed by patient/caregiver N/A   Anticipated Discharge Disposition Home   Can the patient/caregiver answer the patient profile reliably? Yes, cognitively intact

## 2020-08-03 NOTE — PLAN OF CARE
Patient AAOx4, VSS, patient c/o pain, managed with PRN tramadol, patient bp elevated, PRN mediations given. Patient tolerating diet, no nausea or vomiting, tolerating activity, ambulates to bathroom with assistance. Free from falls. Drain care education provided. Call bell in reach. Safety maintained. Will continue to monitor.   Problem: Adult Inpatient Plan of Care  Goal: Plan of Care Review  Outcome: Ongoing, Progressing  Goal: Patient-Specific Goal (Individualization)  Outcome: Ongoing, Progressing  Goal: Absence of Hospital-Acquired Illness or Injury  Outcome: Ongoing, Progressing  Goal: Optimal Comfort and Wellbeing  Outcome: Ongoing, Progressing  Goal: Readiness for Transition of Care  Outcome: Ongoing, Progressing  Goal: Rounds/Family Conference  Outcome: Ongoing, Progressing     Problem: Fall Injury Risk  Goal: Absence of Fall and Fall-Related Injury  Outcome: Ongoing, Progressing     Problem: Infection  Goal: Infection Symptom Resolution  Outcome: Ongoing, Progressing     Problem: Skin Injury Risk Increased  Goal: Skin Health and Integrity  Outcome: Ongoing, Progressing     Problem: Bleeding (Surgery Nonspecified)  Goal: Absence of Bleeding  Outcome: Ongoing, Progressing     Problem: Bowel Elimination Impaired (Surgery Nonspecified)  Goal: Effective Bowel Elimination  Outcome: Ongoing, Progressing

## 2020-08-03 NOTE — PLAN OF CARE
Problem: Occupational Therapy Goal  Goal: Occupational Therapy Goal  Description: Goals to be met by: 8/28     Patient will increase functional independence with ADLs by performing:    UE Dressing with Modified [place].  LE Dressing with Modified [place].  Grooming while standing at sink with Modified [place].  Toileting from toilet with Modified [place] for hygiene and clothing management.   Toilet transfer to toilet with Modified [place].  Indep HEP    Outcome: Ongoing, Progressing       Zohra Paulino is a 70 y.o. female with a medical diagnosis of Primary adenocarcinoma of pancreas.   Performance deficits affecting function are weakness, impaired endurance, impaired self care skills, impaired functional mobilty, gait instability, impaired balance, impaired skin, impaired cardiopulmonary response to activity. Pt found in bed, agreeable to therapy.  Pt tolerated tx fairly well.  Reports of weakness/fatigue only during tx.  Progressing towards goals. Continue OT services to address functional goals, progressing as able.      HUNG Reeves/ODETTE

## 2020-08-03 NOTE — PLAN OF CARE
Pts safety maintained, call light in reach, instructed to call for assistance. Meds given per MAR. Pain relieved with PRN Tylenol. Fluids infusing. Incision and dressing on MANDY drain CDI. No N/V, SOB distress. Vitals stable though the night.

## 2020-08-03 NOTE — PLAN OF CARE
Problem: Physical Therapy Goal  Goal: Physical Therapy Goal  Description: Goals to be met by: 2020     Patient will increase functional independence with mobility by performin. Supine to sit with Modified [place]  2. Sit to supine with Modified [place]  3. Rolling with Modified [place].  4. Sit to stand transfer with Stand-by Assistance with or without AD  5. Bed to chair transfer with Stand-by Assistance with or without AD  6. Gait  x 150 feet with Stand-by Assistance with or without AD   7. Lower extremity exercise program x10 reps per handout, with independence    Outcome: Ongoing, Progressing   Continue working toward goals.

## 2020-08-03 NOTE — PT/OT/SLP PROGRESS
Occupational Therapy   Treatment    Name: Zohra Paulino  MRN: [id number]  Admitting Diagnosis:  Primary adenocarcinoma of pancreas  7 Days Post-Op    Recommendations:     Discharge Recommendations: home  Discharge Equipment Recommendations:  shower chair  Barriers to discharge:  None    Assessment:     Zohra Paulino is a 70 y.o. female with a medical diagnosis of Primary adenocarcinoma of pancreas.   Performance deficits affecting function are weakness, impaired endurance, impaired self care skills, impaired functional mobilty, gait instability, impaired balance, impaired skin, impaired cardiopulmonary response to activity. Pt found in bed, agreeable to therapy.  Pt tolerated tx fairly well.  Reports of weakness/fatigue only during tx.  Progressing towards goals. Continue OT services to address functional goals, progressing as able.      Rehab Prognosis:  Good; patient would benefit from acute skilled OT services to address these deficits and reach maximum level of function.       Plan:     Patient to be seen 5 x/week to address the above listed problems via self-care/home management, therapeutic activities, therapeutic exercises  · Plan of Care Expires: 08/28/20  · Plan of Care Reviewed with: patient    Subjective     Pain/Comfort:  · Pain Rating 1: 0/10  · Pain Rating Post-Intervention 1: 0/10    Objective:     Communicated with: RN prior to session.  Patient found HOB elevated with bed alarm, peripheral IV, telemetry upon OT entry to room.    General Precautions: Standard, fall   Orthopedic Precautions:N/A   Braces: N/A     Occupational Performance:     Bed Mobility:    · Patient completed Rolling/Turning to Right with stand by assistance  · Patient completed Scooting/Bridging with stand by assistance  · Patient completed Supine to Sit with stand by assistance     Functional Mobility/Transfers:  · Patient completed Sit <> Stand Transfer with stand by assistance  with  rolling walker   · Patient  completed Bed <> Chair Transfer using Stand Pivot technique with stand by assistance with rolling walker  · Patient completed Toilet Transfer Stand Pivot technique with stand by assistance with  rolling walker  · Functional Mobility: Pt ambulated room distances with SBA using RW.      Activities of Daily Living:  · Grooming: stand by assistance standing at sink  · Toileting: stand by assistance        [facility] 6 Click ADL: 17    Treatment & Education:  Pt educated on posture awareness in sitting, standing and during functional mobility.  Pt requires increased time with all activity.      Patient left up in chair with all lines intact, call button in reach, chair alarm on and RN notifiedEducation:      GOALS:   Multidisciplinary Problems     Occupational Therapy Goals        Problem: Occupational Therapy Goal    Goal Priority Disciplines Outcome Interventions   Occupational Therapy Goal     OT, PT/OT Ongoing, Progressing    Description: Goals to be met by: 8/28     Patient will increase functional independence with ADLs by performing:    UE Dressing with Modified [place].  LE Dressing with Modified [place].  Grooming while standing at sink with Modified [place].  Toileting from toilet with Modified [place] for hygiene and clothing management.   Toilet transfer to toilet with Modified [place].  Indep HEP                     Time Tracking:     OT Date of Treatment: 08/03/20  OT Start Time: 1018  OT Stop Time: 1042  OT Total Time (min): 24 min    Billable Minutes:Self Care/Home Management 24    CRYSTAL Reeves  8/3/2020

## 2020-08-03 NOTE — PLAN OF CARE
Patient on room air with documented SpO2 and in no apparent distress. Pt will perform IS and Acapella Q4. Will continue to monitor.

## 2020-08-03 NOTE — PROGRESS NOTES
Progress Note    Admit Date: 7/27/2020   LOS: 7 days   POD #7 s/p open spleen preserving distal pancreatectomy 7/27/2020      SUBJECTIVE:     No acute events overnight.    Pain controlled on current regimen.   No nausea or vomiting, tolerating some oral intake with feeling of early satiety  No fevers or chills.    Having bowel movements.  Wang removed, urinating without issue since removal  Central line discontinued yesterday      Scheduled Meds:   acetaminophen  650 mg Oral Q6H    enoxaparin  40 mg Subcutaneous Q24H    iron sucrose (VENOFER) IVPB  100 mg Intravenous Daily    pregabalin  75 mg Oral BID    tamsulosin  0.4 mg Oral Daily     Continuous Infusions:   dextrose 5 % and 0.45 % NaCl with KCl 20 mEq 50 mL/hr at 08/03/20 0018    octreotide infusion (50 mcg/mL) 125 mcg/hr (08/01/20 1926)     PRN Meds:albuterol sulfate, diphenhydrAMINE, hydrALAZINE, labetalol, methocarbamoL, naloxone, ondansetron, simethicone, traMADoL    Review of patient's allergies indicates:   Allergen Reactions    Codeine Palpitations         OBJECTIVE:     Vital Signs (Most Recent)  Temp: 98.8 °F (37.1 °C) (08/03/20 0452)  Pulse: 90 (08/03/20 0452)  Resp: 17 (08/03/20 0452)  BP: (!) 160/60 (08/03/20 0452)  SpO2: 97 % (08/02/20 2105)    Vital Signs Range (Last 24H):  Temp:  [98.6 °F (37 °C)-98.8 °F (37.1 °C)]   Pulse:  [70-94]   Resp:  [17-18]   BP: (135-160)/(60-73)   SpO2:  [97 %-100 %]     I & O (Last 24H):    Intake/Output Summary (Last 24 hours) at 8/3/2020 0615  Last data filed at 8/3/2020 0600  Gross per 24 hour   Intake 2075 ml   Output 4767.5 ml   Net -2692.5 ml     L MANDY 17.5 mL    Physical Exam:  NAD, AAOx3, nontoxic appearing  On room air  Normal respiratory effort, unlabored   RRR  Abdomen soft, appropriately tender, nondistended, midline abdominal incision c/d/i  Abdominal drain with slightly murky output  Moving all extremities    LABS  CBC  Recent Labs   Lab 07/31/20  0541 08/01/20  0540 08/02/20  0526   WBC 4.96  5.03 6.53   RBC 3.19* 2.96* 2.97*   HGB 9.0* 8.3* 8.6*   HCT 28.7* 27.0* 27.0*    227 222   MCV 90 91 91   MCH 28.2 28.0 29.0   MCHC 31.4* 30.7* 31.9*     CMP  Recent Labs   Lab 07/31/20  0541 08/01/20  0540 08/02/20  0526   * 131* 120*   CALCIUM 8.2* 8.1* 8.4*   ALBUMIN 2.9* 2.6* 2.7*   PROT 6.3 5.8* 6.0    137 138   K 4.4 4.3 4.4   CO2 25 25 27    108 108   BUN 9 7* 6*   CREATININE 1.6* 1.4 1.4   ALKPHOS 177* 170* 264*   ALT 17 15 15   AST 18 15 21   BILITOT 0.6 0.6 0.7       Recent Labs   Lab 07/31/20  0541 08/01/20  0540 08/02/20  0526   CALCIUM 8.2* 8.1* 8.4*   MG 1.9 1.9 1.8   PHOS 2.0* 2.4* 2.9         POCT-Glucose  No results found for: POCTGLUCOSE    COAGS  No results for input(s): PT, INR, APTT in the last 168 hours.    CE  No results for input(s): TROPONINI, CPK, CPKMB in the last 168 hours.  BNP  No results for input(s): BNP in the last 168 hours.    ABGs  No results for input(s): PH, PCO2, PO2, HCO3, POCSATURATED, BE in the last 24 hours.    UA  No results for input(s): COLORU, CLARITYU, SPECGRAV, PHUR, PROTEINUA, GLUCOSEU, BILIRUBINCON, BLOODU, WBCU, RBCU, BACTERIA, MUCUS, NITRITE, LEUKOCYTESUR, UROBILINOGEN, HYALINECASTS in the last 168 hours.    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   -Good UOP with diuresis. Now on room air  -Continue Respiratory therapy IS and Acapella q4. Appreciate their assistance  -Continue current pain control  -Continue regular diet  -Will discuss octreotide with staff, drain amylase 877 yesterday  -PT/OT  -Lovenox DVT ppx    Yury Ram MD

## 2020-08-04 VITALS
RESPIRATION RATE: 18 BRPM | HEART RATE: 83 BPM | DIASTOLIC BLOOD PRESSURE: 92 MMHG | BODY MASS INDEX: 31.64 KG/M2 | OXYGEN SATURATION: 95 % | HEIGHT: 62 IN | SYSTOLIC BLOOD PRESSURE: 166 MMHG | TEMPERATURE: 98 F | WEIGHT: 171.94 LBS

## 2020-08-04 DIAGNOSIS — C25.9 PRIMARY ADENOCARCINOMA OF PANCREAS: Primary | ICD-10-CM

## 2020-08-04 LAB
ALBUMIN SERPL BCP-MCNC: 3.2 G/DL (ref 3.5–5.2)
ALP SERPL-CCNC: 346 U/L (ref 55–135)
ALT SERPL W/O P-5'-P-CCNC: 34 U/L (ref 10–44)
ANION GAP SERPL CALC-SCNC: 9 MMOL/L (ref 8–16)
AST SERPL-CCNC: 42 U/L (ref 10–40)
BASOPHILS # BLD AUTO: 0.05 K/UL (ref 0–0.2)
BASOPHILS NFR BLD: 0.6 % (ref 0–1.9)
BILIRUB SERPL-MCNC: 0.6 MG/DL (ref 0.1–1)
BUN SERPL-MCNC: 10 MG/DL (ref 8–23)
CALCIUM SERPL-MCNC: 9.1 MG/DL (ref 8.7–10.5)
CHLORIDE SERPL-SCNC: 102 MMOL/L (ref 95–110)
CO2 SERPL-SCNC: 29 MMOL/L (ref 23–29)
CREAT SERPL-MCNC: 1.4 MG/DL (ref 0.5–1.4)
DIFFERENTIAL METHOD: ABNORMAL
EOSINOPHIL # BLD AUTO: 0.7 K/UL (ref 0–0.5)
EOSINOPHIL NFR BLD: 7.5 % (ref 0–8)
ERYTHROCYTE [DISTWIDTH] IN BLOOD BY AUTOMATED COUNT: 15.8 % (ref 11.5–14.5)
EST. GFR  (AFRICAN AMERICAN): 44 ML/MIN/1.73 M^2
EST. GFR  (NON AFRICAN AMERICAN): 38 ML/MIN/1.73 M^2
GLUCOSE SERPL-MCNC: 111 MG/DL (ref 70–110)
HCT VFR BLD AUTO: 32.6 % (ref 37–48.5)
HGB BLD-MCNC: 10.5 G/DL (ref 12–16)
IMM GRANULOCYTES # BLD AUTO: 0.23 K/UL (ref 0–0.04)
IMM GRANULOCYTES NFR BLD AUTO: 2.6 % (ref 0–0.5)
LYMPHOCYTES # BLD AUTO: 2.2 K/UL (ref 1–4.8)
LYMPHOCYTES NFR BLD: 25.3 % (ref 18–48)
MAGNESIUM SERPL-MCNC: 1.7 MG/DL (ref 1.6–2.6)
MCH RBC QN AUTO: 28.4 PG (ref 27–31)
MCHC RBC AUTO-ENTMCNC: 32.2 G/DL (ref 32–36)
MCV RBC AUTO: 88 FL (ref 82–98)
MONOCYTES # BLD AUTO: 1 K/UL (ref 0.3–1)
MONOCYTES NFR BLD: 11 % (ref 4–15)
NEUTROPHILS # BLD AUTO: 4.6 K/UL (ref 1.8–7.7)
NEUTROPHILS NFR BLD: 53 % (ref 38–73)
NRBC BLD-RTO: 0 /100 WBC
PHOSPHATE SERPL-MCNC: 3.8 MG/DL (ref 2.7–4.5)
PLATELET # BLD AUTO: 298 K/UL (ref 150–350)
PMV BLD AUTO: 11.7 FL (ref 9.2–12.9)
POTASSIUM SERPL-SCNC: 3.7 MMOL/L (ref 3.5–5.1)
PROT SERPL-MCNC: 7 G/DL (ref 6–8.4)
RBC # BLD AUTO: 3.7 M/UL (ref 4–5.4)
SODIUM SERPL-SCNC: 140 MMOL/L (ref 136–145)
WBC # BLD AUTO: 8.72 K/UL (ref 3.9–12.7)

## 2020-08-04 PROCEDURE — 94668 MNPJ CHEST WALL SBSQ: CPT | Mod: HCNC

## 2020-08-04 PROCEDURE — 84100 ASSAY OF PHOSPHORUS: CPT | Mod: HCNC

## 2020-08-04 PROCEDURE — 99900035 HC TECH TIME PER 15 MIN (STAT): Mod: HCNC

## 2020-08-04 PROCEDURE — 36415 COLL VENOUS BLD VENIPUNCTURE: CPT | Mod: HCNC

## 2020-08-04 PROCEDURE — 94664 DEMO&/EVAL PT USE INHALER: CPT | Mod: HCNC

## 2020-08-04 PROCEDURE — 85025 COMPLETE CBC W/AUTO DIFF WBC: CPT | Mod: HCNC

## 2020-08-04 PROCEDURE — 25000003 PHARM REV CODE 250: Mod: HCNC | Performed by: STUDENT IN AN ORGANIZED HEALTH CARE EDUCATION/TRAINING PROGRAM

## 2020-08-04 PROCEDURE — 94761 N-INVAS EAR/PLS OXIMETRY MLT: CPT | Mod: HCNC

## 2020-08-04 PROCEDURE — 27000646 HC AEROBIKA DEVICE: Mod: HCNC

## 2020-08-04 PROCEDURE — 83735 ASSAY OF MAGNESIUM: CPT | Mod: HCNC

## 2020-08-04 PROCEDURE — 80053 COMPREHEN METABOLIC PANEL: CPT | Mod: HCNC

## 2020-08-04 PROCEDURE — 63600175 PHARM REV CODE 636 W HCPCS: Mod: HCNC | Performed by: STUDENT IN AN ORGANIZED HEALTH CARE EDUCATION/TRAINING PROGRAM

## 2020-08-04 RX ORDER — TRAMADOL HYDROCHLORIDE 50 MG/1
50 TABLET ORAL EVERY 6 HOURS PRN
Qty: 20 TABLET | Refills: 0 | Status: SHIPPED | OUTPATIENT
Start: 2020-08-04 | End: 2020-08-11

## 2020-08-04 RX ORDER — ONDANSETRON 4 MG/1
4 TABLET, FILM COATED ORAL EVERY 8 HOURS PRN
Qty: 30 TABLET | Refills: 0 | Status: SHIPPED | OUTPATIENT
Start: 2020-08-04 | End: 2020-08-14

## 2020-08-04 RX ORDER — AMOXICILLIN 250 MG
1 CAPSULE ORAL 2 TIMES DAILY
Qty: 14 TABLET | Refills: 0 | Status: SHIPPED | OUTPATIENT
Start: 2020-08-04 | End: 2020-08-11

## 2020-08-04 RX ORDER — PREGABALIN 75 MG/1
75 CAPSULE ORAL 2 TIMES DAILY
Qty: 14 CAPSULE | Refills: 0 | Status: SHIPPED | OUTPATIENT
Start: 2020-08-04 | End: 2021-03-02

## 2020-08-04 RX ORDER — METHOCARBAMOL 500 MG/1
500 TABLET, FILM COATED ORAL 3 TIMES DAILY
Qty: 21 TABLET | Refills: 0 | Status: SHIPPED | OUTPATIENT
Start: 2020-08-04 | End: 2020-08-11

## 2020-08-04 RX ADMIN — TAMSULOSIN HYDROCHLORIDE 0.4 MG: 0.4 CAPSULE ORAL at 08:08

## 2020-08-04 RX ADMIN — IRON SUCROSE 100 MG: 20 INJECTION, SOLUTION INTRAVENOUS at 08:08

## 2020-08-04 RX ADMIN — STANDARDIZED SENNA CONCENTRATE AND DOCUSATE SODIUM 1 TABLET: 8.6; 5 TABLET ORAL at 08:08

## 2020-08-04 RX ADMIN — ACETAMINOPHEN 650 MG: 325 TABLET ORAL at 11:08

## 2020-08-04 RX ADMIN — ACETAMINOPHEN 650 MG: 325 TABLET ORAL at 12:08

## 2020-08-04 RX ADMIN — PREGABALIN 75 MG: 75 CAPSULE ORAL at 08:08

## 2020-08-04 NOTE — PROGRESS NOTES
NET Team Rounds    Diagnosis this admission: Adenocarcinoma of pancreas [C25.9]    Admit Date: 7/27/2020   Discharge Date: 8/4/20    Surgery/Procedure: Procedure(s):  7/27/20--PANCREATECTOMY, DISTAL, SUBTOTAL, spleen preserving    NET Physician:  GILMA Ellis MD  Local Treating Physician:Vicenta Garber MD      Assessment  Diet: Regular  Nutrition Support -  none  Appetite - fair  Nausea - No  Vomiting- No  BM- last Tues, staring colace  Abdomen- soft, nondistended and nontender  Incision- no drainage, no swelling  Drain-  Krunal-Marroquin drain with closed bulb suction in the left abdomen, Sero-Sanguinous   Temp-last 24 hrs - Temp:  [98.3 °F (36.8 °C)-98.9 °F (37.2 °C)]    Urine- voiding without difficulty  Activity-  up with assistance, working with PT  Pain- none  Respiratory- encourage cough/deep breath/IS     IV Access- Central  & peripheral IV- right hand  Edema: none    Wt Readings from Last 3 Encounters:   08/04/20 78 kg (171 lb 15.3 oz)   07/24/20 75.4 kg (166 lb 5.4 oz)   07/14/20 75 kg (165 lb 7.3 oz)       Labs:   Recent Labs   Lab 08/02/20  0526 08/03/20  0830 08/04/20  0556   WBC 6.53 7.23 8.72   HGB 8.6* 10.8* 10.5*   HCT 27.0* 34.1* 32.6*    280 298   MCV 91 90 88   RDW 15.5* 15.6* 15.8*    140 140   K 4.4 3.8 3.7    103 102   CO2 27 30* 29   BUN 6* 8 10   CREATININE 1.4 1.5* 1.4   * 128* 111*   PROT 6.0 7.3 7.0   ALBUMIN 2.7* 3.2* 3.2*   BILITOT 0.7 0.7 0.6   AST 21 34 42*   ALKPHOS 264* 352* 346*   ALT 15 24 34     Results for KRISTINA JOHNSON (MRN [id number]) as of 8/4/2020 09:46   Ref. Range 7/30/2020 15:00 7/31/2020 17:00 8/1/2020 05:41 8/2/2020 05:26 8/3/2020 05:59   Amylase, Fluid Latest Ref Range: Not established U/L 907 779      Last 24 hours  Drains 17.5       Intake/Output Summary (Last 24 hours) at 8/4/2020 0940  Last data filed at 8/4/2020 0600  Gross per 24 hour   Intake 479.17 ml   Output 1851 ml   Net -1371.83 ml       Scheduled  Meds   acetaminophen  650 mg Oral Q6H    enoxaparin  40 mg Subcutaneous Q24H    iron sucrose (VENOFER) IVPB  100 mg Intravenous Daily    pregabalin  75 mg Oral BID    senna-docusate 8.6-50 mg  1 tablet Oral BID    tamsulosin  0.4 mg Oral Daily       Continuous Infusion      PRN Meds  albuterol sulfate, diphenhydrAMINE, hydrALAZINE, labetalol, methocarbamoL, naloxone, ondansetron, simethicone, traMADoL     Team Round Findings:   - D/C today with drain  -drain teaching complete    Reviewed discharge instruction    No lifting over 20 lbs for 4 weeks   Increase ambulation over time.   Call your MD for the following:    o Fever > 100.4, chills  o Severe uncontrolled pain not relieved by pain medications  o Unable to eat for > 2 meals  o Persistent nausea or vomiting not relieved by prescribed medications  o Redness, swelling, drainage or odor at incision site   o Difficulty breathing or increased cough  o Increased confusion or weakness   CALL NET OFFICE ANYTIME at [phone].  After hours & on weekends, the phone rolls to answer service to get one of our physicians.         Discharge Planning   Appointments- Dr. Ellis 1 wk with drain    CT abd/pelvis -prior to md lindsay    Lindsay with  -Onco- she will contact him for lindsay next week  Home Health needs- none  Therapy needs- none  Nutritional needs - regular  Home support system- son lives with patient, daughter - works here  Barriers- none    Reviewed follow up plan.  Patient verbalized understanding.    ___________________________________________  BROOKE MajanoN, RN, ONN-CG  Nurse Navigator [facility]    Tracie Weil Cavalier, RDN, LDN, CNSC  Registered Dietitian [facility]      Face to Face Time: 15 minutes

## 2020-08-04 NOTE — PLAN OF CARE
VIRTUAL NURSE:  Cued into patient's room.  Patient appears asleep. Side rails x2 up and bed alarm is activated and is on. Cardiac monitor with SR 88Will continue to be available as needed to assist with patient care.

## 2020-08-04 NOTE — DISCHARGE SUMMARY
[facility]  Neuroendocrine Surgery  Discharge Summary      Patient Name: Zohra Paulino  MRN: [id number]  Admission Date: 7/27/2020  Discharge Date and Time: 8/4/2020  2:20 PM  Attending Physician: Dr. Ellis    HPI:   69 yo female with adenocarcinoma of the pancrease who presented for pancreatectomy. Presented to outside facility with transient pain in her side, she thought it was a diverticulitis attack.  She was sent to ER. A CT without contrast  revealed a mass on her pancreas.  She had an elevated lipase level.  She had an EGD which was unrevealing and had a FDG PET scan.  No biopsy performed. EUS with biopsy : Positive for pancreas AdenoCA.  FDG PET negative for metastases.    Procedure(s) (LRB):  PANCREATECTOMY, DISTAL, SUBTOTAL (N/A)     Hospital Course:  The patient underwent a PANCREATECTOMY, DISTAL, SUBTOTAL (N/A) on 7/27/2020. On the morning of post operative day one, the serum calcium level was 8.2.  The patient did not have symptoms of hypocalcemia.. Patient is not taking calcium supplementation.. The patient was not started on Calcitriol supplementation. The patient had a Krunal-Marroquin drain placed at the time of surgery.  The drain output was 1 cc over the last 24 hours and was serous drainage.  The drain was removed prior to discharge. . At the time of discharge, the patient's pain was well-controlled.  She had voided, was ambulatory, and tolerating a diet.    Spleen was preserved, so patient does not require vaccinations.  She will follow up with Dr. Ellis in 1 week with drain in place. CT scan that day prior to appointment.  Will follow up with oncologist, Dr. Camacho, within 1 week of discharge.    Significant Diagnostic Studies:   Specimen (12h ago, onward)    None          Pending Diagnostic Studies:     None        Final Active Diagnoses:    Diagnosis Date Noted POA    PRINCIPAL PROBLEM:  Primary adenocarcinoma of pancreas [C25.9] 07/14/2020 Yes    Localized edema  due to fluid overload [R60.0]  Yes    At risk for malnutrition [Z91.89]  Yes    Pancreas cancer [C25.9] 07/27/2020 Yes    Adenocarcinoma of pancreas [C25.9] 07/21/2020 Yes      Problems Resolved During this Admission:    Diagnosis Date Noted Date Resolved POA    Pain managed using patient-controlled analgesia (PCA) [R52]  08/04/2020 Yes    On supplemental oxygen by nasal cannula [Z78.9]  08/04/2020 Yes    Urinary retention [R33.9]  08/04/2020 Yes      Discharged Condition: good    Disposition: Home or Self Care    Follow Up:  Spleen was preserved, so patient does not require vaccinations.  She will follow up with Dr. Ellis in 1 week with drain in place. CT scan that day prior to appointment.  Will follow up with oncologist, Dr. Camacho, within 1 week of discharge.     Patient Instructions:      Diet diabetic   Order Comments: Carb-steady, low simple sugar, low fat     Lifting restrictions   Order Comments: No lifting >10 pounds for 4 weeks post-op     Discharge instruction sheet was provided. The patient was instructed to call the surgeon's office with any additional questions or concerns.    Medications:  Reconciled Home Medications:      Medication List      START taking these medications    methocarbamoL 500 MG Tab  Commonly known as: ROBAXIN  Take 1 tablet (500 mg total) by mouth 3 (three) times daily. for 7 days     ondansetron 4 MG tablet  Commonly known as: ZOFRAN  Take 1 tablet (4 mg total) by mouth every 8 (eight) hours as needed for Nausea.     pregabalin 75 MG capsule  Commonly known as: LYRICA  Take 1 capsule (75 mg total) by mouth 2 (two) times daily. for 7 days     STOOL SOFTENER-STIMULANT LAXAT 8.6-50 mg per tablet  Generic drug: senna-docusate 8.6-50 mg  Take 1 tablet by mouth 2 (two) times daily. for 7 days     traMADoL 50 mg tablet  Commonly known as: ULTRAM  Take 1 tablet (50 mg total) by mouth every 6 (six) hours as needed for Pain.        CONTINUE taking these medications    alendronate  40 mg tablet  Commonly known as: FOSAMAX  TAKE 1 TABLET (40 MG TOTAL) BY MOUTH ONCE A WEEK.     cetirizine 10 MG tablet  Commonly known as: ZYRTEC  TAKE ONE TABLET BY MOUTH EVERY DAY     hydroCHLOROthiazide 12.5 MG Tab  Commonly known as: HYDRODIURIL  Take 1 tablet (12.5 mg total) by mouth once daily.     vitamin D 1000 units Tab  Commonly known as: VITAMIN D3  Take 1,000 Units by mouth once daily.        STOP taking these medications    bisacodyL 5 mg EC tablet  Commonly known as: DULCOLAX     HIBICLENS 4 % external liquid  Generic drug: chlorhexidine     Listerine Liqd     magnesium citrate solution     metroNIDAZOLE 500 MG tablet  Commonly known as: FLAGYL     neomycin 500 mg Tab  Commonly known as: MYCIFRADIN            Grupo Hartmann II, MD   PGY-2  Neuroendocrinendocrine Surgery  [facility]

## 2020-08-04 NOTE — PROGRESS NOTES
Progress Note    Admit Date: 7/27/2020   LOS: 8 days   POD #8 s/p open spleen-preserving distal pancreatectomy 7/27/2020      SUBJECTIVE:     No acute events overnight.    Pain controlled on current regimen.   No nausea or vomiting, continued decreased appetite but tolerating some intake  No fevers or chills.    No bowel movement  Urinating without issue  Octreotide stopped yesterday      Scheduled Meds:   acetaminophen  650 mg Oral Q6H    enoxaparin  40 mg Subcutaneous Q24H    iron sucrose (VENOFER) IVPB  100 mg Intravenous Daily    pregabalin  75 mg Oral BID    senna-docusate 8.6-50 mg  1 tablet Oral BID    tamsulosin  0.4 mg Oral Daily     Continuous Infusions:    PRN Meds:albuterol sulfate, diphenhydrAMINE, hydrALAZINE, labetalol, methocarbamoL, naloxone, ondansetron, simethicone, traMADoL    Review of patient's allergies indicates:   Allergen Reactions    Codeine Palpitations         OBJECTIVE:     Vital Signs (Most Recent)  Temp: 98.3 °F (36.8 °C) (08/04/20 0434)  Pulse: 74 (08/04/20 0434)  Resp: 16 (08/04/20 0434)  BP: (!) 160/81 (08/04/20 0434)  SpO2: 95 % (08/04/20 0333)    Vital Signs Range (Last 24H):  Temp:  [98.3 °F (36.8 °C)-98.9 °F (37.2 °C)]   Pulse:  [70-88]   Resp:  [15-19]   BP: (128-186)/(73-87)   SpO2:  [92 %-96 %]     I & O (Last 24H):    Intake/Output Summary (Last 24 hours) at 8/4/2020 0634  Last data filed at 8/4/2020 0600  Gross per 24 hour   Intake 479.17 ml   Output 2351 ml   Net -1871.83 ml     L MANDY 1 mL    Physical Exam:  NAD, AAOx3, nontoxic appearing  Respiratory effort unlabored on room air  RRR  Abdomen soft, appropriately tender, nondistended, midline abdominal incision c/d/i  Abdominal drain with minimal output  Moving all extremities    LABS  CBC  Recent Labs   Lab 08/02/20  0526 08/03/20  0830 08/04/20  0556   WBC 6.53 7.23 8.72   RBC 2.97* 3.81* 3.70*   HGB 8.6* 10.8* 10.5*   HCT 27.0* 34.1* 32.6*    280 298   MCV 91 90 88   MCH 29.0 28.3 28.4   MCHC 31.9* 31.7*  32.2     CMP  Recent Labs   Lab 08/01/20  0540 08/02/20  0526 08/03/20  0830   * 120* 128*   CALCIUM 8.1* 8.4* 9.1   ALBUMIN 2.6* 2.7* 3.2*   PROT 5.8* 6.0 7.3    138 140   K 4.3 4.4 3.8   CO2 25 27 30*    108 103   BUN 7* 6* 8   CREATININE 1.4 1.4 1.5*   ALKPHOS 170* 264* 352*   ALT 15 15 24   AST 15 21 34   BILITOT 0.6 0.7 0.7       Recent Labs   Lab 08/01/20  0540 08/02/20  0526 08/03/20  0830   CALCIUM 8.1* 8.4* 9.1   MG 1.9 1.8 1.6   PHOS 2.4* 2.9 2.8         POCT-Glucose  No results found for: POCTGLUCOSE    COAGS  No results for input(s): PT, INR, APTT in the last 168 hours.    CE  No results for input(s): TROPONINI, CPK, CPKMB in the last 168 hours.  BNP  No results for input(s): BNP in the last 168 hours.    ABGs  No results for input(s): PH, PCO2, PO2, HCO3, POCSATURATED, BE in the last 24 hours.    UA  No results for input(s): COLORU, CLARITYU, SPECGRAV, PHUR, PROTEINUA, GLUCOSEU, BILIRUBINCON, BLOODU, WBCU, RBCU, BACTERIA, MUCUS, NITRITE, LEUKOCYTESUR, UROBILINOGEN, HYALINECASTS in the last 168 hours.    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   -Continue Respiratory therapy IS and Acapella q4. Appreciate their assistance  -Continue current pain control  -Continue regular diet  -Drain output unchanged off octreotide  -PT/OT  -Lovenox DVT ppx    Yury Ram MD

## 2020-08-04 NOTE — PT/OT/SLP PROGRESS
Occupational Therapy      Patient Name:  Zohra Paulino   MRN:  [id number]    Patient not seen today secondary to AM 1011: Patient unwilling to participate(Pt pleasantly refused therapy stating she is going home today and will walk when she gets home.). Will follow-up tomorrow if pt remains in hospital.    CRYSTAL Reeves  8/4/2020

## 2020-08-04 NOTE — PLAN OF CARE
Discharge order noted, No HH noted, pt refusing SC.    Discharge rounds on patient. Discussed followup appointments, blue discharge folder, discharge nurse will go over home medications and reasons for medications and final discharge instructions. All patient/caregiver questions answered. Patient verbalized understanding.    Future Appointments   Date Time Provider Department Center   8/13/2020  8:45 AM [facility] CT1 LIMIT 400 LBS [facility] CT SCAN [facility]   8/13/2020 10:00 AM GILMA Ellis MD [facility] TUMOR [facility]        08/04/20 1149   Final Note   Assessment Type Final Discharge Note   Anticipated Discharge Disposition Home   What phone number can be called within the next 1-3 days to see how you are doing after discharge? [phone]   Hospital Follow Up  Appt(s) scheduled? Yes   Discharge plans and expectations educations in teach back method with documentation complete? Yes   Right Care Referral Info   Post Acute Recommendation No Care   Griselda Dawson RN-BC  Transitional Navigator  [phone]

## 2020-08-04 NOTE — PLAN OF CARE
Discharge orders noted. Additional clinical references attached.    Patient's discharge instructions given by bedside RN and reviewed via this VN.  Education provided on new medication, diagnosis, and follow-up appointments. Patient verbalized understanding. All questions answered. Patient awaiting medications from pharmacy and family to transport home.

## 2020-08-05 ENCOUNTER — TELEPHONE (OUTPATIENT)
Dept: NEUROLOGY | Facility: HOSPITAL | Age: 70
End: 2020-08-05

## 2020-08-05 NOTE — TELEPHONE ENCOUNTER
----- Message from Amy Lui sent at 8/5/2020  2:56 PM CDT -----  Contact: self [phone]  JPB - Patient said she is having a fluttering in her chest. Please call

## 2020-08-05 NOTE — TELEPHONE ENCOUNTER
Pt reports intermittent fluttering in chest. States she is currently taking tramadol for pain and lyrica. Notified Dr. Gage who advises that pt visit the ER for evaluation. Pt verbalized understanding

## 2020-08-10 ENCOUNTER — TELEPHONE (OUTPATIENT)
Dept: NEUROLOGY | Facility: HOSPITAL | Age: 70
End: 2020-08-10

## 2020-08-10 NOTE — TELEPHONE ENCOUNTER
----- Message from Amy Lui sent at 8/10/2020  9:45 AM CDT -----  Contact: self [phone]  Jpb - Patient is calling to inquire if her drainage should be a certain color. Please call

## 2020-08-10 NOTE — TELEPHONE ENCOUNTER
Pt informed per Dr. Gage, color of drainage is normal and to continue with appt scheduled on Thursday, 8/13/20.  Pt acknowledged understanding.   Additional Progress Note...

## 2020-08-12 NOTE — PROGRESS NOTES
NOLANETS:  [facility]  A collaboration between [facility] and [facility]        Chief Complaint:  post op follow up    Drain putting out clear drainage, approximately 200 cc per day.  Afebrile.    CT scan done today shows no fluid collections.    S/p:   7/27/2020 - Pancreatectomy, Distal, Subtotal     Pathology:   1. Superior pancreatic portahepatis node, biopsy:  No metastatic carcinoma identified in one lymph node (0/1), supported by negative immunostains for  AE1/AE3 with appropriate controls  2. Retropancreatic tissue, biopsy:  Neurovascular fibroconnective tissue  Negative for malignancy  3. 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, confined to the pancreas  Proximal and distal parenchymal margins negative for invasive carcinoma or high-grade intraepithelial  neoplasia, the tumor measures 1 cm from the proximal margin  Perineural invasion present  No lymphovascular invasion identified  No metPlease see complete Surgical pathology cancer case summary below  PATHOLOGIC TNM STAGING: pT2N0  Procedure-partial pancreatectomy, subtotal spleen sparing  Tumor site-pancreatic body  Tumor size-3 x 2 cmastatic carcinoma identified in one peripancreatic lymph node (0/1)  Histologic type-ductal adenocarcinoma (NOS)  Histologic grade-G2: Moderately differentiated  Tumor extension-tumor is confined to pancreas  Margins  All margins are uninvolved by invasive carcinoma and high-grade intraepithelial neoplasia, tumor measures  1 cm from the proximal margin  Margins examined: Proximal pancreatic parenchymal, distal pancreatic parenchymal  Distance of invasive carcinoma from proximal margin -1 cm,  Distance of invasive carcinoma from distal margin-approximately 10 cm  Lymphovascular invasion-not identified  Perineural invasion-present  Regional lymph nodes  Number of lymph nodes  "involved: 0  Number of lymph nodes examined: 2  Pathologic stage classification  Primary tumor (pT)-pT2: Tumor is greater than 2 cm and less than or equal to 4 cm in greatest dimension  Regional lymph nodes (pN)-pN0: No regional lymph node metastasis  Additional pathologic findings-pancreatic intraepithelial neoplasia, PanIN3  Vital Sign:   Vitals:    08/13/20 1100   BP: (!) 144/72   Pulse: 71   Weight: 70 kg (154 lb 5.2 oz)   Height: 5' 2" (1.575 m)       Drain content clear fluid.    Incision: healing well    Appetite: good    Restrictions: Restriction as listed:  No lifting over 20 lb for 6 weeks    Return to clinic:  Return in 2 weeks  Follow-up with primary oncologist  Send drain fluid for amylase (in house) assay  Monitor drain output  with written daily log  Consult GI for ERCP /pancreatic stent  RTC when drain fluid less than 30 cc per day, nurse can pull drain in clinic                  "

## 2020-08-13 ENCOUNTER — OFFICE VISIT (OUTPATIENT)
Dept: NEUROLOGY | Facility: HOSPITAL | Age: 70
End: 2020-08-13
Attending: SURGERY
Payer: MEDICARE

## 2020-08-13 ENCOUNTER — HOSPITAL ENCOUNTER (OUTPATIENT)
Dept: RADIOLOGY | Facility: HOSPITAL | Age: 70
Discharge: HOME | End: 2020-08-13
Attending: SURGERY
Payer: MEDICARE

## 2020-08-13 VITALS
SYSTOLIC BLOOD PRESSURE: 144 MMHG | HEART RATE: 71 BPM | BODY MASS INDEX: 28.39 KG/M2 | HEIGHT: 62 IN | WEIGHT: 154.31 LBS | DIASTOLIC BLOOD PRESSURE: 72 MMHG

## 2020-08-13 DIAGNOSIS — C25.9 ADENOCARCINOMA OF PANCREAS: Primary | ICD-10-CM

## 2020-08-13 DIAGNOSIS — C25.9 PRIMARY ADENOCARCINOMA OF PANCREAS: ICD-10-CM

## 2020-08-13 LAB
AMYLASE, BODY FLUID: NORMAL U/L
BODY FLUID SOURCE AMYLASE: NORMAL

## 2020-08-13 PROCEDURE — 99214 OFFICE O/P EST MOD 30 MIN: CPT | Mod: 25,HCNC | Performed by: SURGERY

## 2020-08-13 PROCEDURE — 74178 CT ABDOMEN PELVIS W WO CONTRAST: ICD-10-PCS | Mod: 26,HCNC,, | Performed by: RADIOLOGY

## 2020-08-13 PROCEDURE — 74178 CT ABD&PLV WO CNTR FLWD CNTR: CPT | Mod: TC,HCNC

## 2020-08-13 PROCEDURE — 74178 CT ABD&PLV WO CNTR FLWD CNTR: CPT | Mod: 26,HCNC,, | Performed by: RADIOLOGY

## 2020-08-13 PROCEDURE — 25500020 PHARM REV CODE 255: Mod: HCNC | Performed by: SURGERY

## 2020-08-13 PROCEDURE — 82150 ASSAY OF AMYLASE: CPT | Mod: HCNC

## 2020-08-13 PROCEDURE — A9698 NON-RAD CONTRAST MATERIALNOC: HCPCS | Mod: HCNC | Performed by: SURGERY

## 2020-08-13 RX ADMIN — IOHEXOL 75 ML: 350 INJECTION, SOLUTION INTRAVENOUS at 10:08

## 2020-08-13 RX ADMIN — IOHEXOL 1000 ML: 9 SOLUTION ORAL at 09:08

## 2020-08-13 NOTE — PATIENT INSTRUCTIONS
Today: Send drain fluid for amylase (in house) assay    Drain: Monitor drain output  with written daily log    Consult: Consult GI for ERCP /pancreatic stent --- message routed to Dr. Briggs staff    Consult: Follow-up with primary oncologist    Return Clinic Appointment: in 2 weeks with Dr. Ellis - appointment made

## 2020-08-15 ENCOUNTER — PATIENT MESSAGE (OUTPATIENT)
Dept: NEUROLOGY | Facility: HOSPITAL | Age: 70
End: 2020-08-15

## 2020-08-15 RX ORDER — AMOXICILLIN AND CLAVULANATE POTASSIUM 875; 125 MG/1; MG/1
1 TABLET, FILM COATED ORAL 2 TIMES DAILY
Qty: 20 TABLET | Refills: 0 | Status: ON HOLD | OUTPATIENT
Start: 2020-08-15 | End: 2020-09-16

## 2020-08-17 ENCOUNTER — TELEPHONE (OUTPATIENT)
Dept: NEUROLOGY | Facility: HOSPITAL | Age: 70
End: 2020-08-17

## 2020-08-17 NOTE — TELEPHONE ENCOUNTER
Contacted pt in regards to reports of foul smelling drainage. Pt sates someone has already reached out to her. She has no other questions or concerns.  See note from Luisa Horta. RN

## 2020-08-17 NOTE — TELEPHONE ENCOUNTER
Called patient for hospital follow up.     Diagnosis:   Admit Date: 7/27/20   Discharge Date: 8/4/20   Surgery/Procedure: 7/27/2020 - Pancreatectomy, Distal, Subtotal   NET Physician:  GILMA Ellis MD  Local Treating Physician:    Feels ok, reports fatigue    Diet: regular  Appetite: fair  Nausea - Yes -occasional  Vomiting- No  Incision: healing well, no drainage, no swelling  Fever -denies   Urine-  without difficulty  BM- yesterday  Activity- ambultating  Pain- 0/10  Incentive spirometry- encouraged to continue   Drain in place- cloudy, foul smell, contacted office 8/15/20 regarding smell, antibiotics rx. Per Dr. Garcia.  Results for KRISTINA JOHNSON (MRN [id number]) as of 8/17/2020 13:13   Ref. Range 7/31/2020 17:00 8/1/2020 05:41 8/2/2020 05:26 8/3/2020 05:59 8/13/2020 11:20   Amylase, Fluid Latest Ref Range: Not established U/L [id number]  >[id number]     Informed about the need for ERCP with GI asap.  Dr. Briggs nurse will contact her with next steps    Reviewed follow up plan.  Patient verbalized understanding.

## 2020-08-18 ENCOUNTER — TELEPHONE (OUTPATIENT)
Dept: GASTROENTEROLOGY | Facility: CLINIC | Age: 70
End: 2020-08-18

## 2020-08-18 ENCOUNTER — TELEPHONE (OUTPATIENT)
Dept: ENDOSCOPY | Facility: HOSPITAL | Age: 70
End: 2020-08-18

## 2020-08-18 DIAGNOSIS — C25.1 MALIGNANT NEOPLASM OF BODY OF PANCREAS: ICD-10-CM

## 2020-08-18 DIAGNOSIS — Z01.812 PRE-PROCEDURE LAB EXAM: Primary | ICD-10-CM

## 2020-08-18 NOTE — TELEPHONE ENCOUNTER
Spoke with patient about arrival time @ 1100.   Covid test =rapid    NPO status reviewed: Patient may eat until 7:00pm.  After 7pm, pt may have CLEAR liquids ONLY until completely NPO at Midnight.    Medications: Do not take Insulin or oral diabetic medications the day of the procedure.    Take as prescribed: heart, seizure and blood pressure medication in the morning with a sip of water (less than an ounce).  Take any breathing medications and bring inhalers to hospital with you.     Leave all valuables and jewelry at home. Wear comfortable clothes to procedure to change into hospital gown.   You cannot drive for 24 hours after your procedure because you will receive sedation for your procedure to make you comfortable.    A ride must be provided at discharge.

## 2020-08-18 NOTE — TELEPHONE ENCOUNTER
Patient notified . Appointment for tomorrow at 12:00pm. Light dinner and fasting for ERCP. Patient verbalized understanding.will send instruction via patient portal.

## 2020-08-18 NOTE — TELEPHONE ENCOUNTER
----- Message from Key Addison sent at 8/18/2020  8:45 AM CDT -----  Contact: [phone]  JPB-----Pt is requesting a callback from Mrs. Moran in regards to confirming the appt with the pt.  Pt would like to know if the Doctor would like her to come to the appt due to the draining have stopped.      Please call and advise

## 2020-08-18 NOTE — TELEPHONE ENCOUNTER
Spoke to pt about appt with Dr. Ivory. Pt states she spoke to Dr. Ivory's staff and will keep the appt. No other questions

## 2020-08-18 NOTE — TELEPHONE ENCOUNTER
----- Message from Tarik Brunson MD sent at 8/18/2020  8:00 AM CDT -----  Regarding: RE: Consult  Can you schedule this patient for an ERCP this week for a pancreatic duct leak. Referring is Sheila  ----- Message -----  From: Kimi Birch LPN  Sent: 8/17/2020   4:31 PM CDT  To: Tarik Brunson MD, Boy Montanez [facility]  Subject: FW: Consult                                      Can this pt be scheduled asap? Thanks.  ----- Message -----  From: Nicolas Briggs MD  Sent: 8/17/2020   2:30 PM CDT  To: Kimi Birch LPN  Subject: RE: Consult                                      Please forward this referral to the advanced endoscopy service (Dr. Billy, Boy, [facility])   ----- Message -----  From: Kimi Birch LPN  Sent: 8/17/2020  12:51 PM CDT  To: Nicolas Briggs MD  Subject: FW: Consult                                      Ok to direct schedule? Or see in clinic?  ----- Message -----  From: Gina Stewart MA  Sent: 8/13/2020  12:15 PM CDT  To: Brigitte Valenzuela Staff  Subject: Consult                                          Hi All,    As per Dr. Ellis's clinic note on 8/13/20, can you please call the patient to schedule for a GI Consult for ERCP / pancreatic stent.    Please let me know if I can be of any additional assistance.    Thanks,  Teofilo

## 2020-08-19 ENCOUNTER — ANESTHESIA EVENT (OUTPATIENT)
Dept: ENDOSCOPY | Facility: HOSPITAL | Age: 70
End: 2020-08-19
Payer: MEDICARE

## 2020-08-19 ENCOUNTER — TELEPHONE (OUTPATIENT)
Dept: GASTROENTEROLOGY | Facility: CLINIC | Age: 70
End: 2020-08-19

## 2020-08-19 ENCOUNTER — HOSPITAL ENCOUNTER (OUTPATIENT)
Facility: HOSPITAL | Age: 70
Discharge: HOME | End: 2020-08-19
Attending: INTERNAL MEDICINE | Admitting: INTERNAL MEDICINE
Payer: MEDICARE

## 2020-08-19 ENCOUNTER — TELEPHONE (OUTPATIENT)
Dept: NEUROLOGY | Facility: HOSPITAL | Age: 70
End: 2020-08-19

## 2020-08-19 ENCOUNTER — ANESTHESIA (OUTPATIENT)
Dept: ENDOSCOPY | Facility: HOSPITAL | Age: 70
End: 2020-08-19
Payer: MEDICARE

## 2020-08-19 VITALS
TEMPERATURE: 99 F | WEIGHT: 155 LBS | OXYGEN SATURATION: 100 % | RESPIRATION RATE: 14 BRPM | HEIGHT: 62 IN | BODY MASS INDEX: 28.52 KG/M2 | SYSTOLIC BLOOD PRESSURE: 96 MMHG | DIASTOLIC BLOOD PRESSURE: 61 MMHG | HEART RATE: 116 BPM

## 2020-08-19 DIAGNOSIS — K86.89 PANCREATIC DUCT LEAK: Primary | ICD-10-CM

## 2020-08-19 LAB — SARS-COV-2 RDRP RESP QL NAA+PROBE: NEGATIVE

## 2020-08-19 PROCEDURE — C1769 GUIDE WIRE: HCPCS | Mod: HCNC | Performed by: INTERNAL MEDICINE

## 2020-08-19 PROCEDURE — 43262 ENDO CHOLANGIOPANCREATOGRAPH: CPT | Mod: 59,HCNC,, | Performed by: INTERNAL MEDICINE

## 2020-08-19 PROCEDURE — 43262 PR ERCP,SPHINCTEROTOMY: ICD-10-PCS | Mod: 59,HCNC,, | Performed by: INTERNAL MEDICINE

## 2020-08-19 PROCEDURE — 25000003 PHARM REV CODE 250: Mod: HCNC | Performed by: INTERNAL MEDICINE

## 2020-08-19 PROCEDURE — 43274 ERCP DUCT STENT PLACEMENT: CPT | Mod: HCNC,,, | Performed by: INTERNAL MEDICINE

## 2020-08-19 PROCEDURE — 43274 ERCP DUCT STENT PLACEMENT: CPT | Mod: HCNC | Performed by: INTERNAL MEDICINE

## 2020-08-19 PROCEDURE — U0002 COVID-19 LAB TEST NON-CDC: HCPCS | Mod: HCNC

## 2020-08-19 PROCEDURE — 27201674 HC SPHINCTERTOME: Mod: HCNC | Performed by: INTERNAL MEDICINE

## 2020-08-19 PROCEDURE — 43274 PR ERCP W/STENT PLCMNT BILIARY/PANCREATIC DUCT: ICD-10-PCS | Mod: HCNC,,, | Performed by: INTERNAL MEDICINE

## 2020-08-19 PROCEDURE — 74328 PR  X-RAY FOR BILE DUCT ENDOSCOPY: ICD-10-PCS | Mod: 26,HCNC,, | Performed by: INTERNAL MEDICINE

## 2020-08-19 PROCEDURE — 37000008 HC ANESTHESIA 1ST 15 MINUTES: Mod: HCNC | Performed by: INTERNAL MEDICINE

## 2020-08-19 PROCEDURE — 25000003 PHARM REV CODE 250: Mod: HCNC | Performed by: NURSE ANESTHETIST, CERTIFIED REGISTERED

## 2020-08-19 PROCEDURE — C2617 STENT, NON-COR, TEM W/O DEL: HCPCS | Mod: HCNC | Performed by: INTERNAL MEDICINE

## 2020-08-19 PROCEDURE — 25500020 PHARM REV CODE 255: Mod: HCNC | Performed by: INTERNAL MEDICINE

## 2020-08-19 PROCEDURE — 63600175 PHARM REV CODE 636 W HCPCS: Mod: HCNC | Performed by: NURSE ANESTHETIST, CERTIFIED REGISTERED

## 2020-08-19 PROCEDURE — 37000009 HC ANESTHESIA EA ADD 15 MINS: Mod: HCNC | Performed by: INTERNAL MEDICINE

## 2020-08-19 PROCEDURE — 74328 X-RAY BILE DUCT ENDOSCOPY: CPT | Mod: 26,HCNC,, | Performed by: INTERNAL MEDICINE

## 2020-08-19 DEVICE — ZIMMON PANCREATIC STENT
Type: IMPLANTABLE DEVICE | Site: BILE DUCT | Status: FUNCTIONAL
Brand: ZIMMON

## 2020-08-19 RX ORDER — SODIUM CHLORIDE 0.9 % (FLUSH) 0.9 %
10 SYRINGE (ML) INJECTION
Status: ACTIVE | OUTPATIENT
Start: 2020-08-19

## 2020-08-19 RX ORDER — PROPOFOL 10 MG/ML
VIAL (ML) INTRAVENOUS CONTINUOUS PRN
Status: DISCONTINUED | OUTPATIENT
Start: 2020-08-19 | End: 2020-08-19

## 2020-08-19 RX ORDER — LIDOCAINE HCL/PF 100 MG/5ML
SYRINGE (ML) INTRAVENOUS
Status: DISCONTINUED | OUTPATIENT
Start: 2020-08-19 | End: 2020-08-19

## 2020-08-19 RX ORDER — SODIUM CHLORIDE 9 MG/ML
INJECTION, SOLUTION INTRAVENOUS CONTINUOUS
Status: ACTIVE | OUTPATIENT
Start: 2020-08-19

## 2020-08-19 RX ORDER — FENTANYL CITRATE 50 UG/ML
INJECTION, SOLUTION INTRAMUSCULAR; INTRAVENOUS
Status: DISCONTINUED | OUTPATIENT
Start: 2020-08-19 | End: 2020-08-19

## 2020-08-19 RX ORDER — PROPOFOL 10 MG/ML
VIAL (ML) INTRAVENOUS
Status: DISCONTINUED | OUTPATIENT
Start: 2020-08-19 | End: 2020-08-19

## 2020-08-19 RX ORDER — GLYCOPYRROLATE 0.2 MG/ML
INJECTION INTRAMUSCULAR; INTRAVENOUS
Status: DISCONTINUED | OUTPATIENT
Start: 2020-08-19 | End: 2020-08-19

## 2020-08-19 RX ORDER — INDOMETHACIN 50 MG/1
100 SUPPOSITORY RECTAL ONCE
Status: DISCONTINUED | OUTPATIENT
Start: 2020-08-19 | End: 2020-08-19 | Stop reason: HOSPADM

## 2020-08-19 RX ADMIN — PROPOFOL 150 MCG/KG/MIN: 10 INJECTION, EMULSION INTRAVENOUS at 01:08

## 2020-08-19 RX ADMIN — LIDOCAINE HYDROCHLORIDE 75 MG: 20 INJECTION, SOLUTION INTRAVENOUS at 01:08

## 2020-08-19 RX ADMIN — GLYCOPYRROLATE 0.2 MG: 0.2 INJECTION, SOLUTION INTRAMUSCULAR; INTRAVENOUS at 01:08

## 2020-08-19 RX ADMIN — FENTANYL CITRATE 25 MCG: 50 INJECTION, SOLUTION INTRAMUSCULAR; INTRAVENOUS at 01:08

## 2020-08-19 RX ADMIN — FENTANYL CITRATE 25 MCG: 50 INJECTION, SOLUTION INTRAMUSCULAR; INTRAVENOUS at 02:08

## 2020-08-19 RX ADMIN — ESMOLOL HYDROCHLORIDE 20 MG: 10 INJECTION INTRAVENOUS at 02:08

## 2020-08-19 RX ADMIN — SODIUM CHLORIDE 20 ML/HR: 0.9 INJECTION, SOLUTION INTRAVENOUS at 01:08

## 2020-08-19 RX ADMIN — IOHEXOL 5 ML: 300 INJECTION, SOLUTION INTRAVENOUS at 01:08

## 2020-08-19 RX ADMIN — PROPOFOL 60 MG: 10 INJECTION, EMULSION INTRAVENOUS at 01:08

## 2020-08-19 NOTE — ANESTHESIA PREPROCEDURE EVALUATION
08/19/2020  Zohra Paulino is a 70 y.o., female for ERCP under MAC/GA    Past Medical History:   Diagnosis Date    Bronchitis     Fibroids     Osteopetrosis     Uterine fibroid      Past Surgical History:   Procedure Laterality Date    CERVICAL BIOPSY  W/ LOOP ELECTRODE EXCISION      [facility]  2004    DISTAL PANCREATECTOMY N/A 7/27/2020    Procedure: PANCREATECTOMY, DISTAL, SUBTOTAL;  Surgeon: GILMA Ellis MD;  [facility];  Service: General;  Laterality: N/A;    HYSTERECTOMY      fibroids    NE REMOVAL OF OVARY/TUBE(S)      TUBAL LIGATION         Pre-op Assessment    I have reviewed the Patient Summary Reports.     I have reviewed the Nursing Notes. I have reviewed the NPO Status.   I have reviewed the Medications.     Review of Systems  Anesthesia Hx:  No problems with previous Anesthesia  Denies Family Hx of Anesthesia complications.    Social:  Non-Smoker, Social Alcohol Use    Hematology/Oncology:         -- Anemia: Current/Recent Cancer. (Pancreatic Adenocarcinoma)   Cardiovascular:  Cardiovascular Normal Exercise tolerance: good   Denies Angina.        Pulmonary:  Pulmonary Normal    Renal/:  Renal/ Normal     Hepatic/GI:   Pancreatic CA s/p resection   Neurological:  Neurology Normal    Endocrine:  Endocrine Normal        Physical Exam  General:  Obesity    Airway/Jaw/Neck:  Airway Findings: Mouth Opening: Normal Tongue: Normal  General Airway Assessment: Adult  Mallampati: I  TM Distance: Normal, at least 6 cm       Chest/Lungs:  Chest/Lungs Findings: Clear to auscultation, Normal Respiratory Rate     Heart/Vascular:  Heart Findings: Rate: Normal  Rhythm: Regular Rhythm  Sounds: Normal        Mental Status:  Mental Status Findings:  Cooperative, Alert and Oriented       Lab Results   Component Value Date    WBC 8.72 08/04/2020    HGB 10.5 (L) 08/04/2020    HCT 32.6 (L)  08/04/2020     08/04/2020    CHOL 137 06/09/2020    TRIG 82 06/09/2020    HDL 45 06/09/2020    ALT 34 08/04/2020    AST 42 (H) 08/04/2020     08/04/2020    K 3.7 08/04/2020     08/04/2020    CREATININE 1.4 08/04/2020    BUN 10 08/04/2020    CO2 29 08/04/2020    TSH 1.453 06/09/2020    HGBA1C 5.7 01/18/2016         Anesthesia Plan  Type of Anesthesia, risks & benefits discussed:  Anesthesia Type:  MAC, general  Patient's Preference:   Intra-op Monitoring Plan: standard ASA monitors  Intra-op Monitoring Plan Comments:   Post Op Pain Control Plan: multimodal analgesia  Post Op Pain Control Plan Comments:   Induction:   IV  Beta Blocker:  Patient is not currently on a Beta-Blocker (No further documentation required).       Informed Consent: Patient understands risks and agrees with Anesthesia plan.  Questions answered. Anesthesia consent signed with patient.  ASA Score: 3     Day of Surgery Review of History & Physical: I have interviewed and examined the patient. I have reviewed the patient's H&P dated:            Ready For Surgery From Anesthesia Perspective.

## 2020-08-19 NOTE — TELEPHONE ENCOUNTER
Spoke to pt daughter who sates drainage is still foul smelling and yellowish. Advised pt to continue prescribed Augmentin as it was only day two and to follow up with office Friday if she does not see improvement. Pt verbalized understanding

## 2020-08-19 NOTE — H&P
History & Physical - Short Stay  Gastroenterology      SUBJECTIVE:     Procedure: ERCP    Chief Complaint/Indication for Procedure: Pancreatic duct leak    History of Present Illness:  Patient is a 70 y.o. female presents with pancreatic duct leak S/P distal pancreatectomy for pancreas adenocarcinoma. MANDY drain with increased amylase.    PTA Medications   Medication Sig    alendronate (FOSAMAX) 40 mg tablet TAKE 1 TABLET (40 MG TOTAL) BY MOUTH ONCE A WEEK.    amoxicillin-clavulanate 875-125mg (AUGMENTIN) 875-125 mg per tablet Take 1 tablet by mouth 2 (two) times daily.    cetirizine (ZYRTEC) 10 MG tablet TAKE ONE TABLET BY MOUTH EVERY DAY    hydroCHLOROthiazide (HYDRODIURIL) 12.5 MG Tab Take 1 tablet (12.5 mg total) by mouth once daily. (Patient not taking: Reported on 7/14/2020)    pregabalin (LYRICA) 75 MG capsule Take 1 capsule (75 mg total) by mouth 2 (two) times daily. for 7 days    vitamin D 1000 units Tab Take 1,000 Units by mouth once daily.       Review of patient's allergies indicates:   Allergen Reactions    Codeine Palpitations        Past Medical History:   Diagnosis Date    Bronchitis     Fibroids     Osteopetrosis     Uterine fibroid      Past Surgical History:   Procedure Laterality Date    CERVICAL BIOPSY  W/ LOOP ELECTRODE EXCISION      [facility]  2004    DISTAL PANCREATECTOMY N/A 7/27/2020    Procedure: PANCREATECTOMY, DISTAL, SUBTOTAL;  Surgeon: GILMA Ellis MD;  Location: [facility];  Service: General;  Laterality: N/A;    HYSTERECTOMY      fibroids    OK REMOVAL OF OVARY/TUBE(S)      TUBAL LIGATION       Family History   Problem Relation Age of Onset    No Known Problems Mother     No Known Problems Father     Breast cancer Cousin 20    Arthritis Sister     No Known Problems Maternal Grandmother     No Known Problems Maternal Grandfather     Pancreatic cancer Paternal Grandmother 80    No Known Problems Paternal Grandfather     Diabetes Son     No Known Problems Son      No Known Problems Daughter     No Known Problems Daughter      Social History     Tobacco Use    Smoking status: Never Smoker    Smokeless tobacco: Never Used   Substance Use Topics    Alcohol use: Yes     Frequency: Monthly or less     Drinks per session: 1 or 2     Comment: rarely     Drug use: No       Review of Systems:  Constitutional: no fever or chills    OBJECTIVE:     Vital Signs (Most Recent)       Physical Exam:  General: well developed, well nourished  Lungs:  normal respiratory effort  Heart: regular rate, S1, S2 normal    Laboratory  CBC: No results for input(s): WBC, RBC, HGB, HCT, PLT, MCV, MCH, MCHC in the last 168 hours.  CMP: No results for input(s): GLU, CALCIUM, ALBUMIN, PROT, NA, K, CO2, CL, BUN, CREATININE, ALKPHOS, ALT, AST, BILITOT in the last 168 hours.  Coagulation: No results for input(s): LABPROT, INR, APTT in the last 168 hours.      Diagnostic Results:      ASSESSMENT/PLAN:     Pancreatic duct leak    Plan: ERCP    Anesthesia Plan: MAC    ASA Grade: ASA 3 - Patient with moderate systemic disease with functional limitations     The impression and plan was discussed in detail with the patient and family. All questions have been answered and the patient voices understanding of our plan at this point. The risk of the procedure was discussed in detail which includes but not limited to bleeding, infection, perforation in some cases requiring surgery with its spectrum of complications.

## 2020-08-19 NOTE — [employer] PATIENT INSTRUCTIONS
Discharge Summary/Instructions after an Endoscopic Procedure  Patient Name: Zohra Paulino  Patient MRN: [id number]  Patient YOB: 1950  Wednesday, August 19, 2020  Deion Ivory MD  Your health is very important to us during the Covid Crisis. Following your   procedure today, you will receive a daily text for 2 weeks asking about   signs or symptoms of Covid 19.  Please respond to this text when you   receive it so we can follow up and keep you as safe as possible.   RESTRICTIONS:  During your procedure today, you received medications for sedation.  These   medications may affect your judgment, balance and coordination.  Therefore,   for 24 hours, you have the following restrictions:   - DO NOT drive a car, operate machinery, make legal/financial decisions,   sign important papers or drink alcohol.    ACTIVITY:  Today: no heavy lifting, straining or running due to procedural   sedation/anesthesia.  The following day: return to full activity including work.  DIET:  Eat and drink normally unless instructed otherwise.     TREATMENT FOR COMMON SIDE EFFECTS:  - Mild abdominal pain, nausea, belching, bloating or excessive gas:  rest,   eat lightly and use a heating pad.  - Sore Throat: treat with throat lozenges and/or gargle with warm salt   water.  - Because air was used during the procedure, expelling large amounts of air   from your rectum or belching is normal.  - If a bowel prep was taken, you may not have a bowel movement for 1-3 days.    This is normal.  SYMPTOMS TO WATCH FOR AND REPORT TO YOUR PHYSICIAN:  1. Abdominal pain or bloating, other than gas cramps.  2. Chest pain.  3. Back pain.  4. Signs of infection such as: chills or fever occurring within 24 hours   after the procedure.  5. Rectal bleeding, which would show as bright red, maroon, or black stools.   (A tablespoon of blood from the rectum is not serious, especially if   hemorrhoids are present.)  6. Vomiting.  7. Weakness or  dizziness.  GO DIRECTLY TO THE NEAREST EMERGENCY ROOM IF YOU HAVE ANY OF THE FOLLOWING:      Difficulty breathing              Chills and/or fever over 101 F   Persistent vomiting and/or vomiting blood   Severe abdominal pain   Severe chest pain   Black, tarry stools   Bleeding- more than one tablespoon   Any other symptom or condition that you feel may need urgent attention  Your doctor recommends these additional instructions:  If any biopsies were taken, your doctors clinic will contact you in 1 to 2   weeks with any results.  - Avoid aspirin and nonsteroidal anti-inflammatory medicines for 2 weeks.   - Discharge patient to home (ambulatory).   - Watch for pancreatitis, bleeding, perforation, and cholangitis.   - Repeat ERCP in 1 month to exchange stent.   - Return to referring physician.  For questions, problems or results please call your physician - Deion Ivory MD.  EMERGENCY PHONE NUMBER: [phone],  LAB RESULTS: [phone]  IF A COMPLICATION OR EMERGENCY SITUATION ARISES AND YOU ARE UNABLE TO REACH   YOUR PHYSICIAN - GO DIRECTLY TO THE EMERGENCY ROOM.  Deion Ivory MD  8/19/2020 2:56:27 PM  This report has been verified and signed electronically.  [employer]

## 2020-08-19 NOTE — ANESTHESIA POSTPROCEDURE EVALUATION
Anesthesia Post Evaluation    Patient: Zohra Paulino    Procedure(s) Performed: Procedure(s) (LRB):  ERCP (ENDOSCOPIC RETROGRADE CHOLANGIOPANCREATOGRAPHY) (N/A)    Final Anesthesia Type: MAC    Patient location during evaluation: GI PACU  Patient participation: Yes- Able to Participate  Level of consciousness: awake and alert  Post-procedure vital signs: reviewed and stable  Pain management: adequate  Airway patency: patent    PONV status at discharge: No PONV  Anesthetic complications: no      Cardiovascular status: blood pressure returned to baseline and hemodynamically stable  Respiratory status: unassisted, spontaneous ventilation and room air  Hydration status: euvolemic  Follow-up not needed.          Vitals Value Taken Time   BP 90/54 08/19/20 1449   Temp 98 08/19/20 1449   Pulse 105 08/19/20 1449   Resp 10 08/19/20 1449   SpO2 100 08/19/20 1449         No case tracking events are documented in the log.      Pain/Suzanna Score: No data recorded

## 2020-08-19 NOTE — TELEPHONE ENCOUNTER
Patient was in the wrong place, she was taken to the first floor in the main hospital to register for her procedure. Ava notified.

## 2020-08-19 NOTE — TRANSFER OF CARE
"Anesthesia Transfer of Care Note    Patient: Zohra Paulino    Procedure(s) Performed: Procedure(s) (LRB):  ERCP (ENDOSCOPIC RETROGRADE CHOLANGIOPANCREATOGRAPHY) (N/A)    Patient location: GI    Anesthesia Type: MAC    Transport from OR: Transported from OR on room air with adequate spontaneous ventilation    Post pain: adequate analgesia    Post assessment: no apparent anesthetic complications    Post vital signs: stable    Level of consciousness: awake    Nausea/Vomiting: no nausea/vomiting    Complications: none    Transfer of care protocol was followed      Last vitals:   Visit Vitals  /72 (Patient Position: Lying)   Pulse 84   Temp 37.1 °C (98.7 °F) (Temporal)   Resp 18   Ht 5' 2" (1.575 m)   Wt 70.3 kg (155 lb)   LMP  (LMP Unknown)   SpO2 (!) 92%   Breastfeeding No   BMI 28.35 kg/m²     "

## 2020-08-19 NOTE — DISCHARGE SUMMARY
Discharge Summary/Instructions after an Endoscopic Procedure    Patient Name: Zohra Paulino  Patient MRN: [id number]  Patient YOB: 1950    Wednesday, August 19, 2020  Deion Ivory MD    Your health is very important to us during the Covid Crisis. Following your procedure today, you will receive a daily text for 2 weeks asking about signs or symptoms of Covid 19.  Please respond to this text when you receive it so we can follow up and keep you as safe as possible.     RESTRICTIONS:  During your procedure today, you received medications for sedation.  These medications may affect your judgment, balance and coordination.  Therefore, for 24 hours, you have the following restrictions:     - DO NOT drive a car, operate machinery, make legal/financial decisions, sign important papers or drink alcohol.      ACTIVITY:  Today: no heavy lifting, straining or running due to procedural sedation/anesthesia.  The following day: return to full activity including work.    DIET:  Eat and drink normally unless instructed otherwise.     TREATMENT FOR COMMON SIDE EFFECTS:  - Mild abdominal pain, nausea, belching, bloating or excessive gas:  rest, eat lightly and use a heating pad.  - Sore Throat: treat with throat lozenges and/or gargle with warm salt water.  - Because air was used during the procedure, expelling large amounts of air from your rectum or belching is normal.  - If a bowel prep was taken, you may not have a bowel movement for 1-3 days.  This is normal.      SYMPTOMS TO WATCH FOR AND REPORT TO YOUR PHYSICIAN:  1. Abdominal pain or bloating, other than gas cramps.  2. Chest pain.  3. Back pain.  4. Signs of infection such as: chills or fever occurring within 24 hours after the procedure.  5. Rectal bleeding, which would show as bright red, maroon, or black stools. (A tablespoon of blood from the rectum is not serious, especially if hemorrhoids are present.)  6. Vomiting.  7. Weakness or dizziness.      GO  DIRECTLY TO THE NEAREST EMERGENCY ROOM IF YOU HAVE ANY OF THE FOLLOWING:     Difficulty breathing              Chills and/or fever over 101 F   Persistent vomiting and/or vomiting blood   Severe abdominal pain   Severe chest pain   Black, tarry stools   Bleeding- more than one tablespoon   Any other symptom or condition that you feel may need urgent attention    Your doctor recommends these additional instructions:  If any biopsies were taken, your doctors clinic will contact you in 1 to 2 weeks with any results.    - Avoid aspirin and nonsteroidal anti-inflammatory medicines for 2 weeks.   - Discharge patient to home (ambulatory).   - Watch for pancreatitis, bleeding, perforation, and cholangitis.   - Repeat ERCP in 1 month to exchange stent.   - Return to referring physician.    For questions, problems or results please call your physician - Deion Ivory MD.    EMERGENCY PHONE NUMBER: [phone],  LAB RESULTS: [phone]    IF A COMPLICATION OR EMERGENCY SITUATION ARISES AND YOU ARE UNABLE TO REACH YOUR PHYSICIAN - GO DIRECTLY TO THE EMERGENCY ROOM.

## 2020-08-19 NOTE — TELEPHONE ENCOUNTER
----- Message from Key Addison sent at 8/19/2020  9:39 AM CDT -----  Contact: [phone]  JPB-----Pt's daughter Danny is calling in regards to Zohra Washington calling regarding the pt having questions after the pt's surgery Procedure.    Please call and advise

## 2020-08-19 NOTE — TELEPHONE ENCOUNTER
----- Message from Nusrat Alva sent at 8/19/2020 11:11 AM CDT -----  Ms. Delgado, Daughter is calling to speak with Staff regarding today's appt, Covid-19 test.  She says the office is closed until 2:00 pm.  She is requesting a call, as soon as possible.    She can be reached at [phone].    Thank you.

## 2020-08-20 ENCOUNTER — TELEPHONE (OUTPATIENT)
Dept: ENDOSCOPY | Facility: HOSPITAL | Age: 70
End: 2020-08-20

## 2020-08-20 ENCOUNTER — TELEPHONE (OUTPATIENT)
Dept: NEUROLOGY | Facility: HOSPITAL | Age: 70
End: 2020-08-20

## 2020-08-20 DIAGNOSIS — K83.1 BILIARY STRICTURE: Primary | ICD-10-CM

## 2020-08-20 NOTE — TELEPHONE ENCOUNTER
"Spoke with patient for follow up.  She reports vomiting x 1 today. She thinks she gag herself with the antibiotics.    Having some nausea and reports not eating due to "it not getting down"-"not hungry".she is currently taking antibiotic- amoxicillin  per Dr. Garcia which was prescribed to her when she called over the weekend with foul smelling drainage.    She did take pain pill (Tramadol)  today due to pressure in abd. Moved MD appointment with Dr. Ellis to tomorrow.  Pt verbalized understanding.      "

## 2020-08-20 NOTE — TELEPHONE ENCOUNTER
Had ERCP yesterday.  Drain still in place.  Foul smelling odor. Not putting out enough drainage to measure.  Daughter calling for next steps. Will discuss with md.

## 2020-08-21 ENCOUNTER — OFFICE VISIT (OUTPATIENT)
Dept: NEUROLOGY | Facility: HOSPITAL | Age: 70
End: 2020-08-21
Attending: SURGERY
Payer: MEDICARE

## 2020-08-21 VITALS
SYSTOLIC BLOOD PRESSURE: 125 MMHG | BODY MASS INDEX: 28.21 KG/M2 | DIASTOLIC BLOOD PRESSURE: 75 MMHG | HEART RATE: 82 BPM | HEIGHT: 62 IN | TEMPERATURE: 99 F | WEIGHT: 153.31 LBS

## 2020-08-21 DIAGNOSIS — C25.9 ADENOCARCINOMA OF PANCREAS: ICD-10-CM

## 2020-08-21 DIAGNOSIS — C25.9 PRIMARY ADENOCARCINOMA OF PANCREAS: Primary | ICD-10-CM

## 2020-08-21 PROCEDURE — 99213 OFFICE O/P EST LOW 20 MIN: CPT | Mod: HCNC | Performed by: SURGERY

## 2020-08-21 RX ORDER — MEGESTROL ACETATE 40 MG/ML
400 SUSPENSION ORAL DAILY
Qty: 140 ML | Refills: 0 | Status: SHIPPED | OUTPATIENT
Start: 2020-08-21 | End: 2021-03-02

## 2020-08-21 RX ORDER — TRAMADOL HYDROCHLORIDE 50 MG/1
50 TABLET ORAL EVERY 6 HOURS PRN
COMMUNITY
End: 2021-03-02

## 2020-08-21 NOTE — PATIENT INSTRUCTIONS
Notes From Physician: patient will call the office on Monday in regards to drain output. If less than 20 cc per day, patient will be scheduled for Tues appt, if not will continue with Fri appointment.    Medicine:  Megace 400 mg daily for 2 weeks    Consult: Follow-up with primary oncologist    Notes From Physician:   Six small meals per day with protein shakes and protein supplements.  Monitor drain output  with written daily log    Return Clinic Appointment: scheduled for Friday, but pending the output of the drain, may switch to Tues, patient will call the office

## 2020-08-21 NOTE — PROGRESS NOTES
NOLANETS:  [facility]  A collaboration between [facility] and [facility]        Chief Complaint:  post op follow up  .  Status post ERCP and stent placement.  Main complaint is fatigue and slightly decreased appetite.    Drain putting out clear drainage, approximately 15 cc per day.  Afebrile.    CT scan done today shows no fluid collections.    S/p:   8/19/2020 - Ercp (endoscopic Retrograde Cholangiopancreatography)     Pathology:   1. Superior pancreatic portahepatis node, biopsy:  No metastatic carcinoma identified in one lymph node (0/1), supported by negative immunostains for  AE1/AE3 with appropriate controls  2. Retropancreatic tissue, biopsy:  Neurovascular fibroconnective tissue  Negative for malignancy  3. 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, confined to the pancreas  Proximal and distal parenchymal margins negative for invasive carcinoma or high-grade intraepithelial  neoplasia, the tumor measures 1 cm from the proximal margin  Perineural invasion present  No lymphovascular invasion identified  No metPlease see complete Surgical pathology cancer case summary below  PATHOLOGIC TNM STAGING: pT2N0  Procedure-partial pancreatectomy, subtotal spleen sparing  Tumor site-pancreatic body  Tumor size-3 x 2 cmastatic carcinoma identified in one peripancreatic lymph node (0/1)  Histologic type-ductal adenocarcinoma (NOS)  Histologic grade-G2: Moderately differentiated  Tumor extension-tumor is confined to pancreas  Margins  All margins are uninvolved by invasive carcinoma and high-grade intraepithelial neoplasia, tumor measures  1 cm from the proximal margin  Margins examined: Proximal pancreatic parenchymal, distal pancreatic parenchymal  Distance of invasive carcinoma from proximal margin -1 cm,  Distance of invasive carcinoma from distal margin-approximately 10  "cm  Lymphovascular invasion-not identified  Perineural invasion-present  Regional lymph nodes  Number of lymph nodes involved: 0  Number of lymph nodes examined: 2  Pathologic stage classification  Primary tumor (pT)-pT2: Tumor is greater than 2 cm and less than or equal to 4 cm in greatest dimension  Regional lymph nodes (pN)-pN0: No regional lymph node metastasis  Additional pathologic findings-pancreatic intraepithelial neoplasia, PanIN3  Vital Sign:   Vitals:    08/21/20 1537   BP: 125/75   Pulse: 82   Temp: 99 °F (37.2 °C)   TempSrc: Oral   Weight: 69.6 kg (153 lb 5.3 oz)   Height: 5' 2" (1.575 m)       Drain content clear fluid.  Small amount since ERCP stent placed the other day.  No thrush  Abdomen soft  Drain output clearing and decreasing in amount          Incision: healing well    Appetite:  Fair would like something to help improve her appetite    Restrictions: Restriction as listed:  No lifting over 20 lb for 6 weeks    Return to clinic:   Follow-up with primary oncologist  Megace 400 mg daily for 2 weeks  Six small meals per day with protein shakes and protein supplements.    Monitor drain output  with written daily log    RTC when drain fluid consistently less than 20cc per day, nurse can pull drain in clinic next week                  "

## 2020-08-24 ENCOUNTER — TELEPHONE (OUTPATIENT)
Dept: NEUROLOGY | Facility: HOSPITAL | Age: 70
End: 2020-08-24

## 2020-08-24 NOTE — TELEPHONE ENCOUNTER
----- Message from Key Addison sent at 8/24/2020 12:30 PM CDT -----  Contact: [phone]  JPB----Pt Zohra Washington calling regarding giving the status of the pt's drainage from over the weekend per conversation with the Doctor.    Please call and advise

## 2020-08-24 NOTE — TELEPHONE ENCOUNTER
Returned patients call, advised that her drain put out less than 20cc in a 24 hour period. Scheduled patient for an appt on tomorrow to come have drain pulled. Patient has no further questions at this time.

## 2020-08-24 NOTE — PROGRESS NOTES
"NOLANETS:  [facility]  A collaboration between [facility] and [facility]      PATIENT: Zohra Paulino  MRN: [id number]  DATE: 8/25/2020    Subjective:      Chief Complaint: Follow up drain removal      Vitals: Blood pressure 112/62, pulse 82, height 5' 2" (1.575 m), weight 69.6 kg (153 lb 5.3 oz).     ECOG Score: 1 - Symptomatic but completely ambulatory    Diagnosis:   1. Adenocarcinoma of pancreas         Interval History:  Status post distal pancreatectomy for pancreatic adenocarcinoma.  Here for drain removal and follow-up.  Appetite excellent.  Energy level good.  Incision healing well.  Drain output for several days has been minimal, less than 10 cc per day.    Oncologic History:   Oncologic History Panc ductal adenocarcinoma- dx 6/2020   Oncologic Treatment  7/2020- distal pancreatectomy ([facility])   Pathology 6/2020- FNA panc- ductal adenocarcinoma  7/2020-  pancreatic ductal adenocarcinoma with moderately differentiated  Pt2N0        Past Medical History:  Past Medical History:   Diagnosis Date    Bronchitis     Cancer     pancreatic    Fibroids     Osteopetrosis     Uterine fibroid        Past Surgical History:  Past Surgical History:   Procedure Laterality Date    CERVICAL BIOPSY  W/ LOOP ELECTRODE EXCISION      [facility]  2004    DISTAL PANCREATECTOMY N/A 7/27/2020    Procedure: PANCREATECTOMY, DISTAL, SUBTOTAL;  Surgeon: GILMA Ellis MD;  Location: [facility] OR;  Service: General;  Laterality: N/A;    ERCP N/A 8/19/2020    Procedure: ERCP (ENDOSCOPIC RETROGRADE CHOLANGIOPANCREATOGRAPHY);  Surgeon: Deion Ivory MD;  Location: [facility] ENDO;  Service: Endoscopy;  Laterality: N/A;  pancreatic duct leak  Rapid Covid test    HYSTERECTOMY      fibroids    [place] REMOVAL OF OVARY/TUBE(S)      TUBAL LIGATION         Family History:  Family History   Problem Relation Age of Onset    No Known Problems Mother "     No Known Problems Father     Breast cancer Cousin 20    Arthritis Sister     No Known Problems Maternal Grandmother     No Known Problems Maternal Grandfather     Pancreatic cancer Paternal Grandmother 80    No Known Problems Paternal Grandfather     Diabetes Son     No Known Problems Son     No Known Problems Daughter     No Known Problems Daughter        Allergies:  Codeine    Medications:  Current Outpatient Medications   Medication Sig    alendronate (FOSAMAX) 40 mg tablet TAKE 1 TABLET (40 MG TOTAL) BY MOUTH ONCE A WEEK.    amoxicillin-clavulanate 875-125mg (AUGMENTIN) 875-125 mg per tablet Take 1 tablet by mouth 2 (two) times daily.    cetirizine (ZYRTEC) 10 MG tablet TAKE ONE TABLET BY MOUTH EVERY DAY    hydroCHLOROthiazide (HYDRODIURIL) 12.5 MG Tab Take 1 tablet (12.5 mg total) by mouth once daily.    megestroL (MEGACE) 400 mg/10 mL (40 mg/mL) Susp Take 10 mLs (400 mg total) by mouth once daily. for 14 days    traMADoL (ULTRAM) 50 mg tablet Take 50 mg by mouth every 6 (six) hours as needed for Pain.    vitamin D 1000 units Tab Take 1,000 Units by mouth once daily.    pregabalin (LYRICA) 75 MG capsule Take 1 capsule (75 mg total) by mouth 2 (two) times daily. for 7 days (Patient not taking: Reported on 8/21/2020)     No current facility-administered medications for this visit.      Facility-Administered Medications Ordered in Other Visits   Medication    0.9%  NaCl infusion    sodium chloride 0.9% flush 10 mL        Review of Systems   Constitutional: Negative.  Negative for activity change, appetite change, chills, fatigue, fever and unexpected weight change.   HENT: Negative.  Negative for congestion, ear pain, rhinorrhea and sinus pressure.    Eyes: Negative.  Negative for photophobia, pain and redness.   Respiratory: Negative.  Negative for cough, chest tightness, shortness of breath and wheezing.    Cardiovascular: Negative for chest pain, palpitations and leg swelling.    Gastrointestinal: Negative.  Negative for abdominal distention, abdominal pain, anal bleeding, blood in stool, constipation, diarrhea, nausea, rectal pain and vomiting.   Endocrine: Negative.  Negative for cold intolerance, heat intolerance, polydipsia, polyphagia and polyuria.   Genitourinary: Negative.  Negative for difficulty urinating, dysuria and frequency.   Musculoskeletal: Negative.  Negative for arthralgias, back pain, gait problem, myalgias, neck pain and neck stiffness.   Skin: Negative.  Negative for color change, pallor and rash.   Allergic/Immunologic: Negative.  Negative for environmental allergies, food allergies and immunocompromised state.   Neurological: Negative.  Negative for dizziness, seizures, syncope, weakness and light-headedness.   Hematological: Negative.  Negative for adenopathy. Does not bruise/bleed easily.   Psychiatric/Behavioral: Negative.  Negative for agitation, behavioral problems, confusion, decreased concentration, dysphoric mood, hallucinations, self-injury, sleep disturbance and suicidal ideas. The patient is not nervous/anxious and is not hyperactive.       Objective:      Physical Exam  Constitutional:       Appearance: She is well-developed.   HENT:      Head: Normocephalic and atraumatic.   Eyes:      Pupils: Pupils are equal, round, and reactive to light.   Neck:      Musculoskeletal: Normal range of motion and neck supple.      Thyroid: No thyromegaly.   Cardiovascular:      Rate and Rhythm: Normal rate and regular rhythm.      Heart sounds: Normal heart sounds.   Pulmonary:      Effort: Pulmonary effort is normal. No respiratory distress.      Breath sounds: Normal breath sounds. No wheezing or rales.   Abdominal:      General: Bowel sounds are normal. There is no distension.      Palpations: Abdomen is soft. There is no mass.      Tenderness: There is no abdominal tenderness. There is no guarding or rebound.      Hernia: No hernia is present. There is no hernia in  the ventral area.   Musculoskeletal: Normal range of motion.         General: No tenderness.   Skin:     General: Skin is warm and dry.      Coloration: Skin is not pale.      Findings: No erythema or rash.   Neurological:      Mental Status: She is alert and oriented to person, place, and time.      Cranial Nerves: No cranial nerve deficit.      Deep Tendon Reflexes: Reflexes are normal and symmetric.   Psychiatric:         Behavior: Behavior normal.         Thought Content: Thought content normal.        Assessment:       1. Adenocarcinoma of pancreas        Laboratory Data:    Neuroendocrine Labs Latest Ref Rng & Units 8/4/2020   CA 19-9 2.0 - 40.0 U/mL    TSH 0.400 - 4.000 uIU/mL    WBC 3.90 - 12.70 K/uL 8.72   HGB 12.0 - 16.0 g/dL 10.5 (L)   HCT 37.0 - 48.5 % 32.6 (L)   PLATLETS 150 - 350 K/uL 298   GLUCOSE 70 - 110 mg/dL 111 (H)   BUN 8 - 23 mg/dL 10   CREATININE 0.5 - 1.4 mg/dL 1.4    - 145 mmol/L 140   K 3.5 - 5.1 mmol/L 3.7   CHLORIDE 95 - 110 mmol/L 102   CO2 23 - 29 mmol/L 29   CALCIUM 8.7 - 10.5 mg/dL 9.1   PROTEIN, TOTAL 6.0 - 8.4 g/dL 7.0   PHOSPHORUS 2.7 - 4.5 mg/dL 3.8   ALBUMIN 3.5 - 5.2 g/dL 3.2 (L)   URIC ACID 2.4 - 5.7 mg/dL    TOTAL BILIRUBIN 0.1 - 1.0 mg/dL 0.6   ALK PHOSPHATASE 55 - 135 U/L 346 (H)   SGOT (AST) 10 - 40 U/L 42 (H)   SGPT (ALT) 10 - 44 U/L 34   TRIGLYCERIDES 30 - 150 mg/dL    CHOLERSTEROL 120 - 199 mg/dL    HDL 40 - 75 mg/dL    LDL 63.0 - 159.0 mg/dL    MG 1.6 - 2.6 mg/dL 1.7   URINE AMYLASE U/L Not established U/L    24 HR CREATININE CLEARANCE 15.0 - 325.0 mg/dL    HEMOGLOBIN A1C 4.5 - 6.2 %    Weight       PREOP Ref Range & Units 1mo ago   CA 19-9 2.0 - 40.0 U/mL 3306.0High       PREOP:        Scans:   CT Abdomen Pelvis W Wo Contrast  Narrative: EXAMINATION:  CT ABDOMEN PELVIS W WO CONTRAST    CLINICAL HISTORY:  blake net, drain post op;Malignant neoplasm of pancreas, unspecified    TECHNIQUE:  Low dose axial images, sagittal and coronal reformations were obtained from the  lung bases to the pubic symphysis before and following the IV administration of 75 mL of Omnipaque 350.  1000 mL of oral Omnipaque 350 was also administered.    COMPARISON:  PET-CT dated 07/07/2020 and CT abdomen pelvis with contrast dated 07/14/2020    FINDINGS:  Limited images through the lower chest demonstrate trace left pleural effusion.  Bibasilar atelectasis.    Abdomen and pelvis:    Similar appearance of the liver without suspicious enhancing lesion.  Main portal vein is patent.    The gallbladder adrenal glands, and kidneys appear normal.  There is nonspecific heterogeneous attenuation of the spleen with more conspicuous peripheral wedge-shaped area measuring 1.2 cm (series 4, image 26).    There are postoperative changes of distal pancreatectomy with mild, scattered mesenteric stranding and fluid at the surgical bed extending near the left paracolic gutter.  No discrete fluid collection or rim enhancing collection identified.    Nondependent intravesicular air as can be seen with recent Wang catheterization.  Uterus is absent.  GI tract is normal in caliber with colonic diverticulosis.    Prior midline laparotomy with left percutaneous abdominal drain with tip terminating in the left upper abdomen.  Aorta is patent.  Blood/contrast mixing at the level of the SMV.  No aggressive osseous lesion.  Impression: Postoperative changes of partial pancreatectomy.  No drainable collection identified.    Heterogeneous splenic enhancement with more conspicuous peripheral wedge-shaped hypoattenuating area suspicious for small, evolving splenic infarct.    Trace left pleural effusion, colonic diverticulosis, and additional findings as above.    Electronically signed by: Ricky Rojo  Date:    08/13/2020  Time:    11:53       Impression:  Status post distal pancreatectomy for adenocarcinoma.  No evidence of metastatic disease at operation. Doing well    Drain removed without incident      Plan:       Follow up with  Dr Pereira for stent removal in 4 weeks  Follow-up with Dr. Camacho oncologist with copy of path report.  Re-stage in 2 months with CT an FDG PET scan.  CBC CMP CA 19 9        GILMA Ellis MD, FACS  Professor of Surgery, [facility]  Neuroendocrine Surgery, Hepatic/Pancreatic & General Surgery  [address], [address]  [place]  [address]  ph. [phone]; [phone]  fax. [phone]

## 2020-08-25 ENCOUNTER — OFFICE VISIT (OUTPATIENT)
Dept: NEUROLOGY | Facility: HOSPITAL | Age: 70
End: 2020-08-25
Attending: SURGERY
Payer: MEDICARE

## 2020-08-25 VITALS
HEART RATE: 82 BPM | SYSTOLIC BLOOD PRESSURE: 112 MMHG | DIASTOLIC BLOOD PRESSURE: 62 MMHG | HEIGHT: 62 IN | BODY MASS INDEX: 28.21 KG/M2 | WEIGHT: 153.31 LBS

## 2020-08-25 DIAGNOSIS — C25.9 ADENOCARCINOMA OF PANCREAS: Primary | ICD-10-CM

## 2020-08-25 DIAGNOSIS — C7B.8 OTHER SECONDARY NEUROENDOCRINE TUMORS: ICD-10-CM

## 2020-08-25 PROCEDURE — 99214 OFFICE O/P EST MOD 30 MIN: CPT | Mod: HCNC | Performed by: SURGERY

## 2020-08-25 NOTE — PATIENT INSTRUCTIONS
Labs: every 2 months  --- due late October 2020  CBC, CMP & CA 19-9    Scans: CT Abd/Pelvis and FDG PET Scan in 2 months  ---  due late October 2020  Call Scheduling Department at [phone] to schedule scans prior to next appointment:      Consult: Follow up with Dr Pereira for stent removal in 4 weeks    Consult: Follow-up with Dr. Camacho oncologist with copy of Operative & Path report (given to patient)    Return Clinic Appointment: in 2 months with Dr. Ellis - appointment made

## 2020-08-26 ENCOUNTER — TELEPHONE (OUTPATIENT)
Dept: GASTROENTEROLOGY | Facility: CLINIC | Age: 70
End: 2020-08-26

## 2020-08-26 DIAGNOSIS — Z01.812 PRE-PROCEDURE LAB EXAM: Primary | ICD-10-CM

## 2020-08-26 NOTE — TELEPHONE ENCOUNTER
Post procedure Instructions: Repeat ERCP in 1 month to exchange stent. Please contact patient to schedule.

## 2020-08-27 NOTE — TELEPHONE ENCOUNTER
Patient called and scheduled for repeat ERCP on 9/16/2020 and covid test to be done at [facility] on 9/13/2020. Patient notified and verbalized understanding info sent via patient portal.

## 2020-08-27 NOTE — TELEPHONE ENCOUNTER
----- Message from Gina Stewart MA sent at 8/27/2020  8:52 AM CDT -----  Regarding: ERCP with Stent Exchange  Hi All,    As per Dr. Ellis's clinic note on 8/25/20, please call patient to schedule for ERCP with stent removal, as Dr. Ivory did the original procedure on 8/21/20.    Let me know if I can be of any additional assistance.    Thanks,  Gina-

## 2020-09-14 ENCOUNTER — TELEPHONE (OUTPATIENT)
Dept: ENDOSCOPY | Facility: HOSPITAL | Age: 70
End: 2020-09-14

## 2020-09-14 NOTE — TELEPHONE ENCOUNTER
Spoke with patient about arrival time @ 0700.   Covid test = needs rapid test on arrival.    NPO status reviewed: Patient may eat until 7:00pm.  After 7pm, pt may have CLEAR liquids ONLY until completely NPO at Midnight.    Medications: Do not take Insulin or oral diabetic medications the day of the procedure.    Take as prescribed: heart, seizure and blood pressure medication in the morning with a sip of water (less than an ounce).  Take any breathing medications and bring inhalers to hospital with you.     Leave all valuables and jewelry at home. Wear comfortable clothes to procedure to change into hospital gown.   You cannot drive for 24 hours after your procedure because you will receive sedation for your procedure to make you comfortable.    A ride must be provided at discharge.

## 2020-09-16 ENCOUNTER — ANESTHESIA EVENT (OUTPATIENT)
Dept: ENDOSCOPY | Facility: HOSPITAL | Age: 70
End: 2020-09-16
Payer: MEDICARE

## 2020-09-16 ENCOUNTER — ANESTHESIA (OUTPATIENT)
Dept: ENDOSCOPY | Facility: HOSPITAL | Age: 70
End: 2020-09-16
Payer: MEDICARE

## 2020-09-16 ENCOUNTER — HOSPITAL ENCOUNTER (OUTPATIENT)
Facility: HOSPITAL | Age: 70
Discharge: HOME | End: 2020-09-16
Attending: INTERNAL MEDICINE | Admitting: INTERNAL MEDICINE
Payer: MEDICARE

## 2020-09-16 VITALS
SYSTOLIC BLOOD PRESSURE: 108 MMHG | TEMPERATURE: 98 F | OXYGEN SATURATION: 100 % | DIASTOLIC BLOOD PRESSURE: 62 MMHG | WEIGHT: 153 LBS | HEART RATE: 75 BPM | RESPIRATION RATE: 18 BRPM | HEIGHT: 62 IN | BODY MASS INDEX: 28.16 KG/M2

## 2020-09-16 DIAGNOSIS — T18.9XXD FOREIGN BODY IN DIGESTIVE TRACT, SUBSEQUENT ENCOUNTER: Primary | ICD-10-CM

## 2020-09-16 DIAGNOSIS — K86.89 PANCREATIC DUCT LEAK: Primary | ICD-10-CM

## 2020-09-16 LAB — SARS-COV-2 RDRP RESP QL NAA+PROBE: NEGATIVE

## 2020-09-16 PROCEDURE — 27201089 HC SNARE, DISP (ANY): Mod: HCNC | Performed by: INTERNAL MEDICINE

## 2020-09-16 PROCEDURE — C1769 GUIDE WIRE: HCPCS | Mod: HCNC | Performed by: INTERNAL MEDICINE

## 2020-09-16 PROCEDURE — 25000003 PHARM REV CODE 250: Mod: HCNC | Performed by: INTERNAL MEDICINE

## 2020-09-16 PROCEDURE — 37000008 HC ANESTHESIA 1ST 15 MINUTES: Mod: HCNC | Performed by: INTERNAL MEDICINE

## 2020-09-16 PROCEDURE — C2617 STENT, NON-COR, TEM W/O DEL: HCPCS | Mod: HCNC | Performed by: INTERNAL MEDICINE

## 2020-09-16 PROCEDURE — 74329 X-RAY FOR PANCREAS ENDOSCOPY: CPT | Mod: 26,HCNC,, | Performed by: INTERNAL MEDICINE

## 2020-09-16 PROCEDURE — 43276 ERCP STENT EXCHANGE W/DILATE: CPT | Mod: HCNC,,, | Performed by: INTERNAL MEDICINE

## 2020-09-16 PROCEDURE — U0002 COVID-19 LAB TEST NON-CDC: HCPCS | Mod: HCNC

## 2020-09-16 PROCEDURE — 25500020 PHARM REV CODE 255: Mod: HCNC | Performed by: INTERNAL MEDICINE

## 2020-09-16 PROCEDURE — 63600175 PHARM REV CODE 636 W HCPCS: Mod: HCNC | Performed by: NURSE ANESTHETIST, CERTIFIED REGISTERED

## 2020-09-16 PROCEDURE — 43276 ERCP STENT EXCHANGE W/DILATE: CPT | Mod: HCNC | Performed by: INTERNAL MEDICINE

## 2020-09-16 PROCEDURE — 74329 PR  X-RAY FOR PANCREAS ENDOSCOPY: ICD-10-PCS | Mod: 26,HCNC,, | Performed by: INTERNAL MEDICINE

## 2020-09-16 PROCEDURE — 37000009 HC ANESTHESIA EA ADD 15 MINS: Mod: HCNC | Performed by: INTERNAL MEDICINE

## 2020-09-16 PROCEDURE — 43276 PR ERCP W/RMVL/EXCH STENT BILIARY/PANCREATIC DUCT W/DILATION: ICD-10-PCS | Mod: HCNC,,, | Performed by: INTERNAL MEDICINE

## 2020-09-16 PROCEDURE — 27201674 HC SPHINCTERTOME: Mod: HCNC | Performed by: INTERNAL MEDICINE

## 2020-09-16 RX ORDER — FENTANYL CITRATE 50 UG/ML
INJECTION, SOLUTION INTRAMUSCULAR; INTRAVENOUS
Status: DISCONTINUED | OUTPATIENT
Start: 2020-09-16 | End: 2020-09-16

## 2020-09-16 RX ORDER — INDOMETHACIN 50 MG/1
SUPPOSITORY RECTAL
Status: COMPLETED | OUTPATIENT
Start: 2020-09-16 | End: 2020-09-16

## 2020-09-16 RX ORDER — PROPOFOL 10 MG/ML
VIAL (ML) INTRAVENOUS CONTINUOUS PRN
Status: DISCONTINUED | OUTPATIENT
Start: 2020-09-16 | End: 2020-09-16

## 2020-09-16 RX ORDER — SODIUM CHLORIDE 9 MG/ML
INJECTION, SOLUTION INTRAVENOUS CONTINUOUS
Status: DISCONTINUED | OUTPATIENT
Start: 2020-09-16 | End: 2020-09-16 | Stop reason: HOSPADM

## 2020-09-16 RX ORDER — SODIUM CHLORIDE 0.9 % (FLUSH) 0.9 %
10 SYRINGE (ML) INJECTION
Status: DISCONTINUED | OUTPATIENT
Start: 2020-09-16 | End: 2020-09-16 | Stop reason: HOSPADM

## 2020-09-16 RX ORDER — PROPOFOL 10 MG/ML
VIAL (ML) INTRAVENOUS
Status: DISCONTINUED | OUTPATIENT
Start: 2020-09-16 | End: 2020-09-16

## 2020-09-16 RX ORDER — LIDOCAINE HYDROCHLORIDE 20 MG/ML
INJECTION INTRAVENOUS
Status: DISCONTINUED | OUTPATIENT
Start: 2020-09-16 | End: 2020-09-16

## 2020-09-16 RX ADMIN — INDOMETHACIN 100 MG: 50 SUPPOSITORY RECTAL at 09:09

## 2020-09-16 RX ADMIN — PROPOFOL 150 MCG/KG/MIN: 10 INJECTION, EMULSION INTRAVENOUS at 09:09

## 2020-09-16 RX ADMIN — FENTANYL CITRATE 50 MCG: 50 INJECTION, SOLUTION INTRAMUSCULAR; INTRAVENOUS at 09:09

## 2020-09-16 RX ADMIN — PROPOFOL 50 MG: 10 INJECTION, EMULSION INTRAVENOUS at 09:09

## 2020-09-16 RX ADMIN — LIDOCAINE HYDROCHLORIDE 60 MG: 20 INJECTION, SOLUTION INTRAVENOUS at 09:09

## 2020-09-16 RX ADMIN — SODIUM CHLORIDE: 0.9 INJECTION, SOLUTION INTRAVENOUS at 08:09

## 2020-09-16 RX ADMIN — IOHEXOL 10 ML: 300 INJECTION, SOLUTION INTRAVENOUS at 09:09

## 2020-09-16 NOTE — DISCHARGE SUMMARY
Discharge Summary/Instructions after an Endoscopic Procedure    Patient Name: Zohra Paulino  Patient MRN: [id number]  Patient YOB: 1950    Wednesday, September 16, 2020  Deion Ivory MD    Your health is very important to us during the Covid Crisis. Following your procedure today, you will receive a daily text for 2 weeks asking about signs or symptoms of Covid 19.  Please respond to this text when you receive it so we can follow up and keep you as safe as possible.     RESTRICTIONS:  During your procedure today, you received medications for sedation.  These medications may affect your judgment, balance and coordination.  Therefore, for 24 hours, you have the following restrictions:     - DO NOT drive a car, operate machinery, make legal/financial decisions, sign important papers or drink alcohol.      ACTIVITY:  Today: no heavy lifting, straining or running due to procedural sedation/anesthesia.  The following day: return to full activity including work.    DIET:  Eat and drink normally unless instructed otherwise.     TREATMENT FOR COMMON SIDE EFFECTS:  - Mild abdominal pain, nausea, belching, bloating or excessive gas:  rest, eat lightly and use a heating pad.  - Sore Throat: treat with throat lozenges and/or gargle with warm salt water.  - Because air was used during the procedure, expelling large amounts of air from your rectum or belching is normal.  - If a bowel prep was taken, you may not have a bowel movement for 1-3 days.  This is normal.      SYMPTOMS TO WATCH FOR AND REPORT TO YOUR PHYSICIAN:  1. Abdominal pain or bloating, other than gas cramps.  2. Chest pain.  3. Back pain.  4. Signs of infection such as: chills or fever occurring within 24 hours after the procedure.  5. Rectal bleeding, which would show as bright red, maroon, or black stools. (A tablespoon of blood from the rectum is not serious, especially if hemorrhoids are present.)  6. Vomiting.  7. Weakness or dizziness.      GO  DIRECTLY TO THE NEAREST EMERGENCY ROOM IF YOU HAVE ANY OF THE FOLLOWING:     Difficulty breathing              Chills and/or fever over 101 F   Persistent vomiting and/or vomiting blood   Severe abdominal pain   Severe chest pain   Black, tarry stools   Bleeding- more than one tablespoon   Any other symptom or condition that you feel may need urgent attention    Your doctor recommends these additional instructions:  If any biopsies were taken, your doctors clinic will contact you in 1 to 2 weeks with any results.    - Discharge patient to home (ambulatory).   - Observe patient's clinical course.   - Watch for pancreatitis, bleeding, perforation, and cholangitis.   - Return to referring physician.   - Perform a flat plate abdominal x-ray in 2 weeks to confirm the passage of the PD stent.    For questions, problems or results please call your physician - Deion Ivory MD.    EMERGENCY PHONE NUMBER: [phone],  LAB RESULTS: [phone]    IF A COMPLICATION OR EMERGENCY SITUATION ARISES AND YOU ARE UNABLE TO REACH YOUR PHYSICIAN - GO DIRECTLY TO THE EMERGENCY ROOM.

## 2020-09-16 NOTE — H&P
History & Physical - Short Stay  Gastroenterology      SUBJECTIVE:     Procedure: ERCP    Chief Complaint/Indication for Procedure: Pancreatic duct leak    History of Present Illness:  Patient is a 70 y.o. female presents with pancreas cancer S/P distal pancreatectomy and pancreas leak S/P ERCP here for stent removal/exchange.     PTA Medications   Medication Sig    alendronate (FOSAMAX) 40 mg tablet TAKE 1 TABLET (40 MG TOTAL) BY MOUTH ONCE A WEEK.    hydroCHLOROthiazide (HYDRODIURIL) 12.5 MG Tab Take 1 tablet (12.5 mg total) by mouth once daily.    vitamin D 1000 units Tab Take 1,000 Units by mouth once daily.    cetirizine (ZYRTEC) 10 MG tablet TAKE ONE TABLET BY MOUTH EVERY DAY    megestroL (MEGACE) 400 mg/10 mL (40 mg/mL) Susp Take 10 mLs (400 mg total) by mouth once daily. for 14 days    OPW TEST CLAIM - DO NOT FILL Inject into the skin. OPW test claim. Do not fill.    pregabalin (LYRICA) 75 MG capsule Take 1 capsule (75 mg total) by mouth 2 (two) times daily. for 7 days (Patient not taking: Reported on 8/21/2020)    traMADoL (ULTRAM) 50 mg tablet Take 50 mg by mouth every 6 (six) hours as needed for Pain.       Review of patient's allergies indicates:   Allergen Reactions    Codeine Palpitations        Past Medical History:   Diagnosis Date    Bronchitis     Cancer     pancreatic    Fibroids     Osteopetrosis     Uterine fibroid      Past Surgical History:   Procedure Laterality Date    CERVICAL BIOPSY  W/ LOOP ELECTRODE EXCISION      [facility]  2004    COLONOSCOPY      DISTAL PANCREATECTOMY N/A 7/27/2020    Procedure: PANCREATECTOMY, DISTAL, SUBTOTAL;  Surgeon: GILMA Ellis MD;  Location: [facility] OR;  Service: General;  Laterality: N/A;    ERCP N/A 8/19/2020    Procedure: ERCP (ENDOSCOPIC RETROGRADE CHOLANGIOPANCREATOGRAPHY);  Surgeon: Deion Ivory MD;  Location: [facility] ENDO;  Service: Endoscopy;  Laterality: N/A;  pancreatic duct leak  Rapid Covid test    HYSTERECTOMY      fibroids     IN REMOVAL OF OVARY/TUBE(S)      TUBAL LIGATION       Family History   Problem Relation Age of Onset    No Known Problems Mother     No Known Problems Father     Breast cancer Cousin 20    Arthritis Sister     No Known Problems Maternal Grandmother     No Known Problems Maternal Grandfather     Pancreatic cancer Paternal Grandmother 80    No Known Problems Paternal Grandfather     Diabetes Son     No Known Problems Son     No Known Problems Daughter     No Known Problems Daughter      Social History     Tobacco Use    Smoking status: Never Smoker    Smokeless tobacco: Never Used   Substance Use Topics    Alcohol use: Yes     Frequency: Monthly or less     Drinks per session: 1 or 2     Comment: rarely     Drug use: No       Review of Systems:  Constitutional: no fever or chills    OBJECTIVE:     Vital Signs (Most Recent)       Physical Exam:  General: well developed, well nourished  Lungs:  normal respiratory effort  Heart: regular rate, S1, S2 normal    Laboratory  CBC: No results for input(s): WBC, RBC, HGB, HCT, PLT, MCV, MCH, MCHC in the last 168 hours.  CMP: No results for input(s): GLU, CALCIUM, ALBUMIN, PROT, NA, K, CO2, CL, BUN, CREATININE, ALKPHOS, ALT, AST, BILITOT in the last 168 hours.  Coagulation: No results for input(s): LABPROT, INR, APTT in the last 168 hours.      Diagnostic Results:      ASSESSMENT/PLAN:     Pancreatic duct leak    Plan: ERCP    Anesthesia Plan: MAC    ASA Grade: ASA 3 - Patient with moderate systemic disease with functional limitations     The impression and plan was discussed in detail with the patient. All questions have been answered and the patient voices understanding of our plan at this point. The risk of the procedure was discussed in detail which includes but not limited to bleeding, infection, perforation in some cases requiring surgery with its spectrum of complications.

## 2020-09-16 NOTE — ANESTHESIA PREPROCEDURE EVALUATION
09/16/2020  Zohra Paulino is a 70 y.o., female for ERCP under MAC/GA    Past Medical History:   Diagnosis Date    Bronchitis     Cancer     pancreatic    Fibroids     Osteopetrosis     Uterine fibroid      Past Surgical History:   Procedure Laterality Date    CERVICAL BIOPSY  W/ LOOP ELECTRODE EXCISION      [facility]  2004    DISTAL PANCREATECTOMY N/A 7/27/2020    Procedure: PANCREATECTOMY, DISTAL, SUBTOTAL;  Surgeon: GILMA Ellis MD;  Location: [facility] OR;  Service: General;  Laterality: N/A;    ERCP N/A 8/19/2020    Procedure: ERCP (ENDOSCOPIC RETROGRADE CHOLANGIOPANCREATOGRAPHY);  Surgeon: Deion Ivory MD;  Location: [facility] ENDO;  Service: Endoscopy;  Laterality: N/A;  pancreatic duct leak  Rapid Covid test    HYSTERECTOMY      fibroids    CT REMOVAL OF OVARY/TUBE(S)      TUBAL LIGATION         Pre-op Assessment    I have reviewed the Patient Summary Reports.     I have reviewed the Nursing Notes. I have reviewed the NPO Status.   I have reviewed the Medications.     Review of Systems  Anesthesia Hx:  No problems with previous Anesthesia  Denies Family Hx of Anesthesia complications.   Denies Personal Hx of Anesthesia complications.   Social:  Non-Smoker, Social Alcohol Use    Hematology/Oncology:         -- Anemia: Current/Recent Cancer. (Pancreatic Adenocarcinoma)   Cardiovascular:  Cardiovascular Normal Exercise tolerance: good   Denies Angina.        Pulmonary:  Pulmonary Normal    Renal/:  Renal/ Normal     Hepatic/GI:   Pancreatic CA s/p resection   Neurological:  Neurology Normal    Endocrine:  Endocrine Normal        Physical Exam  General:  Obesity    Airway/Jaw/Neck:  Airway Findings: Mouth Opening: Normal Tongue: Normal  General Airway Assessment: Adult  Mallampati: I  TM Distance: Normal, at least 6 cm       Chest/Lungs:  Chest/Lungs Findings: Clear to auscultation,  Normal Respiratory Rate     Heart/Vascular:  Heart Findings: Rate: Normal  Rhythm: Regular Rhythm  Sounds: Normal        Mental Status:  Mental Status Findings:  Cooperative, Alert and Oriented       Lab Results   Component Value Date    WBC 8.72 08/04/2020    HGB 10.5 (L) 08/04/2020    HCT 32.6 (L) 08/04/2020     08/04/2020    CHOL 137 06/09/2020    TRIG 82 06/09/2020    HDL 45 06/09/2020    ALT 34 08/04/2020    AST 42 (H) 08/04/2020     08/04/2020    K 3.7 08/04/2020     08/04/2020    CREATININE 1.4 08/04/2020    BUN 10 08/04/2020    CO2 29 08/04/2020    TSH 1.453 06/09/2020    HGBA1C 5.7 01/18/2016 2018  Left Ventricle: The left ventricle is normal in size, with an end-diastolic diameter of 4.1 cm, and an end-systolic diameter of 2.8 cm. LV wall thickness is normal, with the septum measuring 0.9 cm and the posterior wall measuring 0.8 cm across. Relative    wall thickness was normal at 0.39, and the LV mass index was 67.6 g/m2 consistent with normal left ventricular mass. There are no regional wall motion abnormalities. Left ventricular systolic function appears normal. Visually estimated ejection fraction    is 55-60%. The LV Doppler derived stroke volume equals 84.0 ccs.       Anesthesia Plan  Type of Anesthesia, risks & benefits discussed:  Anesthesia Type:  MAC, general  Patient's Preference:   Intra-op Monitoring Plan: standard ASA monitors  Intra-op Monitoring Plan Comments:   Post Op Pain Control Plan: multimodal analgesia  Post Op Pain Control Plan Comments:   Induction:   IV  Beta Blocker:  Patient is not currently on a Beta-Blocker (No further documentation required).       Informed Consent: Patient understands risks and agrees with Anesthesia plan.  Questions answered. Anesthesia consent signed with patient.  ASA Score: 3     Day of Surgery Review of History & Physical: I have interviewed and examined the patient. I have reviewed the patient's H&P dated:            Ready For  Surgery From Anesthesia Perspective.

## 2020-09-16 NOTE — TRANSFER OF CARE
"Anesthesia Transfer of Care Note    Patient: Zohra Paulino    Procedure(s) Performed: Procedure(s) (LRB):  ERCP (ENDOSCOPIC RETROGRADE CHOLANGIOPANCREATOGRAPHY) (N/A)    Patient location: GI    Anesthesia Type: MAC    Transport from OR: Transported from OR on room air with adequate spontaneous ventilation    Post pain: adequate analgesia    Post assessment: no apparent anesthetic complications and tolerated procedure well    Post vital signs: stable    Level of consciousness: awake, oriented and alert    Nausea/Vomiting: no nausea/vomiting    Complications: none    Transfer of care protocol was followed      Last vitals:   Visit Vitals  BP (!) 146/70   Pulse 72   Temp 37.2 °C (99 °F)   Resp 18   Ht 5' 2" (1.575 m)   Wt 69.4 kg (153 lb)   LMP  (LMP Unknown)   SpO2 98%   Breastfeeding No   BMI 27.98 kg/m²     "

## 2020-09-16 NOTE — [employer] PATIENT INSTRUCTIONS
Discharge Summary/Instructions after an Endoscopic Procedure  Patient Name: Zohra Paulino  Patient MRN: [id number]  Patient YOB: 1950  Wednesday, September 16, 2020  Deion Ivory MD  Your health is very important to us during the Covid Crisis. Following your   procedure today, you will receive a daily text for 2 weeks asking about   signs or symptoms of Covid 19.  Please respond to this text when you   receive it so we can follow up and keep you as safe as possible.   RESTRICTIONS:  During your procedure today, you received medications for sedation.  These   medications may affect your judgment, balance and coordination.  Therefore,   for 24 hours, you have the following restrictions:   - DO NOT drive a car, operate machinery, make legal/financial decisions,   sign important papers or drink alcohol.    ACTIVITY:  Today: no heavy lifting, straining or running due to procedural   sedation/anesthesia.  The following day: return to full activity including work.  DIET:  Eat and drink normally unless instructed otherwise.     TREATMENT FOR COMMON SIDE EFFECTS:  - Mild abdominal pain, nausea, belching, bloating or excessive gas:  rest,   eat lightly and use a heating pad.  - Sore Throat: treat with throat lozenges and/or gargle with warm salt   water.  - Because air was used during the procedure, expelling large amounts of air   from your rectum or belching is normal.  - If a bowel prep was taken, you may not have a bowel movement for 1-3 days.    This is normal.  SYMPTOMS TO WATCH FOR AND REPORT TO YOUR PHYSICIAN:  1. Abdominal pain or bloating, other than gas cramps.  2. Chest pain.  3. Back pain.  4. Signs of infection such as: chills or fever occurring within 24 hours   after the procedure.  5. Rectal bleeding, which would show as bright red, maroon, or black stools.   (A tablespoon of blood from the rectum is not serious, especially if   hemorrhoids are present.)  6. Vomiting.  7. Weakness or  dizziness.  GO DIRECTLY TO THE NEAREST EMERGENCY ROOM IF YOU HAVE ANY OF THE FOLLOWING:      Difficulty breathing              Chills and/or fever over 101 F   Persistent vomiting and/or vomiting blood   Severe abdominal pain   Severe chest pain   Black, tarry stools   Bleeding- more than one tablespoon   Any other symptom or condition that you feel may need urgent attention  Your doctor recommends these additional instructions:  If any biopsies were taken, your doctors clinic will contact you in 1 to 2   weeks with any results.  - Discharge patient to home (ambulatory).   - Observe patient's clinical course.   - Watch for pancreatitis, bleeding, perforation, and cholangitis.   - Return to referring physician.   - Perform a flat plate abdominal x-ray in 2 weeks to confirm the passage of   the PD stent.  For questions, problems or results please call your physician - Deion Ivory MD.  EMERGENCY PHONE NUMBER: [phone],  LAB RESULTS: [phone]  IF A COMPLICATION OR EMERGENCY SITUATION ARISES AND YOU ARE UNABLE TO REACH   YOUR PHYSICIAN - GO DIRECTLY TO THE EMERGENCY ROOM.  Deion Ivory MD  9/16/2020 9:47:20 AM  This report has been verified and signed electronically.  [employer]

## 2020-09-16 NOTE — ANESTHESIA POSTPROCEDURE EVALUATION
Anesthesia Post Evaluation    Patient: Zohra Paulino    Procedure(s) Performed: Procedure(s) (LRB):  ERCP (ENDOSCOPIC RETROGRADE CHOLANGIOPANCREATOGRAPHY) (N/A)    Final Anesthesia Type: MAC    Patient location during evaluation: GI PACU  Patient participation: Yes- Able to Participate  Level of consciousness: awake and alert and oriented  Post-procedure vital signs: reviewed and stable  Pain management: adequate  Airway patency: patent    PONV status at discharge: No PONV  Anesthetic complications: no      Cardiovascular status: blood pressure returned to baseline and hemodynamically stable  Respiratory status: unassisted, spontaneous ventilation and room air  Hydration status: euvolemic  Follow-up not needed.          Vitals Value Taken Time   /75 09/16/20 0943   Temp 97.9 09/16/20 0943   Pulse 72 09/16/20 0943   Resp 18 09/16/20 0943   SpO2 100 09/16/20 0943         No case tracking events are documented in the log.      Pain/Suzanna Score: No data recorded

## 2020-09-22 ENCOUNTER — TELEPHONE (OUTPATIENT)
Dept: INTERNAL MEDICINE | Facility: CLINIC | Age: 70
End: 2020-09-22

## 2020-09-22 ENCOUNTER — TELEPHONE (OUTPATIENT)
Dept: ENDOSCOPY | Facility: HOSPITAL | Age: 70
End: 2020-09-22

## 2020-09-22 DIAGNOSIS — Z12.31 ENCOUNTER FOR SCREENING MAMMOGRAM FOR BREAST CANCER: Primary | ICD-10-CM

## 2020-09-22 NOTE — TELEPHONE ENCOUNTER
Mammogram due 09/04/2020  Placed order   Scheduled her for tomorrow at 2:15 pm at [facility]  Pt is worried to do mammo after port being placed on 09/24

## 2020-09-22 NOTE — TELEPHONE ENCOUNTER
----- Message from Shoaib Ashley sent at 9/22/2020 10:56 AM CDT -----  Contact: Self- [phone]  Pt needs a mammogram put in before 9/24 when she is having a procedure. Please call to advise.

## 2020-09-23 ENCOUNTER — HOSPITAL ENCOUNTER (OUTPATIENT)
Dept: RADIOLOGY | Facility: OTHER | Age: 70
Discharge: HOME | End: 2020-09-23
Attending: FAMILY MEDICINE
Payer: MEDICARE

## 2020-09-23 DIAGNOSIS — Z12.31 ENCOUNTER FOR SCREENING MAMMOGRAM FOR BREAST CANCER: ICD-10-CM

## 2020-09-23 PROCEDURE — 77067 MAMMO DIGITAL SCREENING BILAT WITH TOMO: ICD-10-PCS | Mod: 26,HCNC,, | Performed by: RADIOLOGY

## 2020-09-23 PROCEDURE — 77063 BREAST TOMOSYNTHESIS BI: CPT | Mod: 26,HCNC,, | Performed by: RADIOLOGY

## 2020-09-23 PROCEDURE — 77063 MAMMO DIGITAL SCREENING BILAT WITH TOMO: ICD-10-PCS | Mod: 26,HCNC,, | Performed by: RADIOLOGY

## 2020-09-23 PROCEDURE — 77067 SCR MAMMO BI INCL CAD: CPT | Mod: 26,HCNC,, | Performed by: RADIOLOGY

## 2020-09-23 PROCEDURE — 77067 SCR MAMMO BI INCL CAD: CPT | Mod: TC,HCNC

## 2020-09-25 ENCOUNTER — TELEPHONE (OUTPATIENT)
Dept: GASTROENTEROLOGY | Facility: CLINIC | Age: 70
End: 2020-09-25

## 2020-10-06 ENCOUNTER — TELEPHONE (OUTPATIENT)
Dept: ENDOSCOPY | Facility: HOSPITAL | Age: 70
End: 2020-10-06

## 2020-10-06 NOTE — TELEPHONE ENCOUNTER
----- Message from Maged Alexandra sent at 10/6/2020 10:19 AM CDT -----  Contact: Pt @ [phone]  Pt calling to reschedule XR that was missed 09/30.     Please contact Pt @ [phone]

## 2020-10-08 ENCOUNTER — HOSPITAL ENCOUNTER (OUTPATIENT)
Dept: RADIOLOGY | Facility: OTHER | Age: 70
Discharge: HOME | End: 2020-10-08
Attending: INTERNAL MEDICINE
Payer: MEDICARE

## 2020-10-08 ENCOUNTER — TELEPHONE (OUTPATIENT)
Dept: ENDOSCOPY | Facility: HOSPITAL | Age: 70
End: 2020-10-08

## 2020-10-08 DIAGNOSIS — T18.9XXD FOREIGN BODY IN DIGESTIVE TRACT, SUBSEQUENT ENCOUNTER: ICD-10-CM

## 2020-10-08 PROCEDURE — 74018 XR ABDOMEN AP 1 VIEW: ICD-10-PCS | Mod: 26,HCNC,, | Performed by: RADIOLOGY

## 2020-10-08 PROCEDURE — 74018 RADEX ABDOMEN 1 VIEW: CPT | Mod: 26,HCNC,, | Performed by: RADIOLOGY

## 2020-10-08 PROCEDURE — 74018 RADEX ABDOMEN 1 VIEW: CPT | Mod: TC,HCNC,FY

## 2020-10-08 NOTE — TELEPHONE ENCOUNTER
----- Message from Deion Ivory MD sent at 10/8/2020  2:41 PM CDT -----  Please let the patient know the x ray showed that the PD stent passed. Return to referring physician.

## 2020-10-27 ENCOUNTER — HOSPITAL ENCOUNTER (OUTPATIENT)
Dept: RADIOLOGY | Facility: HOSPITAL | Age: 70
Discharge: HOME | End: 2020-10-27
Attending: SURGERY
Payer: MEDICARE

## 2020-10-27 ENCOUNTER — TELEPHONE (OUTPATIENT)
Dept: NEUROLOGY | Facility: HOSPITAL | Age: 70
End: 2020-10-27

## 2020-10-27 DIAGNOSIS — C25.9 ADENOCARCINOMA OF PANCREAS: ICD-10-CM

## 2020-10-27 DIAGNOSIS — C7B.8 OTHER SECONDARY NEUROENDOCRINE TUMORS: ICD-10-CM

## 2020-10-27 LAB — POCT GLUCOSE: 150 MG/DL (ref 70–110)

## 2020-10-27 PROCEDURE — 78815 PET IMAGE W/CT SKULL-THIGH: CPT | Mod: 26,HCNC,PS, | Performed by: RADIOLOGY

## 2020-10-27 PROCEDURE — 78815 PET IMAGE W/CT SKULL-THIGH: CPT | Mod: TC,HCNC,PS

## 2020-10-27 PROCEDURE — 74177 CT ABDOMEN PELVIS WITH CONTRAST: ICD-10-PCS | Mod: 26,HCNC,, | Performed by: RADIOLOGY

## 2020-10-27 PROCEDURE — A9698 NON-RAD CONTRAST MATERIALNOC: HCPCS | Mod: HCNC | Performed by: SURGERY

## 2020-10-27 PROCEDURE — 74177 CT ABD & PELVIS W/CONTRAST: CPT | Mod: 26,HCNC,, | Performed by: RADIOLOGY

## 2020-10-27 PROCEDURE — 25500020 PHARM REV CODE 255: Mod: HCNC | Performed by: SURGERY

## 2020-10-27 PROCEDURE — 74177 CT ABD & PELVIS W/CONTRAST: CPT | Mod: TC,HCNC

## 2020-10-27 PROCEDURE — 78815 NM PET CT ROUTINE: ICD-10-PCS | Mod: 26,HCNC,PS, | Performed by: RADIOLOGY

## 2020-10-27 PROCEDURE — A9552 F18 FDG: HCPCS | Mod: HCNC

## 2020-10-27 RX ADMIN — IOHEXOL 75 ML: 350 INJECTION, SOLUTION INTRAVENOUS at 01:10

## 2020-10-27 RX ADMIN — IOHEXOL 1000 ML: 9 SOLUTION ORAL at 11:10

## 2020-10-27 NOTE — TELEPHONE ENCOUNTER
As per daughters request, lab results faxed to Dr. Camacho's office at [phone]. Danny has no further questions at this time.

## 2020-10-27 NOTE — TELEPHONE ENCOUNTER
----- Message from Peyman Bai sent at 10/27/2020 10:16 AM CDT -----  Type:  Needs Medical Advice    Who Called: Danny/daughter  Would the patient rather a call back or a response via [facility]? call  Best Call Back Number: [phone]  Additional Information: Can you send the results of her blood work to her oncologist Dr. Camacho at [facility].  The fax # is [phone]

## 2020-11-03 ENCOUNTER — OFFICE VISIT (OUTPATIENT)
Dept: NEUROLOGY | Facility: HOSPITAL | Age: 70
End: 2020-11-03
Attending: SURGERY
Payer: MEDICARE

## 2020-11-03 VITALS
HEART RATE: 101 BPM | TEMPERATURE: 98 F | WEIGHT: 137.13 LBS | DIASTOLIC BLOOD PRESSURE: 65 MMHG | HEIGHT: 62 IN | BODY MASS INDEX: 25.23 KG/M2 | SYSTOLIC BLOOD PRESSURE: 108 MMHG

## 2020-11-03 DIAGNOSIS — R93.5 ABNORMAL FINDINGS ON DIAGNOSTIC IMAGING OF OTHER ABDOMINAL REGIONS, INCLUDING RETROPERITONEUM: ICD-10-CM

## 2020-11-03 DIAGNOSIS — C25.9 PRIMARY ADENOCARCINOMA OF PANCREAS: Primary | ICD-10-CM

## 2020-11-03 PROCEDURE — 99214 OFFICE O/P EST MOD 30 MIN: CPT | Mod: HCNC | Performed by: SURGERY

## 2020-11-03 RX ORDER — DICYCLOMINE HYDROCHLORIDE 20 MG/1
20 TABLET ORAL
COMMUNITY
Start: 2020-10-08 | End: 2021-03-02

## 2020-11-03 RX ORDER — PEGFILGRASTIM 6 MG/.6ML
INJECTION SUBCUTANEOUS
COMMUNITY
Start: 2020-11-02 | End: 2021-03-02

## 2020-11-03 NOTE — PROGRESS NOTES
"NOLANETS:  [facility]  A collaboration between [facility] and [facility]      PATIENT: Zohra Paulino  MRN: [id number]  DATE: 11/3/2020    Subjective:      Chief Complaint: Follow up distal pancreatectomy for adenocarcinoma.      Vitals: Blood pressure 108/65, pulse 101, temperature 97.6 °F (36.4 °C), temperature source Oral, height 5' 2" (1.575 m), weight 62.2 kg (137 lb 2 oz).     ECOG Score: 1 - Symptomatic but completely ambulatory    Diagnosis:   1. Primary adenocarcinoma of pancreas         Interval History:  Status post distal pancreatectomy for pancreatic adenocarcinoma.  Here for  follow-up.  Appetite excellent.  Energy level good     Ref Range & Units 7d ago 4mo ago   CA 19-9 2.0 - 40.0 U/mL 178.0High   3306.0High           Oncologic History:   Oncologic History Panc ductal adenocarcinoma- dx 6/2020   Oncologic Treatment  7/27 /2020- distal pancreatectomy ([facility])   Pathology 6/2020- FNA panc- ductal adenocarcinoma  7/2020-  pancreatic ductal adenocarcinoma with moderately differentiated  Pt2N0        1. Superior pancreatic portahepatis node, biopsy:  No metastatic carcinoma identified in one lymph node (0/1), supported by negative immunostains for  AE1/AE3 with appropriate controls  2. Retropancreatic tissue, biopsy:  Neurovascular fibroconnective tissue  Negative for malignancy  3. 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, confined to the pancreas  Proximal and distal parenchymal margins negative for invasive carcinoma or high-grade intraepithelial  neoplasia, the tumor measures 1 cm from the proximal margin  Perineural invasion present  No lymphovascular invasion identified  No metastatic carcinoma identified in one peripancreatic lymph node (0/1)    Past Medical History:  Past Medical History:   Diagnosis Date    Bronchitis     Cancer     pancreatic "    Fibroids     Osteopetrosis     Uterine fibroid        Past Surgical History:  Past Surgical History:   Procedure Laterality Date    CERVICAL BIOPSY  W/ LOOP ELECTRODE EXCISION      [facility]  2004    COLONOSCOPY      DISTAL PANCREATECTOMY N/A 7/27/2020    Procedure: PANCREATECTOMY, DISTAL, SUBTOTAL;  Surgeon: GILMA Ellis MD;  Location: [facility] OR;  Service: General;  Laterality: N/A;    ERCP N/A 8/19/2020    Procedure: ERCP (ENDOSCOPIC RETROGRADE CHOLANGIOPANCREATOGRAPHY);  Surgeon: Deion Ivory MD;  Location: [facility] ENDO;  Service: Endoscopy;  Laterality: N/A;  pancreatic duct leak  Rapid Covid test    ERCP N/A 9/16/2020    Procedure: ERCP (ENDOSCOPIC RETROGRADE CHOLANGIOPANCREATOGRAPHY);  Surgeon: Deion Ivory MD;  Location: [facility] ENDO;  Service: Endoscopy;  Laterality: N/A;    HYSTERECTOMY      fibroids    OR REMOVAL OF OVARY/TUBE(S)      TUBAL LIGATION         Family History:  Family History   Problem Relation Age of Onset    No Known Problems Mother     No Known Problems Father     Breast cancer Cousin 20    Arthritis Sister     No Known Problems Maternal Grandmother     No Known Problems Maternal Grandfather     Pancreatic cancer Paternal Grandmother 80    No Known Problems Paternal Grandfather     Diabetes Son     No Known Problems Son     No Known Problems Daughter     No Known Problems Daughter        Allergies:  Codeine    Medications:  Current Outpatient Medications   Medication Sig    alendronate (FOSAMAX) 40 mg tablet TAKE 1 TABLET (40 MG TOTAL) BY MOUTH ONCE A WEEK.    cetirizine (ZYRTEC) 10 MG tablet TAKE ONE TABLET BY MOUTH EVERY DAY    dicyclomine (BENTYL) 20 mg tablet     hydroCHLOROthiazide (HYDRODIURIL) 12.5 MG Tab Take 1 tablet (12.5 mg total) by mouth once daily.    NEULASTA 6 mg/0.6 mL injection     traMADoL (ULTRAM) 50 mg tablet Take 50 mg by mouth every 6 (six) hours as needed for Pain.    vitamin D 1000 units Tab Take 1,000 Units by mouth once  daily.    megestroL (MEGACE) 400 mg/10 mL (40 mg/mL) Susp Take 10 mLs (400 mg total) by mouth once daily. for 14 days (Patient not taking: Reported on 11/3/2020)    OPW TEST CLAIM - DO NOT FILL Inject into the skin. OPW test claim. Do not fill.    pregabalin (LYRICA) 75 MG capsule Take 1 capsule (75 mg total) by mouth 2 (two) times daily. for 7 days (Patient not taking: Reported on 8/21/2020)     No current facility-administered medications for this visit.      Facility-Administered Medications Ordered in Other Visits   Medication    0.9%  NaCl infusion    sodium chloride 0.9% flush 10 mL        Review of Systems   Constitutional: Negative.  Negative for activity change, appetite change, chills, fatigue, fever and unexpected weight change.   HENT: Negative.  Negative for congestion, ear pain, rhinorrhea and sinus pressure.    Eyes: Negative.  Negative for photophobia, pain and redness.   Respiratory: Negative.  Negative for cough, chest tightness, shortness of breath and wheezing.    Cardiovascular: Negative for chest pain, palpitations and leg swelling.   Gastrointestinal: Negative.  Negative for abdominal distention, abdominal pain, anal bleeding, blood in stool, constipation, diarrhea, nausea, rectal pain and vomiting.   Endocrine: Negative.  Negative for cold intolerance, heat intolerance, polydipsia, polyphagia and polyuria.   Genitourinary: Negative.  Negative for difficulty urinating, dysuria and frequency.   Musculoskeletal: Negative.  Negative for arthralgias, back pain, gait problem, myalgias, neck pain and neck stiffness.   Skin: Negative.  Negative for color change, pallor and rash.   Allergic/Immunologic: Negative.  Negative for environmental allergies, food allergies and immunocompromised state.   Neurological: Negative.  Negative for dizziness, seizures, syncope, weakness and light-headedness.   Hematological: Negative.  Negative for adenopathy. Does not bruise/bleed easily.    Psychiatric/Behavioral: Negative.  Negative for agitation, behavioral problems, confusion, decreased concentration, dysphoric mood, hallucinations, self-injury, sleep disturbance and suicidal ideas. The patient is not nervous/anxious and is not hyperactive.       Objective:      Physical Exam  Constitutional:       Appearance: She is well-developed.   HENT:      Head: Normocephalic and atraumatic.   Eyes:      Pupils: Pupils are equal, round, and reactive to light.   Neck:      Musculoskeletal: Normal range of motion and neck supple.      Thyroid: No thyromegaly.   Cardiovascular:      Rate and Rhythm: Normal rate and regular rhythm.      Heart sounds: Normal heart sounds.   Pulmonary:      Effort: Pulmonary effort is normal. No respiratory distress.      Breath sounds: Normal breath sounds. No wheezing or rales.   Abdominal:      General: Bowel sounds are normal. There is no distension.      Palpations: Abdomen is soft. There is no mass.      Tenderness: There is no abdominal tenderness. There is no guarding or rebound.      Hernia: No hernia is present. There is no hernia in the ventral area.   Musculoskeletal: Normal range of motion.         General: No tenderness.   Skin:     General: Skin is warm and dry.      Coloration: Skin is not pale.      Findings: No erythema or rash.   Neurological:      Mental Status: She is alert and oriented to person, place, and time.      Cranial Nerves: No cranial nerve deficit.      Deep Tendon Reflexes: Reflexes are normal and symmetric.   Psychiatric:         Behavior: Behavior normal.         Thought Content: Thought content normal.        Assessment:       1. Primary adenocarcinoma of pancreas        Laboratory Data:    Neuroendocrine Labs Latest Ref Rng & Units 8/4/2020   CA 19-9 2.0 - 40.0 U/mL    TSH 0.400 - 4.000 uIU/mL    WBC 3.90 - 12.70 K/uL 8.72   HGB 12.0 - 16.0 g/dL 10.5 (L)   HCT 37.0 - 48.5 % 32.6 (L)   PLATLETS 150 - 350 K/uL 298   GLUCOSE 70 - 110 mg/dL 111  (H)   BUN 8 - 23 mg/dL 10   CREATININE 0.5 - 1.4 mg/dL 1.4    - 145 mmol/L 140   K 3.5 - 5.1 mmol/L 3.7   CHLORIDE 95 - 110 mmol/L 102   CO2 23 - 29 mmol/L 29   CALCIUM 8.7 - 10.5 mg/dL 9.1   PROTEIN, TOTAL 6.0 - 8.4 g/dL 7.0   PHOSPHORUS 2.7 - 4.5 mg/dL 3.8   ALBUMIN 3.5 - 5.2 g/dL 3.2 (L)   URIC ACID 2.4 - 5.7 mg/dL    TOTAL BILIRUBIN 0.1 - 1.0 mg/dL 0.6   ALK PHOSPHATASE 55 - 135 U/L 346 (H)   SGOT (AST) 10 - 40 U/L 42 (H)   SGPT (ALT) 10 - 44 U/L 34   TRIGLYCERIDES 30 - 150 mg/dL    CHOLERSTEROL 120 - 199 mg/dL    HDL 40 - 75 mg/dL    LDL 63.0 - 159.0 mg/dL    MG 1.6 - 2.6 mg/dL 1.7   URINE AMYLASE U/L Not established U/L    24 HR CREATININE CLEARANCE 15.0 - 325.0 mg/dL    HEMOGLOBIN A1C 4.5 - 6.2 %    Weight       PREOP Ref Range & Units 1mo ago   CA 19-9 2.0 - 40.0 U/mL 3306.0High       PREOP:        Scans PREOP:   NM PET CT Routine FDG  Narrative: EXAMINATION:  NM PET CT ROUTINE    CLINICAL HISTORY:  Restage; Other secondary neuroendocrine tumors    TECHNIQUE:  8.57 mCi of F18-FDG was administered intravenously in the right antecubital fossa.  After an approximately 60 min distribution time, PET/CT images were acquired from the skull base to mid thigh.  Transmission images were acquired to correct for attenuation using a whole body low-dose CT scan without contrast with the arms positioned above the head. Glycemia at the time of injection was 150 mg/dL.    COMPARISON:  CT abdomen pelvis of same date, 07/14/2020, FDG PET-CT 07/07/2020    FINDINGS:  Quality of the study: Adequate.    In the head and neck, there are no hypermetabolic lesions worrisome for malignancy. There are no hypermetabolic mucosal lesions, and there are no pathologically enlarged or hypermetabolic lymph nodes.    In the chest, there are no hypermetabolic foci concerning for malignancy.  Note radiotracer uptake about the right antecubital fossa correlating with the injection site.    In the abdomen and pelvis, the previously  hypermetabolic focus of the pancreatic body has undergone interval surgical resection, and there is moderate uptake in the ill-defined collection in the resection bed in close proximity to the physiologic uptake of the stomach, anterior loop of small bowel and the left kidney.  There is physiologic tracer distribution within the abdominal organs and excretion into the genitourinary system.  No other hypermetabolic foci in the abdomen.    In the bones, there are no hypermetabolic lesions worrisome for malignancy.    CT findings:    Base of the neck and chest are grossly unremarkable.  Status post distal pancreatectomy noting some mild persistent fat stranding and mesenteric haziness consistent with surgical history.  Diverticulosis coli of the descending and sigmoid colon.  Impression: In this patient with pathology proven adenocarcinoma of the pancreas, there is no definite evidence of hypermetabolic tumor.    Moderately hypermetabolic ill-defined fluid collection in the resection bed favored to be inflammatory or infectious in etiology.  Fat necrosis could have a similar appearance.  Recommend clinical correlation    Diverticulosis coli of the descending and sigmoid colon.    This report was flagged in Epic as abnormal.    I, Nicolas Sun MD, attest that I reviewed and interpreted the images.    Electronically signed by resident: Ray Michael  Date:    10/27/2020  Time:    13:13    Electronically signed by: Nicolas Sun  Date:    10/28/2020  Time:    09:41      POSTOP CT 10/27/2020  In this patient with pathology proven adenocarcinoma of the pancreas, there is no definite evidence of hypermetabolic tumor.     Moderately hypermetabolic ill-defined fluid collection in the resection bed favored to be inflammatory or infectious in etiology.  Fat necrosis could have a similar appearance.  Recommend clinical correlation       FDG PET: 10/28/2020     In this patient with pathology proven adenocarcinoma of the pancreas, there  is no definite evidence of hypermetabolic tumor.     Moderately hypermetabolic ill-defined fluid collection in the resection bed favored to be inflammatory or infectious in etiology.  Fat necrosis could have a similar appearance.  Recommend clinical correlation    Impression:  Status post distal pancreatectomy for adenocarcinoma.  No evidence of metastatic disease at operation or now. Doing well. Mildly elevated CA 19-9 may be due to postop inflammatory process. Negative margins.  Will need close follow up.  PD duct stent passed spontaneously.      Plan:         Follow-up with Dr. Camacho oncologist with copy of path report.  Re-stage in 3 months with CT an FDG PET scan. Sooner if CA 19-9 rising.  CA 19 9 monthly and send results to Dr Doug Ellis MD, FACS  Professor of Surgery, [facility]  Neuroendocrine Surgery, Hepatic/Pancreatic & General Surgery  [address], [address]  [place]  [address]  ph. [phone]; [phone]  fax. [phone]

## 2020-11-03 NOTE — PATIENT INSTRUCTIONS
Tumor marker (CA 19-9): every month  --- due November & December 2020, & January 2021    Scans: CT Abd/Pelvis and FDG PET in 3 months  ---  due January 2021  Call Scheduling Department at [phone] to schedule scans prior to next appointment:      Notes From Physician:   - Follow-up with Dr. Camacho oncologist with copy of path report.    Return Clinic Appointment: in 3 months with Dr. Ellis - appointment made

## 2020-12-08 ENCOUNTER — PATIENT OUTREACH (OUTPATIENT)
Dept: ADMINISTRATIVE | Facility: CLINIC | Age: 70
End: 2020-12-08

## 2020-12-08 ENCOUNTER — EXTERNAL HOSPITAL ADMISSION (OUTPATIENT)
Dept: ADMINISTRATIVE | Facility: CLINIC | Age: 70
End: 2020-12-08

## 2020-12-08 DIAGNOSIS — I48.91 ATRIAL FIBRILLATION WITH RVR: Primary | ICD-10-CM

## 2020-12-08 NOTE — PATIENT INSTRUCTIONS
Discharge Instructions for Atrial Fibrillation  You have been diagnosed with an abnormal heart rhythm called atrial fibrillation. With this condition, your hearts 2 upper chambers quiver rather than squeeze the blood out in a normal pattern. This leads to an irregular and sometimes rapid heartbeat. Some people will develop associated symptoms such as a flip-flopping heartbeat, chest pain, lightheadedness, or shortness of breath. Other people may have no symptoms at all. Atrial fibrillation is serious because it affects the hearts ability to fill with blood as it should. Blood clots may form. This increases the risk for stroke. Untreated atrial fibrillation can also lead to heart failure. Atrial fibrillation can be controlled. With treatment, most people with atrial fibrillation lead normal lives.  Treatment options  Recommended treatment for atrial fibrillation depends on your age, symptoms, how long you have had atrial fibrillation, and other factors. You will have a complete evaluation to find out if you have any abnormalities that caused your heart to go into atrial fibrillation. This might be blocked heart arteries or a thyroid problem. Your doctor will assess your particular case and discuss choices with you.  Treatment choices may include:  · Treating an underlying disorder that puts you at risk for atrial fibrillation. For example, correcting an abnormal thyroid or electrolyte problem, or treating a blocked heart artery.  · Restoring a normal heart rhythm with an electrical shock (cardioversion) or with an antiarrhythmic medicine (chemical cardioversion)  · Using medicine to control your heart rate in atrial fibrillation.  · Preventing the risk for blood clot and stroke using blood-thinning medicines. Your doctor will tell you what he or she recommends. Choices may include aspirin, clopidogrel, warfarin, dabigatran, rivaroxaban, apixaban, and edoxaban.  · Doing catheter ablation or a surgical maze  procedure. These use different methods to destroy certain areas of heart tissue. This interrupts the electrical signals causing atrial fibrillation. One of these procedures may be a choice when medicines do not work, or as an alternative to long-term medicine.  · Other treatment choices may be recommended for you by your doctor.  Managing risk factors for stroke and preventing heart failure are important parts of any treatment plan for atrial fibrillation.  Home care  · Take your medicines exactly as directed. Dont skip doses.  · Work with your doctor to find the right medicines and doses for you.  · Learn to take your own pulse. Keep a record of your results. Ask your doctor which pulse rates mean that you need medical attention. Slowing your pulse is often the goal of treatment. Ask your doctor if its OK for you to use an automatic machine to check your pulse at home. Sometimes these machines dont count the pulse correctly when you have atrial fibrillation.  · Limit your intake of coffee, tea, cola, and other beverages with caffeine. Talk with your doctor about whether you should eliminate caffeine.  · Avoid over-the-counter medicines that have caffeine in them.  · Let your doctor know what medicines you take, including prescription and over-the-counter medicines, as well as any supplements. They interfere with some medicines given for atrial fibrillation.  · Ask your doctor about whether you can drink alcohol. Some people need to avoid alcohol to better treat atrial fibrillation. If you are taking blood-thinner medicines, alcohol may interfere with them by increasing their effect.  · Never take stimulants such as amphetamines or cocaine. These drugs can speed up your heart rate and trigger atrial fibrillation.  Follow-up care  Follow up with your doctor, or as advised.     When should I call my healthcare provider  Call your healthcare provider right away if you have any of the  following:  · Weakness  · Dizziness  · Fainting  · Fatigue  · Shortness of breath  · Chest pain with increased activity  · A change in the usual regularity of your heartbeat, or an unusually fast heartbeat   Date Last Reviewed: 4/23/2016  © 1015-7366 [employer]. [address] [place] [address], [place] [address]. All rights reserved. This information is not intended as a substitute for professional medical care. Always follow your healthcare professional's instructions.

## 2020-12-08 NOTE — TELEPHONE ENCOUNTER
C3 nurse attempted to contact patient. The following occurred:   C3 nurse attempted to contact Zohra Paulino  for a TCC post hospital discharge follow up call. The patient is unable to conduct the call @ this time. The patient requested a callback.    The patient does not have a scheduled HOSFU appointment within 7-14 days post hospital discharge date 12/5/20.

## 2020-12-21 ENCOUNTER — CARE AT HOME (OUTPATIENT)
Dept: HOME HEALTH SERVICES | Facility: CLINIC | Age: 70
End: 2020-12-21
Payer: MEDICARE

## 2020-12-21 DIAGNOSIS — C25.9 ADENOCARCINOMA OF PANCREAS: Primary | ICD-10-CM

## 2020-12-21 DIAGNOSIS — I48.0 PAROXYSMAL A-FIB: ICD-10-CM

## 2020-12-21 DIAGNOSIS — E11.65 UNCONTROLLED TYPE 2 DIABETES MELLITUS WITH HYPERGLYCEMIA: ICD-10-CM

## 2020-12-21 PROCEDURE — 99442 PR PHYSICIAN TELEPHONE EVALUATION 11-20 MIN: ICD-10-PCS | Mod: 95,,, | Performed by: NURSE PRACTITIONER

## 2020-12-21 PROCEDURE — 99442 PR PHYSICIAN TELEPHONE EVALUATION 11-20 MIN: CPT | Mod: 95,,, | Performed by: NURSE PRACTITIONER

## 2020-12-22 NOTE — PATIENT INSTRUCTIONS
Instructions:  [facility] to schedule home follow-up visit with patient as needed.  - Continue all medications, treatments and therapies as ordered.   - Follow all instructions, recommendations as discussed.  - Maintain Safety Precautions at all times.  - Attend all medical appointments as scheduled.  - For worsening symptoms: call Primary Care Physician or Nurse Practitioner.  - For emergencies, call 911 or immediately report to the nearest emergency room.  - Limit Risks of environmental exposure to coronavirus/COVID-19 as discussed including: social distancing, hand hygiene, and limiting departures from the home for necessities only.       actual

## 2020-12-22 NOTE — ASSESSMENT & PLAN NOTE
Denies chest pain or SOB  Continue medications as prescribed  Keep scheduled follow up with cardiology

## 2020-12-22 NOTE — PROGRESS NOTES
Established Patient - Audio Only Telehealth Visit     The patient location is: Home  The chief complaint leading to consultation is: Transitional care follow up   Visit type: Virtual visit with audio only (telephone)  Total time spent with patient: 20 minutes        The reason for the audio only service rather than synchronous audio and video virtual visit was related to technical difficulties or patient preference/necessity.     Each patient to whom I provide medical services by telemedicine is:  (1) informed of the relationship between the physician and patient and the respective role of any other health care provider with respect to management of the patient; and (2) notified that they may decline to receive medical services by telemedicine and may withdraw from such care at any time. Patient verbally consented to receive this service via voice-only telephone call.       Hospital Course: 70-year-old female admitted to the hospital medicine service for hyperglycemic hyperosmolar syndrome as well as atrial fibrillation with rapid ventricular response. Patient previously had no known diagnosis of diabetes mellitus. Severely elevated blood glucose as well as hemoglobin A1c of 10.3% confirmed diagnosis. Initially treated hyperglycemic hyperosmolar syndrome with intravenous fluids, intravenous insulin. Blood glucose improved significantly. Treated with supplemental insulin aspart with continued reasonable control of blood glucose. Decision made to start metformin upon discharge for chronic control. Patient will need to follow-up with primary care physician as outpatient for ongoing monitoring of blood glucose and adjust treatment as indicated for adequate glycemic control. With regard to atrial fibrillation, patient was found to be in rapid ventricular response on presentation. Treated with intravenous diltiazem drip as well as intravenous fluids with eventual conversion to sinus rhythm with controlled rate.  Transitioned to oral diltiazem with continued heart rate/rhythm control. 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 Symptoms improved with conservative measures. Patient tolerating oral intake at the time of discharge.  Patient discharged home stable condition.     Today:  Patient states she has scheduled follow up appointments and does not feel the need for home visits at this time. She denies chest pain, SOB, fever, chills, cough, congestion, change in bladder habits or change in bowel habits.  Reports taking medications as prescribed. Denies any issues or concerns to be addressed today.      Problem List Items Addressed This Visit        Cardiac/Vascular    Paroxysmal A-fib     Denies chest pain or SOB  Continue medications as prescribed  Keep scheduled follow up with cardiology             Oncology    Adenocarcinoma of pancreas - Primary     Continue follow up with oncology at [facility]             Endocrine    Diabetes type 2, uncontrolled     Recently diagnosed.  Denies symptoms of hypo or hyperglycemia.  Continue medications as prescribed.  Keep scheduled follow up appointments.                   Instructions:  - [facility] Nurse Practitioner to schedule home follow-up visit with patient as needed.  - Continue all medications, treatments and therapies as ordered.   - Follow all instructions, recommendations as discussed.  - Maintain Safety Precautions at all times.  - Attend all medical appointments as scheduled.  - For worsening symptoms: call Primary Care Physician or Nurse Practitioner.  - For emergencies, call 911 or immediately report to the nearest emergency room.  - Limit Risks of environmental exposure to coronavirus/COVID-19 as discussed including: social distancing, hand hygiene, and limiting departures from the home for necessities only.                     This service was not  originating from a related E/M service provided within the previous 7 days nor will  to an E/M service or procedure within the next 24 hours or my soonest available appointment.  Prevailing standard of care was able to be met in this audio-only visit.

## 2020-12-22 NOTE — ASSESSMENT & PLAN NOTE
Recently diagnosed.  Denies symptoms of hypo or hyperglycemia.  Continue medications as prescribed.  Keep scheduled follow up appointments.

## 2021-01-21 NOTE — ASSESSMENT & PLAN NOTE
- possibly undiagnosed HTN, follow up outpatient   Siliq Pregnancy And Lactation Text: The risk during pregnancy and breastfeeding is uncertain with this medication. Spironolactone Pregnancy And Lactation Text: This medication can cause feminization of the male fetus and should be avoided during pregnancy. The active metabolite is also found in breast milk. Topical Retinoid counseling:  Patient advised to apply a pea-sized amount only at bedtime and wait 30 minutes after washing their face before applying.  If too drying, patient may add a non-comedogenic moisturizer. The patient verbalized understanding of the proper use and possible adverse effects of retinoids.  All of the patient's questions and concerns were addressed. Xeljanz Counseling: I discussed with the patient the risks of Xeljanz therapy including increased risk of infection, liver issues, headache, diarrhea, or cold symptoms. Live vaccines should be avoided. They were instructed to call if they have any problems. Azathioprine Pregnancy And Lactation Text: This medication is Pregnancy Category D and isn't considered safe during pregnancy. It is unknown if this medication is excreted in breast milk. Dapsone Pregnancy And Lactation Text: This medication is Pregnancy Category C and is not considered safe during pregnancy or breast feeding. Enbrel Pregnancy And Lactation Text: This medication is Pregnancy Category B and is considered safe during pregnancy. It is unknown if this medication is excreted in breast milk. Niacinamide Pregnancy And Lactation Text: These medications are considered safe during pregnancy. 5-Fu Counseling: 5-Fluorouracil Counseling:  I discussed with the patient the risks of 5-fluorouracil including but not limited to erythema, scaling, itching, weeping, crusting, and pain. Finasteride Male Counseling: Finasteride Counseling:  I discussed with the patient the risks of use of finasteride including but not limited to decreased libido, decreased ejaculate volume, gynecomastia, and depression. Women should not handle medication.  All of the patient's questions and concerns were addressed. Opioid Pregnancy And Lactation Text: These medications can lead to premature delivery and should be avoided during pregnancy. These medications are also present in breast milk in small amounts. Minoxidil Pregnancy And Lactation Text: This medication has not been assigned a Pregnancy Risk Category but animal studies failed to show danger with the topical medication. It is unknown if the medication is excreted in breast milk. Albendazole Counseling:  I discussed with the patient the risks of albendazole including but not limited to cytopenia, kidney damage, nausea/vomiting and severe allergy.  The patient understands that this medication is being used in an off-label manner. Isotretinoin Counseling: Patient should get monthly blood tests, not donate blood, not drive at night if vision affected, not share medication, and not undergo elective surgery for 6 months after tx completed. Side effects reviewed, pt to contact office should one occur. Hydroxychloroquine Counseling:  I discussed with the patient that a baseline ophthalmologic exam is needed at the start of therapy and every year thereafter while on therapy. A CBC may also be warranted for monitoring.  The side effects of this medication were discussed with the patient, including but not limited to agranulocytosis, aplastic anemia, seizures, rashes, retinopathy, and liver toxicity. Patient instructed to call the office should any adverse effect occur.  The patient verbalized understanding of the proper use and possible adverse effects of Plaquenil.  All the patient's questions and concerns were addressed. Azithromycin Pregnancy And Lactation Text: This medication is considered safe during pregnancy and is also secreted in breast milk. Hydroxychloroquine Pregnancy And Lactation Text: This medication has been shown to cause fetal harm but it isn't assigned a Pregnancy Risk Category. There are small amounts excreted in breast milk. 5-Fu Pregnancy And Lactation Text: This medication is Pregnancy Category X and contraindicated in pregnancy and in women who may become pregnant. It is unknown if this medication is excreted in breast milk. Ilumya Counseling: I discussed with the patient the risks of tildrakizumab including but not limited to immunosuppression, malignancy, posterior leukoencephalopathy syndrome, and serious infections.  The patient understands that monitoring is required including a PPD at baseline and must alert us or the primary physician if symptoms of infection or other concerning signs are noted. Acitretin Counseling:  I discussed with the patient the risks of acitretin including but not limited to hair loss, dry lips/skin/eyes, liver damage, hyperlipidemia, depression/suicidal ideation, photosensitivity.  Serious rare side effects can include but are not limited to pancreatitis, pseudotumor cerebri, bony changes, clot formation/stroke/heart attack.  Patient understands that alcohol is contraindicated since it can result in liver toxicity and significantly prolong the elimination of the drug by many years. SSKI Counseling:  I discussed with the patient the risks of SSKI including but not limited to thyroid abnormalities, metallic taste, GI upset, fever, headache, acne, arthralgias, paraesthesias, lymphadenopathy, easy bleeding, arrhythmias, and allergic reaction. Odomzo Counseling- I discussed with the patient the risks of Odomzo including but not limited to nausea, vomiting, diarrhea, constipation, weight loss, changes in the sense of taste, decreased appetite, muscle spasms, and hair loss.  The patient verbalized understanding of the proper use and possible adverse effects of Odomzo.  All of the patient's questions and concerns were addressed. Itraconazole Counseling:  I discussed with the patient the risks of itraconazole including but not limited to liver damage, nausea/vomiting, neuropathy, and severe allergy.  The patient understands that this medication is best absorbed when taken with acidic beverages such as non-diet cola or ginger ale.  The patient understands that monitoring is required including baseline LFTs and repeat LFTs at intervals.  The patient understands that they are to contact us or the primary physician if concerning signs are noted. Cyclosporine Counseling:  I discussed with the patient the risks of cyclosporine including but not limited to hypertension, gingival hyperplasia,myelosuppression, immunosuppression, liver damage, kidney damage, neurotoxicity, lymphoma, and serious infections. The patient understands that monitoring is required including baseline blood pressure, CBC, CMP, lipid panel and uric acid, and then 1-2 times monthly CMP and blood pressure. Prednisone Counseling:  I discussed with the patient the risks of prolonged use of prednisone including but not limited to weight gain, insomnia, osteoporosis, mood changes, diabetes, susceptibility to infection, glaucoma and high blood pressure.  In cases where prednisone use is prolonged, patients should be monitored with blood pressure checks, serum glucose levels and an eye exam.  Additionally, the patient may need to be placed on GI prophylaxis, PCP prophylaxis, and calcium and vitamin D supplementation and/or a bisphosphonate.  The patient verbalized understanding of the proper use and the possible adverse effects of prednisone.  All of the patient's questions and concerns were addressed. Rifampin Counseling: I discussed with the patient the risks of rifampin including but not limited to liver damage, kidney damage, red-orange body fluids, nausea/vomiting and severe allergy. Skyrizi Counseling: I discussed with the patient the risks of risankizumab-rzaa including but not limited to immunosuppression, and serious infections.  The patient understands that monitoring is required including a PPD at baseline and must alert us or the primary physician if symptoms of infection or other concerning signs are noted. Arava Counseling:  Patient counseled regarding adverse effects of Arava including but not limited to nausea, vomiting, abnormalities in liver function tests. Patients may develop mouth sores, rash, diarrhea, and abnormalities in blood counts. The patient understands that monitoring is required including LFTs and blood counts.  There is a rare possibility of scarring of the liver and lung problems that can occur when taking methotrexate. Persistent nausea, loss of appetite, pale stools, dark urine, cough, and shortness of breath should be reported immediately. Patient advised to discontinue Arava treatment and consult with a physician prior to attempting conception. The patient will have to undergo a treatment to eliminate Arava from the body prior to conception. Detail Level: Simple Rhofade Pregnancy And Lactation Text: This medication has not been assigned a Pregnancy Risk Category. It is unknown if the medication is excreted in breast milk. Ivermectin Counseling:  Patient instructed to take medication on an empty stomach with a full glass of water.  Patient informed of potential adverse effects including but not limited to nausea, diarrhea, dizziness, itching, and swelling of the extremities or lymph nodes.  The patient verbalized understanding of the proper use and possible adverse effects of ivermectin.  All of the patient's questions and concerns were addressed. Eucrisa Counseling: Patient may experience a mild burning sensation during topical application. Eucrisa is not approved in children less than 2 years of age. Cyclophosphamide Pregnancy And Lactation Text: This medication is Pregnancy Category D and it isn't considered safe during pregnancy. This medication is excreted in breast milk. Topical Clindamycin Pregnancy And Lactation Text: This medication is Pregnancy Category B and is considered safe during pregnancy. It is unknown if it is excreted in breast milk. Cellcept Counseling:  I discussed with the patient the risks of mycophenolate mofetil including but not limited to infection/immunosuppression, GI upset, hypokalemia, hypercholesterolemia, bone marrow suppression, lymphoproliferative disorders, malignancy, GI ulceration/bleed/perforation, colitis, interstitial lung disease, kidney failure, progressive multifocal leukoencephalopathy, and birth defects.  The patient understands that monitoring is required including a baseline creatinine and regular CBC testing. In addition, patient must alert us immediately if symptoms of infection or other concerning signs are noted. Cyclosporine Pregnancy And Lactation Text: This medication is Pregnancy Category C and it isn't know if it is safe during pregnancy. This medication is excreted in breast milk. Quinolones Pregnancy And Lactation Text: This medication is Pregnancy Category C and it isn't know if it is safe during pregnancy. It is also excreted in breast milk. Rhofade Counseling: Rhofade is a topical medication which can decrease superficial blood flow where applied. Side effects are uncommon and include stinging, redness and allergic reactions. Nsaids Counseling: NSAID Counseling: I discussed with the patient that NSAIDs should be taken with food. Prolonged use of NSAIDs can result in the development of stomach ulcers.  Patient advised to stop taking NSAIDs if abdominal pain occurs.  The patient verbalized understanding of the proper use and possible adverse effects of NSAIDs.  All of the patient's questions and concerns were addressed. Isotretinoin Pregnancy And Lactation Text: This medication is Pregnancy Category X and is considered extremely dangerous during pregnancy. It is unknown if it is excreted in breast milk. Metronidazole Counseling:  I discussed with the patient the risks of metronidazole including but not limited to seizures, nausea/vomiting, a metallic taste in the mouth, nausea/vomiting and severe allergy. Protopic Counseling: Patient may experience a mild burning sensation during topical application. Protopic is not approved in children less than 2 years of age. There have been case reports of hematologic and skin malignancies in patients using topical calcineurin inhibitors although causality is questionable. Cosentyx Counseling:  I discussed with the patient the risks of Cosentyx including but not limited to worsening of Crohn's disease, immunosuppression, allergic reactions and infections.  The patient understands that monitoring is required including a PPD at baseline and must alert us or the primary physician if symptoms of infection or other concerning signs are noted. Xolair Pregnancy And Lactation Text: This medication is Pregnancy Category B and is considered safe during pregnancy. This medication is excreted in breast milk. Tremfya Counseling: I discussed with the patient the risks of guselkumab including but not limited to immunosuppression, serious infections, worsening of inflammatory bowel disease and drug reactions.  The patient understands that monitoring is required including a PPD at baseline and must alert us or the primary physician if symptoms of infection or other concerning signs are noted. Benzoyl Peroxide Counseling: Patient counseled that medicine may cause skin irritation and bleach clothing.  In the event of skin irritation, the patient was advised to reduce the amount of the drug applied or use it less frequently.   The patient verbalized understanding of the proper use and possible adverse effects of benzoyl peroxide.  All of the patient's questions and concerns were addressed. Otezla Pregnancy And Lactation Text: This medication is Pregnancy Category C and it isn't known if it is safe during pregnancy. It is unknown if it is excreted in breast milk. Propranolol Pregnancy And Lactation Text: This medication is Pregnancy Category C and it isn't known if it is safe during pregnancy. It is excreted in breast milk. Benzoyl Peroxide Pregnancy And Lactation Text: This medication is Pregnancy Category C. It is unknown if benzoyl peroxide is excreted in breast milk. Glycopyrrolate Counseling:  I discussed with the patient the risks of glycopyrrolate including but not limited to skin rash, drowsiness, dry mouth, difficulty urinating, and blurred vision. Bactrim Counseling:  I discussed with the patient the risks of sulfa antibiotics including but not limited to GI upset, allergic reaction, drug rash, diarrhea, dizziness, photosensitivity, and yeast infections.  Rarely, more serious reactions can occur including but not limited to aplastic anemia, agranulocytosis, methemoglobinemia, blood dyscrasias, liver or kidney failure, lung infiltrates or desquamative/blistering drug rashes. Topical Sulfur Applications Pregnancy And Lactation Text: This medication is Pregnancy Category C and has an unknown safety profile during pregnancy. It is unknown if this topical medication is excreted in breast milk. Tranexamic Acid Counseling:  Patient advised of the small risk of bleeding problems with tranexamic acid. They were also instructed to call if they developed any nausea, vomiting or diarrhea. All of the patient's questions and concerns were addressed. Clofazimine Counseling:  I discussed with the patient the risks of clofazimine including but not limited to skin and eye pigmentation, liver damage, nausea/vomiting, gastrointestinal bleeding and allergy. Griseofulvin Counseling:  I discussed with the patient the risks of griseofulvin including but not limited to photosensitivity, cytopenia, liver damage, nausea/vomiting and severe allergy.  The patient understands that this medication is best absorbed when taken with a fatty meal (e.g., ice cream or french fries). Niacinamide Counseling: I recommended taking niacin or niacinamide, also know as vitamin B3, twice daily. Recent evidence suggests that taking vitamin B3 (500 mg twice daily) can reduce the risk of actinic keratoses and non-melanoma skin cancers. Side effects of vitamin B3 include flushing and headache. Colchicine Counseling:  Patient counseled regarding adverse effects including but not limited to stomach upset (nausea, vomiting, stomach pain, or diarrhea).  Patient instructed to limit alcohol consumption while taking this medication.  Colchicine may reduce blood counts especially with prolonged use.  The patient understands that monitoring of kidney function and blood counts may be required, especially at baseline. The patient verbalized understanding of the proper use and possible adverse effects of colchicine.  All of the patient's questions and concerns were addressed. Erivedge Counseling- I discussed with the patient the risks of Erivedge including but not limited to nausea, vomiting, diarrhea, constipation, weight loss, changes in the sense of taste, decreased appetite, muscle spasms, and hair loss.  The patient verbalized understanding of the proper use and possible adverse effects of Erivedge.  All of the patient's questions and concerns were addressed. Albendazole Pregnancy And Lactation Text: This medication is Pregnancy Category C and it isn't known if it is safe during pregnancy. It is also excreted in breast milk. Quinolones Counseling:  I discussed with the patient the risks of fluoroquinolones including but not limited to GI upset, allergic reaction, drug rash, diarrhea, dizziness, photosensitivity, yeast infections, liver function test abnormalities, tendonitis/tendon rupture. Picato Counseling:  I discussed with the patient the risks of Picato including but not limited to erythema, scaling, itching, weeping, crusting, and pain. Hydroxyzine Counseling: Patient advised that the medication is sedating and not to drive a car after taking this medication.  Patient informed of potential adverse effects including but not limited to dry mouth, urinary retention, and blurry vision.  The patient verbalized understanding of the proper use and possible adverse effects of hydroxyzine.  All of the patient's questions and concerns were addressed. Humira Counseling:  I discussed with the patient the risks of adalimumab including but not limited to myelosuppression, immunosuppression, autoimmune hepatitis, demyelinating diseases, lymphoma, and serious infections.  The patient understands that monitoring is required including a PPD at baseline and must alert us or the primary physician if symptoms of infection or other concerning signs are noted. Xolair Counseling:  Patient informed of potential adverse effects including but not limited to fever, muscle aches, rash and allergic reactions.  The patient verbalized understanding of the proper use and possible adverse effects of Xolair.  All of the patient's questions and concerns were addressed. Clindamycin Pregnancy And Lactation Text: This medication can be used in pregnancy if certain situations. Clindamycin is also present in breast milk. Bactrim Pregnancy And Lactation Text: This medication is Pregnancy Category D and is known to cause fetal risk.  It is also excreted in breast milk. Oxybutynin Counseling:  I discussed with the patient the risks of oxybutynin including but not limited to skin rash, drowsiness, dry mouth, difficulty urinating, and blurred vision. Mirvaso Counseling: Mirvaso is a topical medication which can decrease superficial blood flow where applied. Side effects are uncommon and include stinging, redness and allergic reactions. Rifampin Pregnancy And Lactation Text: This medication is Pregnancy Category C and it isn't know if it is safe during pregnancy. It is also excreted in breast milk and should not be used if you are breast feeding. Drysol Counseling:  I discussed with the patient the risks of drysol/aluminum chloride including but not limited to skin rash, itching, irritation, burning. Topical Sulfur Applications Counseling: Topical Sulfur Counseling: Patient counseled that this medication may cause skin irritation or allergic reactions.  In the event of skin irritation, the patient was advised to reduce the amount of the drug applied or use it less frequently.   The patient verbalized understanding of the proper use and possible adverse effects of topical sulfur application.  All of the patient's questions and concerns were addressed. Thalidomide Pregnancy And Lactation Text: This medication is Pregnancy Category X and is absolutely contraindicated during pregnancy. It is unknown if it is excreted in breast milk. Erythromycin Pregnancy And Lactation Text: This medication is Pregnancy Category B and is considered safe during pregnancy. It is also excreted in breast milk. Tazorac Pregnancy And Lactation Text: This medication is not safe during pregnancy. It is unknown if this medication is excreted in breast milk. Valtrex Pregnancy And Lactation Text: this medication is Pregnancy Category B and is considered safe during pregnancy. This medication is not directly found in breast milk but it's metabolite acyclovir is present. Azathioprine Counseling:  I discussed with the patient the risks of azathioprine including but not limited to myelosuppression, immunosuppression, hepatotoxicity, lymphoma, and infections.  The patient understands that monitoring is required including baseline LFTs, Creatinine, possible TPMP genotyping and weekly CBCs for the first month and then every 2 weeks thereafter.  The patient verbalized understanding of the proper use and possible adverse effects of azathioprine.  All of the patient's questions and concerns were addressed. Infliximab Counseling:  I discussed with the patient the risks of infliximab including but not limited to myelosuppression, immunosuppression, autoimmune hepatitis, demyelinating diseases, lymphoma, and serious infections.  The patient understands that monitoring is required including a PPD at baseline and must alert us or the primary physician if symptoms of infection or other concerning signs are noted. Elidel Counseling: Patient may experience a mild burning sensation during topical application. Elidel is not approved in children less than 2 years of age. There have been case reports of hematologic and skin malignancies in patients using topical calcineurin inhibitors although causality is questionable. Gabapentin Counseling: I discussed with the patient the risks of gabapentin including but not limited to dizziness, somnolence, fatigue and ataxia. Solaraze Counseling:  I discussed with the patient the risks of Solaraze including but not limited to erythema, scaling, itching, weeping, crusting, and pain. Birth Control Pills Counseling: Birth Control Pill Counseling: I discussed with the patient the potential side effects of OCPs including but not limited to increased risk of stroke, heart attack, thrombophlebitis, deep venous thrombosis, hepatic adenomas, breast changes, GI upset, headaches, and depression.  The patient verbalized understanding of the proper use and possible adverse effects of OCPs. All of the patient's questions and concerns were addressed. Birth Control Pills Pregnancy And Lactation Text: This medication should be avoided if pregnant and for the first 30 days post-partum. Griseofulvin Pregnancy And Lactation Text: This medication is Pregnancy Category X and is known to cause serious birth defects. It is unknown if this medication is excreted in breast milk but breast feeding should be avoided. Minoxidil Counseling: Minoxidil is a topical medication which can increase blood flow where it is applied. It is uncertain how this medication increases hair growth. Side effects are uncommon and include stinging and allergic reactions. Enbrel Counseling:  I discussed with the patient the risks of etanercept including but not limited to myelosuppression, immunosuppression, autoimmune hepatitis, demyelinating diseases, lymphoma, and infections.  The patient understands that monitoring is required including a PPD at baseline and must alert us or the primary physician if symptoms of infection or other concerning signs are noted. Topical Retinoid Pregnancy And Lactation Text: This medication is Pregnancy Category C. It is unknown if this medication is excreted in breast milk. Cimzia Counseling:  I discussed with the patient the risks of Cimzia including but not limited to immunosuppression, allergic reactions and infections.  The patient understands that monitoring is required including a PPD at baseline and must alert us or the primary physician if symptoms of infection or other concerning signs are noted. Clofazimine Pregnancy And Lactation Text: This medication is Pregnancy Category C and isn't considered safe during pregnancy. It is excreted in breast milk. Protopic Pregnancy And Lactation Text: This medication is Pregnancy Category C. It is unknown if this medication is excreted in breast milk when applied topically. Topical Clindamycin Counseling: Patient counseled that this medication may cause skin irritation or allergic reactions.  In the event of skin irritation, the patient was advised to reduce the amount of the drug applied or use it less frequently.   The patient verbalized understanding of the proper use and possible adverse effects of clindamycin.  All of the patient's questions and concerns were addressed. Stelara Counseling:  I discussed with the patient the risks of ustekinumab including but not limited to immunosuppression, malignancy, posterior leukoencephalopathy syndrome, and serious infections.  The patient understands that monitoring is required including a PPD at baseline and must alert us or the primary physician if symptoms of infection or other concerning signs are noted. Imiquimod Counseling:  I discussed with the patient the risks of imiquimod including but not limited to erythema, scaling, itching, weeping, crusting, and pain.  Patient understands that the inflammatory response to imiquimod is variable from person to person and was educated regarded proper titration schedule.  If flu-like symptoms develop, patient knows to discontinue the medication and contact us. Terbinafine Counseling: Patient counseling regarding adverse effects of terbinafine including but not limited to headache, diarrhea, rash, upset stomach, liver function test abnormalities, itching, taste/smell disturbance, nausea, abdominal pain, and flatulence.  There is a rare possibility of liver failure that can occur when taking terbinafine.  The patient understands that a baseline LFT and kidney function test may be required. The patient verbalized understanding of the proper use and possible adverse effects of terbinafine.  All of the patient's questions and concerns were addressed. High Dose Vitamin A Pregnancy And Lactation Text: High dose vitamin A therapy is contraindicated during pregnancy and breast feeding. Fluconazole Counseling:  Patient counseled regarding adverse effects of fluconazole including but not limited to headache, diarrhea, nausea, upset stomach, liver function test abnormalities, taste disturbance, and stomach pain.  There is a rare possibility of liver failure that can occur when taking fluconazole.  The patient understands that monitoring of LFTs and kidney function test may be required, especially at baseline. The patient verbalized understanding of the proper use and possible adverse effects of fluconazole.  All of the patient's questions and concerns were addressed. Methotrexate Pregnancy And Lactation Text: This medication is Pregnancy Category X and is known to cause fetal harm. This medication is excreted in breast milk. Doxepin Pregnancy And Lactation Text: This medication is Pregnancy Category C and it isn't known if it is safe during pregnancy. It is also excreted in breast milk and breast feeding isn't recommended. Doxycycline Pregnancy And Lactation Text: This medication is Pregnancy Category D and not consider safe during pregnancy. It is also excreted in breast milk but is considered safe for shorter treatment courses. Terbinafine Pregnancy And Lactation Text: This medication is Pregnancy Category B and is considered safe during pregnancy. It is also excreted in breast milk and breast feeding isn't recommended. Doxycycline Counseling:  Patient counseled regarding possible photosensitivity and increased risk for sunburn.  Patient instructed to avoid sunlight, if possible.  When exposed to sunlight, patients should wear protective clothing, sunglasses, and sunscreen.  The patient was instructed to call the office immediately if the following severe adverse effects occur:  hearing changes, easy bruising/bleeding, severe headache, or vision changes.  The patient verbalized understanding of the proper use and possible adverse effects of doxycycline.  All of the patient's questions and concerns were addressed. Carac Counseling:  I discussed with the patient the risks of Carac including but not limited to erythema, scaling, itching, weeping, crusting, and pain. Finasteride Pregnancy And Lactation Text: This medication is absolutely contraindicated during pregnancy. It is unknown if it is excreted in breast milk. Dupixent Counseling: I discussed with the patient the risks of dupilumab including but not limited to eye infection and irritation, cold sores, injection site reactions, worsening of asthma, allergic reactions and increased risk of parasitic infection.  Live vaccines should be avoided while taking dupilumab. Dupilumab will also interact with certain medications such as warfarin and cyclosporine. The patient understands that monitoring is required and they must alert us or the primary physician if symptoms of infection or other concerning signs are noted. Include Pregnancy/Lactation Warning?: No Taltz Counseling: I discussed with the patient the risks of ixekizumab including but not limited to immunosuppression, serious infections, worsening of inflammatory bowel disease and drug reactions.  The patient understands that monitoring is required including a PPD at baseline and must alert us or the primary physician if symptoms of infection or other concerning signs are noted. Rituxan Counseling:  I discussed with the patient the risks of Rituxan infusions. Side effects can include infusion reactions, severe drug rashes including mucocutaneous reactions, reactivation of latent hepatitis and other infections and rarely progressive multifocal leukoencephalopathy.  All of the patient's questions and concerns were addressed. Opioid Counseling: I discussed with the patient the potential side effects of opioids including but not limited to addiction, altered mental status, and depression. I stressed avoiding alcohol, benzodiazepines, muscle relaxants and sleep aids unless specifically okayed by a physician. The patient verbalized understanding of the proper use and possible adverse effects of opioids. All of the patient's questions and concerns were addressed. They were instructed to flush the remaining pills down the toilet if they did not need them for pain. Tranexamic Acid Pregnancy And Lactation Text: It is unknown if this medication is safe during pregnancy or breast feeding. Tetracycline Counseling: Patient counseled regarding possible photosensitivity and increased risk for sunburn.  Patient instructed to avoid sunlight, if possible.  When exposed to sunlight, patients should wear protective clothing, sunglasses, and sunscreen.  The patient was instructed to call the office immediately if the following severe adverse effects occur:  hearing changes, easy bruising/bleeding, severe headache, or vision changes.  The patient verbalized understanding of the proper use and possible adverse effects of tetracycline.  All of the patient's questions and concerns were addressed. Patient understands to avoid pregnancy while on therapy due to potential birth defects. Dupixent Pregnancy And Lactation Text: This medication likely crosses the placenta but the risk for the fetus is uncertain. This medication is excreted in breast milk. Cyclophosphamide Counseling:  I discussed with the patient the risks of cyclophosphamide including but not limited to hair loss, hormonal abnormalities, decreased fertility, abdominal pain, diarrhea, nausea and vomiting, bone marrow suppression and infection. The patient understands that monitoring is required while taking this medication. Cimetidine Counseling:  I discussed with the patient the risks of Cimetidine including but not limited to gynecomastia, headache, diarrhea, nausea, drowsiness, arrhythmias, pancreatitis, skin rashes, psychosis, bone marrow suppression and kidney toxicity. Bexarotene Counseling:  I discussed with the patient the risks of bexarotene including but not limited to hair loss, dry lips/skin/eyes, liver abnormalities, hyperlipidemia, pancreatitis, depression/suicidal ideation, photosensitivity, drug rash/allergic reactions, hypothyroidism, anemia, leukopenia, infection, cataracts, and teratogenicity.  Patient understands that they will need regular blood tests to check lipid profile, liver function tests, white blood cell count, thyroid function tests and pregnancy test if applicable. Ketoconazole Counseling:   Patient counseled regarding improving absorption with orange juice.  Adverse effects include but are not limited to breast enlargement, headache, diarrhea, nausea, upset stomach, liver function test abnormalities, taste disturbance, and stomach pain.  There is a rare possibility of liver failure that can occur when taking ketoconazole. The patient understands that monitoring of LFTs may be required, especially at baseline. The patient verbalized understanding of the proper use and possible adverse effects of ketoconazole.  All of the patient's questions and concerns were addressed. Hydroquinone Counseling:  Patient advised that medication may result in skin irritation, lightening (hypopigmentation), dryness, and burning.  In the event of skin irritation, the patient was advised to reduce the amount of the drug applied or use it less frequently.  Rarely, spots that are treated with hydroquinone can become darker (pseudoochronosis).  Should this occur, patient instructed to stop medication and call the office. The patient verbalized understanding of the proper use and possible adverse effects of hydroquinone.  All of the patient's questions and concerns were addressed. Thalidomide Counseling: I discussed with the patient the risks of thalidomide including but not limited to birth defects, anxiety, weakness, chest pain, dizziness, cough and severe allergy. Metronidazole Pregnancy And Lactation Text: This medication is Pregnancy Category B and considered safe during pregnancy.  It is also excreted in breast milk. Clindamycin Counseling: I counseled the patient regarding use of clindamycin as an antibiotic for prophylactic and/or therapeutic purposes. Clindamycin is active against numerous classes of bacteria, including skin bacteria. Side effects may include nausea, diarrhea, gastrointestinal upset, rash, hives, yeast infections, and in rare cases, colitis. Glycopyrrolate Pregnancy And Lactation Text: This medication is Pregnancy Category B and is considered safe during pregnancy. It is unknown if it is excreted breast milk. Sski Pregnancy And Lactation Text: This medication is Pregnancy Category D and isn't considered safe during pregnancy. It is excreted in breast milk. Doxepin Counseling:  Patient advised that the medication is sedating and not to drive a car after taking this medication. Patient informed of potential adverse effects including but not limited to dry mouth, urinary retention, and blurry vision.  The patient verbalized understanding of the proper use and possible adverse effects of doxepin.  All of the patient's questions and concerns were addressed. Spironolactone Counseling: Patient advised regarding risks of diarrhea, abdominal pain, hyperkalemia, birth defects (for female patients), liver toxicity and renal toxicity. The patient may need blood work to monitor liver and kidney function and potassium levels while on therapy. The patient verbalized understanding of the proper use and possible adverse effects of spironolactone.  All of the patient's questions and concerns were addressed. Zyclara Counseling:  I discussed with the patient the risks of imiquimod including but not limited to erythema, scaling, itching, weeping, crusting, and pain.  Patient understands that the inflammatory response to imiquimod is variable from person to person and was educated regarded proper titration schedule.  If flu-like symptoms develop, patient knows to discontinue the medication and contact us. Solaraze Pregnancy And Lactation Text: This medication is Pregnancy Category B and is considered safe. There is some data to suggest avoiding during the third trimester. It is unknown if this medication is excreted in breast milk. Nsaids Pregnancy And Lactation Text: These medications are considered safe up to 30 weeks gestation. It is excreted in breast milk. Dapsone Counseling: I discussed with the patient the risks of dapsone including but not limited to hemolytic anemia, agranulocytosis, rashes, methemoglobinemia, kidney failure, peripheral neuropathy, headaches, GI upset, and liver toxicity.  Patients who start dapsone require monitoring including baseline LFTs and weekly CBCs for the first month, then every month thereafter.  The patient verbalized understanding of the proper use and possible adverse effects of dapsone.  All of the patient's questions and concerns were addressed. High Dose Vitamin A Counseling: Side effects reviewed, pt to contact office should one occur. Minocycline Counseling: Patient advised regarding possible photosensitivity and discoloration of the teeth, skin, lips, tongue and gums.  Patient instructed to avoid sunlight, if possible.  When exposed to sunlight, patients should wear protective clothing, sunglasses, and sunscreen.  The patient was instructed to call the office immediately if the following severe adverse effects occur:  hearing changes, easy bruising/bleeding, severe headache, or vision changes.  The patient verbalized understanding of the proper use and possible adverse effects of minocycline.  All of the patient's questions and concerns were addressed. Bexarotene Pregnancy And Lactation Text: This medication is Pregnancy Category X and should not be given to women who are pregnant or may become pregnant. This medication should not be used if you are breast feeding. Tetracycline Pregnancy And Lactation Text: This medication is Pregnancy Category D and not consider safe during pregnancy. It is also excreted in breast milk. Xelreynaz Pregnancy And Lactation Text: This medication is Pregnancy Category D and is not considered safe during pregnancy.  The risk during breast feeding is also uncertain. Siliq Counseling:  I discussed with the patient the risks of Siliq including but not limited to new or worsening depression, suicidal thoughts and behavior, immunosuppression, malignancy, posterior leukoencephalopathy syndrome, and serious infections.  The patient understands that monitoring is required including a PPD at baseline and must alert us or the primary physician if symptoms of infection or other concerning signs are noted. There is also a special program designed to monitor depression which is required with Siliq. Valtrex Counseling: I discussed with the patient the risks of valacyclovir including but not limited to kidney damage, nausea, vomiting and severe allergy.  The patient understands that if the infection seems to be worsening or is not improving, they are to call. Hydroxyzine Pregnancy And Lactation Text: This medication is not safe during pregnancy and should not be taken. It is also excreted in breast milk and breast feeding isn't recommended. Simponi Counseling:  I discussed with the patient the risks of golimumab including but not limited to myelosuppression, immunosuppression, autoimmune hepatitis, demyelinating diseases, lymphoma, and serious infections.  The patient understands that monitoring is required including a PPD at baseline and must alert us or the primary physician if symptoms of infection or other concerning signs are noted. Cimzia Pregnancy And Lactation Text: This medication crosses the placenta but can be considered safe in certain situations. Cimzia may be excreted in breast milk. Methotrexate Counseling:  Patient counseled regarding adverse effects of methotrexate including but not limited to nausea, vomiting, abnormalities in liver function tests. Patients may develop mouth sores, rash, diarrhea, and abnormalities in blood counts. The patient understands that monitoring is required including LFT's and blood counts.  There is a rare possibility of scarring of the liver and lung problems that can occur when taking methotrexate. Persistent nausea, loss of appetite, pale stools, dark urine, cough, and shortness of breath should be reported immediately. Patient advised to discontinue methotrexate treatment at least three months before attempting to become pregnant.  I discussed the need for folate supplements while taking methotrexate.  These supplements can decrease side effects during methotrexate treatment. The patient verbalized understanding of the proper use and possible adverse effects of methotrexate.  All of the patient's questions and concerns were addressed. Wartpeel Counseling:  I discussed with the patient the risks of Wartpeel including but not limited to erythema, scaling, itching, weeping, crusting, and pain. Propranolol Counseling:  I discussed with the patient the risks of propranolol including but not limited to low heart rate, low blood pressure, low blood sugar, restlessness and increased cold sensitivity. They should call the office if they experience any of these side effects. Azithromycin Counseling:  I discussed with the patient the risks of azithromycin including but not limited to GI upset, allergic reaction, drug rash, diarrhea, and yeast infections. Cephalexin Counseling: I counseled the patient regarding use of cephalexin as an antibiotic for prophylactic and/or therapeutic purposes. Cephalexin (commonly prescribed under brand name Keflex) is a cephalosporin antibiotic which is active against numerous classes of bacteria, including most skin bacteria. Side effects may include nausea, diarrhea, gastrointestinal upset, rash, hives, yeast infections, and in rare cases, hepatitis, kidney disease, seizures, fever, confusion, neurologic symptoms, and others. Patients with severe allergies to penicillin medications are cautioned that there is about a 10% incidence of cross-reactivity with cephalosporins. When possible, patients with penicillin allergies should use alternatives to cephalosporins for antibiotic therapy. Rituxan Pregnancy And Lactation Text: This medication is Pregnancy Category C and it isn't know if it is safe during pregnancy. It is unknown if this medication is excreted in breast milk but similar antibodies are known to be excreted. Tazorac Counseling:  Patient advised that medication is irritating and drying.  Patient may need to apply sparingly and wash off after an hour before eventually leaving it on overnight.  The patient verbalized understanding of the proper use and possible adverse effects of tazorac.  All of the patient's questions and concerns were addressed. Cephalexin Pregnancy And Lactation Text: This medication is Pregnancy Category B and considered safe during pregnancy.  It is also excreted in breast milk but can be used safely for shorter doses. Ketoconazole Pregnancy And Lactation Text: This medication is Pregnancy Category C and it isn't know if it is safe during pregnancy. It is also excreted in breast milk and breast feeding isn't recommended. Otezla Counseling: The side effects of Otezla were discussed with the patient, including but not limited to worsening or new depression, weight loss, diarrhea, nausea, upper respiratory tract infection, and headache. Patient instructed to call the office should any adverse effect occur.  The patient verbalized understanding of the proper use and possible adverse effects of Otezla.  All the patient's questions and concerns were addressed. Erythromycin Counseling:  I discussed with the patient the risks of erythromycin including but not limited to GI upset, allergic reaction, drug rash, diarrhea, increase in liver enzymes, and yeast infections. Acitretin Pregnancy And Lactation Text: This medication is Pregnancy Category X and should not be given to women who are pregnant or may become pregnant in the future. This medication is excreted in breast milk. Drysol Pregnancy And Lactation Text: This medication is considered safe during pregnancy and breast feeding.

## 2021-01-28 ENCOUNTER — IMMUNIZATION (OUTPATIENT)
Dept: PHARMACY | Facility: CLINIC | Age: 71
End: 2021-01-28
Payer: MEDICARE

## 2021-01-28 ENCOUNTER — HOSPITAL ENCOUNTER (OUTPATIENT)
Dept: RADIOLOGY | Facility: HOSPITAL | Age: 71
Discharge: HOME | End: 2021-01-28
Attending: SURGERY
Payer: MEDICARE

## 2021-01-28 DIAGNOSIS — Z23 NEED FOR VACCINATION: Primary | ICD-10-CM

## 2021-01-28 DIAGNOSIS — C25.9 PRIMARY ADENOCARCINOMA OF PANCREAS: ICD-10-CM

## 2021-01-28 DIAGNOSIS — R93.5 ABNORMAL FINDINGS ON DIAGNOSTIC IMAGING OF OTHER ABDOMINAL REGIONS, INCLUDING RETROPERITONEUM: ICD-10-CM

## 2021-01-28 LAB
CREAT SERPL-MCNC: 0.5 MG/DL (ref 0.5–1.4)
SAMPLE: NORMAL

## 2021-01-28 PROCEDURE — 78815 PET IMAGE W/CT SKULL-THIGH: CPT | Mod: 26,PS,, | Performed by: RADIOLOGY

## 2021-01-28 PROCEDURE — 78815 PET IMAGE W/CT SKULL-THIGH: CPT | Mod: TC,PS

## 2021-01-28 PROCEDURE — 74177 CT ABDOMEN PELVIS WITH CONTRAST: ICD-10-PCS | Mod: 26,,, | Performed by: RADIOLOGY

## 2021-01-28 PROCEDURE — 74177 CT ABD & PELVIS W/CONTRAST: CPT | Mod: 26,,, | Performed by: RADIOLOGY

## 2021-01-28 PROCEDURE — 74177 CT ABD & PELVIS W/CONTRAST: CPT | Mod: TC

## 2021-01-28 PROCEDURE — A9698 NON-RAD CONTRAST MATERIALNOC: HCPCS | Performed by: SURGERY

## 2021-01-28 PROCEDURE — 78815 NM PET CT ROUTINE: ICD-10-PCS | Mod: 26,PS,, | Performed by: RADIOLOGY

## 2021-01-28 PROCEDURE — 25500020 PHARM REV CODE 255: Performed by: SURGERY

## 2021-01-28 RX ADMIN — IOHEXOL 1000 ML: 9 SOLUTION ORAL at 12:01

## 2021-01-28 RX ADMIN — IOHEXOL 75 ML: 350 INJECTION, SOLUTION INTRAVENOUS at 01:01

## 2021-02-01 ENCOUNTER — TELEPHONE (OUTPATIENT)
Dept: NEUROLOGY | Facility: HOSPITAL | Age: 71
End: 2021-02-01

## 2021-02-02 ENCOUNTER — OFFICE VISIT (OUTPATIENT)
Dept: NEUROLOGY | Facility: HOSPITAL | Age: 71
End: 2021-02-02
Attending: SURGERY
Payer: MEDICARE

## 2021-02-02 VITALS
TEMPERATURE: 98 F | HEART RATE: 77 BPM | SYSTOLIC BLOOD PRESSURE: 114 MMHG | DIASTOLIC BLOOD PRESSURE: 68 MMHG | HEIGHT: 62 IN | BODY MASS INDEX: 22.95 KG/M2 | WEIGHT: 124.69 LBS | RESPIRATION RATE: 16 BRPM

## 2021-02-02 DIAGNOSIS — C25.9 PRIMARY ADENOCARCINOMA OF PANCREAS: Primary | ICD-10-CM

## 2021-02-02 DIAGNOSIS — R93.3 ABNORMAL FINDINGS ON DIAGNOSTIC IMAGING OF OTHER PARTS OF DIGESTIVE TRACT: ICD-10-CM

## 2021-02-02 PROCEDURE — 99214 OFFICE O/P EST MOD 30 MIN: CPT | Performed by: SURGERY

## 2021-02-02 RX ORDER — METFORMIN HYDROCHLORIDE 500 MG/1
500 TABLET ORAL DAILY
COMMUNITY
Start: 2020-12-07 | End: 2021-03-02

## 2021-02-02 RX ORDER — DIPHENHYDRAMINE HCL 25 MG
TABLET ORAL
COMMUNITY
Start: 2020-12-07 | End: 2022-04-19

## 2021-02-02 RX ORDER — ONDANSETRON 8 MG/1
8 TABLET, ORALLY DISINTEGRATING ORAL
COMMUNITY
End: 2021-03-02

## 2021-02-02 RX ORDER — ATORVASTATIN CALCIUM 40 MG/1
40 TABLET, FILM COATED ORAL DAILY
COMMUNITY
Start: 2021-01-09 | End: 2021-03-02 | Stop reason: SDUPTHER

## 2021-02-02 RX ORDER — OMEPRAZOLE 40 MG/1
40 CAPSULE, DELAYED RELEASE ORAL
COMMUNITY
Start: 2020-11-11 | End: 2021-03-02

## 2021-02-02 RX ORDER — DILTIAZEM HYDROCHLORIDE 180 MG/1
180 CAPSULE, COATED, EXTENDED RELEASE ORAL DAILY
COMMUNITY
Start: 2021-01-09 | End: 2021-07-07

## 2021-02-02 RX ORDER — GLUCOSAM/CHON-MSM1/C/MANG/BOSW 500-416.6
TABLET ORAL
COMMUNITY
Start: 2020-12-07 | End: 2022-04-19

## 2021-02-02 RX ORDER — BLOOD-GLUCOSE METER
EACH MISCELLANEOUS
COMMUNITY
Start: 2020-12-07 | End: 2022-04-19

## 2021-02-02 RX ORDER — DEXAMETHASONE 4 MG/1
4 TABLET ORAL
COMMUNITY
Start: 2020-12-07 | End: 2021-03-02

## 2021-02-08 ENCOUNTER — TELEPHONE (OUTPATIENT)
Dept: NEUROLOGY | Facility: HOSPITAL | Age: 71
End: 2021-02-08

## 2021-02-09 ENCOUNTER — TELEPHONE (OUTPATIENT)
Dept: NEUROLOGY | Facility: HOSPITAL | Age: 71
End: 2021-02-09

## 2021-02-10 ENCOUNTER — HOSPITAL ENCOUNTER (OUTPATIENT)
Dept: INTERVENTIONAL RADIOLOGY/VASCULAR | Facility: HOSPITAL | Age: 71
Discharge: HOME | End: 2021-02-10
Attending: SURGERY
Payer: MEDICARE

## 2021-02-10 ENCOUNTER — HOSPITAL ENCOUNTER (OUTPATIENT)
Dept: RADIOLOGY | Facility: HOSPITAL | Age: 71
Discharge: HOME | End: 2021-02-10
Attending: SURGERY
Payer: MEDICARE

## 2021-02-10 VITALS
HEART RATE: 87 BPM | RESPIRATION RATE: 18 BRPM | TEMPERATURE: 98 F | OXYGEN SATURATION: 96 % | SYSTOLIC BLOOD PRESSURE: 148 MMHG | BODY MASS INDEX: 22.82 KG/M2 | HEIGHT: 62 IN | WEIGHT: 124 LBS | DIASTOLIC BLOOD PRESSURE: 76 MMHG

## 2021-02-10 DIAGNOSIS — C25.9 PRIMARY ADENOCARCINOMA OF PANCREAS: ICD-10-CM

## 2021-02-10 LAB
INR PPP: 1.5 (ref 0.8–1.2)
PROTHROMBIN TIME: 15.8 SEC (ref 9–12.5)

## 2021-02-10 PROCEDURE — 88333 PR  INTRAOPERATIVE CYTO PATH CONSULT, INITIAL SITE: ICD-10-PCS | Mod: 26,,, | Performed by: PATHOLOGY

## 2021-02-10 PROCEDURE — 88333 PATH CONSLTJ SURG CYTO XM 1: CPT | Mod: 26,,, | Performed by: PATHOLOGY

## 2021-02-10 PROCEDURE — 88341 PR IHC OR ICC EACH ADD'L SINGLE ANTIBODY  STAINPR: ICD-10-PCS | Mod: 26,,, | Performed by: PATHOLOGY

## 2021-02-10 PROCEDURE — 77012 PR  CT GUIDANCE NEEDLE PLACEMENT: ICD-10-PCS | Mod: 26,,, | Performed by: RADIOLOGY

## 2021-02-10 PROCEDURE — 77012 CT SCAN FOR NEEDLE BIOPSY: CPT | Mod: TC

## 2021-02-10 PROCEDURE — 88341 IMHCHEM/IMCYTCHM EA ADD ANTB: CPT | Performed by: PATHOLOGY

## 2021-02-10 PROCEDURE — 77012 CT SCAN FOR NEEDLE BIOPSY: CPT | Mod: 26,,, | Performed by: RADIOLOGY

## 2021-02-10 PROCEDURE — 77012 CT SCAN FOR NEEDLE BIOPSY: CPT | Mod: TC | Performed by: RADIOLOGY

## 2021-02-10 PROCEDURE — 36415 COLL VENOUS BLD VENIPUNCTURE: CPT

## 2021-02-10 PROCEDURE — 85610 PROTHROMBIN TIME: CPT

## 2021-02-10 PROCEDURE — 88341 IMHCHEM/IMCYTCHM EA ADD ANTB: CPT | Mod: 26,,, | Performed by: PATHOLOGY

## 2021-02-10 PROCEDURE — 88342 CHG IMMUNOCYTOCHEMISTRY: ICD-10-PCS | Mod: 26,,, | Performed by: PATHOLOGY

## 2021-02-10 PROCEDURE — 38505 NEEDLE BIOPSY LYMPH NODES: CPT | Performed by: RADIOLOGY

## 2021-02-10 PROCEDURE — 88342 IMHCHEM/IMCYTCHM 1ST ANTB: CPT | Mod: 26,,, | Performed by: PATHOLOGY

## 2021-02-10 PROCEDURE — 88305 TISSUE EXAM BY PATHOLOGIST: ICD-10-PCS | Mod: 26,,, | Performed by: PATHOLOGY

## 2021-02-10 PROCEDURE — 88333 PATH CONSLTJ SURG CYTO XM 1: CPT | Performed by: PATHOLOGY

## 2021-02-10 PROCEDURE — 88342 IMHCHEM/IMCYTCHM 1ST ANTB: CPT | Performed by: PATHOLOGY

## 2021-02-10 PROCEDURE — 88305 TISSUE EXAM BY PATHOLOGIST: CPT | Mod: 26,,, | Performed by: PATHOLOGY

## 2021-02-10 PROCEDURE — 88305 TISSUE EXAM BY PATHOLOGIST: CPT | Performed by: PATHOLOGY

## 2021-02-10 PROCEDURE — 38505 IR ULTRASOUND GUIDANCE: ICD-10-PCS | Mod: LT,,, | Performed by: RADIOLOGY

## 2021-02-10 RX ORDER — HEPARIN 100 UNIT/ML
500 SYRINGE INTRAVENOUS ONCE
Status: DISCONTINUED | OUTPATIENT
Start: 2021-02-10 | End: 2021-02-11 | Stop reason: HOSPADM

## 2021-02-10 RX ORDER — HEPARIN 100 UNIT/ML
500 SYRINGE INTRAVENOUS ONCE
Status: DISCONTINUED | OUTPATIENT
Start: 2021-02-10 | End: 2021-02-10 | Stop reason: CLARIF

## 2021-02-12 ENCOUNTER — NURSE TRIAGE (OUTPATIENT)
Dept: ADMINISTRATIVE | Facility: CLINIC | Age: 71
End: 2021-02-12

## 2021-02-12 LAB
FINAL PATHOLOGIC DIAGNOSIS: NORMAL
GROSS: NORMAL

## 2021-02-25 ENCOUNTER — TELEPHONE (OUTPATIENT)
Dept: NEUROLOGY | Facility: HOSPITAL | Age: 71
End: 2021-02-25

## 2021-02-25 ENCOUNTER — IMMUNIZATION (OUTPATIENT)
Dept: PHARMACY | Facility: CLINIC | Age: 71
End: 2021-02-25
Payer: MEDICARE

## 2021-02-25 DIAGNOSIS — Z23 NEED FOR VACCINATION: Primary | ICD-10-CM

## 2021-03-02 ENCOUNTER — OFFICE VISIT (OUTPATIENT)
Dept: PRIMARY CARE CLINIC | Facility: CLINIC | Age: 71
End: 2021-03-02
Payer: MEDICARE

## 2021-03-02 VITALS
OXYGEN SATURATION: 100 % | BODY MASS INDEX: 23.32 KG/M2 | TEMPERATURE: 98 F | HEIGHT: 62 IN | HEART RATE: 72 BPM | DIASTOLIC BLOOD PRESSURE: 72 MMHG | SYSTOLIC BLOOD PRESSURE: 132 MMHG | WEIGHT: 126.75 LBS

## 2021-03-02 DIAGNOSIS — Z90.710 S/P HYSTERECTOMY WITH OOPHORECTOMY: ICD-10-CM

## 2021-03-02 DIAGNOSIS — I51.9 LEFT VENTRICULAR DIASTOLIC DYSFUNCTION: ICD-10-CM

## 2021-03-02 DIAGNOSIS — E11.65 UNCONTROLLED TYPE 2 DIABETES MELLITUS WITH HYPERGLYCEMIA: ICD-10-CM

## 2021-03-02 DIAGNOSIS — I48.0 PAROXYSMAL A-FIB: ICD-10-CM

## 2021-03-02 DIAGNOSIS — C25.9 ADENOCARCINOMA OF PANCREAS: Primary | ICD-10-CM

## 2021-03-02 DIAGNOSIS — E78.1 HYPERTRIGLYCERIDEMIA WITHOUT HYPERCHOLESTEROLEMIA: ICD-10-CM

## 2021-03-02 DIAGNOSIS — M85.89 OSTEOPENIA OF MULTIPLE SITES: ICD-10-CM

## 2021-03-02 DIAGNOSIS — Z90.721 S/P HYSTERECTOMY WITH OOPHORECTOMY: ICD-10-CM

## 2021-03-02 DIAGNOSIS — Z12.83 SKIN CANCER SCREENING: ICD-10-CM

## 2021-03-02 PROBLEM — E66.811 CLASS 1 OBESITY DUE TO EXCESS CALORIES WITH BODY MASS INDEX (BMI) OF 30.0 TO 30.9 IN ADULT: Status: RESOLVED | Noted: 2020-06-03 | Resolved: 2021-03-02

## 2021-03-02 PROBLEM — E66.09 CLASS 1 OBESITY DUE TO EXCESS CALORIES WITH BODY MASS INDEX (BMI) OF 30.0 TO 30.9 IN ADULT: Status: RESOLVED | Noted: 2020-06-03 | Resolved: 2021-03-02

## 2021-03-02 PROCEDURE — 1126F PR PAIN SEVERITY QUANTIFIED, NO PAIN PRESENT: ICD-10-PCS | Mod: S$GLB,,, | Performed by: FAMILY MEDICINE

## 2021-03-02 PROCEDURE — 3078F DIAST BP <80 MM HG: CPT | Mod: CPTII,S$GLB,, | Performed by: FAMILY MEDICINE

## 2021-03-02 PROCEDURE — 3288F PR FALLS RISK ASSESSMENT DOCUMENTED: ICD-10-PCS | Mod: CPTII,S$GLB,, | Performed by: FAMILY MEDICINE

## 2021-03-02 PROCEDURE — 1159F PR MEDICATION LIST DOCUMENTED IN MEDICAL RECORD: ICD-10-PCS | Mod: S$GLB,,, | Performed by: FAMILY MEDICINE

## 2021-03-02 PROCEDURE — 1100F PTFALLS ASSESS-DOCD GE2>/YR: CPT | Mod: CPTII,S$GLB,, | Performed by: FAMILY MEDICINE

## 2021-03-02 PROCEDURE — 99999 PR PBB SHADOW E&M-EST. PATIENT-LVL IV: CPT | Mod: PBBFAC,,, | Performed by: FAMILY MEDICINE

## 2021-03-02 PROCEDURE — 1126F AMNT PAIN NOTED NONE PRSNT: CPT | Mod: S$GLB,,, | Performed by: FAMILY MEDICINE

## 2021-03-02 PROCEDURE — 3008F PR BODY MASS INDEX (BMI) DOCUMENTED: ICD-10-PCS | Mod: CPTII,S$GLB,, | Performed by: FAMILY MEDICINE

## 2021-03-02 PROCEDURE — 3008F BODY MASS INDEX DOCD: CPT | Mod: CPTII,S$GLB,, | Performed by: FAMILY MEDICINE

## 2021-03-02 PROCEDURE — 3075F SYST BP GE 130 - 139MM HG: CPT | Mod: CPTII,S$GLB,, | Performed by: FAMILY MEDICINE

## 2021-03-02 PROCEDURE — 99999 PR PBB SHADOW E&M-EST. PATIENT-LVL IV: ICD-10-PCS | Mod: PBBFAC,,, | Performed by: FAMILY MEDICINE

## 2021-03-02 PROCEDURE — 1159F MED LIST DOCD IN RCRD: CPT | Mod: S$GLB,,, | Performed by: FAMILY MEDICINE

## 2021-03-02 PROCEDURE — 3046F PR MOST RECENT HEMOGLOBIN A1C LEVEL > 9.0%: ICD-10-PCS | Mod: CPTII,S$GLB,, | Performed by: FAMILY MEDICINE

## 2021-03-02 PROCEDURE — 99215 OFFICE O/P EST HI 40 MIN: CPT | Mod: S$GLB,,, | Performed by: FAMILY MEDICINE

## 2021-03-02 PROCEDURE — 3075F PR MOST RECENT SYSTOLIC BLOOD PRESS GE 130-139MM HG: ICD-10-PCS | Mod: CPTII,S$GLB,, | Performed by: FAMILY MEDICINE

## 2021-03-02 PROCEDURE — 1100F PR PT FALLS ASSESS DOC 2+ FALLS/FALL W/INJURY/YR: ICD-10-PCS | Mod: CPTII,S$GLB,, | Performed by: FAMILY MEDICINE

## 2021-03-02 PROCEDURE — 3046F HEMOGLOBIN A1C LEVEL >9.0%: CPT | Mod: CPTII,S$GLB,, | Performed by: FAMILY MEDICINE

## 2021-03-02 PROCEDURE — 3078F PR MOST RECENT DIASTOLIC BLOOD PRESSURE < 80 MM HG: ICD-10-PCS | Mod: CPTII,S$GLB,, | Performed by: FAMILY MEDICINE

## 2021-03-02 PROCEDURE — 3288F FALL RISK ASSESSMENT DOCD: CPT | Mod: CPTII,S$GLB,, | Performed by: FAMILY MEDICINE

## 2021-03-02 PROCEDURE — 99215 PR OFFICE/OUTPT VISIT, EST, LEVL V, 40-54 MIN: ICD-10-PCS | Mod: S$GLB,,, | Performed by: FAMILY MEDICINE

## 2021-03-02 RX ORDER — ATORVASTATIN CALCIUM 40 MG/1
40 TABLET, FILM COATED ORAL DAILY
Qty: 90 TABLET | Refills: 3 | Status: SHIPPED | OUTPATIENT
Start: 2021-03-02 | End: 2021-09-28

## 2021-03-02 RX ORDER — POTASSIUM CHLORIDE 20 MEQ/15ML
SOLUTION ORAL
COMMUNITY
Start: 2020-12-21 | End: 2021-03-02

## 2021-03-02 RX ORDER — LORATADINE 10 MG/1
10 TABLET ORAL
COMMUNITY
End: 2021-03-02

## 2021-03-03 ENCOUNTER — TELEPHONE (OUTPATIENT)
Dept: NEUROLOGY | Facility: HOSPITAL | Age: 71
End: 2021-03-03

## 2021-03-04 ENCOUNTER — LAB VISIT (OUTPATIENT)
Dept: LAB | Facility: HOSPITAL | Age: 71
End: 2021-03-04
Attending: SURGERY
Payer: MEDICARE

## 2021-03-04 DIAGNOSIS — C25.9 PRIMARY ADENOCARCINOMA OF PANCREAS: ICD-10-CM

## 2021-03-04 LAB — CANCER AG19-9 SERPL-ACNC: 38 U/ML (ref 2–40)

## 2021-03-04 PROCEDURE — 86301 IMMUNOASSAY TUMOR CA 19-9: CPT | Performed by: SURGERY

## 2021-03-04 PROCEDURE — 36415 COLL VENOUS BLD VENIPUNCTURE: CPT | Performed by: SURGERY

## 2021-03-05 ENCOUNTER — OFFICE VISIT (OUTPATIENT)
Dept: NEUROLOGY | Facility: HOSPITAL | Age: 71
End: 2021-03-05
Attending: SURGERY
Payer: MEDICARE

## 2021-03-05 DIAGNOSIS — C25.9 PRIMARY ADENOCARCINOMA OF PANCREAS: Primary | ICD-10-CM

## 2021-03-05 DIAGNOSIS — E11.65 UNCONTROLLED TYPE 2 DIABETES MELLITUS WITH HYPERGLYCEMIA: ICD-10-CM

## 2021-03-10 ENCOUNTER — TELEPHONE (OUTPATIENT)
Dept: INTERVENTIONAL RADIOLOGY/VASCULAR | Facility: HOSPITAL | Age: 71
End: 2021-03-10

## 2021-03-22 ENCOUNTER — TELEPHONE (OUTPATIENT)
Dept: DERMATOLOGY | Facility: CLINIC | Age: 71
End: 2021-03-22

## 2021-03-22 ENCOUNTER — HOSPITAL ENCOUNTER (OUTPATIENT)
Dept: RADIOLOGY | Facility: OTHER | Age: 71
Discharge: HOME | End: 2021-03-22
Attending: FAMILY MEDICINE
Payer: MEDICARE

## 2021-03-22 ENCOUNTER — OFFICE VISIT (OUTPATIENT)
Dept: DERMATOLOGY | Facility: CLINIC | Age: 71
End: 2021-03-22
Payer: MEDICARE

## 2021-03-22 DIAGNOSIS — D64.9 ANEMIA, UNSPECIFIED TYPE: ICD-10-CM

## 2021-03-22 DIAGNOSIS — L65.9 HAIR LOSS: Primary | ICD-10-CM

## 2021-03-22 DIAGNOSIS — E55.9 VITAMIN D DEFICIENCY, UNSPECIFIED: ICD-10-CM

## 2021-03-22 DIAGNOSIS — M85.89 OSTEOPENIA OF MULTIPLE SITES: ICD-10-CM

## 2021-03-22 DIAGNOSIS — L66.9 SCARRING ALOPECIA: ICD-10-CM

## 2021-03-22 DIAGNOSIS — L82.1 SK (SEBORRHEIC KERATOSIS): ICD-10-CM

## 2021-03-22 DIAGNOSIS — Z12.83 SKIN CANCER SCREENING: ICD-10-CM

## 2021-03-22 DIAGNOSIS — E53.8 DEFICIENCY OF OTHER SPECIFIED B GROUP VITAMINS: ICD-10-CM

## 2021-03-22 DIAGNOSIS — C25.9 ADENOCARCINOMA OF PANCREAS: ICD-10-CM

## 2021-03-22 PROCEDURE — 1126F AMNT PAIN NOTED NONE PRSNT: CPT | Mod: S$GLB,,, | Performed by: DERMATOLOGY

## 2021-03-22 PROCEDURE — 1126F PR PAIN SEVERITY QUANTIFIED, NO PAIN PRESENT: ICD-10-PCS | Mod: S$GLB,,, | Performed by: DERMATOLOGY

## 2021-03-22 PROCEDURE — 3288F PR FALLS RISK ASSESSMENT DOCUMENTED: ICD-10-PCS | Mod: CPTII,S$GLB,, | Performed by: DERMATOLOGY

## 2021-03-22 PROCEDURE — 3288F FALL RISK ASSESSMENT DOCD: CPT | Mod: CPTII,S$GLB,, | Performed by: DERMATOLOGY

## 2021-03-22 PROCEDURE — 1101F PR PT FALLS ASSESS DOC 0-1 FALLS W/OUT INJ PAST YR: ICD-10-PCS | Mod: CPTII,S$GLB,, | Performed by: DERMATOLOGY

## 2021-03-22 PROCEDURE — 77080 DEXA BONE DENSITY SPINE HIP: ICD-10-PCS | Mod: 26,,, | Performed by: RADIOLOGY

## 2021-03-22 PROCEDURE — 1101F PT FALLS ASSESS-DOCD LE1/YR: CPT | Mod: CPTII,S$GLB,, | Performed by: DERMATOLOGY

## 2021-03-22 PROCEDURE — 99204 OFFICE O/P NEW MOD 45 MIN: CPT | Mod: S$GLB,,, | Performed by: DERMATOLOGY

## 2021-03-22 PROCEDURE — 1159F MED LIST DOCD IN RCRD: CPT | Mod: S$GLB,,, | Performed by: DERMATOLOGY

## 2021-03-22 PROCEDURE — 99204 PR OFFICE/OUTPT VISIT, NEW, LEVL IV, 45-59 MIN: ICD-10-PCS | Mod: S$GLB,,, | Performed by: DERMATOLOGY

## 2021-03-22 PROCEDURE — 99999 PR PBB SHADOW E&M-EST. PATIENT-LVL III: CPT | Mod: PBBFAC,,, | Performed by: DERMATOLOGY

## 2021-03-22 PROCEDURE — 77080 DXA BONE DENSITY AXIAL: CPT | Mod: TC

## 2021-03-22 PROCEDURE — 1159F PR MEDICATION LIST DOCUMENTED IN MEDICAL RECORD: ICD-10-PCS | Mod: S$GLB,,, | Performed by: DERMATOLOGY

## 2021-03-22 PROCEDURE — 99999 PR PBB SHADOW E&M-EST. PATIENT-LVL III: ICD-10-PCS | Mod: PBBFAC,,, | Performed by: DERMATOLOGY

## 2021-03-22 PROCEDURE — 77080 DXA BONE DENSITY AXIAL: CPT | Mod: 26,,, | Performed by: RADIOLOGY

## 2021-03-22 RX ORDER — TRIAMCINOLONE ACETONIDE 1 MG/G
CREAM TOPICAL 2 TIMES DAILY
Status: CANCELLED | OUTPATIENT
Start: 2021-03-22

## 2021-03-22 RX ORDER — TRIAMCINOLONE ACETONIDE 0.25 MG/ML
LOTION TOPICAL
Qty: 1 BOTTLE | Refills: 0 | Status: SHIPPED | OUTPATIENT
Start: 2021-03-22 | End: 2022-04-19

## 2021-03-23 ENCOUNTER — TELEPHONE (OUTPATIENT)
Dept: PRIMARY CARE CLINIC | Facility: CLINIC | Age: 71
End: 2021-03-23

## 2021-03-24 ENCOUNTER — TELEPHONE (OUTPATIENT)
Dept: INTERVENTIONAL RADIOLOGY/VASCULAR | Facility: HOSPITAL | Age: 71
End: 2021-03-24

## 2021-03-25 ENCOUNTER — HOSPITAL ENCOUNTER (OUTPATIENT)
Dept: RADIOLOGY | Facility: HOSPITAL | Age: 71
Discharge: HOME | End: 2021-03-25
Attending: SURGERY
Payer: MEDICARE

## 2021-03-25 VITALS
TEMPERATURE: 97 F | DIASTOLIC BLOOD PRESSURE: 68 MMHG | OXYGEN SATURATION: 98 % | SYSTOLIC BLOOD PRESSURE: 148 MMHG | HEART RATE: 65 BPM | RESPIRATION RATE: 14 BRPM | WEIGHT: 124 LBS | BODY MASS INDEX: 22.82 KG/M2 | HEIGHT: 62 IN

## 2021-03-25 DIAGNOSIS — E11.65 UNCONTROLLED TYPE 2 DIABETES MELLITUS WITH HYPERGLYCEMIA: ICD-10-CM

## 2021-03-25 DIAGNOSIS — C25.9 PRIMARY ADENOCARCINOMA OF PANCREAS: ICD-10-CM

## 2021-03-25 LAB
INR PPP: 1.6 (ref 0.8–1.2)
POCT GLUCOSE: 98 MG/DL (ref 70–110)
PROTHROMBIN TIME: 16.1 SEC (ref 9–12.5)

## 2021-03-25 PROCEDURE — 36415 COLL VENOUS BLD VENIPUNCTURE: CPT | Performed by: SURGERY

## 2021-03-25 PROCEDURE — 88172 CYTP DX EVAL FNA 1ST EA SITE: CPT | Performed by: PATHOLOGY

## 2021-03-25 PROCEDURE — 88305 TISSUE EXAM BY PATHOLOGIST: CPT | Mod: 26,,, | Performed by: PATHOLOGY

## 2021-03-25 PROCEDURE — 88305 TISSUE EXAM BY PATHOLOGIST: CPT | Performed by: PATHOLOGY

## 2021-03-25 PROCEDURE — 63600175 PHARM REV CODE 636 W HCPCS: Performed by: RADIOLOGY

## 2021-03-25 PROCEDURE — 88172 CYTP DX EVAL FNA 1ST EA SITE: CPT | Mod: 26,,, | Performed by: PATHOLOGY

## 2021-03-25 PROCEDURE — 99152 MOD SED SAME PHYS/QHP 5/>YRS: CPT

## 2021-03-25 PROCEDURE — 10005 CT BIOPSY LYMPH NODE (XPD): ICD-10-PCS | Mod: ,,, | Performed by: RADIOLOGY

## 2021-03-25 PROCEDURE — 85610 PROTHROMBIN TIME: CPT | Performed by: SURGERY

## 2021-03-25 PROCEDURE — 88177 CYTP FNA EVAL EA ADDL: CPT | Performed by: PATHOLOGY

## 2021-03-25 PROCEDURE — 88177 CYTP FNA EVAL EA ADDL: CPT | Mod: 26,,, | Performed by: PATHOLOGY

## 2021-03-25 PROCEDURE — 99152 MOD SED SAME PHYS/QHP 5/>YRS: CPT | Mod: ,,, | Performed by: RADIOLOGY

## 2021-03-25 PROCEDURE — 10005 FNA BX W/US GDN 1ST LES: CPT | Mod: ,,, | Performed by: RADIOLOGY

## 2021-03-25 PROCEDURE — 25000003 PHARM REV CODE 250: Performed by: RADIOLOGY

## 2021-03-25 PROCEDURE — 88173 CYTOPATH EVAL FNA REPORT: CPT | Performed by: PATHOLOGY

## 2021-03-25 PROCEDURE — 88173 PR  INTERPRETATION OF FNA SMEAR: ICD-10-PCS | Mod: 26,,, | Performed by: PATHOLOGY

## 2021-03-25 PROCEDURE — 88305 TISSUE EXAM BY PATHOLOGIST: ICD-10-PCS | Mod: 26,,, | Performed by: PATHOLOGY

## 2021-03-25 PROCEDURE — 88172 PR  EVALUATION OF FNA SMEAR TO DETERMINE ADEQUACY, FIRST EVAL: ICD-10-PCS | Mod: 26,,, | Performed by: PATHOLOGY

## 2021-03-25 PROCEDURE — 77012 CT SCAN FOR NEEDLE BIOPSY: CPT | Mod: TC

## 2021-03-25 PROCEDURE — 99152 CT BIOPSY LYMPH NODE (XPD): ICD-10-PCS | Mod: ,,, | Performed by: RADIOLOGY

## 2021-03-25 PROCEDURE — 88173 CYTOPATH EVAL FNA REPORT: CPT | Mod: 26,,, | Performed by: PATHOLOGY

## 2021-03-25 PROCEDURE — 88177 PR  EVALUATION OF FNA SMEAR TO DETERMINE ADEQUACY, EA ADD EVAL: ICD-10-PCS | Mod: 26,,, | Performed by: PATHOLOGY

## 2021-03-25 PROCEDURE — 99153 MOD SED SAME PHYS/QHP EA: CPT

## 2021-03-25 RX ORDER — MIDAZOLAM HYDROCHLORIDE 1 MG/ML
INJECTION INTRAMUSCULAR; INTRAVENOUS CODE/TRAUMA/SEDATION MEDICATION
Status: COMPLETED | OUTPATIENT
Start: 2021-03-25 | End: 2021-03-25

## 2021-03-25 RX ORDER — METOPROLOL SUCCINATE 25 MG/1
25 TABLET, EXTENDED RELEASE ORAL DAILY
COMMUNITY
End: 2021-11-15

## 2021-03-25 RX ORDER — LIDOCAINE HYDROCHLORIDE 10 MG/ML
INJECTION INFILTRATION; PERINEURAL CODE/TRAUMA/SEDATION MEDICATION
Status: COMPLETED | OUTPATIENT
Start: 2021-03-25 | End: 2021-03-25

## 2021-03-25 RX ORDER — FENTANYL CITRATE 50 UG/ML
INJECTION, SOLUTION INTRAMUSCULAR; INTRAVENOUS CODE/TRAUMA/SEDATION MEDICATION
Status: COMPLETED | OUTPATIENT
Start: 2021-03-25 | End: 2021-03-25

## 2021-03-25 RX ORDER — SODIUM CHLORIDE 9 MG/ML
INJECTION, SOLUTION INTRAVENOUS ONCE
Status: DISCONTINUED | OUTPATIENT
Start: 2021-03-25 | End: 2021-03-26 | Stop reason: HOSPADM

## 2021-03-25 RX ADMIN — MIDAZOLAM HYDROCHLORIDE 0.5 MG: 1 INJECTION, SOLUTION INTRAMUSCULAR; INTRAVENOUS at 10:03

## 2021-03-25 RX ADMIN — FENTANYL CITRATE 25 MCG: 50 INJECTION, SOLUTION INTRAMUSCULAR; INTRAVENOUS at 09:03

## 2021-03-25 RX ADMIN — MIDAZOLAM HYDROCHLORIDE 0.5 MG: 1 INJECTION, SOLUTION INTRAMUSCULAR; INTRAVENOUS at 09:03

## 2021-03-25 RX ADMIN — LIDOCAINE HYDROCHLORIDE 10 ML: 10 INJECTION, SOLUTION INFILTRATION; PERINEURAL at 09:03

## 2021-03-25 RX ADMIN — FENTANYL CITRATE 25 MCG: 50 INJECTION, SOLUTION INTRAMUSCULAR; INTRAVENOUS at 10:03

## 2021-03-26 ENCOUNTER — PATIENT MESSAGE (OUTPATIENT)
Dept: RESEARCH | Facility: HOSPITAL | Age: 71
End: 2021-03-26

## 2021-03-30 LAB
ADEQUACY: ABNORMAL
FINAL PATHOLOGIC DIAGNOSIS: ABNORMAL
Lab: ABNORMAL

## 2021-04-01 DIAGNOSIS — M85.89 OSTEOPENIA OF MULTIPLE SITES: ICD-10-CM

## 2021-04-07 ENCOUNTER — TELEPHONE (OUTPATIENT)
Dept: PRIMARY CARE CLINIC | Facility: CLINIC | Age: 71
End: 2021-04-07

## 2021-04-20 ENCOUNTER — LAB VISIT (OUTPATIENT)
Dept: LAB | Facility: HOSPITAL | Age: 71
End: 2021-04-20
Attending: DERMATOLOGY
Payer: MEDICARE

## 2021-04-20 ENCOUNTER — OFFICE VISIT (OUTPATIENT)
Dept: DERMATOLOGY | Facility: CLINIC | Age: 71
End: 2021-04-20
Payer: MEDICARE

## 2021-04-20 DIAGNOSIS — E55.9 VITAMIN D DEFICIENCY, UNSPECIFIED: ICD-10-CM

## 2021-04-20 DIAGNOSIS — C25.9 ADENOCARCINOMA OF PANCREAS: ICD-10-CM

## 2021-04-20 DIAGNOSIS — D64.9 ANEMIA, UNSPECIFIED TYPE: ICD-10-CM

## 2021-04-20 DIAGNOSIS — L66.9 SCARRING ALOPECIA: ICD-10-CM

## 2021-04-20 DIAGNOSIS — L65.9 HAIR LOSS: ICD-10-CM

## 2021-04-20 DIAGNOSIS — L65.9 HAIR LOSS: Primary | ICD-10-CM

## 2021-04-20 DIAGNOSIS — E53.8 DEFICIENCY OF OTHER SPECIFIED B GROUP VITAMINS: ICD-10-CM

## 2021-04-20 LAB
BASOPHILS # BLD AUTO: 0.08 K/UL (ref 0–0.2)
BASOPHILS NFR BLD: 1.5 % (ref 0–1.9)
DIFFERENTIAL METHOD: ABNORMAL
EOSINOPHIL # BLD AUTO: 0.4 K/UL (ref 0–0.5)
EOSINOPHIL NFR BLD: 8.2 % (ref 0–8)
ERYTHROCYTE [DISTWIDTH] IN BLOOD BY AUTOMATED COUNT: 15.8 % (ref 11.5–14.5)
HCT VFR BLD AUTO: 37.7 % (ref 37–48.5)
HGB BLD-MCNC: 11.7 G/DL (ref 12–16)
IMM GRANULOCYTES # BLD AUTO: 0.01 K/UL (ref 0–0.04)
IMM GRANULOCYTES NFR BLD AUTO: 0.2 % (ref 0–0.5)
LYMPHOCYTES # BLD AUTO: 1.9 K/UL (ref 1–4.8)
LYMPHOCYTES NFR BLD: 36.2 % (ref 18–48)
MCH RBC QN AUTO: 29.1 PG (ref 27–31)
MCHC RBC AUTO-ENTMCNC: 31 G/DL (ref 32–36)
MCV RBC AUTO: 94 FL (ref 82–98)
MONOCYTES # BLD AUTO: 0.7 K/UL (ref 0.3–1)
MONOCYTES NFR BLD: 12.5 % (ref 4–15)
NEUTROPHILS # BLD AUTO: 2.2 K/UL (ref 1.8–7.7)
NEUTROPHILS NFR BLD: 41.4 % (ref 38–73)
NRBC BLD-RTO: 0 /100 WBC
PLATELET # BLD AUTO: 168 K/UL (ref 150–450)
PMV BLD AUTO: 13.8 FL (ref 9.2–12.9)
RBC # BLD AUTO: 4.02 M/UL (ref 4–5.4)
WBC # BLD AUTO: 5.36 K/UL (ref 3.9–12.7)

## 2021-04-20 PROCEDURE — 84630 ASSAY OF ZINC: CPT | Performed by: DERMATOLOGY

## 2021-04-20 PROCEDURE — 99213 PR OFFICE/OUTPT VISIT, EST, LEVL III, 20-29 MIN: ICD-10-PCS | Mod: S$GLB,,, | Performed by: DERMATOLOGY

## 2021-04-20 PROCEDURE — 3288F FALL RISK ASSESSMENT DOCD: CPT | Mod: CPTII,S$GLB,, | Performed by: DERMATOLOGY

## 2021-04-20 PROCEDURE — 82652 VIT D 1 25-DIHYDROXY: CPT | Performed by: DERMATOLOGY

## 2021-04-20 PROCEDURE — 82728 ASSAY OF FERRITIN: CPT | Performed by: DERMATOLOGY

## 2021-04-20 PROCEDURE — 1126F AMNT PAIN NOTED NONE PRSNT: CPT | Mod: S$GLB,,, | Performed by: DERMATOLOGY

## 2021-04-20 PROCEDURE — 83540 ASSAY OF IRON: CPT | Performed by: DERMATOLOGY

## 2021-04-20 PROCEDURE — 1101F PR PT FALLS ASSESS DOC 0-1 FALLS W/OUT INJ PAST YR: ICD-10-PCS | Mod: CPTII,S$GLB,, | Performed by: DERMATOLOGY

## 2021-04-20 PROCEDURE — 99999 PR PBB SHADOW E&M-EST. PATIENT-LVL III: CPT | Mod: PBBFAC,,, | Performed by: DERMATOLOGY

## 2021-04-20 PROCEDURE — 1159F PR MEDICATION LIST DOCUMENTED IN MEDICAL RECORD: ICD-10-PCS | Mod: S$GLB,,, | Performed by: DERMATOLOGY

## 2021-04-20 PROCEDURE — 1126F PR PAIN SEVERITY QUANTIFIED, NO PAIN PRESENT: ICD-10-PCS | Mod: S$GLB,,, | Performed by: DERMATOLOGY

## 2021-04-20 PROCEDURE — 1159F MED LIST DOCD IN RCRD: CPT | Mod: S$GLB,,, | Performed by: DERMATOLOGY

## 2021-04-20 PROCEDURE — 3288F PR FALLS RISK ASSESSMENT DOCUMENTED: ICD-10-PCS | Mod: CPTII,S$GLB,, | Performed by: DERMATOLOGY

## 2021-04-20 PROCEDURE — 85025 COMPLETE CBC W/AUTO DIFF WBC: CPT | Performed by: DERMATOLOGY

## 2021-04-20 PROCEDURE — 99999 PR PBB SHADOW E&M-EST. PATIENT-LVL III: ICD-10-PCS | Mod: PBBFAC,,, | Performed by: DERMATOLOGY

## 2021-04-20 PROCEDURE — 36415 COLL VENOUS BLD VENIPUNCTURE: CPT | Mod: PN | Performed by: DERMATOLOGY

## 2021-04-20 PROCEDURE — 84443 ASSAY THYROID STIM HORMONE: CPT | Performed by: DERMATOLOGY

## 2021-04-20 PROCEDURE — 82607 VITAMIN B-12: CPT | Performed by: DERMATOLOGY

## 2021-04-20 PROCEDURE — 99213 OFFICE O/P EST LOW 20 MIN: CPT | Mod: S$GLB,,, | Performed by: DERMATOLOGY

## 2021-04-20 PROCEDURE — 1101F PT FALLS ASSESS-DOCD LE1/YR: CPT | Mod: CPTII,S$GLB,, | Performed by: DERMATOLOGY

## 2021-04-21 LAB
FERRITIN SERPL-MCNC: 448 NG/ML (ref 20–300)
IRON SERPL-MCNC: 65 UG/DL (ref 30–160)
SATURATED IRON: 27 % (ref 20–50)
TOTAL IRON BINDING CAPACITY: 243 UG/DL (ref 250–450)
TRANSFERRIN SERPL-MCNC: 164 MG/DL (ref 200–375)
TSH SERPL DL<=0.005 MIU/L-ACNC: 2.49 UIU/ML (ref 0.4–4)
VIT B12 SERPL-MCNC: 519 PG/ML (ref 210–950)

## 2021-04-23 LAB
1,25(OH)2D3 SERPL-MCNC: 40 PG/ML (ref 20–79)
ZINC SERPL-MCNC: 60 UG/DL (ref 60–130)

## 2021-04-28 ENCOUNTER — TELEPHONE (OUTPATIENT)
Dept: DERMATOLOGY | Facility: CLINIC | Age: 71
End: 2021-04-28

## 2021-04-29 ENCOUNTER — HOSPITAL ENCOUNTER (OUTPATIENT)
Dept: RADIOLOGY | Facility: HOSPITAL | Age: 71
Discharge: HOME | End: 2021-04-29
Attending: SURGERY
Payer: MEDICARE

## 2021-04-29 DIAGNOSIS — C25.9 PRIMARY ADENOCARCINOMA OF PANCREAS: ICD-10-CM

## 2021-04-29 DIAGNOSIS — R93.3 ABNORMAL FINDINGS ON DIAGNOSTIC IMAGING OF OTHER PARTS OF DIGESTIVE TRACT: ICD-10-CM

## 2021-04-29 PROCEDURE — 78815 PET IMAGE W/CT SKULL-THIGH: CPT | Mod: 26,PS,, | Performed by: RADIOLOGY

## 2021-04-29 PROCEDURE — 78815 PET IMAGE W/CT SKULL-THIGH: CPT | Mod: TC,PS

## 2021-04-29 PROCEDURE — 78815 NM PET CT ROUTINE: ICD-10-PCS | Mod: 26,PS,, | Performed by: RADIOLOGY

## 2021-05-04 ENCOUNTER — OFFICE VISIT (OUTPATIENT)
Dept: NEUROLOGY | Facility: HOSPITAL | Age: 71
End: 2021-05-04
Attending: SURGERY
Payer: MEDICARE

## 2021-05-04 VITALS
WEIGHT: 129.06 LBS | BODY MASS INDEX: 23.75 KG/M2 | SYSTOLIC BLOOD PRESSURE: 121 MMHG | HEIGHT: 62 IN | TEMPERATURE: 98 F | DIASTOLIC BLOOD PRESSURE: 69 MMHG | HEART RATE: 64 BPM | RESPIRATION RATE: 18 BRPM

## 2021-05-04 DIAGNOSIS — C25.9 PRIMARY ADENOCARCINOMA OF PANCREAS: Primary | ICD-10-CM

## 2021-05-04 PROCEDURE — 99214 OFFICE O/P EST MOD 30 MIN: CPT | Performed by: SURGERY

## 2021-05-04 RX ORDER — BLOOD GLUCOSE CONTROL HIGH,LOW
EACH MISCELLANEOUS
COMMUNITY
Start: 2021-03-10 | End: 2022-04-19

## 2021-05-04 RX ORDER — POTASSIUM CHLORIDE 750 MG/1
10 CAPSULE, EXTENDED RELEASE ORAL DAILY
COMMUNITY
Start: 2021-03-03

## 2021-05-04 RX ORDER — ISOPROPYL ALCOHOL 0.75 G/1
SWAB TOPICAL
COMMUNITY
Start: 2021-03-10 | End: 2022-04-19

## 2021-05-26 ENCOUNTER — TELEPHONE (OUTPATIENT)
Dept: NEUROLOGY | Facility: HOSPITAL | Age: 71
End: 2021-05-26

## 2021-06-04 ENCOUNTER — HOSPITAL ENCOUNTER (OUTPATIENT)
Dept: RADIOLOGY | Facility: HOSPITAL | Age: 71
Discharge: HOME | End: 2021-06-04
Attending: SURGERY
Payer: MEDICARE

## 2021-06-04 DIAGNOSIS — C25.9 PRIMARY ADENOCARCINOMA OF PANCREAS: ICD-10-CM

## 2021-06-04 PROCEDURE — 74183 MRI ABD W/O CNTR FLWD CNTR: CPT | Mod: 26,,, | Performed by: RADIOLOGY

## 2021-06-04 PROCEDURE — 74177 CT ABD & PELVIS W/CONTRAST: CPT | Mod: 26,,, | Performed by: RADIOLOGY

## 2021-06-04 PROCEDURE — 74183 MRI ABD W/O CNTR FLWD CNTR: CPT | Mod: TC

## 2021-06-04 PROCEDURE — 74177 CT ABD & PELVIS W/CONTRAST: CPT | Mod: TC

## 2021-06-04 PROCEDURE — 25500020 PHARM REV CODE 255: Performed by: SURGERY

## 2021-06-04 PROCEDURE — 74177 CT ABDOMEN PELVIS WITH CONTRAST: ICD-10-PCS | Mod: 26,,, | Performed by: RADIOLOGY

## 2021-06-04 PROCEDURE — A9698 NON-RAD CONTRAST MATERIALNOC: HCPCS | Performed by: SURGERY

## 2021-06-04 PROCEDURE — A9585 GADOBUTROL INJECTION: HCPCS | Performed by: SURGERY

## 2021-06-04 PROCEDURE — 74183 MRI ABDOMEN W WO CONTRAST: ICD-10-PCS | Mod: 26,,, | Performed by: RADIOLOGY

## 2021-06-04 RX ORDER — GADOBUTROL 604.72 MG/ML
10 INJECTION INTRAVENOUS
Status: COMPLETED | OUTPATIENT
Start: 2021-06-04 | End: 2021-06-04

## 2021-06-04 RX ADMIN — GADOBUTROL 10 ML: 604.72 INJECTION INTRAVENOUS at 03:06

## 2021-06-04 RX ADMIN — IOHEXOL 75 ML: 350 INJECTION, SOLUTION INTRAVENOUS at 02:06

## 2021-06-04 RX ADMIN — IOHEXOL 1000 ML: 9 SOLUTION ORAL at 01:06

## 2021-06-09 ENCOUNTER — TELEPHONE (OUTPATIENT)
Dept: NEUROLOGY | Facility: HOSPITAL | Age: 71
End: 2021-06-09

## 2021-06-11 ENCOUNTER — OFFICE VISIT (OUTPATIENT)
Dept: NEUROLOGY | Facility: HOSPITAL | Age: 71
End: 2021-06-11
Attending: SURGERY
Payer: MEDICARE

## 2021-06-11 DIAGNOSIS — C25.9 PRIMARY ADENOCARCINOMA OF PANCREAS: Primary | ICD-10-CM

## 2021-06-17 LAB — A1C: 6.3

## 2021-06-18 ENCOUNTER — TELEPHONE (OUTPATIENT)
Dept: NEUROLOGY | Facility: HOSPITAL | Age: 71
End: 2021-06-18

## 2021-06-22 ENCOUNTER — HOSPITAL ENCOUNTER (OUTPATIENT)
Dept: RADIOLOGY | Facility: HOSPITAL | Age: 71
Discharge: HOME | End: 2021-06-22
Attending: RADIOLOGY
Payer: MEDICARE

## 2021-06-22 ENCOUNTER — OFFICE VISIT (OUTPATIENT)
Dept: NEUROLOGY | Facility: HOSPITAL | Age: 71
End: 2021-06-22
Attending: RADIOLOGY
Payer: MEDICARE

## 2021-06-22 VITALS
SYSTOLIC BLOOD PRESSURE: 106 MMHG | TEMPERATURE: 99 F | HEART RATE: 65 BPM | BODY MASS INDEX: 24.54 KG/M2 | WEIGHT: 133.38 LBS | DIASTOLIC BLOOD PRESSURE: 61 MMHG | HEIGHT: 62 IN | RESPIRATION RATE: 20 BRPM

## 2021-06-22 DIAGNOSIS — C25.9 ADENOCARCINOMA OF PANCREAS: ICD-10-CM

## 2021-06-22 DIAGNOSIS — C25.9 ADENOCARCINOMA OF PANCREAS: Primary | ICD-10-CM

## 2021-06-22 DIAGNOSIS — K76.9 LIVER LESION: ICD-10-CM

## 2021-06-22 PROCEDURE — 3008F PR BODY MASS INDEX (BMI) DOCUMENTED: ICD-10-PCS | Mod: ,,, | Performed by: RADIOLOGY

## 2021-06-22 PROCEDURE — 76705 ECHO EXAM OF ABDOMEN: CPT | Mod: TC

## 2021-06-22 PROCEDURE — 99215 PR OFFICE/OUTPT VISIT, EST, LEVL V, 40-54 MIN: ICD-10-PCS | Mod: ,,, | Performed by: RADIOLOGY

## 2021-06-22 PROCEDURE — 99215 OFFICE O/P EST HI 40 MIN: CPT | Mod: ,,, | Performed by: RADIOLOGY

## 2021-06-22 PROCEDURE — 3288F PR FALLS RISK ASSESSMENT DOCUMENTED: ICD-10-PCS | Mod: ,,, | Performed by: RADIOLOGY

## 2021-06-22 PROCEDURE — 1101F PR PT FALLS ASSESS DOC 0-1 FALLS W/OUT INJ PAST YR: ICD-10-PCS | Mod: ,,, | Performed by: RADIOLOGY

## 2021-06-22 PROCEDURE — 1159F MED LIST DOCD IN RCRD: CPT | Mod: ,,, | Performed by: RADIOLOGY

## 2021-06-22 PROCEDURE — 76705 US ABDOMEN LIMITED: ICD-10-PCS | Mod: 26,,, | Performed by: RADIOLOGY

## 2021-06-22 PROCEDURE — 1126F PR PAIN SEVERITY QUANTIFIED, NO PAIN PRESENT: ICD-10-PCS | Mod: ,,, | Performed by: RADIOLOGY

## 2021-06-22 PROCEDURE — 1101F PT FALLS ASSESS-DOCD LE1/YR: CPT | Mod: ,,, | Performed by: RADIOLOGY

## 2021-06-22 PROCEDURE — 1126F AMNT PAIN NOTED NONE PRSNT: CPT | Mod: ,,, | Performed by: RADIOLOGY

## 2021-06-22 PROCEDURE — 3288F FALL RISK ASSESSMENT DOCD: CPT | Mod: ,,, | Performed by: RADIOLOGY

## 2021-06-22 PROCEDURE — 76705 ECHO EXAM OF ABDOMEN: CPT | Mod: 26,,, | Performed by: RADIOLOGY

## 2021-06-22 PROCEDURE — 99215 OFFICE O/P EST HI 40 MIN: CPT | Mod: 25 | Performed by: RADIOLOGY

## 2021-06-22 PROCEDURE — 1159F PR MEDICATION LIST DOCUMENTED IN MEDICAL RECORD: ICD-10-PCS | Mod: ,,, | Performed by: RADIOLOGY

## 2021-06-22 PROCEDURE — 3008F BODY MASS INDEX DOCD: CPT | Mod: ,,, | Performed by: RADIOLOGY

## 2021-06-22 RX ORDER — PANTOPRAZOLE SODIUM 40 MG/1
40 TABLET, DELAYED RELEASE ORAL 2 TIMES DAILY
COMMUNITY
End: 2021-11-15

## 2021-06-23 ENCOUNTER — PATIENT OUTREACH (OUTPATIENT)
Dept: ADMINISTRATIVE | Facility: HOSPITAL | Age: 71
End: 2021-06-23

## 2021-07-01 ENCOUNTER — OFFICE VISIT (OUTPATIENT)
Dept: PRIMARY CARE CLINIC | Facility: CLINIC | Age: 71
End: 2021-07-01
Payer: MEDICARE

## 2021-07-01 ENCOUNTER — HOSPITAL ENCOUNTER (OUTPATIENT)
Dept: RADIOLOGY | Facility: HOSPITAL | Age: 71
Discharge: HOME | End: 2021-07-01
Attending: FAMILY MEDICINE
Payer: MEDICARE

## 2021-07-01 VITALS
BODY MASS INDEX: 25.84 KG/M2 | WEIGHT: 136.88 LBS | HEART RATE: 64 BPM | DIASTOLIC BLOOD PRESSURE: 62 MMHG | OXYGEN SATURATION: 99 % | SYSTOLIC BLOOD PRESSURE: 128 MMHG | HEIGHT: 61 IN | TEMPERATURE: 98 F

## 2021-07-01 DIAGNOSIS — Z90.721 S/P HYSTERECTOMY WITH OOPHORECTOMY: ICD-10-CM

## 2021-07-01 DIAGNOSIS — M25.512 CHRONIC LEFT SHOULDER PAIN: Primary | ICD-10-CM

## 2021-07-01 DIAGNOSIS — C25.9 ADENOCARCINOMA OF PANCREAS: ICD-10-CM

## 2021-07-01 DIAGNOSIS — G89.29 CHRONIC LEFT SHOULDER PAIN: Primary | ICD-10-CM

## 2021-07-01 DIAGNOSIS — G89.29 CHRONIC LEFT SHOULDER PAIN: ICD-10-CM

## 2021-07-01 DIAGNOSIS — I51.9 LEFT VENTRICULAR DIASTOLIC DYSFUNCTION: ICD-10-CM

## 2021-07-01 DIAGNOSIS — Z01.00 DIABETIC EYE EXAM: ICD-10-CM

## 2021-07-01 DIAGNOSIS — I48.0 PAROXYSMAL A-FIB: ICD-10-CM

## 2021-07-01 DIAGNOSIS — L66.9 SCARRING ALOPECIA: ICD-10-CM

## 2021-07-01 DIAGNOSIS — K76.9 LIVER LESION: ICD-10-CM

## 2021-07-01 DIAGNOSIS — E78.1 HYPERTRIGLYCERIDEMIA WITHOUT HYPERCHOLESTEROLEMIA: ICD-10-CM

## 2021-07-01 DIAGNOSIS — Z90.710 S/P HYSTERECTOMY WITH OOPHORECTOMY: ICD-10-CM

## 2021-07-01 DIAGNOSIS — E11.9 ENCOUNTER FOR DIABETIC FOOT EXAM: ICD-10-CM

## 2021-07-01 DIAGNOSIS — E11.9 TYPE 2 DIABETES MELLITUS WITHOUT COMPLICATION, WITHOUT LONG-TERM CURRENT USE OF INSULIN: ICD-10-CM

## 2021-07-01 DIAGNOSIS — M25.512 CHRONIC LEFT SHOULDER PAIN: ICD-10-CM

## 2021-07-01 DIAGNOSIS — M85.89 OSTEOPENIA OF MULTIPLE SITES: ICD-10-CM

## 2021-07-01 DIAGNOSIS — E11.9 DIABETIC EYE EXAM: ICD-10-CM

## 2021-07-01 PROCEDURE — 1159F PR MEDICATION LIST DOCUMENTED IN MEDICAL RECORD: ICD-10-PCS | Mod: S$GLB,,, | Performed by: FAMILY MEDICINE

## 2021-07-01 PROCEDURE — 1101F PR PT FALLS ASSESS DOC 0-1 FALLS W/OUT INJ PAST YR: ICD-10-PCS | Mod: CPTII,S$GLB,, | Performed by: FAMILY MEDICINE

## 2021-07-01 PROCEDURE — 73030 X-RAY EXAM OF SHOULDER: CPT | Mod: 26,LT,, | Performed by: RADIOLOGY

## 2021-07-01 PROCEDURE — 1125F PR PAIN SEVERITY QUANTIFIED, PAIN PRESENT: ICD-10-PCS | Mod: S$GLB,,, | Performed by: FAMILY MEDICINE

## 2021-07-01 PROCEDURE — 3288F PR FALLS RISK ASSESSMENT DOCUMENTED: ICD-10-PCS | Mod: CPTII,S$GLB,, | Performed by: FAMILY MEDICINE

## 2021-07-01 PROCEDURE — 99214 PR OFFICE/OUTPT VISIT, EST, LEVL IV, 30-39 MIN: ICD-10-PCS | Mod: S$GLB,,, | Performed by: FAMILY MEDICINE

## 2021-07-01 PROCEDURE — 1101F PT FALLS ASSESS-DOCD LE1/YR: CPT | Mod: CPTII,S$GLB,, | Performed by: FAMILY MEDICINE

## 2021-07-01 PROCEDURE — 73030 X-RAY EXAM OF SHOULDER: CPT | Mod: TC,PN,LT

## 2021-07-01 PROCEDURE — 3288F FALL RISK ASSESSMENT DOCD: CPT | Mod: CPTII,S$GLB,, | Performed by: FAMILY MEDICINE

## 2021-07-01 PROCEDURE — 3008F PR BODY MASS INDEX (BMI) DOCUMENTED: ICD-10-PCS | Mod: CPTII,S$GLB,, | Performed by: FAMILY MEDICINE

## 2021-07-01 PROCEDURE — 3008F BODY MASS INDEX DOCD: CPT | Mod: CPTII,S$GLB,, | Performed by: FAMILY MEDICINE

## 2021-07-01 PROCEDURE — 99999 PR PBB SHADOW E&M-EST. PATIENT-LVL V: CPT | Mod: PBBFAC,,, | Performed by: FAMILY MEDICINE

## 2021-07-01 PROCEDURE — 1125F AMNT PAIN NOTED PAIN PRSNT: CPT | Mod: S$GLB,,, | Performed by: FAMILY MEDICINE

## 2021-07-01 PROCEDURE — 99999 PR PBB SHADOW E&M-EST. PATIENT-LVL V: ICD-10-PCS | Mod: PBBFAC,,, | Performed by: FAMILY MEDICINE

## 2021-07-01 PROCEDURE — 1159F MED LIST DOCD IN RCRD: CPT | Mod: S$GLB,,, | Performed by: FAMILY MEDICINE

## 2021-07-01 PROCEDURE — 99214 OFFICE O/P EST MOD 30 MIN: CPT | Mod: S$GLB,,, | Performed by: FAMILY MEDICINE

## 2021-07-01 PROCEDURE — 73030 XR SHOULDER COMPLETE 2 OR MORE VIEWS LEFT: ICD-10-PCS | Mod: 26,LT,, | Performed by: RADIOLOGY

## 2021-07-01 RX ORDER — CYCLOBENZAPRINE HCL 10 MG
10 TABLET ORAL NIGHTLY PRN
Qty: 30 TABLET | Refills: 11 | Status: SHIPPED | OUTPATIENT
Start: 2021-07-01 | End: 2021-11-15

## 2021-07-01 RX ORDER — LIDOCAINE AND PRILOCAINE 25; 25 MG/G; MG/G
CREAM TOPICAL
COMMUNITY
Start: 2021-05-28 | End: 2023-10-06

## 2021-07-02 ENCOUNTER — TELEPHONE (OUTPATIENT)
Dept: PRIMARY CARE CLINIC | Facility: CLINIC | Age: 71
End: 2021-07-02

## 2021-07-07 ENCOUNTER — OFFICE VISIT (OUTPATIENT)
Dept: SPORTS MEDICINE | Facility: CLINIC | Age: 71
End: 2021-07-07
Payer: MEDICARE

## 2021-07-07 VITALS
HEIGHT: 60 IN | BODY MASS INDEX: 25.84 KG/M2 | WEIGHT: 131.63 LBS | SYSTOLIC BLOOD PRESSURE: 118 MMHG | DIASTOLIC BLOOD PRESSURE: 63 MMHG

## 2021-07-07 DIAGNOSIS — M25.512 CHRONIC LEFT SHOULDER PAIN: Primary | ICD-10-CM

## 2021-07-07 DIAGNOSIS — M67.912 ROTATOR CUFF DISORDER, LEFT: ICD-10-CM

## 2021-07-07 DIAGNOSIS — M75.42 SUBACROMIAL IMPINGEMENT, LEFT: ICD-10-CM

## 2021-07-07 DIAGNOSIS — G89.29 CHRONIC LEFT SHOULDER PAIN: Primary | ICD-10-CM

## 2021-07-07 PROCEDURE — 3008F BODY MASS INDEX DOCD: CPT | Mod: CPTII,S$GLB,, | Performed by: STUDENT IN AN ORGANIZED HEALTH CARE EDUCATION/TRAINING PROGRAM

## 2021-07-07 PROCEDURE — 99999 PR PBB SHADOW E&M-EST. PATIENT-LVL IV: ICD-10-PCS | Mod: PBBFAC,,, | Performed by: STUDENT IN AN ORGANIZED HEALTH CARE EDUCATION/TRAINING PROGRAM

## 2021-07-07 PROCEDURE — 99213 PR OFFICE/OUTPT VISIT, EST, LEVL III, 20-29 MIN: ICD-10-PCS | Mod: S$GLB,,, | Performed by: STUDENT IN AN ORGANIZED HEALTH CARE EDUCATION/TRAINING PROGRAM

## 2021-07-07 PROCEDURE — 1159F PR MEDICATION LIST DOCUMENTED IN MEDICAL RECORD: ICD-10-PCS | Mod: S$GLB,,, | Performed by: STUDENT IN AN ORGANIZED HEALTH CARE EDUCATION/TRAINING PROGRAM

## 2021-07-07 PROCEDURE — 99213 OFFICE O/P EST LOW 20 MIN: CPT | Mod: S$GLB,,, | Performed by: STUDENT IN AN ORGANIZED HEALTH CARE EDUCATION/TRAINING PROGRAM

## 2021-07-07 PROCEDURE — 1159F MED LIST DOCD IN RCRD: CPT | Mod: S$GLB,,, | Performed by: STUDENT IN AN ORGANIZED HEALTH CARE EDUCATION/TRAINING PROGRAM

## 2021-07-07 PROCEDURE — 99999 PR PBB SHADOW E&M-EST. PATIENT-LVL IV: CPT | Mod: PBBFAC,,, | Performed by: STUDENT IN AN ORGANIZED HEALTH CARE EDUCATION/TRAINING PROGRAM

## 2021-07-07 PROCEDURE — 3008F PR BODY MASS INDEX (BMI) DOCUMENTED: ICD-10-PCS | Mod: CPTII,S$GLB,, | Performed by: STUDENT IN AN ORGANIZED HEALTH CARE EDUCATION/TRAINING PROGRAM

## 2021-07-07 RX ORDER — MELOXICAM 15 MG/1
15 TABLET ORAL DAILY
Qty: 30 TABLET | Refills: 3 | Status: SHIPPED | OUTPATIENT
Start: 2021-07-07 | End: 2021-11-03

## 2021-07-13 ENCOUNTER — PATIENT OUTREACH (OUTPATIENT)
Dept: ADMINISTRATIVE | Facility: HOSPITAL | Age: 71
End: 2021-07-13

## 2021-07-14 ENCOUNTER — TELEPHONE (OUTPATIENT)
Dept: PRIMARY CARE CLINIC | Facility: CLINIC | Age: 71
End: 2021-07-14

## 2021-07-19 ENCOUNTER — ANESTHESIA (OUTPATIENT)
Dept: RADIOLOGY | Facility: HOSPITAL | Age: 71
End: 2021-07-19
Payer: MEDICARE

## 2021-07-19 ENCOUNTER — ANESTHESIA EVENT (OUTPATIENT)
Dept: RADIOLOGY | Facility: HOSPITAL | Age: 71
End: 2021-07-19
Payer: MEDICARE

## 2021-07-19 ENCOUNTER — HOSPITAL ENCOUNTER (OUTPATIENT)
Dept: RADIOLOGY | Facility: HOSPITAL | Age: 71
Discharge: HOME | End: 2021-07-19
Attending: RADIOLOGY
Payer: MEDICARE

## 2021-07-19 VITALS
DIASTOLIC BLOOD PRESSURE: 61 MMHG | OXYGEN SATURATION: 99 % | SYSTOLIC BLOOD PRESSURE: 127 MMHG | RESPIRATION RATE: 15 BRPM | HEART RATE: 71 BPM

## 2021-07-19 DIAGNOSIS — K76.9 LIVER LESION: ICD-10-CM

## 2021-07-19 DIAGNOSIS — C25.9 ADENOCARCINOMA OF PANCREAS: ICD-10-CM

## 2021-07-19 LAB
ALBUMIN SERPL BCP-MCNC: 3.3 G/DL (ref 3.5–5.2)
ALP SERPL-CCNC: 276 U/L (ref 55–135)
ALT SERPL W/O P-5'-P-CCNC: 27 U/L (ref 10–44)
ANION GAP SERPL CALC-SCNC: 8 MMOL/L (ref 8–16)
AST SERPL-CCNC: 23 U/L (ref 10–40)
BASOPHILS # BLD AUTO: 0.05 K/UL (ref 0–0.2)
BASOPHILS NFR BLD: 0.7 % (ref 0–1.9)
BILIRUB SERPL-MCNC: 0.6 MG/DL (ref 0.1–1)
BUN SERPL-MCNC: 17 MG/DL (ref 8–23)
CALCIUM SERPL-MCNC: 9.5 MG/DL (ref 8.7–10.5)
CHLORIDE SERPL-SCNC: 108 MMOL/L (ref 95–110)
CO2 SERPL-SCNC: 24 MMOL/L (ref 23–29)
CREAT SERPL-MCNC: 0.9 MG/DL (ref 0.5–1.4)
DIFFERENTIAL METHOD: ABNORMAL
EOSINOPHIL # BLD AUTO: 0.4 K/UL (ref 0–0.5)
EOSINOPHIL NFR BLD: 6 % (ref 0–8)
ERYTHROCYTE [DISTWIDTH] IN BLOOD BY AUTOMATED COUNT: 16.3 % (ref 11.5–14.5)
EST. GFR  (AFRICAN AMERICAN): >60 ML/MIN/1.73 M^2
EST. GFR  (NON AFRICAN AMERICAN): >60 ML/MIN/1.73 M^2
GLUCOSE SERPL-MCNC: 119 MG/DL (ref 70–110)
HCT VFR BLD AUTO: 33.1 % (ref 37–48.5)
HGB BLD-MCNC: 10.7 G/DL (ref 12–16)
IMM GRANULOCYTES # BLD AUTO: 0.03 K/UL (ref 0–0.04)
IMM GRANULOCYTES NFR BLD AUTO: 0.4 % (ref 0–0.5)
INR PPP: 1.5 (ref 0.8–1.2)
LYMPHOCYTES # BLD AUTO: 1.8 K/UL (ref 1–4.8)
LYMPHOCYTES NFR BLD: 26.1 % (ref 18–48)
MCH RBC QN AUTO: 28.6 PG (ref 27–31)
MCHC RBC AUTO-ENTMCNC: 32.3 G/DL (ref 32–36)
MCV RBC AUTO: 89 FL (ref 82–98)
MONOCYTES # BLD AUTO: 1 K/UL (ref 0.3–1)
MONOCYTES NFR BLD: 13.7 % (ref 4–15)
NEUTROPHILS # BLD AUTO: 3.7 K/UL (ref 1.8–7.7)
NEUTROPHILS NFR BLD: 53.1 % (ref 38–73)
NRBC BLD-RTO: 0 /100 WBC
PLATELET # BLD AUTO: 241 K/UL (ref 150–450)
PMV BLD AUTO: 11.1 FL (ref 9.2–12.9)
POTASSIUM SERPL-SCNC: 3.9 MMOL/L (ref 3.5–5.1)
PROT SERPL-MCNC: 7.1 G/DL (ref 6–8.4)
PROTHROMBIN TIME: 15.3 SEC (ref 9–12.5)
RBC # BLD AUTO: 3.74 M/UL (ref 4–5.4)
SODIUM SERPL-SCNC: 140 MMOL/L (ref 136–145)
WBC # BLD AUTO: 7.01 K/UL (ref 3.9–12.7)

## 2021-07-19 PROCEDURE — 85610 PROTHROMBIN TIME: CPT | Performed by: RADIOLOGY

## 2021-07-19 PROCEDURE — 47000 NEEDLE BIOPSY OF LIVER PERQ: CPT

## 2021-07-19 PROCEDURE — 63600175 PHARM REV CODE 636 W HCPCS: Performed by: NURSE ANESTHETIST, CERTIFIED REGISTERED

## 2021-07-19 PROCEDURE — 85025 COMPLETE CBC W/AUTO DIFF WBC: CPT | Performed by: RADIOLOGY

## 2021-07-19 PROCEDURE — 25000003 PHARM REV CODE 250: Performed by: NURSE ANESTHETIST, CERTIFIED REGISTERED

## 2021-07-19 PROCEDURE — 36415 COLL VENOUS BLD VENIPUNCTURE: CPT | Performed by: RADIOLOGY

## 2021-07-19 PROCEDURE — 88307 TISSUE EXAM BY PATHOLOGIST: CPT | Performed by: PATHOLOGY

## 2021-07-19 PROCEDURE — 88307 TISSUE EXAM BY PATHOLOGIST: CPT | Mod: 26,,, | Performed by: PATHOLOGY

## 2021-07-19 PROCEDURE — 88333 PATH CONSLTJ SURG CYTO XM 1: CPT | Mod: 26,,, | Performed by: PATHOLOGY

## 2021-07-19 PROCEDURE — 25500020 PHARM REV CODE 255: Performed by: RADIOLOGY

## 2021-07-19 PROCEDURE — 25000003 PHARM REV CODE 250: Performed by: RADIOLOGY

## 2021-07-19 PROCEDURE — 77002 NEEDLE LOCALIZATION BY XRAY: CPT | Mod: 26,,, | Performed by: RADIOLOGY

## 2021-07-19 PROCEDURE — 63600175 PHARM REV CODE 636 W HCPCS: Performed by: RADIOLOGY

## 2021-07-19 PROCEDURE — 47000 IR BIOPSY LIVER: ICD-10-PCS | Mod: ,,, | Performed by: RADIOLOGY

## 2021-07-19 PROCEDURE — 47000 NEEDLE BIOPSY OF LIVER PERQ: CPT | Mod: ,,, | Performed by: RADIOLOGY

## 2021-07-19 PROCEDURE — 88333 PATH CONSLTJ SURG CYTO XM 1: CPT | Performed by: PATHOLOGY

## 2021-07-19 PROCEDURE — 88307 PR  SURG PATH,LEVEL V: ICD-10-PCS | Mod: 26,,, | Performed by: PATHOLOGY

## 2021-07-19 PROCEDURE — 80053 COMPREHEN METABOLIC PANEL: CPT | Performed by: RADIOLOGY

## 2021-07-19 PROCEDURE — 77002 IR BIOPSY LIVER: ICD-10-PCS | Mod: 26,,, | Performed by: RADIOLOGY

## 2021-07-19 PROCEDURE — 88333 PR  INTRAOPERATIVE CYTO PATH CONSULT, INITIAL SITE: ICD-10-PCS | Mod: 26,,, | Performed by: PATHOLOGY

## 2021-07-19 RX ORDER — HEPARIN 100 UNIT/ML
5 SYRINGE INTRAVENOUS ONCE
Status: COMPLETED | OUTPATIENT
Start: 2021-07-19 | End: 2021-07-19

## 2021-07-19 RX ORDER — LIDOCAINE HYDROCHLORIDE 20 MG/ML
INJECTION, SOLUTION EPIDURAL; INFILTRATION; INTRACAUDAL; PERINEURAL
Status: DISCONTINUED | OUTPATIENT
Start: 2021-07-19 | End: 2021-07-19

## 2021-07-19 RX ORDER — FENTANYL CITRATE 50 UG/ML
INJECTION, SOLUTION INTRAMUSCULAR; INTRAVENOUS
Status: DISCONTINUED | OUTPATIENT
Start: 2021-07-19 | End: 2021-07-19

## 2021-07-19 RX ORDER — EPHEDRINE SULFATE 50 MG/ML
INJECTION, SOLUTION INTRAVENOUS
Status: DISCONTINUED | OUTPATIENT
Start: 2021-07-19 | End: 2021-07-19

## 2021-07-19 RX ORDER — MIDAZOLAM HYDROCHLORIDE 1 MG/ML
INJECTION, SOLUTION INTRAMUSCULAR; INTRAVENOUS
Status: DISCONTINUED | OUTPATIENT
Start: 2021-07-19 | End: 2021-07-19

## 2021-07-19 RX ORDER — SODIUM CHLORIDE 0.9 % (FLUSH) 0.9 %
10 SYRINGE (ML) INJECTION
Status: DISCONTINUED | OUTPATIENT
Start: 2021-07-19 | End: 2021-07-20 | Stop reason: HOSPADM

## 2021-07-19 RX ORDER — PROPOFOL 10 MG/ML
VIAL (ML) INTRAVENOUS
Status: DISCONTINUED | OUTPATIENT
Start: 2021-07-19 | End: 2021-07-19

## 2021-07-19 RX ORDER — PROPOFOL 10 MG/ML
VIAL (ML) INTRAVENOUS CONTINUOUS PRN
Status: DISCONTINUED | OUTPATIENT
Start: 2021-07-19 | End: 2021-07-19

## 2021-07-19 RX ADMIN — GELATIN ABSORBABLE SPONGE SIZE 100 1 APPLICATOR: MISC at 11:07

## 2021-07-19 RX ADMIN — FENTANYL CITRATE 25 MCG: 50 INJECTION, SOLUTION INTRAMUSCULAR; INTRAVENOUS at 10:07

## 2021-07-19 RX ADMIN — GLYCOPYRROLATE 0.2 MG: 0.2 INJECTION, SOLUTION INTRAMUSCULAR; INTRAVITREAL at 10:07

## 2021-07-19 RX ADMIN — EPHEDRINE SULFATE 10 MG: 50 INJECTION INTRAVENOUS at 10:07

## 2021-07-19 RX ADMIN — MIDAZOLAM 2 MG: 1 INJECTION INTRAMUSCULAR; INTRAVENOUS at 10:07

## 2021-07-19 RX ADMIN — LIDOCAINE HYDROCHLORIDE 25 MG: 20 INJECTION, SOLUTION EPIDURAL; INFILTRATION; INTRACAUDAL; PERINEURAL at 10:07

## 2021-07-19 RX ADMIN — PROPOFOL 10 MG: 10 INJECTION, EMULSION INTRAVENOUS at 10:07

## 2021-07-19 RX ADMIN — PROPOFOL 50 MCG/KG/MIN: 10 INJECTION, EMULSION INTRAVENOUS at 10:07

## 2021-07-19 RX ADMIN — HEPARIN 500 UNITS: 100 SYRINGE at 02:07

## 2021-07-19 RX ADMIN — IOHEXOL 150 ML: 350 INJECTION, SOLUTION INTRAVENOUS at 11:07

## 2021-07-22 ENCOUNTER — TELEPHONE (OUTPATIENT)
Dept: NEUROLOGY | Facility: HOSPITAL | Age: 71
End: 2021-07-22

## 2021-07-27 LAB
ADEQUACY: NORMAL
FINAL PATHOLOGIC DIAGNOSIS: NORMAL
GROSS: NORMAL
Lab: NORMAL

## 2021-07-28 ENCOUNTER — OFFICE VISIT (OUTPATIENT)
Dept: DERMATOLOGY | Facility: CLINIC | Age: 71
End: 2021-07-28
Payer: MEDICARE

## 2021-07-28 DIAGNOSIS — C25.9 ADENOCARCINOMA OF PANCREAS: ICD-10-CM

## 2021-07-28 DIAGNOSIS — L66.9 SCARRING ALOPECIA: ICD-10-CM

## 2021-07-28 DIAGNOSIS — L65.9 HAIR LOSS: ICD-10-CM

## 2021-07-28 DIAGNOSIS — E55.9 VITAMIN D DEFICIENCY, UNSPECIFIED: Primary | ICD-10-CM

## 2021-07-28 DIAGNOSIS — D64.9 ANEMIA, UNSPECIFIED TYPE: ICD-10-CM

## 2021-07-28 PROCEDURE — 1159F PR MEDICATION LIST DOCUMENTED IN MEDICAL RECORD: ICD-10-PCS | Mod: CPTII,S$GLB,, | Performed by: DERMATOLOGY

## 2021-07-28 PROCEDURE — 1160F RVW MEDS BY RX/DR IN RCRD: CPT | Mod: CPTII,S$GLB,, | Performed by: DERMATOLOGY

## 2021-07-28 PROCEDURE — 99999 PR PBB SHADOW E&M-EST. PATIENT-LVL III: ICD-10-PCS | Mod: PBBFAC,,, | Performed by: DERMATOLOGY

## 2021-07-28 PROCEDURE — 99213 PR OFFICE/OUTPT VISIT, EST, LEVL III, 20-29 MIN: ICD-10-PCS | Mod: S$GLB,,, | Performed by: DERMATOLOGY

## 2021-07-28 PROCEDURE — 99213 OFFICE O/P EST LOW 20 MIN: CPT | Mod: S$GLB,,, | Performed by: DERMATOLOGY

## 2021-07-28 PROCEDURE — 99999 PR PBB SHADOW E&M-EST. PATIENT-LVL III: CPT | Mod: PBBFAC,,, | Performed by: DERMATOLOGY

## 2021-07-28 PROCEDURE — 1159F MED LIST DOCD IN RCRD: CPT | Mod: CPTII,S$GLB,, | Performed by: DERMATOLOGY

## 2021-07-28 PROCEDURE — 1101F PR PT FALLS ASSESS DOC 0-1 FALLS W/OUT INJ PAST YR: ICD-10-PCS | Mod: CPTII,S$GLB,, | Performed by: DERMATOLOGY

## 2021-07-28 PROCEDURE — 3044F HG A1C LEVEL LT 7.0%: CPT | Mod: CPTII,S$GLB,, | Performed by: DERMATOLOGY

## 2021-07-28 PROCEDURE — 1160F PR REVIEW ALL MEDS BY PRESCRIBER/CLIN PHARMACIST DOCUMENTED: ICD-10-PCS | Mod: CPTII,S$GLB,, | Performed by: DERMATOLOGY

## 2021-07-28 PROCEDURE — 3288F PR FALLS RISK ASSESSMENT DOCUMENTED: ICD-10-PCS | Mod: CPTII,S$GLB,, | Performed by: DERMATOLOGY

## 2021-07-28 PROCEDURE — 3044F PR MOST RECENT HEMOGLOBIN A1C LEVEL <7.0%: ICD-10-PCS | Mod: CPTII,S$GLB,, | Performed by: DERMATOLOGY

## 2021-07-28 PROCEDURE — 3288F FALL RISK ASSESSMENT DOCD: CPT | Mod: CPTII,S$GLB,, | Performed by: DERMATOLOGY

## 2021-07-28 PROCEDURE — 1101F PT FALLS ASSESS-DOCD LE1/YR: CPT | Mod: CPTII,S$GLB,, | Performed by: DERMATOLOGY

## 2021-08-02 ENCOUNTER — TELEPHONE (OUTPATIENT)
Dept: NEUROLOGY | Facility: HOSPITAL | Age: 71
End: 2021-08-02

## 2021-08-02 DIAGNOSIS — C25.9 ADENOCARCINOMA OF PANCREAS: Primary | ICD-10-CM

## 2021-08-02 DIAGNOSIS — C25.9 PRIMARY ADENOCARCINOMA OF PANCREAS: ICD-10-CM

## 2021-08-02 DIAGNOSIS — K76.9 LIVER LESION: ICD-10-CM

## 2021-08-02 DIAGNOSIS — C25.1 MALIGNANT NEOPLASM OF BODY OF PANCREAS: ICD-10-CM

## 2021-08-04 ENCOUNTER — PATIENT OUTREACH (OUTPATIENT)
Dept: ADMINISTRATIVE | Facility: HOSPITAL | Age: 71
End: 2021-08-04

## 2021-08-04 ENCOUNTER — LAB VISIT (OUTPATIENT)
Dept: LAB | Facility: HOSPITAL | Age: 71
End: 2021-08-04
Attending: DERMATOLOGY
Payer: MEDICARE

## 2021-08-04 DIAGNOSIS — E55.9 VITAMIN D DEFICIENCY, UNSPECIFIED: ICD-10-CM

## 2021-08-04 DIAGNOSIS — L65.9 HAIR LOSS: ICD-10-CM

## 2021-08-04 DIAGNOSIS — D64.9 ANEMIA, UNSPECIFIED TYPE: ICD-10-CM

## 2021-08-04 LAB
FERRITIN SERPL-MCNC: 382 NG/ML (ref 20–300)
IRON SERPL-MCNC: 64 UG/DL (ref 30–160)
SATURATED IRON: 24 % (ref 20–50)
TOTAL IRON BINDING CAPACITY: 269 UG/DL (ref 250–450)
TRANSFERRIN SERPL-MCNC: 182 MG/DL (ref 200–375)

## 2021-08-04 PROCEDURE — 36415 COLL VENOUS BLD VENIPUNCTURE: CPT | Mod: PN | Performed by: DERMATOLOGY

## 2021-08-04 PROCEDURE — 83540 ASSAY OF IRON: CPT | Performed by: DERMATOLOGY

## 2021-08-04 PROCEDURE — 82728 ASSAY OF FERRITIN: CPT | Performed by: DERMATOLOGY

## 2021-08-04 PROCEDURE — 84630 ASSAY OF ZINC: CPT | Performed by: DERMATOLOGY

## 2021-08-04 PROCEDURE — 82652 VIT D 1 25-DIHYDROXY: CPT | Performed by: DERMATOLOGY

## 2021-08-09 LAB
1,25(OH)2D3 SERPL-MCNC: 55 PG/ML (ref 20–79)
ZINC SERPL-MCNC: 73 UG/DL (ref 60–130)

## 2021-09-15 ENCOUNTER — TELEPHONE (OUTPATIENT)
Dept: NEUROLOGY | Facility: HOSPITAL | Age: 71
End: 2021-09-15

## 2021-09-24 ENCOUNTER — HOSPITAL ENCOUNTER (OUTPATIENT)
Dept: RADIOLOGY | Facility: HOSPITAL | Age: 71
Discharge: HOME | End: 2021-09-24
Attending: RADIOLOGY
Payer: MEDICARE

## 2021-09-24 ENCOUNTER — HOSPITAL ENCOUNTER (OUTPATIENT)
Dept: RADIOLOGY | Facility: HOSPITAL | Age: 71
Discharge: HOME | End: 2021-09-24
Attending: SURGERY
Payer: MEDICARE

## 2021-09-24 DIAGNOSIS — C25.9 ADENOCARCINOMA OF PANCREAS: ICD-10-CM

## 2021-09-24 DIAGNOSIS — K76.9 LIVER LESION: ICD-10-CM

## 2021-09-24 DIAGNOSIS — C25.9 PRIMARY ADENOCARCINOMA OF PANCREAS: ICD-10-CM

## 2021-09-24 DIAGNOSIS — C25.1 MALIGNANT NEOPLASM OF BODY OF PANCREAS: ICD-10-CM

## 2021-09-24 LAB
CREAT SERPL-MCNC: 1 MG/DL (ref 0.5–1.4)
SAMPLE: NORMAL

## 2021-09-24 PROCEDURE — A9585 GADOBUTROL INJECTION: HCPCS | Performed by: RADIOLOGY

## 2021-09-24 PROCEDURE — 25500020 PHARM REV CODE 255: Performed by: RADIOLOGY

## 2021-09-24 PROCEDURE — 78815 PET IMAGE W/CT SKULL-THIGH: CPT | Mod: 26,PS,, | Performed by: RADIOLOGY

## 2021-09-24 PROCEDURE — 74177 CT ABD & PELVIS W/CONTRAST: CPT | Mod: TC

## 2021-09-24 PROCEDURE — 74183 MRI ABD W/O CNTR FLWD CNTR: CPT | Mod: TC

## 2021-09-24 PROCEDURE — 74183 MRI ABDOMEN W WO CONTRAST: ICD-10-PCS | Mod: 26,,, | Performed by: RADIOLOGY

## 2021-09-24 PROCEDURE — 74177 CT ABD & PELVIS W/CONTRAST: CPT | Mod: 26,,, | Performed by: RADIOLOGY

## 2021-09-24 PROCEDURE — 74177 CT ABDOMEN PELVIS WITH CONTRAST: ICD-10-PCS | Mod: 26,,, | Performed by: RADIOLOGY

## 2021-09-24 PROCEDURE — 78815 NM PET CT ROUTINE: ICD-10-PCS | Mod: 26,PS,, | Performed by: RADIOLOGY

## 2021-09-24 PROCEDURE — 25500020 PHARM REV CODE 255: Performed by: SURGERY

## 2021-09-24 PROCEDURE — 74183 MRI ABD W/O CNTR FLWD CNTR: CPT | Mod: 26,,, | Performed by: RADIOLOGY

## 2021-09-24 PROCEDURE — 78815 PET IMAGE W/CT SKULL-THIGH: CPT | Mod: TC,PS

## 2021-09-24 PROCEDURE — A9698 NON-RAD CONTRAST MATERIALNOC: HCPCS | Performed by: SURGERY

## 2021-09-24 RX ORDER — GADOBUTROL 604.72 MG/ML
10 INJECTION INTRAVENOUS
Status: COMPLETED | OUTPATIENT
Start: 2021-09-24 | End: 2021-09-24

## 2021-09-24 RX ADMIN — GADOBUTROL 10 ML: 604.72 INJECTION INTRAVENOUS at 10:09

## 2021-09-24 RX ADMIN — IOHEXOL 75 ML: 350 INJECTION, SOLUTION INTRAVENOUS at 12:09

## 2021-09-24 RX ADMIN — IOHEXOL 1000 ML: 9 SOLUTION ORAL at 11:09

## 2021-09-28 ENCOUNTER — OFFICE VISIT (OUTPATIENT)
Dept: NEUROLOGY | Facility: HOSPITAL | Age: 71
End: 2021-09-28
Attending: SURGERY
Payer: MEDICARE

## 2021-09-28 VITALS
WEIGHT: 137.81 LBS | HEART RATE: 71 BPM | HEIGHT: 62 IN | BODY MASS INDEX: 25.36 KG/M2 | SYSTOLIC BLOOD PRESSURE: 133 MMHG | DIASTOLIC BLOOD PRESSURE: 80 MMHG | TEMPERATURE: 98 F

## 2021-09-28 DIAGNOSIS — C7B.8 OTHER SECONDARY NEUROENDOCRINE TUMORS: ICD-10-CM

## 2021-09-28 DIAGNOSIS — C25.9 PRIMARY ADENOCARCINOMA OF PANCREAS: Primary | ICD-10-CM

## 2021-09-28 DIAGNOSIS — K76.9 LIVER LESION: ICD-10-CM

## 2021-09-28 DIAGNOSIS — C25.9 ADENOCARCINOMA OF PANCREAS: ICD-10-CM

## 2021-09-28 PROCEDURE — 99214 OFFICE O/P EST MOD 30 MIN: CPT | Performed by: SURGERY

## 2021-09-28 RX ORDER — AMOXICILLIN 500 MG/1
CAPSULE ORAL
COMMUNITY
Start: 2021-09-14 | End: 2021-11-15

## 2021-09-28 RX ORDER — IBUPROFEN 800 MG/1
800 TABLET ORAL EVERY 6 HOURS PRN
COMMUNITY
Start: 2021-09-14 | End: 2021-11-03 | Stop reason: ALTCHOICE

## 2021-09-30 ENCOUNTER — TELEPHONE (OUTPATIENT)
Dept: PRIMARY CARE CLINIC | Facility: CLINIC | Age: 71
End: 2021-09-30

## 2021-09-30 DIAGNOSIS — Z12.31 SCREENING MAMMOGRAM, ENCOUNTER FOR: Primary | ICD-10-CM

## 2021-10-11 ENCOUNTER — HOSPITAL ENCOUNTER (OUTPATIENT)
Dept: RADIOLOGY | Facility: HOSPITAL | Age: 71
Discharge: HOME | End: 2021-10-11
Attending: FAMILY MEDICINE
Payer: MEDICARE

## 2021-10-11 VITALS — HEIGHT: 62 IN | WEIGHT: 137 LBS | BODY MASS INDEX: 25.21 KG/M2

## 2021-10-11 DIAGNOSIS — Z12.31 SCREENING MAMMOGRAM, ENCOUNTER FOR: ICD-10-CM

## 2021-10-11 PROCEDURE — 77067 SCR MAMMO BI INCL CAD: CPT | Mod: TC,PO

## 2021-10-11 PROCEDURE — 77063 MAMMO DIGITAL SCREENING BILAT WITH TOMO: ICD-10-PCS | Mod: 26,,, | Performed by: RADIOLOGY

## 2021-10-11 PROCEDURE — 77067 SCR MAMMO BI INCL CAD: CPT | Mod: 26,,, | Performed by: RADIOLOGY

## 2021-10-11 PROCEDURE — 77063 BREAST TOMOSYNTHESIS BI: CPT | Mod: 26,,, | Performed by: RADIOLOGY

## 2021-10-11 PROCEDURE — 77067 MAMMO DIGITAL SCREENING BILAT WITH TOMO: ICD-10-PCS | Mod: 26,,, | Performed by: RADIOLOGY

## 2021-11-02 ENCOUNTER — TELEPHONE (OUTPATIENT)
Dept: NEUROLOGY | Facility: HOSPITAL | Age: 71
End: 2021-11-02
Payer: MEDICARE

## 2021-11-08 ENCOUNTER — NURSE TRIAGE (OUTPATIENT)
Dept: ADMINISTRATIVE | Facility: CLINIC | Age: 71
End: 2021-11-08
Payer: MEDICARE

## 2021-11-12 ENCOUNTER — PATIENT OUTREACH (OUTPATIENT)
Dept: ADMINISTRATIVE | Facility: OTHER | Age: 71
End: 2021-11-12
Payer: MEDICARE

## 2021-11-12 DIAGNOSIS — E11.9 TYPE 2 DIABETES MELLITUS WITHOUT COMPLICATION, WITHOUT LONG-TERM CURRENT USE OF INSULIN: Primary | ICD-10-CM

## 2021-11-15 ENCOUNTER — OFFICE VISIT (OUTPATIENT)
Dept: OPTOMETRY | Facility: CLINIC | Age: 71
End: 2021-11-15
Payer: MEDICARE

## 2021-11-15 DIAGNOSIS — H25.13 NUCLEAR SCLEROSIS OF BOTH EYES: ICD-10-CM

## 2021-11-15 DIAGNOSIS — H52.7 REFRACTIVE ERROR: ICD-10-CM

## 2021-11-15 DIAGNOSIS — H25.013 CORTICAL AGE-RELATED CATARACT, BILATERAL: ICD-10-CM

## 2021-11-15 DIAGNOSIS — E11.36 TYPE 2 DIABETES MELLITUS WITH DIABETIC CATARACT, WITHOUT LONG-TERM CURRENT USE OF INSULIN: Primary | ICD-10-CM

## 2021-11-15 PROCEDURE — 3044F HG A1C LEVEL LT 7.0%: CPT | Mod: CPTII,S$GLB,, | Performed by: OPTOMETRIST

## 2021-11-15 PROCEDURE — 1101F PR PT FALLS ASSESS DOC 0-1 FALLS W/OUT INJ PAST YR: ICD-10-PCS | Mod: CPTII,S$GLB,, | Performed by: OPTOMETRIST

## 2021-11-15 PROCEDURE — 92004 PR EYE EXAM, NEW PATIENT,COMPREHESV: ICD-10-PCS | Mod: S$GLB,,, | Performed by: OPTOMETRIST

## 2021-11-15 PROCEDURE — 1159F PR MEDICATION LIST DOCUMENTED IN MEDICAL RECORD: ICD-10-PCS | Mod: CPTII,S$GLB,, | Performed by: OPTOMETRIST

## 2021-11-15 PROCEDURE — 3288F FALL RISK ASSESSMENT DOCD: CPT | Mod: CPTII,S$GLB,, | Performed by: OPTOMETRIST

## 2021-11-15 PROCEDURE — 1159F MED LIST DOCD IN RCRD: CPT | Mod: CPTII,S$GLB,, | Performed by: OPTOMETRIST

## 2021-11-15 PROCEDURE — 3288F PR FALLS RISK ASSESSMENT DOCUMENTED: ICD-10-PCS | Mod: CPTII,S$GLB,, | Performed by: OPTOMETRIST

## 2021-11-15 PROCEDURE — 1160F RVW MEDS BY RX/DR IN RCRD: CPT | Mod: CPTII,S$GLB,, | Performed by: OPTOMETRIST

## 2021-11-15 PROCEDURE — 1160F PR REVIEW ALL MEDS BY PRESCRIBER/CLIN PHARMACIST DOCUMENTED: ICD-10-PCS | Mod: CPTII,S$GLB,, | Performed by: OPTOMETRIST

## 2021-11-15 PROCEDURE — 92004 COMPRE OPH EXAM NEW PT 1/>: CPT | Mod: S$GLB,,, | Performed by: OPTOMETRIST

## 2021-11-15 PROCEDURE — 99999 PR PBB SHADOW E&M-EST. PATIENT-LVL II: ICD-10-PCS | Mod: PBBFAC,,, | Performed by: OPTOMETRIST

## 2021-11-15 PROCEDURE — 2023F DILAT RTA XM W/O RTNOPTHY: CPT | Mod: CPTII,S$GLB,, | Performed by: OPTOMETRIST

## 2021-11-15 PROCEDURE — 92015 PR REFRACTION: ICD-10-PCS | Mod: S$GLB,,, | Performed by: OPTOMETRIST

## 2021-11-15 PROCEDURE — 3044F PR MOST RECENT HEMOGLOBIN A1C LEVEL <7.0%: ICD-10-PCS | Mod: CPTII,S$GLB,, | Performed by: OPTOMETRIST

## 2021-11-15 PROCEDURE — 2023F PR DILATED RETINAL EXAM W/O EVID OF RETINOPATHY: ICD-10-PCS | Mod: CPTII,S$GLB,, | Performed by: OPTOMETRIST

## 2021-11-15 PROCEDURE — 92015 DETERMINE REFRACTIVE STATE: CPT | Mod: S$GLB,,, | Performed by: OPTOMETRIST

## 2021-11-15 PROCEDURE — 1101F PT FALLS ASSESS-DOCD LE1/YR: CPT | Mod: CPTII,S$GLB,, | Performed by: OPTOMETRIST

## 2021-11-15 PROCEDURE — 99999 PR PBB SHADOW E&M-EST. PATIENT-LVL II: CPT | Mod: PBBFAC,,, | Performed by: OPTOMETRIST

## 2021-12-15 ENCOUNTER — IMMUNIZATION (OUTPATIENT)
Dept: PHARMACY | Facility: CLINIC | Age: 71
End: 2021-12-15

## 2021-12-15 DIAGNOSIS — Z23 NEED FOR VACCINATION: Primary | ICD-10-CM

## 2021-12-28 ENCOUNTER — TELEPHONE (OUTPATIENT)
Dept: NEUROLOGY | Facility: HOSPITAL | Age: 71
End: 2021-12-28
Payer: MEDICARE

## 2022-01-04 ENCOUNTER — OFFICE VISIT (OUTPATIENT)
Dept: NEUROLOGY | Facility: HOSPITAL | Age: 72
End: 2022-01-04
Attending: SURGERY
Payer: MEDICARE

## 2022-01-04 ENCOUNTER — HOSPITAL ENCOUNTER (OUTPATIENT)
Dept: RADIOLOGY | Facility: HOSPITAL | Age: 72
Discharge: HOME | End: 2022-01-04
Attending: SURGERY
Payer: MEDICARE

## 2022-01-04 VITALS
WEIGHT: 145.5 LBS | HEART RATE: 75 BPM | SYSTOLIC BLOOD PRESSURE: 165 MMHG | BODY MASS INDEX: 26.78 KG/M2 | RESPIRATION RATE: 18 BRPM | OXYGEN SATURATION: 96 % | TEMPERATURE: 98 F | DIASTOLIC BLOOD PRESSURE: 84 MMHG | HEIGHT: 62 IN

## 2022-01-04 DIAGNOSIS — K76.9 LIVER LESION: ICD-10-CM

## 2022-01-04 DIAGNOSIS — C25.9 ADENOCARCINOMA OF PANCREAS: ICD-10-CM

## 2022-01-04 DIAGNOSIS — C25.1 MALIGNANT NEOPLASM OF BODY OF PANCREAS: ICD-10-CM

## 2022-01-04 DIAGNOSIS — C25.9 PRIMARY ADENOCARCINOMA OF PANCREAS: ICD-10-CM

## 2022-01-04 DIAGNOSIS — C25.9 ADENOCARCINOMA OF PANCREAS: Primary | ICD-10-CM

## 2022-01-04 DIAGNOSIS — C7B.8 OTHER SECONDARY NEUROENDOCRINE TUMORS: ICD-10-CM

## 2022-01-04 DIAGNOSIS — R93.5 ABNORMAL FINDINGS ON DIAGNOSTIC IMAGING OF OTHER ABDOMINAL REGIONS, INCLUDING RETROPERITONEUM: ICD-10-CM

## 2022-01-04 LAB
CREAT SERPL-MCNC: 0.8 MG/DL (ref 0.5–1.4)
POCT GLUCOSE: 92 MG/DL (ref 70–110)
SAMPLE: NORMAL

## 2022-01-04 PROCEDURE — 78815 NM PET CT ROUTINE: ICD-10-PCS | Mod: 26,PS,, | Performed by: RADIOLOGY

## 2022-01-04 PROCEDURE — 25500020 PHARM REV CODE 255: Performed by: SURGERY

## 2022-01-04 PROCEDURE — 74177 CT ABD & PELVIS W/CONTRAST: CPT | Mod: 26,,, | Performed by: RADIOLOGY

## 2022-01-04 PROCEDURE — 78815 PET IMAGE W/CT SKULL-THIGH: CPT | Mod: 26,PS,, | Performed by: RADIOLOGY

## 2022-01-04 PROCEDURE — 78815 PET IMAGE W/CT SKULL-THIGH: CPT | Mod: TC

## 2022-01-04 PROCEDURE — 74177 CT ABDOMEN PELVIS WITH CONTRAST: ICD-10-PCS | Mod: 26,,, | Performed by: RADIOLOGY

## 2022-01-04 PROCEDURE — 99214 OFFICE O/P EST MOD 30 MIN: CPT | Mod: 25 | Performed by: SURGERY

## 2022-01-04 PROCEDURE — A9698 NON-RAD CONTRAST MATERIALNOC: HCPCS | Performed by: SURGERY

## 2022-01-04 PROCEDURE — 74177 CT ABD & PELVIS W/CONTRAST: CPT | Mod: TC

## 2022-01-04 RX ADMIN — IOHEXOL 1000 ML: 9 SOLUTION ORAL at 12:01

## 2022-01-04 RX ADMIN — IOHEXOL 75 ML: 350 INJECTION, SOLUTION INTRAVENOUS at 01:01

## 2022-01-04 NOTE — PATIENT INSTRUCTIONS
Labs: CBC, CMP & CA 19-9 monthly     Scans: [facility] PET & CT Abd/Pelvis in 3 months  ---  due April 2022    Notes From Physician: If repeat scans in 3 months are still negative, can probably increase to Q 4-6 months if oncologist agrees    Return Clinic Appointment: in 6 months with Dr. Ellis --- appointment made

## 2022-01-04 NOTE — PROGRESS NOTES
"NOLANETS:  [facility]  A collaboration between [facility] and [facility]      PATIENT: Zohra Paulino  MRN: [id number]  DATE: 1/4/2022    Subjective:      Chief Complaint: Follow up distal pancreatectomy for adenocarcinoma.  Asymptomatic.      Vitals: Blood pressure (!) 165/84, pulse 75, temperature 98.2 °F (36.8 °C), temperature source Oral, resp. rate 18, height 5' 2" (1.575 m), weight 66 kg (145 lb 8.1 oz), SpO2 96 %.       ECOG Score: 1 - Symptomatic but completely ambulatory    Diagnosis:   1. Adenocarcinoma of pancreas         Interval History:  Status post distal pancreatectomy for pancreatic adenocarcinoma.  Here for  follow-up.  Appetite excellent.  Energy level good. Biopsy negative. Repeat scans done. FDG avid area resolved, postop inflammatory changes. HELEN at present.      PATH 2/12/21:  Pancreatic resection bed, CT-guided core biopsy:   Core biopsies reveal fibrous tissue with acute and chronic inflammation,   histiocytic infiltrate and occasional giant cells   No evidence of malignancy   Appropriately controlled immunohistochemical stains are performed.  CD68   highlights histiocytes.  AE1/AE3 is negat    Oncologic History:   Oncologic History Panc ductal adenocarcinoma- dx 6/2020   Oncologic Treatment  7/27 /2020- distal pancreatectomy (Rhonda)   Pathology 6/2020- FNA panc- ductal adenocarcinoma  7/2020-  pancreatic ductal adenocarcinoma with moderately differentiated  Pt2N0        1. Superior pancreatic portahepatis node, biopsy:  No metastatic carcinoma identified in one lymph node (0/1), supported by negative immunostains for  AE1/AE3 with appropriate controls  2. Retropancreatic tissue, biopsy:  Neurovascular fibroconnective tissue  Negative for malignancy  3. 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, confined to the pancreas  Proximal and distal " parenchymal margins negative for invasive carcinoma or high-grade intraepithelial  neoplasia, the tumor measures 1 cm from the proximal margin  Perineural invasion present  No lymphovascular invasion identified  No metastatic carcinoma identified in one peripancreatic lymph node (0/1)    Past Medical History:  Past Medical History:   Diagnosis Date    Bronchitis     Cancer     pancreatic    Diabetes mellitus     Fibroids     Nuclear sclerosis of both eyes 11/15/2021    Osteopetrosis     Uterine fibroid        Past Surgical History:  Past Surgical History:   Procedure Laterality Date    CERVICAL BIOPSY  W/ LOOP ELECTRODE EXCISION      ckc  2004    COLONOSCOPY      DISTAL PANCREATECTOMY N/A 7/27/2020    Procedure: PANCREATECTOMY, DISTAL, SUBTOTAL;  Surgeon: GILMA Ellis MD;  Location: [facility] OR;  Service: General;  Laterality: N/A;    ERCP N/A 8/19/2020    Procedure: ERCP (ENDOSCOPIC RETROGRADE CHOLANGIOPANCREATOGRAPHY);  Surgeon: Deion Ivory MD;  Location: [facility] ENDO;  Service: Endoscopy;  Laterality: N/A;  pancreatic duct leak  Rapid Covid test    ERCP N/A 9/16/2020    Procedure: ERCP (ENDOSCOPIC RETROGRADE CHOLANGIOPANCREATOGRAPHY);  Surgeon: Deion Ivory MD;  Location: [facility] ENDO;  Service: Endoscopy;  Laterality: N/A;    HYSTERECTOMY      fibroids    DE REMOVAL OF OVARY/TUBE(S)      TUBAL LIGATION         Family History:  Family History   Problem Relation Age of Onset    Cataracts Mother     No Known Problems Father     Breast cancer Cousin 20    Arthritis Sister     Cataracts Sister     No Known Problems Brother     No Known Problems Maternal Grandmother     No Known Problems Maternal Grandfather     Pancreatic cancer Paternal Grandmother 80    No Known Problems Paternal Grandfather     Diabetes Son     No Known Problems Son     No Known Problems Daughter     No Known Problems Daughter     No Known Problems Maternal Aunt     No Known Problems Maternal Uncle      No Known Problems Paternal Aunt     No Known Problems Paternal Uncle        Allergies:  Codeine    Medications:  Current Outpatient Medications   Medication Sig    alendronate (FOSAMAX) 40 mg tablet Take 1 tablet (40 mg total) by mouth once a week.    BD ALCOHOL SWABS PadM     iron bis-gly/FA/C/B12/Ca/succ (IRON-150 ORAL) Take by mouth once daily.    LIDOcaine-prilocaine (EMLA) cream APPLY DIME SIZED AMOUNT OVER PORT 1 HOUR PRIOR TO USING    potassium chloride (MICRO-K) 10 MEQ CpSR Take 10 mEq by mouth once daily.    triamcinolone acetonide 0.025 % Lotn AAA scalp qod    TRUE METRIX AIR GLUCOSE METER Misc     TRUE METRIX PRO TEST STRIP Strp     TRUEPLUS LANCETS 28 gauge Misc     vitamin D 1000 units Tab Take 1,000 Units by mouth once daily.    ACCU-CHEK GENO CONTROL SOLN Fredrick      No current facility-administered medications for this visit.     Facility-Administered Medications Ordered in Other Visits   Medication    0.9%  NaCl infusion    sodium chloride 0.9% flush 10 mL        Review of Systems   Constitutional: Negative.  Negative for activity change, appetite change, chills, fatigue, fever and unexpected weight change.   HENT: Negative.  Negative for congestion, ear pain, rhinorrhea and sinus pressure.    Eyes: Negative.  Negative for photophobia, pain and redness.   Respiratory: Negative.  Negative for cough, chest tightness, shortness of breath and wheezing.    Cardiovascular: Negative for chest pain, palpitations and leg swelling.   Gastrointestinal: Negative.  Negative for abdominal distention, abdominal pain, anal bleeding, blood in stool, constipation, diarrhea, nausea, rectal pain and vomiting.   Endocrine: Negative.  Negative for cold intolerance, heat intolerance, polydipsia, polyphagia and polyuria.   Genitourinary: Negative.  Negative for difficulty urinating, dysuria and frequency.   Musculoskeletal: Negative.  Negative for arthralgias, back pain, gait problem, myalgias, neck pain and  neck stiffness.   Skin: Negative.  Negative for color change, pallor and rash.   Allergic/Immunologic: Negative.  Negative for environmental allergies, food allergies and immunocompromised state.   Neurological: Negative.  Negative for dizziness, seizures, syncope, weakness and light-headedness.   Hematological: Negative.  Negative for adenopathy. Does not bruise/bleed easily.   Psychiatric/Behavioral: Negative.  Negative for agitation, behavioral problems, confusion, decreased concentration, dysphoric mood, hallucinations, self-injury, sleep disturbance and suicidal ideas. The patient is not nervous/anxious and is not hyperactive.       Objective:      Physical Exam  Constitutional:       Appearance: She is well-developed.   HENT:      Head: Normocephalic and atraumatic.   Eyes:      Pupils: Pupils are equal, round, and reactive to light.   Neck:      Thyroid: No thyromegaly.   Cardiovascular:      Rate and Rhythm: Normal rate and regular rhythm.      Heart sounds: Normal heart sounds.   Pulmonary:      Effort: Pulmonary effort is normal. No respiratory distress.      Breath sounds: Normal breath sounds. No wheezing or rales.   Abdominal:      General: Bowel sounds are normal. There is no distension.      Palpations: Abdomen is soft. There is no mass.      Tenderness: There is no abdominal tenderness. There is no guarding or rebound.      Hernia: No hernia is present. There is no hernia in the ventral area.   Musculoskeletal:         General: No tenderness. Normal range of motion.      Cervical back: Normal range of motion and neck supple.   Skin:     General: Skin is warm and dry.      Coloration: Skin is not pale.      Findings: No erythema or rash.   Neurological:      Mental Status: She is alert and oriented to person, place, and time.      Cranial Nerves: No cranial nerve deficit.      Deep Tendon Reflexes: Reflexes are normal and symmetric.   Psychiatric:         Behavior: Behavior normal.         Thought  Content: Thought content normal.        Assessment:       1. Adenocarcinoma of pancreas        Laboratory Data:    Neuroendocrine Labs Latest Ref Rng & Units 8/4/2020   CA 19-9 2.0 - 40.0 U/mL    TSH 0.400 - 4.000 uIU/mL    WBC 3.90 - 12.70 K/uL 8.72   HGB 12.0 - 16.0 g/dL 10.5 (L)   HCT 37.0 - 48.5 % 32.6 (L)   PLATLETS 150 - 350 K/uL 298   GLUCOSE 70 - 110 mg/dL 111 (H)   BUN 8 - 23 mg/dL 10   CREATININE 0.5 - 1.4 mg/dL 1.4    - 145 mmol/L 140    3.5 - 5.1 mmol/L 3.7   CHLORIDE 95 - 110 mmol/L 102   CO2 23 - 29 mmol/L 29   CALCIUM 8.7 - 10.5 mg/dL 9.1   PROTEIN, TOTAL 6.0 - 8.4 g/dL 7.0   PHOSPHORUS 2.7 - 4.5 mg/dL 3.8   ALBUMIN 3.5 - 5.2 g/dL 3.2 (L)   URIC ACID 2.4 - 5.7 mg/dL    TOTAL BILIRUBIN 0.1 - 1.0 mg/dL 0.6   ALK PHOSPHATASE 55 - 135 U/L 346 (H)   SGOT (AST) 10 - 40 U/L 42 (H)   SGPT (ALT) 10 - 44 U/L 34   TRIGLYCERIDES 30 - 150 mg/dL    CHOLERSTEROL 120 - 199 mg/dL    HDL 40 - 75 mg/dL    LDL 63.0 - 159.0 mg/dL    MG 1.6 - 2.6 mg/dL 1.7   URINE AMYLASE U/L Not established U/L    24 HR CREATININE CLEARANCE 15.0 - 325.0 mg/dL    HEMOGLOBIN A1C 4.5 - 6.2 %    Weight       Neuroendocrine Labs Latest Ref Rng & Units 6/4/2021   CA 19-9 2.0 - 40.0 U/mL 81.0 (H)      Ref Range & Units 4/29/21 1/28/21 3 mo ago   CA 19-9 2.0 - 40.0 U/mL 67.0High   38.0  39.0                PREOP Ref Range & Units 7/2/20   CA 19-9 2.0 - 40.0 U/mL 3306.0High       CA 19-9      Ref Range & Units 1/28/21 10/27/2020 7/2/2020   CA 19-9 2.0 - 40.0 U/mL 39.0  178.0High   3306.0High     Resulting Agency      [facility]                                                                                                       9/24/21  CA 19-9 0.0 - 40.0 U/mL 63.2 High         Liver Biopsy 7/21/21:  Liver, biopsy:  Liver with cholestasis  No significant inflammation or steatosis  Negative for neoplasm or malignancy    PREOP:        9/24/21  Mammo Digital Screening Bilat w/ Peterson  Narrative: Result:  Mammo Digital Screening Bilat w/  Peterson    History:  Patient is 71 y.o. and is seen for a screening mammogram.    Films Compared:  Prior images (if available) were compared.     Findings:   This procedure was performed using tomosynthesis.   Computer-aided detection was utilized in the interpretation of this   examination.    The breasts are heterogeneously dense, which may obscure small masses.   There is no evidence of suspicious masses, microcalcifications or   architectural distortion.  Impression:    No mammographic evidence of malignancy.    BI-RADS Category 1: Negative    Recommendation:  Routine screening mammogram in 1 year is recommended.    Your estimated lifetime risk of breast cancer (to age 85) based on   Tyrer-Cuzick risk assessment model is Tyrer-Cuzick: 3.93 %. According to   the American Cancer Society, patients with a lifetime breast cancer risk   of 20% or higher might benefit from supplemental screening tests.      POSTOP CT 10/27/2020  In this patient with pathology proven adenocarcinoma of the pancreas, there is no definite evidence of hypermetabolic tumor.     Moderately hypermetabolic ill-defined fluid collection in the resection bed favored to be inflammatory or infectious in etiology.  Fat necrosis could have a similar appearance.  Recommend clinical correlation       FDG PET: 10/28/2020     In this patient with pathology proven adenocarcinoma of the pancreas, there is no definite evidence of hypermetabolic tumor.     Moderately hypermetabolic ill-defined fluid collection in the resection bed favored to be inflammatory or infectious in etiology.  Fat necrosis could have a similar appearance.  Recommend clinical correlation    FDG PET 1/28/21  Impression:     1.  Evolving hypermetabolic findings in the pancreatectomy bed, shifting laterally and now appearing more organized as a masslike consolidation as well as a new 1 cm perigastric nodule concerning for malignancy.  Recommend correlation with tumor markers.     2.  No  extra-abdominal findings concerning for malignancy.            CT 1/28/21:     At the distal pancreatectomy surgical bed, induration change/thickening posteriorly adjacent and partially involving the posterior stomach greater curvature which extends just anteroinferior to the spleen with central tubular region of low-density.  Overall findings concerning for component of recurrent/residual disease considering degree of corresponding uptake on same day PET.  Inflammatory/infectious component felt less likely, particularly in the lack of systemic signs or symptoms of infection.     Mid-abdominal nodular focus with corresponding tracer avidity at the level of the distal stomach concerning for metastasis.      FDG PET 4/29/21:  Evolving postsurgical changes status post pancreatectomy with interval resolution of abnormal radiotracer signal within the surgical bed and perigastric soft tissue density.  No abnormal radiotracer uptake on today's exam to suggest recurrent or metastatic disease.      Mri:6/4/21   Status post partial pancreatectomy.  The pancreatectomy bed appears normal.     Subcentimeter arterially enhancing lesion segment 4A of the liver, too small to characterize.  It could be benign or malignant.     Short-term follow-up advised.     There are no measurable lesions per RECIST criteria.    CT 6/4/21:     Status post partial pancreatectomy.  The pancreatectomy bed appears normal.     Small arterially enhancing lesion 1.0 cm, series 2, image 28, segment 4A of the liver is nonspecific.  It could be a small arterially enhancing arteriovenous or arterial portal vascular malformation.  A small metastases cannot be excluded.  To correlate with PET-CT and follow-up examination.     Diverticulosis coli.     Hysterectomy.     There are no measurable lesions per RECIST criteria.     This report was flagged in Epic as abnormal    CT 1/4/22:  Impression:     1. Stable postoperative changes.  No evidence of metastatic  "disease in the abdomen or pelvis.  2. Redemonstration of a ventral hernia containing short segments of small and large bowel.     FDG PET 1/4/22:     Postsurgical changes of distal pancreatectomy without FDG PET-CT evidence of recurrent or metastatic disease.     Two new non physiologic tracer avid foci in the rectosigmoid colon against a background of diverticulosis may indicate early diverticulitis.  Recommend clinical correlation with GI history and attention on follow-up.     Mild uptake in the distal right femur in keeping with red marrow conversion.       Impression:  Prior FDG positive area in  post distal pancreatectomy bed resolved. Prior Liver  " lesion " resolved and no longer seen. Negative biopsy.     Previously rising CA 19-9 not explained on recent FDG PET or CT. . Needs serial imaging.   Slightly elevated  Ca 19-9 is worrisome but no imageable disease. Today's CA 19-9 still pending  Final read on today's CT and FDG PET  No obvious recurrence on my exam    Plan:     Recommend continue close follow up    Re-stage in 3 months with CT Abd and FDG PET scan.then if still negative , can probably increase to Q4- 6 months if oncologist agrees.   CA 19 9 monthly and send results to Dr Camacho /Kelli  Ts scans/labs to Dr Camacho     40 min total time review chart/data/biopsy/d/ew eloynet, D/W IR, coordinate care    GILMA Ellis MD, FACS  Professor of Surgery, [facility]  Neuroendocrine Surgery, Hepatic/Pancreatic & General Surgery  [address], [address]  [place]  [address]  ph. [phone]; [phone]  fax. [phone]                          "

## 2022-01-19 ENCOUNTER — PATIENT MESSAGE (OUTPATIENT)
Dept: NEUROLOGY | Facility: HOSPITAL | Age: 72
End: 2022-01-19
Payer: MEDICARE

## 2022-01-19 ENCOUNTER — PATIENT MESSAGE (OUTPATIENT)
Dept: ADMINISTRATIVE | Facility: HOSPITAL | Age: 72
End: 2022-01-19
Payer: MEDICARE

## 2022-01-21 ENCOUNTER — CONFERENCE (OUTPATIENT)
Dept: NEUROLOGY | Facility: HOSPITAL | Age: 72
End: 2022-01-21
Payer: MEDICARE

## 2022-01-21 NOTE — TELEPHONE ENCOUNTER
[facility]      GENERAL TUMOR BOARD  _____________________________________________________________________    PRESENTER:   GILMA Ellis MD    REASON FOR PRESENTATION:  Treatment Plan and Lab Review    ATTENDEES:   Surgery:              MD Charlotte Trejo MD Jarett Brashear, MD  Interventional Radiology - Not Present  Nuclear Medicine - Not Present  Pathology - Paula Le MD & Magy Sanchez DO  Oncology - MD Rosalba  Gastroenterology - Not Present   Palliative Care - Not Present  Nutritional Support- Not Present  Research  Nursing    PATIENT STATUS:  Established Patient    PATIENT SUMMARY:  Past Medical History:   Diagnosis Date    Bronchitis     Cancer     pancreatic    Diabetes mellitus     Fibroids     Nuclear sclerosis of both eyes 11/15/2021    Osteopetrosis     Uterine fibroid        Past Surgical History:   Procedure Laterality Date    CERVICAL BIOPSY  W/ LOOP ELECTRODE EXCISION      ckc  2004    COLONOSCOPY      DISTAL PANCREATECTOMY N/A 7/27/2020    Procedure: PANCREATECTOMY, DISTAL, SUBTOTAL;  Surgeon: GILMA Ellis MD;  Location: [facility] OR;  Service: General;  Laterality: N/A;    ERCP N/A 8/19/2020    Procedure: ERCP (ENDOSCOPIC RETROGRADE CHOLANGIOPANCREATOGRAPHY);  Surgeon: Deion Ivory MD;  Location: [facility];  Service: Endoscopy;  Laterality: N/A;  pancreatic duct leak  Rapid Covid test    ERCP N/A 9/16/2020    Procedure: ERCP (ENDOSCOPIC RETROGRADE CHOLANGIOPANCREATOGRAPHY);  Surgeon: Deion Ivory MD;  Location: [facility] ENDO;  Service: Endoscopy;  Laterality: N/A;    HYSTERECTOMY      fibroids    KS REMOVAL OF OVARY/TUBE(S)      TUBAL LIGATION       ________________________________________________________________      BOARD RECOMMENDATIONS:   Continue with Monthly Labs  FDG PET Scan & CT Abd/Pel in 3 months  Follow up visit in 3 months

## 2022-01-24 NOTE — TELEPHONE ENCOUNTER
Advised patient of Tumor Board recommendations. Patient all scheduled for follow up in April 2022. All questions answered at this time.

## 2022-03-13 DIAGNOSIS — E11.9 TYPE 2 DIABETES MELLITUS WITHOUT COMPLICATION: ICD-10-CM

## 2022-03-17 ENCOUNTER — PATIENT MESSAGE (OUTPATIENT)
Dept: ADMINISTRATIVE | Facility: HOSPITAL | Age: 72
End: 2022-03-17
Payer: MEDICARE

## 2022-03-18 ENCOUNTER — HOSPITAL ENCOUNTER (EMERGENCY)
Facility: HOSPITAL | Age: 72
Discharge: HOME | End: 2022-03-18
Attending: EMERGENCY MEDICINE
Payer: MEDICARE

## 2022-03-18 VITALS
TEMPERATURE: 99 F | WEIGHT: 144 LBS | HEART RATE: 72 BPM | BODY MASS INDEX: 26.34 KG/M2 | OXYGEN SATURATION: 97 % | SYSTOLIC BLOOD PRESSURE: 172 MMHG | RESPIRATION RATE: 18 BRPM | DIASTOLIC BLOOD PRESSURE: 95 MMHG

## 2022-03-18 DIAGNOSIS — M25.521 RIGHT ELBOW PAIN: ICD-10-CM
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
## 2022-03-18 DIAGNOSIS — S52.124A CLOSED NONDISPLACED FRACTURE OF HEAD OF RIGHT RADIUS, INITIAL ENCOUNTER: Primary | ICD-10-CM

## 2022-03-18 DIAGNOSIS — M25.539 WRIST PAIN: ICD-10-CM

## 2022-03-18 PROCEDURE — 25000003 PHARM REV CODE 250: Mod: ER | Performed by: EMERGENCY MEDICINE

## 2022-03-18 PROCEDURE — 99284 EMERGENCY DEPT VISIT MOD MDM: CPT | Mod: 25,ER

## 2022-03-18 PROCEDURE — 29105 APPLICATION LONG ARM SPLINT: CPT | Mod: RT,ER

## 2022-03-18 RX ORDER — CYCLOBENZAPRINE HCL 10 MG
10 TABLET ORAL
Status: COMPLETED | OUTPATIENT
Start: 2022-03-18 | End: 2022-03-18

## 2022-03-18 RX ORDER — TIZANIDINE 4 MG/1
4 TABLET ORAL EVERY 6 HOURS PRN
Qty: 20 TABLET | Refills: 0 | Status: SHIPPED | OUTPATIENT
Start: 2022-03-18 | End: 2022-03-28

## 2022-03-18 RX ORDER — ACETAMINOPHEN 325 MG/1
650 TABLET ORAL
Status: COMPLETED | OUTPATIENT
Start: 2022-03-18 | End: 2022-03-18

## 2022-03-18 RX ORDER — NAPROXEN 500 MG/1
500 TABLET ORAL 2 TIMES DAILY WITH MEALS
Qty: 60 TABLET | Refills: 0 | Status: ON HOLD | OUTPATIENT
Start: 2022-03-18 | End: 2022-06-05 | Stop reason: HOSPADM

## 2022-03-18 RX ADMIN — CYCLOBENZAPRINE 10 MG: 10 TABLET, FILM COATED ORAL at 04:03

## 2022-03-18 RX ADMIN — ACETAMINOPHEN 650 MG: 325 TABLET ORAL at 03:03

## 2022-03-18 NOTE — FIRST PROVIDER EVALUATION
Emergency Department TeleTriage Encounter Note      CHIEF COMPLAINT    Chief Complaint   Patient presents with    FALL     TRIP AND FALL TODAY WHILE WALKING; NOW WITH RIGHT HAND AND ARM PAIN; ALSO WITH HEAD PAIN AND LEFT FOREHEAD HEMATOMA; DENIES LOC; -BT       VITAL SIGNS   Initial Vitals [03/18/22 1314]   BP Pulse Resp Temp SpO2   (!) 167/93 80 18 98.8 °F (37.1 °C) 97 %      MAP       --            ALLERGIES    Review of patient's allergies indicates:   Allergen Reactions    Codeine Palpitations       PROVIDER TRIAGE NOTE  Patient had a trip and fall today while walking. She is complaining or pain in the left frontal head and right elbow. No LOC. No numbness or focal weakness.       ORDERS  Labs Reviewed - No data to display    ED Orders (720h ago, onward)    None            Virtual Visit Note: The provider triage portion of this emergency department evaluation and documentation was performed via [employer], a HIPAA-compliant telemedicine application, in concert with a tele-presenter in the room. A face to face patient evaluation with one of my colleagues will occur once the patient is placed in an emergency department room.      DISCLAIMER: This note was prepared with [employer] voice recognition transcription software. Garbled syntax, mangled pronouns, and other bizarre constructions may be attributed to that software system.

## 2022-03-18 NOTE — ED PROVIDER NOTES
Encounter Date: 3/18/2022    SCRIBE #1 NOTE: I, Shereen Zamorano, am scribing for, and in the presence of,  North Diaz MD. I have scribed the following portions of the note - Other sections scribed: HPI; ROS.       History     Chief Complaint   Patient presents with    FALL     TRIP AND FALL TODAY WHILE WALKING; NOW WITH RIGHT HAND AND ARM PAIN; ALSO WITH HEAD PAIN AND LEFT FOREHEAD HEMATOMA; DENIES LOC; -BT     Zohra Paulino is a 72 y.o. female with Hx of Pancreatic Cancer and DM who presents to the ED for chief complaint of right elbow and wrist pain and a hematoma on the left frontal s/p mechanical fall today. Patient reports she was walking outside when her foot got caught on plastic, causing her to trip and fall. She states that her right elbow hurts the most. Patient notes she is uncertain of LOC, she states a man helped her stand up. She states she is unsure if Tetanus is UTD. Patient reports she is allergic to Codeine. She denies back pain, abdominal pain, eye pain, or pain in the lower extremities.     The history is provided by the patient. No  was used.     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/2021    Osteopetrosis     Uterine fibroid      Past Surgical History:   Procedure Laterality Date    CERVICAL BIOPSY  W/ LOOP ELECTRODE EXCISION      [facility]  2004    COLONOSCOPY      DISTAL PANCREATECTOMY N/A 7/27/2020    Procedure: PANCREATECTOMY, DISTAL, SUBTOTAL;  Surgeon: GILMA Ellis MD;  Location: [facility] OR;  Service: General;  Laterality: N/A;    ERCP N/A 8/19/2020    Procedure: ERCP (ENDOSCOPIC RETROGRADE CHOLANGIOPANCREATOGRAPHY);  Surgeon: Deion Ivory MD;  Location: [facility] ENDO;  Service: Endoscopy;  Laterality: N/A;  pancreatic duct leak  Rapid  Covid test    ERCP N/A 9/16/2020    Procedure: ERCP (ENDOSCOPIC RETROGRADE CHOLANGIOPANCREATOGRAPHY);  Surgeon: Deion Ivory MD;  Location: [facility];  Service: Endoscopy;  Laterality: N/A;    HYSTERECTOMY      fibroids    ME REMOVAL OF OVARY/TUBE(S)      TUBAL LIGATION       Family History   Problem Relation Age of Onset    Cataracts Mother     No Known Problems Father     Breast cancer Cousin 20    Arthritis Sister     Cataracts Sister     No Known Problems Brother     No Known Problems Maternal Grandmother     No Known Problems Maternal Grandfather     Pancreatic cancer Paternal Grandmother 80    No Known Problems Paternal Grandfather     Diabetes Son     No Known Problems Son     No Known Problems Daughter     No Known Problems Daughter     No Known Problems Maternal Aunt     No Known Problems Maternal Uncle     No Known Problems Paternal Aunt     No Known Problems Paternal Uncle      Social History     Tobacco Use    Smoking status: Never Smoker    Smokeless tobacco: Never Used   Substance Use Topics    Alcohol use: Yes     Comment: rarely     Drug use: No     Review of Systems   Constitutional: Negative for activity change, appetite change, chills and fever.   HENT: Negative for congestion, ear pain, rhinorrhea and sore throat.    Eyes: Negative for pain and redness.   Respiratory: Negative for cough and shortness of breath.    Cardiovascular: Negative for chest pain and leg swelling.   Gastrointestinal: Negative for abdominal distention, abdominal pain, diarrhea, nausea and vomiting.   Genitourinary: Negative for difficulty urinating and dysuria.   Musculoskeletal: Positive for myalgias (right elbow and wrist). Negative for arthralgias and back pain.   Skin: Positive for wound (left frontal). Negative for color change and pallor.   Neurological: Negative for light-headedness and headaches.   Psychiatric/Behavioral: Negative for confusion.   All other systems reviewed and are  negative.      Physical Exam     Initial Vitals [03/18/22 1314]   BP Pulse Resp Temp SpO2   (!) 167/93 80 18 98.8 °F (37.1 °C) 97 %      MAP       --         Physical Exam    Nursing note and vitals reviewed.  Constitutional: She appears well-developed and well-nourished. She is not diaphoretic. No distress.   HENT:   Right Ear: External ear normal.   Left Ear: External ear normal.   Mouth/Throat: No oropharyngeal exudate.   Left-sided frontal hematoma noted.   Eyes: Conjunctivae and EOM are normal. Pupils are equal, round, and reactive to light.   Neck: Neck supple. No JVD present.   Normal range of motion.  Cardiovascular: Normal rate, regular rhythm, normal heart sounds and intact distal pulses. Exam reveals no friction rub.    No murmur heard.  Pulmonary/Chest: Breath sounds normal. No stridor. No respiratory distress. She has no wheezes. She has no rales.   Abdominal: Abdomen is soft. She exhibits no distension. There is no abdominal tenderness. There is no rebound.   Musculoskeletal:         General: Tenderness present. Normal range of motion.      Cervical back: Normal range of motion and neck supple.      Comments: Mild swelling noted to the right elbow.  There is some point tenderness to the elbow.  No signs of any crepitus, erythema or ecchymosis.     Neurological: She is alert and oriented to person, place, and time. She has normal strength.   Skin: No rash and no abscess noted. No erythema. No pallor.         ED Course   Procedures  Labs Reviewed - No data to display       Imaging Results           X-Ray Elbow Complete Right (Final result)  Result time 03/18/22 15:32:16    Final result by Romero Mackenzie Jr., MD (03/18/22 15:32:16)                 Impression:      Elbow joint effusion, in the setting of trauma is compatible with radiographically occult fracture, most likely the radial head.    This report was flagged in Epic as abnormal.      Electronically signed by: Romero Bowens  Jr  Date:    03/18/2022  Time:    15:32             Narrative:    EXAMINATION:  XR ELBOW COMPLETE 3 VIEW RIGHT    CLINICAL HISTORY:  Fall on same level from slipping, tripping and stumbling without subsequent striking against object, initial encounter    TECHNIQUE:  XR ELBOW COMPLETE 3 VIEW RIGHT    COMPARISON:  None    FINDINGS:  The elbow joint is congruent.  There is elevation of the anterior fat pad compatible with an elbow effusion.  No displaced fracture is seen.  Areas of soft tissue calcification are seen along the medial and lateral humeral epicondyles compatible with chronic tendinosis.                               X-Ray Wrist Complete Right (Final result)  Result time 03/18/22 15:30:43    Final result by Jovanny Alejandro MD (03/18/22 15:30:43)                 Impression:      1. No acute displaced fracture or dislocation of the wrist.      Electronically signed by: Jovanny Alejandro MD  Date:    03/18/2022  Time:    15:30             Narrative:    EXAMINATION:  XR WRIST COMPLETE 3 VIEWS RIGHT    CLINICAL HISTORY:  Pain in unspecified wrist    TECHNIQUE:  PA, lateral, and oblique views of the right wrist were performed.    COMPARISON:  None    FINDINGS:  Three views right wrist.    There is osteopenia.  Degenerative changes are noted of the 1st carpal metacarpal joint.  No acute displaced fracture or dislocation of the wrist.  No radiopaque foreign body.  No significant edema.                               CT Head Without Contrast (Final result)  Result time 03/18/22 15:24:16    Final result by Kalpesh Shepherd MD (03/18/22 15:24:16)                 Impression:      Left frontal subcutaneous hematoma.  No calvarial fracture or acute intracranial process.      Electronically signed by: Kalpesh Shepherd MD  Date:    03/18/2022  Time:    15:24             Narrative:    EXAMINATION:  CT HEAD WITHOUT CONTRAST    CLINICAL HISTORY:  Head trauma, minor (Age >= 65y);    TECHNIQUE:  Low dose axial images were obtained  through the head.  Coronal and sagittal reformations were also performed. Contrast was not administered.    COMPARISON:  None.    FINDINGS:  There is a left frontal subcutaneous hematoma.  The underlying calvarium is intact.  The paranasal sinuses unremarkable.  The mastoid air cells are clear.  The orbits and intraorbital contents are within normal limits.    The craniocervical junction is intact.  The midline structures are unremarkable.  There are no extra-axial fluid collections.  There is no evidence of intracranial hemorrhage.  The ventricles and sulci are within normal limits for the patient's age.  There are hypodensities within the periventricular and subcortical white matter.  The gray-white differentiation is maintained.  There is no dense vessel sign.  There is no evidence of mass effect.                                 Medications   cyclobenzaprine tablet 10 mg (has no administration in time range)   acetaminophen tablet 650 mg (650 mg Oral Given 3/18/22 1518)     Medical Decision Making:   Initial Assessment:   72-year-old with elbow pain after fall.  Differential diagnosis includes but is not limited to fracture, dislocation, skeletal pain, strain for sprains, subarachnoid hemorrhage, subdural hematoma.  Workup-CT head, plain films.  CT head shows no signs of any acute intracranial abnormalities.  X-ray elbow shows signs of possible radial head fracture.  Patient has been placed in a splint.  Patient has been informed of this result and recommended to follow-up with orthopedics for this.  Patient states that she would do so.  Will discharge with return precautions and PCP follow-up.  [facility]  03/18/2022 4:37 PM            Scribe Attestation:   Scribe #1: I performed the above scribed service and the documentation accurately describes the services I performed. I attest to the accuracy of the note.               Scribe attestation: I, Dr. North Diaz, personally  performed the services described in this documentation. All medical record entries made by the scribe were at my direction and in my presence.  I have reviewed the chart and agree that the record reflects my personal performance and is accurate and complete     Clinical Impression:   Final diagnoses:  [W01.0XXA] Fall on same level from slipping, tripping and stumbling without subsequent striking against object, initial encounter  [M25.539] Wrist pain  [S52.124A] Closed nondisplaced fracture of head of right radius, initial encounter (Primary)  [M25.521] Right elbow pain          ED Disposition Condition    Discharge Stable        ED Prescriptions     Medication Sig Dispense Start Date End Date Auth. Provider    naproxen (NAPROSYN) 500 MG tablet Take 1 tablet (500 mg total) by mouth 2 (two) times daily with meals. 60 tablet 3/18/2022  North Diaz MD    tiZANidine (ZANAFLEX) 4 MG tablet Take 1 tablet (4 mg total) by mouth every 6 (six) hours as needed (back pain). 20 tablet 3/18/2022 3/28/2022 North Diaz MD        Follow-up Information     Follow up With Specialties Details Why Contact Info    Tracey Martin MD Family Medicine Schedule an appointment as soon as possible for a visit  continued care [address] [facility] [address]  [phone]      [facility] ED Emergency Medicine Go to  If symptoms worsen [address] [facility] [id number] [phone]           North Diaz MD  03/18/22 0335

## 2022-03-21 ENCOUNTER — TELEPHONE (OUTPATIENT)
Dept: PRIMARY CARE CLINIC | Facility: CLINIC | Age: 72
End: 2022-03-21
Payer: MEDICARE

## 2022-03-21 ENCOUNTER — TELEPHONE (OUTPATIENT)
Dept: ORTHOPEDICS | Facility: CLINIC | Age: 72
End: 2022-03-21
Payer: MEDICARE

## 2022-03-21 NOTE — TELEPHONE ENCOUNTER
Patient states that she went to the ED and she's supposed to see Dr. Martin but she's not sure why, she really needs to see the orthopedic doctor.  Advised that the referral was placed and is ready for scheduling.  I can schedule her tomorrow to see Dr. Martin for an ED f/u. States she would prefer to see ortho and then see Dr. Martin for her annual exam.  Annual schedule for May 2022.

## 2022-03-21 NOTE — TELEPHONE ENCOUNTER
----- Message from Héctor Sunshine sent at 3/21/2022  2:14 PM CDT -----  Contact: pt  Caller is requesting an earlier appointment then we can schedule.  Caller is requesting a message be sent to the provider.  If this is for urgent care symptoms, did you offer other providers at this location, providers at other locations, or [facility]? (yes, no, n/a):    If this is for the patients physical, did you offer to schedule next available and put on wait list, or to see NP or PA for their physical?  (yes, no, n/a):    When is the next available appointment with their provider:  May  Reason for the appointment:  follow up from urgent care  Patient preference of timeframe to be scheduled:  asap  Would the patient like a call back, or a response through their [facility] portal?:  chun  Comments:

## 2022-03-21 NOTE — TELEPHONE ENCOUNTER
----- Message from Yuliana Almazan sent at 3/21/2022  3:11 PM CDT -----  Dr.Lewis Diaz has placed a referral for the patient to be seen with Orthopedics. Please assist with scheduling.       Closed nondisplaced fracture of head of right radius, initial encounter [S52.124    We spoke  Lindsay this coming Wednesday @ 1 pm Mrs Nugent @ [facility]   She voiced approval

## 2022-03-22 ENCOUNTER — PATIENT OUTREACH (OUTPATIENT)
Dept: ADMINISTRATIVE | Facility: OTHER | Age: 72
End: 2022-03-22
Payer: MEDICARE

## 2022-03-22 ENCOUNTER — TELEPHONE (OUTPATIENT)
Dept: ORTHOPEDICS | Facility: CLINIC | Age: 72
End: 2022-03-22
Payer: MEDICARE

## 2022-03-22 DIAGNOSIS — R52 PAIN: Primary | ICD-10-CM

## 2022-03-22 NOTE — PROGRESS NOTES
Care Everywhere: updated   Immunization: updated  Health Maintenance: updated  Media Review:   Legacy Review:   DIS:  Order placed:   Upcoming appts:hemoglobin 4.19  EFAX:  Task Tickets:  Referrals:

## 2022-03-23 ENCOUNTER — HOSPITAL ENCOUNTER (OUTPATIENT)
Dept: RADIOLOGY | Facility: OTHER | Age: 72
Discharge: HOME | End: 2022-03-23
Attending: PHYSICIAN ASSISTANT
Payer: MEDICARE

## 2022-03-23 ENCOUNTER — OFFICE VISIT (OUTPATIENT)
Dept: ORTHOPEDICS | Facility: CLINIC | Age: 72
End: 2022-03-23
Payer: MEDICARE

## 2022-03-23 VITALS — WEIGHT: 144 LBS | BODY MASS INDEX: 26.5 KG/M2 | HEIGHT: 62 IN

## 2022-03-23 DIAGNOSIS — M25.621 DECREASED RANGE OF MOTION OF ELBOW, RIGHT: Primary | ICD-10-CM

## 2022-03-23 DIAGNOSIS — S42.401A OCCULT CLOSED FRACTURE OF RIGHT ELBOW, INITIAL ENCOUNTER: ICD-10-CM

## 2022-03-23 DIAGNOSIS — M25.531 PAIN IN BOTH WRISTS: ICD-10-CM

## 2022-03-23 DIAGNOSIS — R52 PAIN: ICD-10-CM

## 2022-03-23 DIAGNOSIS — M25.532 PAIN IN BOTH WRISTS: ICD-10-CM

## 2022-03-23 DIAGNOSIS — M25.642 DECREASED RANGE OF MOTION OF FINGER OF LEFT HAND: ICD-10-CM

## 2022-03-23 PROCEDURE — 3072F PR LOW RISK FOR RETINOPATHY: ICD-10-PCS | Mod: CPTII,S$GLB,, | Performed by: PHYSICIAN ASSISTANT

## 2022-03-23 PROCEDURE — 1160F RVW MEDS BY RX/DR IN RCRD: CPT | Mod: CPTII,S$GLB,, | Performed by: PHYSICIAN ASSISTANT

## 2022-03-23 PROCEDURE — 99999 PR PBB SHADOW E&M-EST. PATIENT-LVL III: ICD-10-PCS | Mod: PBBFAC,,, | Performed by: PHYSICIAN ASSISTANT

## 2022-03-23 PROCEDURE — 99204 OFFICE O/P NEW MOD 45 MIN: CPT | Mod: S$GLB,,, | Performed by: PHYSICIAN ASSISTANT

## 2022-03-23 PROCEDURE — 3008F PR BODY MASS INDEX (BMI) DOCUMENTED: ICD-10-PCS | Mod: CPTII,S$GLB,, | Performed by: PHYSICIAN ASSISTANT

## 2022-03-23 PROCEDURE — 1101F PR PT FALLS ASSESS DOC 0-1 FALLS W/OUT INJ PAST YR: ICD-10-PCS | Mod: CPTII,S$GLB,, | Performed by: PHYSICIAN ASSISTANT

## 2022-03-23 PROCEDURE — 73080 X-RAY EXAM OF ELBOW: CPT | Mod: 26,RT,, | Performed by: RADIOLOGY

## 2022-03-23 PROCEDURE — 3072F LOW RISK FOR RETINOPATHY: CPT | Mod: CPTII,S$GLB,, | Performed by: PHYSICIAN ASSISTANT

## 2022-03-23 PROCEDURE — 73110 XR WRIST COMPLETE 3 VIEWS RIGHT: ICD-10-PCS | Mod: 26,RT,, | Performed by: RADIOLOGY

## 2022-03-23 PROCEDURE — 1125F PR PAIN SEVERITY QUANTIFIED, PAIN PRESENT: ICD-10-PCS | Mod: CPTII,S$GLB,, | Performed by: PHYSICIAN ASSISTANT

## 2022-03-23 PROCEDURE — 3288F PR FALLS RISK ASSESSMENT DOCUMENTED: ICD-10-PCS | Mod: CPTII,S$GLB,, | Performed by: PHYSICIAN ASSISTANT

## 2022-03-23 PROCEDURE — 73080 X-RAY EXAM OF ELBOW: CPT | Mod: TC,FY,RT

## 2022-03-23 PROCEDURE — 1159F PR MEDICATION LIST DOCUMENTED IN MEDICAL RECORD: ICD-10-PCS | Mod: CPTII,S$GLB,, | Performed by: PHYSICIAN ASSISTANT

## 2022-03-23 PROCEDURE — 99204 PR OFFICE/OUTPT VISIT, NEW, LEVL IV, 45-59 MIN: ICD-10-PCS | Mod: S$GLB,,, | Performed by: PHYSICIAN ASSISTANT

## 2022-03-23 PROCEDURE — 1160F PR REVIEW ALL MEDS BY PRESCRIBER/CLIN PHARMACIST DOCUMENTED: ICD-10-PCS | Mod: CPTII,S$GLB,, | Performed by: PHYSICIAN ASSISTANT

## 2022-03-23 PROCEDURE — 1159F MED LIST DOCD IN RCRD: CPT | Mod: CPTII,S$GLB,, | Performed by: PHYSICIAN ASSISTANT

## 2022-03-23 PROCEDURE — 3288F FALL RISK ASSESSMENT DOCD: CPT | Mod: CPTII,S$GLB,, | Performed by: PHYSICIAN ASSISTANT

## 2022-03-23 PROCEDURE — 1125F AMNT PAIN NOTED PAIN PRSNT: CPT | Mod: CPTII,S$GLB,, | Performed by: PHYSICIAN ASSISTANT

## 2022-03-23 PROCEDURE — 73110 X-RAY EXAM OF WRIST: CPT | Mod: 26,RT,, | Performed by: RADIOLOGY

## 2022-03-23 PROCEDURE — 73110 X-RAY EXAM OF WRIST: CPT | Mod: TC,FY,RT

## 2022-03-23 PROCEDURE — 3008F BODY MASS INDEX DOCD: CPT | Mod: CPTII,S$GLB,, | Performed by: PHYSICIAN ASSISTANT

## 2022-03-23 PROCEDURE — 73080 XR ELBOW COMPLETE 3 VIEW RIGHT: ICD-10-PCS | Mod: 26,RT,, | Performed by: RADIOLOGY

## 2022-03-23 PROCEDURE — 99999 PR PBB SHADOW E&M-EST. PATIENT-LVL III: CPT | Mod: PBBFAC,,, | Performed by: PHYSICIAN ASSISTANT

## 2022-03-23 PROCEDURE — 1101F PT FALLS ASSESS-DOCD LE1/YR: CPT | Mod: CPTII,S$GLB,, | Performed by: PHYSICIAN ASSISTANT

## 2022-03-23 NOTE — PROGRESS NOTES
Subjective:      Patient ID: Zohra Paulino is a 72 y.o. female.    Chief Complaint: Pain of the Right Elbow and Pain of the Right Wrist      HPI  Zohra Paulino is a right hand dominant 72 y.o. female, with hx of Pancreatic Cancer and DM, presenting today for follow-up from the ED. She was seen in the ED the [facility] 3/18/2022. There was a history of trauma - reports a trip and fall, landing on the right elbow.  Injury occurred 3/18/2022.  She reports that over the past few days her right elbow pain has improved.  She does have discomfort in the right wrist.  She has been in the long-arm splint full-time and wearing the sling regularly.  She reports that pain is improved with use of naproxen.  She also reports a 1 year history of pain and decreased motion in the left upper extremity.  She reports difficulty making a full fist on the left, pain in the hand and wrist as well as pain in the left shoulder.  She denies any finger numbness or tingling.          Review of patient's allergies indicates:   Allergen Reactions    Codeine Palpitations         Current Outpatient Medications   Medication Sig Dispense Refill    alendronate (FOSAMAX) 40 mg tablet Take 1 tablet (40 mg total) by mouth once a week. 12 tablet 3    iron bis-gly/FA/C/B12/Ca/succ (IRON-150 ORAL) Take by mouth once daily.      LIDOcaine-prilocaine (EMLA) cream APPLY DIME SIZED AMOUNT OVER PORT 1 HOUR PRIOR TO USING      naproxen (NAPROSYN) 500 MG tablet Take 1 tablet (500 mg total) by mouth 2 (two) times daily with meals. 60 tablet 0    potassium chloride (MICRO-K) 10 MEQ CpSR Take 10 mEq by mouth once daily.      tiZANidine (ZANAFLEX) 4 MG tablet Take 1 tablet (4 mg total) by mouth every 6 (six) hours as needed (back pain). 20 tablet 0    vitamin D 1000 units Tab Take 1,000 Units by mouth once daily.      ACCU-CHEK GENO CONTROL SOLN Soln       BD ALCOHOL SWABS PadM       triamcinolone acetonide 0.025 % Lotn AAA scalp qod 1 Bottle  "0    TRUE METRIX AIR GLUCOSE METER Misc       TRUE METRIX PRO TEST STRIP Strp       TRUEPLUS LANCETS 28 gauge Misc        No current facility-administered medications for this visit.     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/2021    Osteopetrosis     Uterine fibroid        Past Surgical History:   Procedure Laterality Date    CERVICAL BIOPSY  W/ LOOP ELECTRODE EXCISION      [facility]  2004    COLONOSCOPY      DISTAL PANCREATECTOMY N/A 7/27/2020    Procedure: PANCREATECTOMY, DISTAL, SUBTOTAL;  Surgeon: GILMA Ellis MD;  Location: [facility] OR;  Service: General;  Laterality: N/A;    ERCP N/A 8/19/2020    Procedure: ERCP (ENDOSCOPIC RETROGRADE CHOLANGIOPANCREATOGRAPHY);  Surgeon: Deion Ivory MD;  Location: [facility] ENDO;  Service: Endoscopy;  Laterality: N/A;  pancreatic duct leak  Rapid Covid test    ERCP N/A 9/16/2020    Procedure: ERCP (ENDOSCOPIC RETROGRADE CHOLANGIOPANCREATOGRAPHY);  Surgeon: Deion Ivory MD;  Location: [facility] ENDO;  Service: Endoscopy;  Laterality: N/A;    HYSTERECTOMY      fibroids    MA REMOVAL OF OVARY/TUBE(S)      TUBAL LIGATION           Review of Systems:  ROS:  Constitutional: no fever or chills  Skin: no rash or suspicious lesions  Musculoskeletal: See HPI.   Neurological: no headaches, lightheadedness, or dizziness. No numbness or tingling  Psychological/behavioral: no anxiety or depression      OBJECTIVE:     PHYSICAL EXAM:  Height: 5' 2" (157.5 cm) Weight: 65.3 kg (144 lb)  Vitals:    03/23/22 1305   Weight: 65.3 kg " "(144 lb)   Height: 5' 2" (1.575 m)   PainSc:   4     Vitals reviewed.  Constitutional: NAD. Patient appears well-developed and well-nourished.   HENT:   Head: Normocephalic and atraumatic.   Neck: Normal range of motion.   Cardiovascular: Normal rate.    Pulmonary/Chest: Effort normal. No respiratory distress.   Neurological: Patient is awake, alert, oriented.   Psychiatric: Patient has a normal mood and affect. Behavior is normal. Judgment and thought content normal.  Musculoskeletal:  No lacerations or abrasions, no scars.  No ecchymosis appreciated.  Mild tenderness to palpation bilateral wrists and right elbow laterally.  On the right good finger and wrist range of motion, she does report slight discomfort with wrist hyperextension hyperflexion.  Right elbow flexion and extension are limited, she reports feeling stiffness button only mild discomfort with elbow motion.  On the left, she has decreased finger flexion, difficulty making a full composite fist.  She also has decreased hyperextension hyperflexion of the wrist, reports feeling stiffness and mild discomfort with attempted motion.  Neurovascularly intact-good sensation and motor function, good capillary refill, 2+ radial pulses.    RADIOGRAPHS:  Right elbow x-ray, 3/23/2022  FINDINGS:  No acute fracture or dislocation seen.  Elbow joint effusion is decreased in volume. No associated soft tissue edema.  Impression:  Elbow joint effusion is decreased in volume.  No definite fracture.  Occult fracture, likely of the radial head, remains a possibility.    Right wrist x-ray, 3/23/2022  FINDINGS:  The position alignment is satisfactory and unchanged as compared to the previous study.  Mild DJD.  No definite acute fractures noted   Impression:  See above    Comments: I have personally reviewed the imaging and I agree with the above radiologist's report.    ASSESSMENT/PLAN:   Zohra was seen today for pain and pain.    Diagnoses and all orders for this " visit:    Decreased range of motion of elbow, right  -     CT Arm Elbow Without Contrast Right; Future    Decreased range of motion of finger of left hand  -     X-Ray Wrist Complete Left; Future  -     X-Ray Hand 3 View Left; Future    Pain in both wrists  -     X-Ray Wrist Complete Left; Future  -     X-Ray Hand 3 View Left; Future    Occult closed fracture of right elbow, initial encounter  -     CT Arm Elbow Without Contrast Right; Future           - We talked at length about the anatomy and pathophysiology of   Encounter Diagnoses   Name Primary?    Decreased range of motion of elbow, right Yes    Decreased range of motion of finger of left hand     Pain in both wrists     Occult closed fracture of right elbow, initial encounter        - discussed patient's symptoms and physical exam findings.  Discussed x-ray, possible occult right elbow fracture (radial head).  Discussed further evaluation with CT scan due to x-ray findings and patient's motion limitations.  Continue full-time in the sling at this time.  Due to patient's wrist pain recommend a short wrist brace, provided (15 minutes spent preparing, fitting, and educating on brace).  - patient has chronic left hand and wrist pain and decreased motion.  Will further evaluate with x-ray.  - follow-up after CT scan and x-rays for imaging review and further discussion of treatment  - call with questions or concerns    Disclaimer: This note has been generated using voice-recognition software. There may be typographical errors that have been missed during proof-reading.

## 2022-03-25 ENCOUNTER — HOSPITAL ENCOUNTER (OUTPATIENT)
Dept: RADIOLOGY | Facility: HOSPITAL | Age: 72
Discharge: HOME | End: 2022-03-25
Attending: PHYSICIAN ASSISTANT
Payer: MEDICARE

## 2022-03-25 DIAGNOSIS — M25.621 DECREASED RANGE OF MOTION OF ELBOW, RIGHT: ICD-10-CM

## 2022-03-25 DIAGNOSIS — S42.401A OCCULT CLOSED FRACTURE OF RIGHT ELBOW, INITIAL ENCOUNTER: ICD-10-CM

## 2022-03-25 PROCEDURE — 73200 CT ARM ELBOW WITHOUT CONTRAST RIGHT: ICD-10-PCS | Mod: 26,RT,, | Performed by: RADIOLOGY

## 2022-03-25 PROCEDURE — 73200 CT UPPER EXTREMITY W/O DYE: CPT | Mod: TC,RT

## 2022-03-25 PROCEDURE — 73200 CT UPPER EXTREMITY W/O DYE: CPT | Mod: 26,RT,, | Performed by: RADIOLOGY

## 2022-03-31 ENCOUNTER — DOCUMENTATION ONLY (OUTPATIENT)
Dept: ORTHOPEDICS | Facility: CLINIC | Age: 72
End: 2022-03-31
Payer: MEDICARE

## 2022-04-01 ENCOUNTER — HOSPITAL ENCOUNTER (OUTPATIENT)
Dept: RADIOLOGY | Facility: OTHER | Age: 72
Discharge: HOME | End: 2022-04-01
Attending: PHYSICIAN ASSISTANT
Payer: MEDICARE

## 2022-04-01 ENCOUNTER — OFFICE VISIT (OUTPATIENT)
Dept: ORTHOPEDICS | Facility: CLINIC | Age: 72
End: 2022-04-01
Payer: MEDICARE

## 2022-04-01 VITALS — HEIGHT: 62 IN | WEIGHT: 144 LBS | BODY MASS INDEX: 26.5 KG/M2

## 2022-04-01 DIAGNOSIS — M25.632 DECREASED RANGE OF MOTION OF BOTH WRISTS: ICD-10-CM

## 2022-04-01 DIAGNOSIS — M25.631 DECREASED RANGE OF MOTION OF BOTH WRISTS: ICD-10-CM

## 2022-04-01 DIAGNOSIS — M25.531 PAIN IN BOTH WRISTS: ICD-10-CM

## 2022-04-01 DIAGNOSIS — M25.532 PAIN IN BOTH WRISTS: ICD-10-CM

## 2022-04-01 DIAGNOSIS — M25.642 DECREASED RANGE OF MOTION OF FINGER OF LEFT HAND: ICD-10-CM

## 2022-04-01 DIAGNOSIS — S42.401A OCCULT CLOSED FRACTURE OF RIGHT ELBOW, INITIAL ENCOUNTER: Primary | ICD-10-CM

## 2022-04-01 DIAGNOSIS — M65.30 TRIGGER FINGER OF LEFT HAND, UNSPECIFIED FINGER: ICD-10-CM

## 2022-04-01 PROCEDURE — 1160F PR REVIEW ALL MEDS BY PRESCRIBER/CLIN PHARMACIST DOCUMENTED: ICD-10-PCS | Mod: CPTII,S$GLB,, | Performed by: PHYSICIAN ASSISTANT

## 2022-04-01 PROCEDURE — 1125F AMNT PAIN NOTED PAIN PRSNT: CPT | Mod: CPTII,S$GLB,, | Performed by: PHYSICIAN ASSISTANT

## 2022-04-01 PROCEDURE — 3288F FALL RISK ASSESSMENT DOCD: CPT | Mod: CPTII,S$GLB,, | Performed by: PHYSICIAN ASSISTANT

## 2022-04-01 PROCEDURE — 99999 PR PBB SHADOW E&M-EST. PATIENT-LVL III: ICD-10-PCS | Mod: PBBFAC,,, | Performed by: PHYSICIAN ASSISTANT

## 2022-04-01 PROCEDURE — 3288F PR FALLS RISK ASSESSMENT DOCUMENTED: ICD-10-PCS | Mod: CPTII,S$GLB,, | Performed by: PHYSICIAN ASSISTANT

## 2022-04-01 PROCEDURE — 99999 PR PBB SHADOW E&M-EST. PATIENT-LVL III: CPT | Mod: PBBFAC,,, | Performed by: PHYSICIAN ASSISTANT

## 2022-04-01 PROCEDURE — 3008F PR BODY MASS INDEX (BMI) DOCUMENTED: ICD-10-PCS | Mod: CPTII,S$GLB,, | Performed by: PHYSICIAN ASSISTANT

## 2022-04-01 PROCEDURE — 3008F BODY MASS INDEX DOCD: CPT | Mod: CPTII,S$GLB,, | Performed by: PHYSICIAN ASSISTANT

## 2022-04-01 PROCEDURE — 1101F PT FALLS ASSESS-DOCD LE1/YR: CPT | Mod: CPTII,S$GLB,, | Performed by: PHYSICIAN ASSISTANT

## 2022-04-01 PROCEDURE — 3072F PR LOW RISK FOR RETINOPATHY: ICD-10-PCS | Mod: CPTII,S$GLB,, | Performed by: PHYSICIAN ASSISTANT

## 2022-04-01 PROCEDURE — 99214 PR OFFICE/OUTPT VISIT, EST, LEVL IV, 30-39 MIN: ICD-10-PCS | Mod: S$GLB,,, | Performed by: PHYSICIAN ASSISTANT

## 2022-04-01 PROCEDURE — 1159F PR MEDICATION LIST DOCUMENTED IN MEDICAL RECORD: ICD-10-PCS | Mod: CPTII,S$GLB,, | Performed by: PHYSICIAN ASSISTANT

## 2022-04-01 PROCEDURE — 73110 XR WRIST COMPLETE 3 VIEWS LEFT: ICD-10-PCS | Mod: 26,LT,, | Performed by: RADIOLOGY

## 2022-04-01 PROCEDURE — 73130 X-RAY EXAM OF HAND: CPT | Mod: TC,FY,LT

## 2022-04-01 PROCEDURE — 99214 OFFICE O/P EST MOD 30 MIN: CPT | Mod: S$GLB,,, | Performed by: PHYSICIAN ASSISTANT

## 2022-04-01 PROCEDURE — 3072F LOW RISK FOR RETINOPATHY: CPT | Mod: CPTII,S$GLB,, | Performed by: PHYSICIAN ASSISTANT

## 2022-04-01 PROCEDURE — 1125F PR PAIN SEVERITY QUANTIFIED, PAIN PRESENT: ICD-10-PCS | Mod: CPTII,S$GLB,, | Performed by: PHYSICIAN ASSISTANT

## 2022-04-01 PROCEDURE — 1101F PR PT FALLS ASSESS DOC 0-1 FALLS W/OUT INJ PAST YR: ICD-10-PCS | Mod: CPTII,S$GLB,, | Performed by: PHYSICIAN ASSISTANT

## 2022-04-01 PROCEDURE — 1160F RVW MEDS BY RX/DR IN RCRD: CPT | Mod: CPTII,S$GLB,, | Performed by: PHYSICIAN ASSISTANT

## 2022-04-01 PROCEDURE — 73130 XR HAND COMPLETE 3 VIEW LEFT: ICD-10-PCS | Mod: 26,LT,, | Performed by: RADIOLOGY

## 2022-04-01 PROCEDURE — 1159F MED LIST DOCD IN RCRD: CPT | Mod: CPTII,S$GLB,, | Performed by: PHYSICIAN ASSISTANT

## 2022-04-01 PROCEDURE — 73110 X-RAY EXAM OF WRIST: CPT | Mod: 26,LT,, | Performed by: RADIOLOGY

## 2022-04-01 PROCEDURE — 73130 X-RAY EXAM OF HAND: CPT | Mod: 26,LT,, | Performed by: RADIOLOGY

## 2022-04-01 PROCEDURE — 73110 X-RAY EXAM OF WRIST: CPT | Mod: TC,FY,LT

## 2022-04-01 NOTE — PROGRESS NOTES
Subjective:      Patient ID: Zohra Paulino is a 72 y.o. female.    Chief Complaint: Pain of the Right Elbow and Pain of the Left Hand      HPI  Zohra Paulino is a right hand dominant 72 y.o. female, with hx of Pancreatic Cancer and DM, presenting today for follow-up of possible right radial head fracture.  CT scan showed probable nondisplaced right radial head fracture.  She reports that her right elbow and wrist pain have improved.  She has been using a right wrist brace and a sling for the right elbow.  She reports that pain in the left hand and wrist as well as decreased motion is most bothersome today.    She was seen in the ED the [facility] 3/18/2022. There was a history of trauma - reports a trip and fall, landing on the right elbow.  Injury occurred 3/18/2022. She reports that pain is improved with use of naproxen.   She has a 1 year history of pain and decreased motion in the left upper extremity.  She reports difficulty making a full fist on the left, pain in the hand and wrist as well as pain in the left shoulder.  She denies any finger numbness or tingling.          Review of patient's allergies indicates:   Allergen Reactions    Codeine Palpitations         Current Outpatient Medications   Medication Sig Dispense Refill    alendronate (FOSAMAX) 40 mg tablet Take 1 tablet (40 mg total) by mouth once a week. 12 tablet 3    iron bis-gly/FA/C/B12/Ca/succ (IRON-150 ORAL) Take by mouth once daily.      LIDOcaine-prilocaine (EMLA) cream APPLY DIME SIZED AMOUNT OVER PORT 1 HOUR PRIOR TO USING      naproxen (NAPROSYN) 500 MG tablet Take 1 tablet (500 mg total) by mouth 2 (two) times daily with meals. 60 tablet 0    potassium chloride (MICRO-K) 10 MEQ CpSR Take 10 mEq by mouth once daily.      vitamin D 1000 units Tab Take 1,000 Units by mouth once daily.      ACCU-CHEK GENO CONTROL SOLN Soln       BD ALCOHOL SWABS PadM       triamcinolone acetonide 0.025 % Lotn AAA scalp qod 1 Bottle 0  "   TRUE METRIX AIR GLUCOSE METER Misc       TRUE METRIX PRO TEST STRIP Strp       TRUEPLUS LANCETS 28 gauge Misc        No current facility-administered medications for this visit.     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/2021    Osteopetrosis     Uterine fibroid        Past Surgical History:   Procedure Laterality Date    CERVICAL BIOPSY  W/ LOOP ELECTRODE EXCISION      [facility]  2004    COLONOSCOPY      DISTAL PANCREATECTOMY N/A 7/27/2020    Procedure: PANCREATECTOMY, DISTAL, SUBTOTAL;  Surgeon: GILMA Ellis MD;  Location: [facility] OR;  Service: General;  Laterality: N/A;    ERCP N/A 8/19/2020    Procedure: ERCP (ENDOSCOPIC RETROGRADE CHOLANGIOPANCREATOGRAPHY);  Surgeon: Deion Ivory MD;  Location: [facility] ENDO;  Service: Endoscopy;  Laterality: N/A;  pancreatic duct leak  Rapid Covid test    ERCP N/A 9/16/2020    Procedure: ERCP (ENDOSCOPIC RETROGRADE CHOLANGIOPANCREATOGRAPHY);  Surgeon: Deion Ivory MD;  Location: [facility] ENDO;  Service: Endoscopy;  Laterality: N/A;    HYSTERECTOMY      fibroids    ND REMOVAL OF OVARY/TUBE(S)      TUBAL LIGATION           Review of Systems:  ROS:  Constitutional: no fever or chills  Skin: no rash or suspicious lesions  Musculoskeletal: See HPI.   Neurological: no headaches, lightheadedness, or dizziness. No numbness or tingling  Psychological/behavioral: no anxiety or depression      OBJECTIVE:     PHYSICAL EXAM:  Height: 5' 2" (157.5 cm) Weight: 65.3 kg (144 lb)  Vitals:    04/01/22 1446   Weight: 65.3 kg " "(144 lb)   Height: 5' 2" (1.575 m)   PainSc:   4     Vitals reviewed.  Constitutional: NAD. Patient appears well-developed and well-nourished.   HENT:   Head: Normocephalic and atraumatic.   Neck: Normal range of motion.   Cardiovascular: Normal rate.    Pulmonary/Chest: Effort normal. No respiratory distress.   Neurological: Patient is awake, alert, oriented.   Psychiatric: Patient has a normal mood and affect. Behavior is normal. Judgment and thought content normal.  Musculoskeletal:  No lacerations or abrasions, no scars.  No ecchymosis appreciated.  Mild tenderness to palpation right dorsal wrist and at the A1 pulleys of the left fingers.  Nontender to palpation at the right elbow today. On the right good finger and wrist range of motion, she does report slight discomfort with wrist hyperflexion.  Right elbow flexion and extension are improved, nearly full flexion, lacking approximately 20° of extension.  Good pronation and supination.  On the left, she has decreased finger flexion, difficulty making a full composite fist.  Palpable thickening at the A1 pulleys of the left ring and long fingers, pain with passive motion.  No discrete triggering noted on exam. She also has decreased hyperextension and hyperflexion of the wrist, reports feeling stiffness and mild discomfort with attempted motion.  Neurovascularly intact-good sensation and motor function, good capillary refill, 2+ radial pulses.    RADIOGRAPHS:  Right elbow CT, 3/25/2022  FINDINGS:  There is generalized bone demineralization.  The elbow joint is congruent with mild bicompartmental marginal osteoarthritic spurring.  There is subtle radiolucency traversing the radial neck which may reflect a tiny fracture with nutrient foramen also considered.  No articular incongruity or articular surface depression.  There is an elbow joint effusion.     There is some periarticular soft tissue swelling.  Calcification at the origin of the common flexor and extensor " tendons/medial and lateral humeral epicondyle noted.     Impression:  Probable tiny nondisplaced nondepressed radial head fracture  Mild bicompartmental osteoarthrosis    Right elbow x-ray, 3/23/2022  FINDINGS:  No acute fracture or dislocation seen.  Elbow joint effusion is decreased in volume. No associated soft tissue edema.  Impression:  Elbow joint effusion is decreased in volume.  No definite fracture.  Occult fracture, likely of the radial head, remains a possibility.    Right wrist x-ray, 3/23/2022  FINDINGS:  The position alignment is satisfactory and unchanged as compared to the previous study.  Mild DJD.  No definite acute fractures noted   Impression:  See above    Left wrist XRay, 4/1/2022  FINDINGS:  The bones are intact.  There is no evidence for acute fracture or bone destruction.  There are degenerative changes which are most pronounced at the triscaphe joint and at the left 1st carpometacarpal joint.  No bony erosions are identified.  Soft tissues are unremarkable.     Impression:  No evidence for acute fracture, bone destruction, or dislocation.  Degenerative changes particularly at the triscaphe joint and at the left 1st carpometacarpal joint.    Left hand XRay, 4/1/2022  FINDINGS:  The bones are intact.  There is no evidence for acute fracture or bone destruction.  There is no evidence for dislocation.  There are degenerative changes particularly at the triscaphe joint and 1st carpometacarpal joint.  No erosive changes are identified.  Soft tissues are unremarkable.     Impression:  No evidence for acute fracture, bone destruction, or dislocation.  Degenerative changes.    Comments: I have personally reviewed the imaging and I agree with the above radiologist's report.        ASSESSMENT/PLAN:   Zohra was seen today for pain and pain.    Diagnoses and all orders for this visit:    Occult closed fracture of right elbow, initial encounter  -     Ambulatory referral/consult to Physical/Occupational  Therapy; Future  -     X-Ray Elbow Complete Right; Future    Trigger finger of left hand, unspecified finger  -     Ambulatory referral/consult to Physical/Occupational Therapy; Future    Pain in both wrists    Decreased range of motion of both wrists  -     Ambulatory referral/consult to Physical/Occupational Therapy; Future           - We talked at length about the anatomy and pathophysiology of   Encounter Diagnoses   Name Primary?    Occult closed fracture of right elbow, initial encounter Yes    Trigger finger of left hand, unspecified finger     Pain in both wrists     Decreased range of motion of both wrists        - CT scan reviewed with the patient, discussed probable radial head fracture.  Continue conservative treatment with full-time in the sling at this time, will wean out over the next few weeks.  OT orders placed.    - can discontinue use of the wrist brace as tolerated  - patient has chronic left hand and wrist pain and decreased motion.  Discussed x-ray findings of LA, discussed possible trigger fingers affecting patient's motion.  Discussed conservative treatment options.  She is not interested in steroid injection.  Will address with OT.  - follow-up in 2 weeks with repeat x-ray  - call with questions or concerns    Disclaimer: This note has been generated using voice-recognition software. There may be typographical errors that have been missed during proof-reading.

## 2022-04-11 ENCOUNTER — TELEPHONE (OUTPATIENT)
Dept: NEUROLOGY | Facility: HOSPITAL | Age: 72
End: 2022-04-11
Payer: MEDICARE

## 2022-04-11 NOTE — TELEPHONE ENCOUNTER
----- Message from Nia Trimble sent at 4/11/2022 10:44 AM CDT -----  Contact: [phone]  Type:  Needs Medical Advice    Who Called: Pt  Reason for call: regarding Dates of service with the Dr for her Insurance Company  Would the patient rather a call back or a response via [facility]?  Call back  Best Call Back Number: [phone]

## 2022-04-12 NOTE — TELEPHONE ENCOUNTER
Spoke with patient, states she needs copies of the bills for specific dates of service. Educated patient that she will need to follow up with the billing dept. Patient was given contact information, and verbalized her understanding. All questions were answered at this time.

## 2022-04-12 NOTE — TELEPHONE ENCOUNTER
----- Message from Ana Finney sent at 4/12/2022  2:27 PM CDT -----  Contact: [phone]/ Self  JPB     Type: Requesting to speak with nurse    Who Called: Pt   Regarding: discuss a letter pt received from insurance company   Would the patient rather a call back or a response via [facility]? Call back  Best Call Back Number: [phone]  Additional Information: n/a

## 2022-04-19 ENCOUNTER — HOSPITAL ENCOUNTER (OUTPATIENT)
Dept: RADIOLOGY | Facility: HOSPITAL | Age: 72
Discharge: HOME | End: 2022-04-19
Attending: SURGERY
Payer: MEDICARE

## 2022-04-19 ENCOUNTER — OFFICE VISIT (OUTPATIENT)
Dept: NEUROLOGY | Facility: HOSPITAL | Age: 72
End: 2022-04-19
Attending: SURGERY
Payer: MEDICARE

## 2022-04-19 VITALS
HEART RATE: 84 BPM | WEIGHT: 151.88 LBS | DIASTOLIC BLOOD PRESSURE: 70 MMHG | BODY MASS INDEX: 27.95 KG/M2 | HEIGHT: 62 IN | SYSTOLIC BLOOD PRESSURE: 119 MMHG | RESPIRATION RATE: 18 BRPM | TEMPERATURE: 98 F

## 2022-04-19 DIAGNOSIS — C25.9 PRIMARY ADENOCARCINOMA OF PANCREAS: Primary | ICD-10-CM

## 2022-04-19 DIAGNOSIS — K76.9 LIVER LESION: ICD-10-CM

## 2022-04-19 DIAGNOSIS — C25.9 ADENOCARCINOMA OF PANCREAS: ICD-10-CM

## 2022-04-19 DIAGNOSIS — R93.5 ABNORMAL FINDINGS ON DIAGNOSTIC IMAGING OF OTHER ABDOMINAL REGIONS, INCLUDING RETROPERITONEUM: ICD-10-CM

## 2022-04-19 DIAGNOSIS — C25.1 MALIGNANT NEOPLASM OF BODY OF PANCREAS: ICD-10-CM

## 2022-04-19 DIAGNOSIS — C25.9 PRIMARY ADENOCARCINOMA OF PANCREAS: ICD-10-CM

## 2022-04-19 DIAGNOSIS — K43.2 INCISIONAL HERNIA, WITHOUT OBSTRUCTION OR GANGRENE: ICD-10-CM

## 2022-04-19 PROCEDURE — 74177 CT ABD & PELVIS W/CONTRAST: CPT | Mod: 26,,, | Performed by: RADIOLOGY

## 2022-04-19 PROCEDURE — 74177 CT ABD & PELVIS W/CONTRAST: CPT | Mod: TC

## 2022-04-19 PROCEDURE — 25500020 PHARM REV CODE 255: Performed by: SURGERY

## 2022-04-19 PROCEDURE — A9698 NON-RAD CONTRAST MATERIALNOC: HCPCS | Performed by: SURGERY

## 2022-04-19 PROCEDURE — 99214 OFFICE O/P EST MOD 30 MIN: CPT | Mod: 25 | Performed by: SURGERY

## 2022-04-19 PROCEDURE — 74177 CT ABDOMEN PELVIS WITH CONTRAST: ICD-10-PCS | Mod: 26,,, | Performed by: RADIOLOGY

## 2022-04-19 PROCEDURE — 78815 NM PET CT ROUTINE: ICD-10-PCS | Mod: 26,PS,, | Performed by: RADIOLOGY

## 2022-04-19 PROCEDURE — 78815 PET IMAGE W/CT SKULL-THIGH: CPT | Mod: 26,PS,, | Performed by: RADIOLOGY

## 2022-04-19 PROCEDURE — 78815 PET IMAGE W/CT SKULL-THIGH: CPT | Mod: PS,TC

## 2022-04-19 RX ORDER — ATORVASTATIN CALCIUM 40 MG/1
TABLET, FILM COATED ORAL
COMMUNITY
Start: 2022-01-15 | End: 2022-04-19

## 2022-04-19 RX ORDER — MELOXICAM 15 MG/1
TABLET ORAL
COMMUNITY
Start: 2022-01-29

## 2022-04-19 RX ORDER — COVID-19 MOLECULAR TEST ASSAY
KIT MISCELLANEOUS
COMMUNITY
Start: 2022-01-10

## 2022-04-19 RX ORDER — OMEPRAZOLE 20 MG/1
20 CAPSULE, DELAYED RELEASE ORAL EVERY 12 HOURS
COMMUNITY
Start: 2022-02-02

## 2022-04-19 RX ORDER — CETIRIZINE HYDROCHLORIDE 10 MG/1
10 TABLET ORAL
COMMUNITY

## 2022-04-19 RX ORDER — LANCETS
EACH MISCELLANEOUS
COMMUNITY
Start: 2022-03-02

## 2022-04-19 RX ORDER — DULOXETIN HYDROCHLORIDE 30 MG/1
30 CAPSULE, DELAYED RELEASE ORAL NIGHTLY
COMMUNITY
Start: 2021-12-28

## 2022-04-19 RX ORDER — CYCLOBENZAPRINE HCL 10 MG
TABLET ORAL
COMMUNITY
Start: 2021-12-27

## 2022-04-19 RX ORDER — NAPROXEN 500 MG/1
500 TABLET ORAL
COMMUNITY
Start: 2022-03-18 | End: 2022-04-19

## 2022-04-19 RX ADMIN — IOHEXOL 1000 ML: 9 SOLUTION ORAL at 10:04

## 2022-04-19 RX ADMIN — IOHEXOL 75 ML: 350 INJECTION, SOLUTION INTRAVENOUS at 11:04

## 2022-04-19 NOTE — PATIENT INSTRUCTIONS
Colonoscopy: needed with Dr. Pereira --- due ASAP    Cardiac Clearance: needed with Dr. Ba; whether ok to stop eliquis (given to pt, as she was going to his office when she left [place])    Return To Clinic: after above done to schedule for Robotic Hernia Repair        Tumor Markers: CA 19 9 monthly and send results to Dr Camacho /Kelli    Scans: FDG PET and CT Abd/Pelvis in 3 months  --- if still negative , can probably increase to Q4- 6 months if oncologist agrees.

## 2022-04-19 NOTE — PROGRESS NOTES
"NOLANETS:  [facility]  A collaboration between [facility] and [facility]      PATIENT: Zohra Paulino  MRN: [id number]  DATE: 4/19/2022    Subjective:      Chief Complaint: Follow up distal pancreatectomy for adenocarcinoma.  Complains of enlarging abdominal hernia and left-sided abdominal pain      Vitals: Blood pressure 119/70, pulse 84, temperature 98.4 °F (36.9 °C), temperature source Oral, resp. rate 18, height 5' 2" (1.575 m), weight 68.9 kg (151 lb 14.4 oz).       ECOG Score: 1 - Symptomatic but completely ambulatory    Diagnosis:   1. Primary adenocarcinoma of pancreas    2. Incisional hernia, without obstruction or gangrene         Interval History:  Had a recent fall and broke her elbow proximally 1 month ago has had cast removed.  Recently saw cardiologist who wants her to resume Eliquis for atrial fibrillation.  Complains of abdominal pain with enlarging abdominal hernia midline and left-sided abdominal pain worse at night.    Status post distal pancreatectomy for pancreatic adenocarcinoma.  Here for  follow-up.  Appetite excellent.  Energy level good. Biopsy negative. Repeat scans done. FDG avid area resolved, postop inflammatory changes. HELEN at present.      PATH 2/12/21:  Pancreatic resection bed, CT-guided core biopsy:   Core biopsies reveal fibrous tissue with acute and chronic inflammation,   histiocytic infiltrate and occasional giant cells   No evidence of malignancy   Appropriately controlled immunohistochemical stains are performed.  [id number]   highlights histiocytes.  AE1/AE3 is negat    Oncologic History:   Oncologic History Panc ductal adenocarcinoma- dx 6/2020   Oncologic Treatment  7/27 /2020- distal pancreatectomy (Rhonda)   Pathology 6/2020- FNA panc- ductal adenocarcinoma  7/2020-  pancreatic ductal adenocarcinoma with moderately differentiated  Pt2N0        1. Superior pancreatic " portahepatis node, biopsy:  No metastatic carcinoma identified in one lymph node (0/1), supported by negative immunostains for  AE1/AE3 with appropriate controls  2. Retropancreatic tissue, biopsy:  Neurovascular fibroconnective tissue  Negative for malignancy  3. 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, confined to the pancreas  Proximal and distal parenchymal margins negative for invasive carcinoma or high-grade intraepithelial  neoplasia, the tumor measures 1 cm from the proximal margin  Perineural invasion present  No lymphovascular invasion identified  No metastatic carcinoma identified in one peripancreatic lymph node (0/1)    Past Medical History:  Past Medical History:   Diagnosis Date    Bronchitis     Cancer     pancreatic    Diabetes mellitus     Fibroids     Nuclear sclerosis of both eyes 11/15/2021    Osteopetrosis     Uterine fibroid        Past Surgical History:  Past Surgical History:   Procedure Laterality Date    CERVICAL BIOPSY  W/ LOOP ELECTRODE EXCISION      [facility]  2004    COLONOSCOPY      DISTAL PANCREATECTOMY N/A 7/27/2020    Procedure: PANCREATECTOMY, DISTAL, SUBTOTAL;  Surgeon: GILMA Ellis MD;  Location: [facility] OR;  Service: General;  Laterality: N/A;    ERCP N/A 8/19/2020    Procedure: ERCP (ENDOSCOPIC RETROGRADE CHOLANGIOPANCREATOGRAPHY);  Surgeon: Deion Ivory MD;  Location: [facility] ENDO;  Service: Endoscopy;  Laterality: N/A;  pancreatic duct leak  Rapid Covid test    ERCP N/A 9/16/2020    Procedure: ERCP (ENDOSCOPIC RETROGRADE CHOLANGIOPANCREATOGRAPHY);  Surgeon: Deion Ivory MD;  Location: [facility] ENDO;  Service: Endoscopy;  Laterality: N/A;    HYSTERECTOMY      fibroids    HI REMOVAL OF OVARY/TUBE(S)      TUBAL LIGATION         Family History:  Family History   Problem Relation Age of Onset    Cataracts Mother     No Known Problems Father     Breast cancer Cousin 20    Arthritis Sister     Cataracts  Sister     No Known Problems Brother     No Known Problems Maternal Grandmother     No Known Problems Maternal Grandfather     Pancreatic cancer Paternal Grandmother 80    No Known Problems Paternal Grandfather     Diabetes Son     No Known Problems Son     No Known Problems Daughter     No Known Problems Daughter     No Known Problems Maternal Aunt     No Known Problems Maternal Uncle     No Known Problems Paternal Aunt     No Known Problems Paternal Uncle        Allergies:  Codeine    Medications:  Current Outpatient Medications   Medication Sig    ACCU-CHEK SOFTCLIX LANCETS Misc     alendronate (FOSAMAX) 40 mg tablet Take 1 tablet (40 mg total) by mouth once a week.    apixaban (ELIQUIS) 2.5 mg Tab Take 1 tablet by mouth 2 (two) times daily.    atorvastatin (LIPITOR) 40 MG tablet     cetirizine (ZYRTEC) 10 MG tablet Take 10 mg by mouth.    cyclobenzaprine (FLEXERIL) 10 MG tablet     DULoxetine (CYMBALTA) 30 MG capsule Take 30 mg by mouth every evening.    ID NOW COVID-19 TEST KIT Kit TEST AS DIRECTED TODAY    iron bis-gly/FA/C/B12/Ca/succ (IRON-150 ORAL) Take by mouth once daily.    LIDOcaine-prilocaine (EMLA) cream APPLY DIME SIZED AMOUNT OVER PORT 1 HOUR PRIOR TO USING    meloxicam (MOBIC) 15 MG tablet     naproxen (NAPROSYN) 500 MG tablet Take 1 tablet (500 mg total) by mouth 2 (two) times daily with meals.    omeprazole (PRILOSEC) 20 MG capsule Take 20 mg by mouth every 12 (twelve) hours.    potassium chloride (MICRO-K) 10 MEQ CpSR Take 10 mEq by mouth once daily.    vitamin D 1000 units Tab Take 1,000 Units by mouth once daily.     No current facility-administered medications for this visit.     Facility-Administered Medications Ordered in Other Visits   Medication    0.9%  NaCl infusion    sodium chloride 0.9% flush 10 mL        Review of Systems   Constitutional: Negative.  Negative for activity change, appetite change, chills, fatigue, fever and unexpected weight change.    HENT: Negative.  Negative for congestion, ear pain, rhinorrhea and sinus pressure.    Eyes: Negative.  Negative for photophobia, pain and redness.   Respiratory: Negative.  Negative for cough, chest tightness, shortness of breath and wheezing.    Cardiovascular: Negative for chest pain, palpitations and leg swelling.   Gastrointestinal: Negative.  Negative for abdominal distention, abdominal pain, anal bleeding, blood in stool, constipation, diarrhea, nausea, rectal pain and vomiting.   C/o abdominal pain and enlarging hernia, L sided abdominal pain at night.  Endocrine: Negative.  Negative for cold intolerance, heat intolerance, polydipsia, polyphagia and polyuria.   Genitourinary: Negative.  Negative for difficulty urinating, dysuria and frequency.   Musculoskeletal: Negative.  Negative for arthralgias, back pain, gait problem, myalgias, neck pain and neck stiffness.   Skin: Negative.  Negative for color change, pallor and rash.   Allergic/Immunologic: Negative.  Negative for environmental allergies, food allergies and immunocompromised state.   Neurological: Negative.  Negative for dizziness, seizures, syncope, weakness and light-headedness.   Hematological: Negative.  Negative for adenopathy. Does not bruise/bleed easily.   Psychiatric/Behavioral: Negative.  Negative for agitation, behavioral problems, confusion, decreased concentration, dysphoric mood, hallucinations, self-injury, sleep disturbance and suicidal ideas. The patient is not nervous/anxious and is not hyperactive.       Objective:      Physical Exam  Constitutional:       Appearance: She is well-developed.   HENT:      Head: Normocephalic and atraumatic.   Eyes:      Pupils: Pupils are equal, round, and reactive to light.   Neck:      Thyroid: No thyromegaly.   Cardiovascular:      Rate and Rhythm: Normal rate and regular rhythm.      Heart sounds: Normal heart sounds.   Pulmonary:      Effort: Pulmonary effort is normal. No respiratory distress.       Breath sounds: Normal breath sounds. No wheezing or rales.   Abdominal:      General: Bowel sounds are normal. There is no distension.      Palpations: Abdomen is soft. There is no mass.      Tenderness: There is no abdominal tenderness. There is no guarding or rebound.      Hernia: No hernia is present. There is no hernia in the ventral area.   Musculoskeletal:         General: No tenderness. Normal range of motion.      Cervical back: Normal range of motion and neck supple.   Skin:     General: Skin is warm and dry.      Coloration: Skin is not pale.      Findings: No erythema or rash.   Neurological:      Mental Status: She is alert and oriented to person, place, and time.      Cranial Nerves: No cranial nerve deficit.      Deep Tendon Reflexes: Reflexes are normal and symmetric.   Psychiatric:         Behavior: Behavior normal.         Thought Content: Thought content normal.        Assessment:       1. Primary adenocarcinoma of pancreas    2. Incisional hernia, without obstruction or gangrene        Laboratory Data:    Neuroendocrine Labs Latest Ref Rng & Units 8/4/2020   CA 19-9 2.0 - 40.0 U/mL    TSH 0.400 - 4.000 uIU/mL    WBC 3.90 - 12.70 K/uL 8.72   HGB 12.0 - 16.0 g/dL 10.5 (L)   HCT 37.0 - 48.5 % 32.6 (L)   PLATLETS 150 - 350 K/uL 298   GLUCOSE 70 - 110 mg/dL 111 (H)   BUN 8 - 23 mg/dL 10   CREATININE 0.5 - 1.4 mg/dL 1.4    - 145 mmol/L 140    3.5 - 5.1 mmol/L 3.7   CHLORIDE 95 - 110 mmol/L 102   CO2 23 - 29 mmol/L 29   CALCIUM 8.7 - 10.5 mg/dL 9.1   PROTEIN, TOTAL 6.0 - 8.4 g/dL 7.0   PHOSPHORUS 2.7 - 4.5 mg/dL 3.8   ALBUMIN 3.5 - 5.2 g/dL 3.2 (L)   URIC ACID 2.4 - 5.7 mg/dL    TOTAL BILIRUBIN 0.1 - 1.0 mg/dL 0.6   ALK PHOSPHATASE 55 - 135 U/L 346 (H)   SGOT (AST) 10 - 40 U/L 42 (H)   SGPT (ALT) 10 - 44 U/L 34   TRIGLYCERIDES 30 - 150 mg/dL    CHOLERSTEROL 120 - 199 mg/dL    HDL 40 - 75 mg/dL    LDL 63.0 - 159.0 mg/dL    MG 1.6 - 2.6 mg/dL 1.7   URINE AMYLASE U/L Not established U/L    24 HR  CREATININE CLEARANCE 15.0 - 325.0 mg/dL    HEMOGLOBIN A1C 4.5 - 6.2 %    Weight       Neuroendocrine Labs Latest Ref Rng & Units 6/4/2021   CA 19-9 2.0 - 40.0 U/mL 81.0 (H)      Ref Range & Units 4/1/22   CA 19-9 - [facility] <34 U/mL 49 High          Ref Range & Units 4/29/21 1/28/21 3 mo ago   CA 19-9 2.0 - 40.0 U/mL 67.0High   38.0  39.0                PREOP Ref Range & Units 7/2/20   CA 19-9 2.0 - 40.0 U/mL 3306.0High       CA 19-9      Ref Range & Units 1/28/21 10/27/2020 7/2/2020   CA 19-9 2.0 - 40.0 U/mL 39.0  178.0High   3306.0High     Resulting Agency      [facility]                                                                                                       9/24/21  CA 19-9 0.0 - 40.0 U/mL 63.2 High         Liver Biopsy 7/21/21:  Liver, biopsy:  Liver with cholestasis  No significant inflammation or steatosis  Negative for neoplasm or malignancy    PREOP:        9/24/21  NM PET CT Routine FDG  Narrative: EXAMINATION:  NM PET CT ROUTINE    CLINICAL HISTORY:  Restage; Malignant neoplasm of pancreas, unspecified    TECHNIQUE:  13.96 mCi of F18-FDG was administered intravenously in the left hand.  After an approximately 60 min distribution time, PET/CT images were acquired from the skull base to mid thigh.  Transmission images were acquired to correct for attenuation using a whole body low-dose CT scan without oral contrast and without IV contrast with the arms positioned above the head. Glycemia at the time of injection was 119 mg/dL.    COMPARISON:  FDG PET CT 01/04/2022 and 09/24/2021.    FINDINGS:  Quality of the study: Adequate.    In the head and neck, there are no hypermetabolic lesions worrisome for malignancy. There are no hypermetabolic mucosal lesions, and there are no pathologically enlarged or hypermetabolic lymph nodes.    In the chest, there are no hypermetabolic lesions worrisome for malignancy.  There are no concerning pulmonary nodules or masses, and there are no pathologically  enlarged or hypermetabolic lymph nodes.  Left chest wall port with tip terminating in the SVC.    In the abdomen and pelvis, there are postoperative changes of subtotal distal pancreatectomy.  No increased uptake along the suture margin to suggest local recurrence.  There is physiologic tracer distribution within the abdominal organs and excretion into the genitourinary system.  Short segment of more prominent uptake within the sigmoid colon.  There are numerous diverticuli within the region of increased uptake in throughout the sigmoid colon, however there is no adjacent fat stranding to suggest diverticulitis, therefore, finding is favored to represent physiologic uptake.  Previous 2 discrete hypermetabolic foci are no longer appreciated.  Midline fat containing hernia.    In the bones, there are no hypermetabolic lesions worrisome for malignancy.    In the extremities, there are no hypermetabolic lesions worrisome for malignancy.  Impression: 1. Postsurgical change of distal pancreatectomy within tissue PET-CT without evidence of local recurrent or metastatic disease.  I, Nicolas Sun MD, attest that I reviewed and interpreted the images.  .    Electronically signed by resident: Claire Maynard  Date:    04/19/2022  Time:    11:26    Electronically signed by: Nicolas Sun  Date:    04/19/2022  Time:    12:13      POSTOP CT 10/27/2020  In this patient with pathology proven adenocarcinoma of the pancreas, there is no definite evidence of hypermetabolic tumor.     Moderately hypermetabolic ill-defined fluid collection in the resection bed favored to be inflammatory or infectious in etiology.  Fat necrosis could have a similar appearance.  Recommend clinical correlation       FDG PET: 10/28/2020     In this patient with pathology proven adenocarcinoma of the pancreas, there is no definite evidence of hypermetabolic tumor.     Moderately hypermetabolic ill-defined fluid collection in the resection bed favored to be  inflammatory or infectious in etiology.  Fat necrosis could have a similar appearance.  Recommend clinical correlation    FDG PET 1/28/21  Impression:     1.  Evolving hypermetabolic findings in the pancreatectomy bed, shifting laterally and now appearing more organized as a masslike consolidation as well as a new 1 cm perigastric nodule concerning for malignancy.  Recommend correlation with tumor markers.     2.  No extra-abdominal findings concerning for malignancy.      FDG PET 4/19/22:    1. Postsurgical change of distal pancreatectomy within tissue PET-CT without evidence of local recurrent or metastatic disease.      CT 1/28/21:     At the distal pancreatectomy surgical bed, induration change/thickening posteriorly adjacent and partially involving the posterior stomach greater curvature which extends just anteroinferior to the spleen with central tubular region of low-density.  Overall findings concerning for component of recurrent/residual disease considering degree of corresponding uptake on same day PET.  Inflammatory/infectious component felt less likely, particularly in the lack of systemic signs or symptoms of infection.     Mid-abdominal nodular focus with corresponding tracer avidity at the level of the distal stomach concerning for metastasis.      FDG PET 4/29/21:  Evolving postsurgical changes status post pancreatectomy with interval resolution of abnormal radiotracer signal within the surgical bed and perigastric soft tissue density.  No abnormal radiotracer uptake on today's exam to suggest recurrent or metastatic disease.      Mri:6/4/21   Status post partial pancreatectomy.  The pancreatectomy bed appears normal.     Subcentimeter arterially enhancing lesion segment 4A of the liver, too small to characterize.  It could be benign or malignant.     Short-term follow-up advised.     There are no measurable lesions per RECIST criteria.    CT 6/4/21:     Status post partial pancreatectomy.  The  "pancreatectomy bed appears normal.     Small arterially enhancing lesion 1.0 cm, series 2, image 28, segment 4A of the liver is nonspecific.  It could be a small arterially enhancing arteriovenous or arterial portal vascular malformation.  A small metastases cannot be excluded.  To correlate with PET-CT and follow-up examination.     Diverticulosis coli.     Hysterectomy.     There are no measurable lesions per RECIST criteria.     This report was flagged in Epic as abnormal    CT 1/4/22:  Impression:     1. Stable postoperative changes.  No evidence of metastatic disease in the abdomen or pelvis.  2. Redemonstration of a ventral hernia containing short segments of small and large bowel.   \    FDG PET 1/4/22:     Postsurgical changes of distal pancreatectomy without FDG PET-CT evidence of recurrent or metastatic disease.     Two new non physiologic tracer avid foci in the rectosigmoid colon against a background of diverticulosis may indicate early diverticulitis.  Recommend clinical correlation with GI history and attention on follow-up.     Mild uptake in the distal right femur in keeping with red marrow conversion.       Impression:  Prior FDG positive area in  post distal pancreatectomy bed resolved. Prior Liver  " lesion " resolved and no longer seen. Negative biopsy.     Previously rising CA 19-9 not explained on recent FDG PET or CT. . Needs serial imaging.   Slightly elevated  Ca 19-9 is improved but no imageable disease. Today's CA 19-9 still pending  Final read on today's CT and FDG PET  No obvious recurrence on my exam  Incisional hernia, increasing in size, tender. Patient asks for repair    Plan:   C scope with dr Pereira  Cardiology clearance and whether ok to stop eliquis  RT to schedule robotic hernia repair      Recommend continue close follow up    Re-stage in 3 months with CT Abd and FDG PET scan.then if still negative , can probably increase to Q4- 6 months if oncologist agrees.   CA 19 9 " monthly and send results to Dr Camacho /Kelli  Copy  scans/labs to Dr Camacho     40 min total time review chart/data/biopsy/d/ew cooper, D/W IR, coordinate care    GILMA Ellis MD, FACS  Professor of Surgery, [facility]  Neuroendocrine Surgery, Hepatic/Pancreatic & General Surgery  [address], [address]  [place]  [address]  ph. [phone]; [phone]  fax. [phone]

## 2022-04-22 ENCOUNTER — CLINICAL SUPPORT (OUTPATIENT)
Dept: REHABILITATION | Facility: HOSPITAL | Age: 72
End: 2022-04-22
Attending: PHYSICIAN ASSISTANT
Payer: MEDICARE

## 2022-04-22 DIAGNOSIS — M25.631 DECREASED RANGE OF MOTION OF BOTH WRISTS: ICD-10-CM

## 2022-04-22 DIAGNOSIS — M65.30 TRIGGER FINGER OF LEFT HAND, UNSPECIFIED FINGER: ICD-10-CM

## 2022-04-22 DIAGNOSIS — Z78.9 ALTERATION IN INSTRUMENTAL ACTIVITIES OF DAILY LIVING (IADL): ICD-10-CM

## 2022-04-22 DIAGNOSIS — M25.632 DECREASED RANGE OF MOTION OF LEFT WRIST: ICD-10-CM

## 2022-04-22 DIAGNOSIS — M25.642 DECREASED RANGE OF MOTION OF FINGER OF LEFT HAND: ICD-10-CM

## 2022-04-22 DIAGNOSIS — R29.898 DECREASED GRIP STRENGTH OF LEFT HAND: ICD-10-CM

## 2022-04-22 DIAGNOSIS — R60.0 LOCALIZED EDEMA: ICD-10-CM

## 2022-04-22 DIAGNOSIS — M25.632 DECREASED RANGE OF MOTION OF BOTH WRISTS: ICD-10-CM

## 2022-04-22 DIAGNOSIS — S42.401A OCCULT CLOSED FRACTURE OF RIGHT ELBOW, INITIAL ENCOUNTER: ICD-10-CM

## 2022-04-22 DIAGNOSIS — S42.401A OCCULT CLOSED FRACTURE OF ELBOW, RIGHT, INITIAL ENCOUNTER: ICD-10-CM

## 2022-04-22 PROCEDURE — 97166 OT EVAL MOD COMPLEX 45 MIN: CPT | Mod: PN

## 2022-04-22 PROCEDURE — 97530 THERAPEUTIC ACTIVITIES: CPT | Mod: PN

## 2022-04-22 NOTE — PLAN OF CARE
[facility] Occupational Therapy   Hand/Wrist Evaluation      Patient: Zohra Paulino  Date of Evaluation: 04/22/2022  Referring Physician: Janeth Ward PA  Medical Diagnosis:   S42.401A (ICD-10-CM) - Occult closed fracture of right elbow, initial encounter   M65.30 (ICD-10-CM) - Trigger finger of left hand, unspecified finger   M25.631,M25.632 (ICD-10-CM) - Decreased range of motion of both wrists   Therapy Diagnosis:   Encounter Diagnoses   Name Primary?    Occult closed fracture of elbow, right, initial encounter     Trigger finger of left hand, unspecified finger     Decreased range of motion of both wrists     Occult closed fracture of right elbow, initial encounter     Decreased  strength of left hand     Decreased range of motion of left wrist     Alteration in instrumental activities of daily living (IADL)     Localized edema     Decreased range of motion of finger of left hand      Authorization Expiration Date: TBD  Total Visits Authorized: 1 for eval   Surgical Date/Procedure: Closed reduction for approx. 2 weeks   DOI: 3/18/22 FOOSH injury   Referral Orders: Eval and treat  Plan of Care Certification Period: 4/22/22-6/22/22   Progress Note Due: 5/22/22  FOTO: Initial evaluation     Visit #: 1/1; 6-12 visits on POC (including evaluation)  Start Time: 10:00  End Time: 10:45  Total Billable Time: 45 min    Precautions: standard, cancer   Orthopedic Precautions: Pt is currently 4 weeks post-injury (mild R radial head fracture)     Past Medical History:   Diagnosis Date    Bronchitis     Cancer     pancreatic    Diabetes mellitus     Fibroids     Nuclear sclerosis of both eyes 11/15/2021    Osteopetrosis     Uterine fibroid      Current Outpatient Medications   Medication Sig    ACCU-CHEK SOFTCLIX LANCETS Misc     alendronate (FOSAMAX) 40 mg tablet Take 1 tablet (40 mg total) by mouth once a week.    apixaban (ELIQUIS) 2.5 mg Tab Take 1 tablet by mouth 2  "(two) times daily.    cetirizine (ZYRTEC) 10 MG tablet Take 10 mg by mouth.    cyclobenzaprine (FLEXERIL) 10 MG tablet     DULoxetine (CYMBALTA) 30 MG capsule Take 30 mg by mouth every evening.    ID NOW COVID-19 TEST KIT Kit TEST AS DIRECTED TODAY    iron bis-gly/FA/C/B12/Ca/succ (IRON-150 ORAL) Take by mouth once daily.    LIDOcaine-prilocaine (EMLA) cream APPLY DIME SIZED AMOUNT OVER PORT 1 HOUR PRIOR TO USING    meloxicam (MOBIC) 15 MG tablet     naproxen (NAPROSYN) 500 MG tablet Take 1 tablet (500 mg total) by mouth 2 (two) times daily with meals.    omeprazole (PRILOSEC) 20 MG capsule Take 20 mg by mouth every 12 (twelve) hours.    potassium chloride (MICRO-K) 10 MEQ CpSR Take 10 mEq by mouth once daily.    vitamin D 1000 units Tab Take 1,000 Units by mouth once daily.     No current facility-administered medications for this visit.     Facility-Administered Medications Ordered in Other Visits   Medication    0.9%  NaCl infusion    sodium chloride 0.9% flush 10 mL     Review of patient's allergies indicates:   Allergen Reactions    Atorvastatin     Codeine Palpitations       Imaging Reports:  X-Ray Results: R hand 4/1/22  "FINDINGS:  The bones are intact.  There is no evidence for acute fracture or bone destruction.  There is no evidence for dislocation.  There are degenerative changes particularly at the triscaphe joint and 1st carpometacarpal joint.  No erosive changes are identified.  Soft tissues are unremarkable.  Impression:  No evidence for acute fracture, bone destruction, or dislocation."     CT Scan Results: R elbow 3/25/22   "FINDINGS:  There is generalized bone demineralization.  The elbow joint is congruent with mild bicompartmental marginal osteoarthritic spurring.  There is subtle radiolucency traversing the radial neck which may reflect a tiny fracture with nutrient foramen also considered.  No articular incongruity or articular surface depression.  There is an elbow joint " "effusion.  There is some periarticular soft tissue swelling.  Calcification at the origin of the common flexor and extensor tendons/medial and lateral humeral epicondyle noted.  Impression:  Probable tiny nondisplaced nondepressed radial head fracture  Mild bicompartmental osteoarthrosis. "        Subjective/Occupational Profile   Age: 72 y.o.  Sex: female    Zohra Paulino is a right-hand dominant female who presents to Outpatient Occupational Therapy for evaluation. Pt sustained a fall on 3/18/22 resulting in a mild R radial head fracture and subsequent wrist pain, presenting initially to [facility], where she was fitted for a soft cast, which was discontinued approx. 2 weeks later. In addition, Pt has history of L wrist pain & swelling/pain of her L long & ring fingers for approx. 2 years. Pt reports her R wrist pain has resolved within the past 2 weeks. Pt states, "It hurts to try and force my fingers closed on my L hand- the tip joints hurt the most."     Home/Environmental History: Pt resides w/ her son.     Assistance level to complete ADLs:   Feeding: Modified Independent   Bathing: Modified Independent   Toileting: Modified Independent   Ambulating: Independent   Grooming: Independent   Dressing upper body: Modified Independent   Dressing lower body: Modified Independent   Meal preparation: Modified Independent   Taking/Managing medications: Independent    Work History: Pt is retired     Driving Status: Pt is able to drive self with Mod I     Activity Level: Lightly active     Date/Mechanism of Injury: 3/18/22 FOOSH injury; Approx. 2 year history of L wrist pain and L long/ring finger pain and edema     Involved areas: right elbow; B volar wrists, L long & ring fingers     Functional Pain Scale Rating 0-10:   At Rest: Intermittent, L volar wrist, 8/10 at worse  With Activity: 8/10 intermittent     Zohra's goals for therapy are: Decreased pain, Increased functional use and Increased " strength      Objective     Hand dominance: right  Observation: Skin intact, Skin dry and Edema present  Sensation: Median Nerve Intact  Walsh Ralph Monofilament Test: No    Edema: Circumferential measurements: as follows:   Location: LUE    Middle Ring   Proximal Phalanx 6 cm  (R: 5.7 cm)  5.7 cm  (R: 5.7 cm)    PIP Joint 6 cm  (R: 6 cm)  5.5 cm  (R: 5.8 cm)    Middle Phalanx 5.2 cm  (R: 5.3 cm)    4.9 cm  (R: 5.1 cm)        Range of Motion: left active  (Ext/Flex) Middle Ring   MP 0*/80*  (R: 0*/85*) 0*/80*  (R: 0*/85*)   PIP 0*/100*  (R: 0*/100*) 0*/100*  (R:0*/100*)   DIP 0*/70*  (R: 0*/85*) 0*/70*  (R: 0*/85*)   SADLER 250*  (R: 270*) 250*  (R: 270*)     Wrist Flex/Ext: R) 85*/50*  L) 85*/45*  Wrist RD/UD: R) 25*/35*   L) 20*/30*    Elbow flexion/extension:           R) 128*/11*     L) 128*/11*  Forearm supination/pronation: R) 70*/85*       L) 70*/85*    Manual Muscle Test: deferred at this time   Muscle Strength     Strength: (HALLE Dynamometer in lbs.) Average 3 trials, Position II  Right: 28.8 lbs  Left: 15.8 lbs    Pinch Strength: (Pinch Gauge in lbs, Average 3 trials  Lateral/Francis Pinch       L) 3.7   R) 3.7  3pt Pinch                    L) 3.5   R) 5.2  2pt Pinch                    L) 3.6   R) 3.8      Problem List: functional limitations: Patient presents with the following functional limitations:   Self Care / ADL: dressing, bathing, opening containers   Work/Activities/Household management tasks: cooking, cleaning, driving      Treatment     Treatment Time In: 10:35 min   Treatment Time Out: 10:45 min   Total Treatment time (time-based codes) separate from Evaluation: 10 min     Home Exercise Program/Education:  Issued HEP (see patient instructions in EMR) and educated on modality use for pain management . Exercises were reviewed and Zohra was able to demonstrate them prior to the end of the session.   -Pt received a written copy of exercises to perform at home. Zhora demonstrated good   "understanding of the education provided.  Pt was advised to perform these exercises free of pain, and to stop performing them if pain occurs.    Patient/Family Education: role of OT, goals for OT, scheduling/cancellations - Pt verbalized understanding. Discussed insurance limitations with patient.    Outcome Measure: FOTO  CMS Impairment/Limitation/Restriction for FOTO  Upper Extremity Survey    Therapist reviewed FOTO scores for Zohra Paulino on 4/22/2022.   FOTO documents entered into EPIC - see Media section.    Limitation Score: 52%  Category: Self Care           History Examination Decision Making Complexity Score   Occupational Profile:   Pt's full occupational profile reviewed , including OD(surgical notes), including report of previous therapies.    Medical and Therapy History:     Please see "Plan" section for reference of therapy goals.    Pt with Hx/o  - See Subjective/Occupational Profile     Brief/expanded review:  Expanded              Performance Deficits     Physical    Please see under "problem list" and the assessment portion of this eval note      Cognitive  WNL      Psychosocial:    Pt with ability to work at this time using just one or both hand.    Rating: mild  Treatment to include :  paraffin, fluidotherapy, manual therapy/joint mobilizations,scar massage,   modalities for pain management, iontophoresis with dexamethasone, US 3mhz, therapeutic exercises/activities,        strengthening,       Clinical decision  Making of moderate  complexity, mild  modifications for required to complete eval      Rating:moderate Moderate in combination of the 3 categories      Problem List:   Decreased function of Right UE, Decreased function of Left UE, Decreased ROM, Increased pain, Decreased strength and Inability to perform work/tasks    Assessment   Zohra Paulino was referred to Outpatient Occupational Therapy with diagnosis of   Encounter Diagnoses   Name Primary?    Occult closed " fracture of elbow, right, initial encounter     Trigger finger of left hand, unspecified finger     Decreased range of motion of both wrists     Occult closed fracture of right elbow, initial encounter     Decreased  strength of left hand     Decreased range of motion of left wrist     Alteration in instrumental activities of daily living (IADL)     Localized edema     Decreased range of motion of finger of left hand     presents with the functional limitations as described in the problem list above. Patient can benefit from Outpatient Occupational Therapy services to maximize functional use of her BUE to facilitate ease of performance of ADLs and IADLs. The following goals were discussed with the Patient and she is in agreement with them as to be addressed in the treatment plan.     Anticipated barriers to Occupational Therapy: None noted     Pt has no cultural, educational or language barriers to learning provided.      ShortTerm Goals (to be met in 3 weeks or by 5/13/22):   1. Patient to be IND and compliant with HEP & attendance for duration of therapy.  2. Patient will demonstrate increased SADLER in her L long and ring fingers by 5-10 degrees to restore functional grasp with daily tasks.   3. Patient will demonstrate increased L hand  strength by 3-5 lbs. to improve functional grasp for ADLs/work/leisure activities.   4. Patient will report no more than 5/10 pain in L /wrist hand.   5. Patient will demonstrate increased L wrist extension, RD, and UD by 5 degrees to facilitate functional hand use w/ daily tasks.       Long Term Goals (to be met in 6 weeks or by DC):   1. Patient to be IND and compliant with HEP & attendance for duration of therapy.  2. Patient will demonstrate increased SADLER in her L long and ring fingers by 10-20 degrees to restore functional grasp with daily tasks.   3. Patient will demonstrate increased L hand  strength by 5-7 lbs. to improve functional grasp for  ADLs/work/leisure activities.   4. Patient will demonstrate increased L 3 pt pinch by 1-2 lbs to restore IND with fine motor activities.  5. Patient will report increase in functional use of her L hand by 20%-30% as evidenced by the FOTO outcome measure.   6. Patient will report no more than 2/10 pain in L wrist/hand.   7. Patient will demonstrate 4+/5-5/5 strength via MMT in R wrist/forearm planes of motion.       Maddy Paulino is to be treated by Occupational Therapy 1-2 times per week for 6 weeks, or 6-12 visits, during the certification period from 4/22/22 to 6/22/22, to achieve the established goals.       Treatment to include: Paraffin/hot packs, manual therapy/joint mobilizations, modalities for pain management, edema control, joint protection, energy conservation, therapeutic exercises, therapeutic activities, and implementation of a personalized Home Exercise Program (HEP), as well as any other treatments deemed necessary based on the patient's needs or progress.       Thank you for allowing me to assist in the care of your Patient. If you have any questions or concerns, please don't hesitate to e-mail or contact me directly.      RAMSEY Peck MOT   [facility]   Phone: [phone]  Fax: [phone]      I certify the need for these services furnished under this plan of treatment and while under my care                                                                            Referring practitoner/provider       Date

## 2022-04-25 ENCOUNTER — TELEPHONE (OUTPATIENT)
Dept: REHABILITATION | Facility: HOSPITAL | Age: 72
End: 2022-04-25
Payer: MEDICARE

## 2022-04-27 ENCOUNTER — TELEPHONE (OUTPATIENT)
Dept: ORTHOPEDICS | Facility: CLINIC | Age: 72
End: 2022-04-27
Payer: MEDICARE

## 2022-04-29 ENCOUNTER — OFFICE VISIT (OUTPATIENT)
Dept: ORTHOPEDICS | Facility: CLINIC | Age: 72
End: 2022-04-29
Payer: MEDICARE

## 2022-04-29 ENCOUNTER — HOSPITAL ENCOUNTER (OUTPATIENT)
Dept: RADIOLOGY | Facility: OTHER | Age: 72
Discharge: HOME | End: 2022-04-29
Attending: PHYSICIAN ASSISTANT
Payer: MEDICARE

## 2022-04-29 VITALS
WEIGHT: 151 LBS | HEART RATE: 66 BPM | HEIGHT: 62 IN | BODY MASS INDEX: 27.79 KG/M2 | DIASTOLIC BLOOD PRESSURE: 81 MMHG | SYSTOLIC BLOOD PRESSURE: 156 MMHG

## 2022-04-29 DIAGNOSIS — S42.401A OCCULT CLOSED FRACTURE OF RIGHT ELBOW, INITIAL ENCOUNTER: Primary | ICD-10-CM

## 2022-04-29 DIAGNOSIS — G89.29 CHRONIC LEFT SHOULDER PAIN: ICD-10-CM

## 2022-04-29 DIAGNOSIS — S42.401A OCCULT CLOSED FRACTURE OF RIGHT ELBOW, INITIAL ENCOUNTER: ICD-10-CM

## 2022-04-29 DIAGNOSIS — M25.512 CHRONIC LEFT SHOULDER PAIN: ICD-10-CM

## 2022-04-29 PROCEDURE — 3072F LOW RISK FOR RETINOPATHY: CPT | Mod: CPTII,S$GLB,, | Performed by: PHYSICIAN ASSISTANT

## 2022-04-29 PROCEDURE — 3072F PR LOW RISK FOR RETINOPATHY: ICD-10-PCS | Mod: CPTII,S$GLB,, | Performed by: PHYSICIAN ASSISTANT

## 2022-04-29 PROCEDURE — 3077F SYST BP >= 140 MM HG: CPT | Mod: CPTII,S$GLB,, | Performed by: PHYSICIAN ASSISTANT

## 2022-04-29 PROCEDURE — 3008F BODY MASS INDEX DOCD: CPT | Mod: CPTII,S$GLB,, | Performed by: PHYSICIAN ASSISTANT

## 2022-04-29 PROCEDURE — 3079F PR MOST RECENT DIASTOLIC BLOOD PRESSURE 80-89 MM HG: ICD-10-PCS | Mod: CPTII,S$GLB,, | Performed by: PHYSICIAN ASSISTANT

## 2022-04-29 PROCEDURE — 3077F PR MOST RECENT SYSTOLIC BLOOD PRESSURE >= 140 MM HG: ICD-10-PCS | Mod: CPTII,S$GLB,, | Performed by: PHYSICIAN ASSISTANT

## 2022-04-29 PROCEDURE — 1101F PR PT FALLS ASSESS DOC 0-1 FALLS W/OUT INJ PAST YR: ICD-10-PCS | Mod: CPTII,S$GLB,, | Performed by: PHYSICIAN ASSISTANT

## 2022-04-29 PROCEDURE — 1101F PT FALLS ASSESS-DOCD LE1/YR: CPT | Mod: CPTII,S$GLB,, | Performed by: PHYSICIAN ASSISTANT

## 2022-04-29 PROCEDURE — 99213 OFFICE O/P EST LOW 20 MIN: CPT | Mod: S$GLB,,, | Performed by: PHYSICIAN ASSISTANT

## 2022-04-29 PROCEDURE — 73080 X-RAY EXAM OF ELBOW: CPT | Mod: 26,RT,, | Performed by: RADIOLOGY

## 2022-04-29 PROCEDURE — 1159F PR MEDICATION LIST DOCUMENTED IN MEDICAL RECORD: ICD-10-PCS | Mod: CPTII,S$GLB,, | Performed by: PHYSICIAN ASSISTANT

## 2022-04-29 PROCEDURE — 73080 X-RAY EXAM OF ELBOW: CPT | Mod: TC,FY,RT

## 2022-04-29 PROCEDURE — 99999 PR PBB SHADOW E&M-EST. PATIENT-LVL IV: ICD-10-PCS | Mod: PBBFAC,,, | Performed by: PHYSICIAN ASSISTANT

## 2022-04-29 PROCEDURE — 3288F PR FALLS RISK ASSESSMENT DOCUMENTED: ICD-10-PCS | Mod: CPTII,S$GLB,, | Performed by: PHYSICIAN ASSISTANT

## 2022-04-29 PROCEDURE — 1125F PR PAIN SEVERITY QUANTIFIED, PAIN PRESENT: ICD-10-PCS | Mod: CPTII,S$GLB,, | Performed by: PHYSICIAN ASSISTANT

## 2022-04-29 PROCEDURE — 1160F PR REVIEW ALL MEDS BY PRESCRIBER/CLIN PHARMACIST DOCUMENTED: ICD-10-PCS | Mod: CPTII,S$GLB,, | Performed by: PHYSICIAN ASSISTANT

## 2022-04-29 PROCEDURE — 99213 PR OFFICE/OUTPT VISIT, EST, LEVL III, 20-29 MIN: ICD-10-PCS | Mod: S$GLB,,, | Performed by: PHYSICIAN ASSISTANT

## 2022-04-29 PROCEDURE — 3288F FALL RISK ASSESSMENT DOCD: CPT | Mod: CPTII,S$GLB,, | Performed by: PHYSICIAN ASSISTANT

## 2022-04-29 PROCEDURE — 1125F AMNT PAIN NOTED PAIN PRSNT: CPT | Mod: CPTII,S$GLB,, | Performed by: PHYSICIAN ASSISTANT

## 2022-04-29 PROCEDURE — 99999 PR PBB SHADOW E&M-EST. PATIENT-LVL IV: CPT | Mod: PBBFAC,,, | Performed by: PHYSICIAN ASSISTANT

## 2022-04-29 PROCEDURE — 3079F DIAST BP 80-89 MM HG: CPT | Mod: CPTII,S$GLB,, | Performed by: PHYSICIAN ASSISTANT

## 2022-04-29 PROCEDURE — 3008F PR BODY MASS INDEX (BMI) DOCUMENTED: ICD-10-PCS | Mod: CPTII,S$GLB,, | Performed by: PHYSICIAN ASSISTANT

## 2022-04-29 PROCEDURE — 1159F MED LIST DOCD IN RCRD: CPT | Mod: CPTII,S$GLB,, | Performed by: PHYSICIAN ASSISTANT

## 2022-04-29 PROCEDURE — 1160F RVW MEDS BY RX/DR IN RCRD: CPT | Mod: CPTII,S$GLB,, | Performed by: PHYSICIAN ASSISTANT

## 2022-04-29 PROCEDURE — 73080 XR ELBOW COMPLETE 3 VIEW RIGHT: ICD-10-PCS | Mod: 26,RT,, | Performed by: RADIOLOGY

## 2022-04-29 NOTE — PROGRESS NOTES
Subjective:      Patient ID: Zohra Paulino is a 72 y.o. female.    Chief Complaint: Pain of the Right Elbow      HPI  Zohra Paulino is a right hand dominant 72 y.o. female, with hx of Pancreatic Cancer and DM, presenting today for follow-up of right radial head fracture. CT scan showed probable nondisplaced right radial head fracture.  She reports that her right elbow and wrist pain have improved.  She has started OT and has discontinued use of the sling.  She is trying to avoid lifting, reports right elbow discomfort with accidental light lifting.     She was seen in the ED the [facility] 3/18/2022. There was a history of trauma - reports a trip and fall, landing on the right elbow.  Injury occurred 3/18/2022.     She has a 1 year history of pain and decreased motion in the left upper extremity.  She reports difficulty making a full fist on the left, pain in the hand and wrist as well as pain in the left shoulder.  She denies any finger numbness or tingling.          Review of patient's allergies indicates:   Allergen Reactions    Atorvastatin     Codeine Palpitations         Current Outpatient Medications   Medication Sig Dispense Refill    alendronate (FOSAMAX) 40 mg tablet Take 1 tablet (40 mg total) by mouth once a week. 12 tablet 3    apixaban (ELIQUIS) 2.5 mg Tab Take 1 tablet by mouth 2 (two) times daily.      cetirizine (ZYRTEC) 10 MG tablet Take 10 mg by mouth.      cyclobenzaprine (FLEXERIL) 10 MG tablet       DULoxetine (CYMBALTA) 30 MG capsule Take 30 mg by mouth every evening.      ID NOW COVID-19 TEST KIT Kit TEST AS DIRECTED TODAY      iron bis-gly/FA/C/B12/Ca/succ (IRON-150 ORAL) Take by mouth once daily.      LIDOcaine-prilocaine (EMLA) cream APPLY DIME SIZED AMOUNT OVER PORT 1 HOUR PRIOR TO USING      meloxicam (MOBIC) 15 MG tablet       naproxen (NAPROSYN) 500 MG tablet Take 1 tablet (500 mg total) by mouth 2 (two) times daily with meals. 60 tablet 0    omeprazole  (PRILOSEC) 20 MG capsule Take 20 mg by mouth every 12 (twelve) hours.      potassium chloride (MICRO-K) 10 MEQ CpSR Take 10 mEq by mouth once daily.      vitamin D 1000 units Tab Take 1,000 Units by mouth once daily.      ACCU-CHEK SOFTCLIX LANCETS Misc        No current facility-administered medications for this visit.     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/2021    Osteopetrosis     Uterine fibroid        Past Surgical History:   Procedure Laterality Date    CERVICAL BIOPSY  W/ LOOP ELECTRODE EXCISION      [facility]  [facility]    COLONOSCOPY      DISTAL PANCREATECTOMY N/A 7/27/2020    Procedure: PANCREATECTOMY, DISTAL, SUBTOTAL;  Surgeon: GILMA Ellis MD;  Location: [facility] OR;  Service: General;  Laterality: N/A;    ERCP N/A 8/19/2020    Procedure: ERCP (ENDOSCOPIC RETROGRADE CHOLANGIOPANCREATOGRAPHY);  Surgeon: Deion Ivory MD;  Location: [facility] ENDO;  Service: Endoscopy;  Laterality: N/A;  pancreatic duct leak  Rapid Covid test    ERCP N/A 9/16/2020    Procedure: ERCP (ENDOSCOPIC RETROGRADE CHOLANGIOPANCREATOGRAPHY);  Surgeon: Deion vIory MD;  Location: [facility] ENDO;  Service: Endoscopy;  Laterality: N/A;    HYSTERECTOMY      fibroids    KS REMOVAL OF OVARY/TUBE(S)      TUBAL LIGATION           Review of Systems:  ROS:  Constitutional: no fever or chills  Skin: no rash or suspicious lesions  Musculoskeletal: See HPI.   Neurological: no headaches, lightheadedness, or dizziness. No numbness or tingling  Psychological/behavioral: no anxiety or  "depression      OBJECTIVE:     PHYSICAL EXAM:  Height: 5' 2" (157.5 cm) Weight: 68.5 kg (151 lb)  Vitals:    04/29/22 1450   BP: (!) 156/81   Pulse: 66   Weight: 68.5 kg (151 lb)   Height: 5' 2" (1.575 m)   PainSc:   4     Vitals reviewed.  Constitutional: NAD. Patient appears well-developed and well-nourished.   HENT:   Head: Normocephalic and atraumatic.   Neck: Normal range of motion.   Cardiovascular: Normal rate.    Pulmonary/Chest: Effort normal. No respiratory distress.   Neurological: Patient is awake, alert, oriented.   Psychiatric: Patient has a normal mood and affect. Behavior is normal. Judgment and thought content normal.  Musculoskeletal:  No lacerations or abrasions, no scars.  No ecchymosis appreciated.  Mild tenderness to palpation at the A1 pulleys of the left fingers.  Nontender to palpation at the right elbow today. On the right good finger and wrist range of motion.  Right elbow flexion and extension are improved, nearly full flexion, lacking approximately 10° of extension.  Good pronation and supination.  On the left, she has decreased finger flexion, difficulty making a full composite fist.  Palpable thickening at the A1 pulleys of the left ring and long fingers, pain with passive motion.  No discrete triggering noted on exam. She also has decreased hyperextension and hyperflexion of the wrist, reports feeling stiffness and mild discomfort with attempted motion.  Neurovascularly intact-good sensation and motor function, good capillary refill, 2+ radial pulses.    RADIOGRAPHS:  Right elbow x-ray, 4/29/2022  FINDINGS:  Previously identified cortical irregularity at the radial head is not confidently identified.  There appears to be resolution of the previously identified elbow joint effusion.  No new fractures.     Impression:  No definite visualization of the presumed nondisplaced radial head fracture.    Right elbow CT, 3/25/2022  FINDINGS:  There is generalized bone demineralization.  The " elbow joint is congruent with mild bicompartmental marginal osteoarthritic spurring.  There is subtle radiolucency traversing the radial neck which may reflect a tiny fracture with nutrient foramen also considered.  No articular incongruity or articular surface depression.  There is an elbow joint effusion.     There is some periarticular soft tissue swelling.  Calcification at the origin of the common flexor and extensor tendons/medial and lateral humeral epicondyle noted.     Impression:  Probable tiny nondisplaced nondepressed radial head fracture  Mild bicompartmental osteoarthrosis      Right wrist x-ray, 3/23/2022  FINDINGS:  The position alignment is satisfactory and unchanged as compared to the previous study.  Mild DJD.  No definite acute fractures noted   Impression:  See above    Left wrist XRay, 4/1/2022  FINDINGS:  The bones are intact.  There is no evidence for acute fracture or bone destruction.  There are degenerative changes which are most pronounced at the triscaphe joint and at the left 1st carpometacarpal joint.  No bony erosions are identified.  Soft tissues are unremarkable.     Impression:  No evidence for acute fracture, bone destruction, or dislocation.  Degenerative changes particularly at the triscaphe joint and at the left 1st carpometacarpal joint.    Left hand XRay, 4/1/2022  FINDINGS:  The bones are intact.  There is no evidence for acute fracture or bone destruction.  There is no evidence for dislocation.  There are degenerative changes particularly at the triscaphe joint and 1st carpometacarpal joint.  No erosive changes are identified.  Soft tissues are unremarkable.     Impression:  No evidence for acute fracture, bone destruction, or dislocation.  Degenerative changes.    Comments: I have personally reviewed the imaging and I agree with the above radiologist's report.        ASSESSMENT/PLAN:   Zohra was seen today for pain.    Diagnoses and all orders for this visit:    Occult  closed fracture of right elbow, initial encounter  -     X-Ray Elbow Complete Right; Future    Chronic left shoulder pain  -     X-Ray Shoulder Trauma 3 view Left; Future           - We talked at length about the anatomy and pathophysiology of   Encounter Diagnoses   Name Primary?    Occult closed fracture of right elbow, initial encounter Yes    Chronic left shoulder pain        - Continue conservative treatment with OT and lifting limitations for right radial head  - patient has chronic left hand and wrist pain and decreased motion.  Again discussed x-ray findings of OA, discussed possible trigger fingers affecting patient's motion.  Discussed conservative treatment options.  She is not interested in steroid injection.  Will address with OT.  - Patient also reports chronic shoulder pain, rafa order XRay and follow up at next visit  - follow-up in 4 weeks with repeat x-ray  - call with questions or concerns    Disclaimer: This note has been generated using voice-recognition software. There may be typographical errors that have been missed during proof-reading.

## 2022-05-02 NOTE — PROGRESS NOTES
[facility]  Occupational Therapy Treatment Note    Date: 5/4/2022  Name: Zohra Sun Washington  Clinic Number: [id number]    Therapy Diagnosis:   Encounter Diagnoses   Name Primary?    Decreased  strength of left hand Yes    Decreased range of motion of left wrist     Alteration in instrumental activities of daily living (IADL)     Localized edema     Decreased range of motion of finger of left hand      Physician: Janeth Ward PA    Medical Diagnosis:   S42.401A (ICD-10-CM) - Occult closed fracture of right elbow, initial encounter   M65.30 (ICD-10-CM) - Trigger finger of left hand, unspecified finger   M25.631,M25.632 (ICD-10-CM) - Decreased range of motion of both wrists     Surgical Date/Procedure: Closed reduction for approx. 2 weeks   DOI: 3/18/22 GHAZAL injury   Referral Orders: Eval and treat  Plan of Care Certification Period: 4/22/22-6/22/22   Progress Note Due: 5/22/22  FOTO: visit 4 or 5     Visit #: 1/1; 6-12 visits on POC (including evaluation)  Date of Return to MD: 5/27/22    Visit # / Visits authorized: 2 / 20  FOTO: 4 or 5th visit    Time In: 10:53 am  Time Out: 11:35 am  Total Billable Time: 42 minutes    Precautions:  Standard and cancer      SUBJECTIVE     Pt reports:  My fingers are more stiff then painful.  She was not compliant with home exercise program given last session.   Response to previous treatment: no adverse affects  Functional change: none reported    Pain: 7/10  stiffness  Location: left fingers      OBJECTIVE       Zohra received the following direct contact modalities after being cleared for contraindications for 8 minutes:  -Patient received paraffin bath to left  hand(s) for 8 minutes to increase blood flow, circulation, pain management and for tissue elasticity prior to therex.     Zohra received therapeutic exercises for 34 minutes including:  -passive stretch of long and ring fingers to composite flexion x 10 reps each  -isolated PIP  flexion of long and ring fingers x 10 reps  -fisting hand x 10 reps  -lg dowel instrinsic roll x 10 reps  -digi extensor yellow band x 1 min  -ergo gripper 1 yellow band x 3 min       Patient Education and Home Exercises      Education provided:   - Progress towards goals     Written Home Exercises Provided: yes.  Exercises were reviewed and Zohra was able to demonstrate them prior to the end of the session.  Zohra demonstrated good  understanding of the HEP provided. See EMR under Patient Instructions for exercises provided during therapy sessions.       Assessment     Pt would continue to benefit from skilled OT. Zohra tolerated therapy well with reports of improvement in flexibility of her left fingers post therapy. Some difficulty performing isolated exercise initially but improvement with reps.     Zohra is progressing well towards her goals and there are no updates to goals at this time. Pt prognosis is Good.     Pt will continue to benefit from skilled outpatient occupational therapy to address the deficits listed in the problem list on initial evaluation provide pt/family education and to maximize pt's level of independence in the home and community environment.     Pt's spiritual, cultural and educational needs considered and pt agreeable to plan of care and goals.    Anticipated barriers to occupational therapy: none noted    Goals:  ShortTerm Goals (to be met in 3 weeks or by 5/13/22):   1. Patient to be IND and compliant with HEP & attendance for duration of therapy.  2. Patient will demonstrate increased SADLER in her L long and ring fingers by 5-10 degrees to restore functional grasp with daily tasks.   3. Patient will demonstrate increased L hand  strength by 3-5 lbs. to improve functional grasp for ADLs/work/leisure activities.   4. Patient will report no more than 5/10 pain in L /wrist hand.   5. Patient will demonstrate increased L wrist extension, RD, and UD by 5 degrees to facilitate functional  hand use w/ daily tasks.         Long Term Goals (to be met in 6 weeks or by DC):   1. Patient to be IND and compliant with HEP & attendance for duration of therapy.  2. Patient will demonstrate increased SADLER in her L long and ring fingers by 10-20 degrees to restore functional grasp with daily tasks.   3. Patient will demonstrate increased L hand  strength by 5-7 lbs. to improve functional grasp for ADLs/work/leisure activities.   4. Patient will demonstrate increased L 3 pt pinch by 1-2 lbs to restore IND with fine motor activities.  5. Patient will report increase in functional use of her L hand by 20%-30% as evidenced by the FOTO outcome measure.   6. Patient will report no more than 2/10 pain in L wrist/hand.   7. Patient will demonstrate 4+/5-5/5 strength via MMT in R wrist/forearm planes of motion.     PLAN     Progress  resistive exercises for hand as tolerated.       RAMSEY Machado

## 2022-05-04 ENCOUNTER — TELEPHONE (OUTPATIENT)
Dept: NEUROLOGY | Facility: HOSPITAL | Age: 72
End: 2022-05-04
Payer: MEDICARE

## 2022-05-04 ENCOUNTER — CLINICAL SUPPORT (OUTPATIENT)
Dept: REHABILITATION | Facility: HOSPITAL | Age: 72
End: 2022-05-04
Attending: PHYSICIAN ASSISTANT
Payer: MEDICARE

## 2022-05-04 DIAGNOSIS — Z78.9 ALTERATION IN INSTRUMENTAL ACTIVITIES OF DAILY LIVING (IADL): ICD-10-CM

## 2022-05-04 DIAGNOSIS — R29.898 DECREASED GRIP STRENGTH OF LEFT HAND: Primary | ICD-10-CM

## 2022-05-04 DIAGNOSIS — M25.642 DECREASED RANGE OF MOTION OF FINGER OF LEFT HAND: ICD-10-CM

## 2022-05-04 DIAGNOSIS — M25.632 DECREASED RANGE OF MOTION OF LEFT WRIST: ICD-10-CM

## 2022-05-04 DIAGNOSIS — R60.0 LOCALIZED EDEMA: ICD-10-CM

## 2022-05-04 PROCEDURE — 97110 THERAPEUTIC EXERCISES: CPT | Mod: PN

## 2022-05-04 PROCEDURE — 97018 PARAFFIN BATH THERAPY: CPT | Mod: PN

## 2022-05-04 NOTE — PATIENT INSTRUCTIONS
"[facility] - OCCUPATIONAL THERAPY  HOME EXERCISE PROGRAM       Complete the following exercises with 10 repetitions each, 2x/day.       AROM: Isolated PIP Flexion  Bend only middle joint of your finger, keeping other fingers straight with other hand.    AROM: Isolated IPJ Flexion / Extension ("Hook")  Bend only your middle and end knuckles. Hold 3 seconds.   Straighten your fingers.     AROM: MCP and PIP Flexion / Extension ("Straight Fist")  Bend your bottom and middle knuckles, keeping the tips of your fingers straight. Try to touch the pads of your fingers on your palm. Hold 3 seconds. Straighten your fingers.     AROM: Composite Flexion / Extension ("Full Fist")  Bend every joint in your hand into a fist. Hold 3 seconds. Straighten your fingers.     Copyright © [employer]. All rights reserved.     Therapist: RAMSEY Machado   "

## 2022-05-04 NOTE — TELEPHONE ENCOUNTER
Spoke with patient, stated she still has not been to see Dr. Ba about obtaining Cardiac Clearance. She will reach out to their office on today. Fax request sent to Dr. Harris office to obtain cardiac clearance. All questions were answered at this time.

## 2022-05-05 ENCOUNTER — TELEPHONE (OUTPATIENT)
Dept: NEUROLOGY | Facility: HOSPITAL | Age: 72
End: 2022-05-05
Payer: MEDICARE

## 2022-05-05 NOTE — TELEPHONE ENCOUNTER
----- Message from Ana Finney sent at 5/4/2022  2:16 PM CDT -----  Contact: [phone]/ Self  JPB     Type: Requesting to speak with nurse    Who Called: Pt   Regarding: surgery clearance    Would the patient rather a call back or a response via [facility]? Call back  Best Call Back Number: [phone]  Additional Information: n/a

## 2022-05-05 NOTE — TELEPHONE ENCOUNTER
Spoke with patient, educated that we received cardiac clearance from Dr. Harris office. Scheduled patient for follow up visit to discuss hernia repair. All questions were answered at this time.

## 2022-05-10 NOTE — PROGRESS NOTES
[facility]  Occupational Therapy Progress / Treatment Note    Date: 5/11/2022  Name: Zohra Sun Washington  Clinic Number: [id number]    Therapy Diagnosis:   Encounter Diagnoses   Name Primary?    Decreased  strength of left hand Yes    Decreased range of motion of left wrist     Alteration in instrumental activities of daily living (IADL)     Localized edema     Decreased range of motion of finger of left hand      Physician: Janeth Ward PA    Medical Diagnosis:   S42.401A (ICD-10-CM) - Occult closed fracture of right elbow, initial encounter   M65.30 (ICD-10-CM) - Trigger finger of left hand, unspecified finger   M25.631,M25.632 (ICD-10-CM) - Decreased range of motion of both wrists     Surgical Date/Procedure: Closed reduction    DOI: 3/18/22 FOADRIEL injury   Referral Orders: Eval and treat  Plan of Care Certification Period: 4/22/22-6/22/22   Progress Note Due: 5/22/22  FOTO: visit 4 or 5     Visit #: 1/1; 6-12 visits on POC (including evaluation)  Date of Return to MD: 5/27/22    Visit # / Visits authorized: 3 / 20  FOTO: 4 or 5th visit    Time In: 12:19 pm  Time Out: 1:05 pm  Total Billable Time: 41 minutes    Precautions:  Standard and cancer      SUBJECTIVE     Pt reports: My fingers are feeling better(referring to her left hand).  She was compliant with home exercise program given last session.   Response to previous treatment: felt ok  Functional change: able to grab things with my left hand    Pain: 2-3/10  stiffness  Location: left fingers      OBJECTIVE       Zohra received the following direct contact modalities after being cleared for contraindications for 8 minutes:  -Patient received fluidotherapy to left hand(s) for 8 minutes to increase blood flow, circulation, desensitization, sensory re-education and for pain management.    Zohra received therapeutic exercises for 33  minutes including:    Reassessment:  Range of Motion: left active                                              4/22/22 5/11/22  (Ext/Flex) Middle Ring  Middle              ring    MP 0*/80*  (R: 0*/85*) 0*/80*  (R: 0*/85*) 0/77 0/73   PIP 0*/100*  (R: 0*/100*) 0*/100*  (R:0*/100*) 0/95 0/96   DIP 0*/70*  (R: 0*/85*) 0*/70*  (R: 0*/85*) 0/60 0/65   SALDER 250*  (R: 270*) 250*  (R: 270*) 232 234      Wrist Flex/Ext: 4/22/22 R) 85*/50*  L) 85*/45*                           5/11/22                    L) 45/60  Wrist RD/UD: 4/22/22 R) 25*/35*   L) 20*/30*                          5/11/22                     L) 20/20     Strength: (HALLE Dynamometer in lbs.) Average 3 trials, Position II           4/22/22 5/11/22  Right: 28.8 lbs  Left: 15.8 lbs           15.3#     Pinch Strength: (Pinch Gauge in lbs, Average 3 trials                                        4/22/22 5/11/22  Lateral/Francis Pinch       L) 3.7   R) 3.7      L) 4.1  3pt Pinch                    L) 3.5   R) 5.2      L) 4.0  2pt Pinch                    L) 3.6   R) 3.8      L) nt    -gentle passive stretch of left wrist x 10 reps                                        Of all fingers to composite flexion x 10 reps   -wrist extension from table top x 10                             Fisted hand from table top x 10 reps  -wrist ext/flexion off wedge x 10 reps            Deviations off wedge x 10 reps            Flex/extension off wedge x 10 rep  -isolated PIP flexion of long and ring fingers x 10 reps  -lg dowel instrinsic roll x 10 reps  -ergo gripper 1 yellow band x 4 min       Patient Education and Home Exercises      Education provided:   - Progress towards goals     Written Home Exercises Provided: continue with previously instructed HEP  Exercises were reviewed and Zohra was able to demonstrate them prior to the end of the session.  Zohra demonstrated good  understanding of the HEP provided. See EMR under Patient Instructions for exercises provided during therapy sessions.       Assessment     Pt  would continue to benefit from skilled OT. Zohra with some improvement in pinch strength as noted above.  Discrepancy with AROM noted possibly due to different therapist technique. Pt is reporting reduction in her wrist and hand pain.      Zohra is progressing well towards her goals and there are no updates to goals at this time. Pt prognosis is Good.     Pt will continue to benefit from skilled outpatient occupational therapy to address the deficits listed in the problem list on initial evaluation provide pt/family education and to maximize pt's level of independence in the home and community environment.     Pt's spiritual, cultural and educational needs considered and pt agreeable to plan of care and goals.    Anticipated barriers to occupational therapy: none noted    Goals:  ShortTerm Goals (to be met in 3 weeks or by 5/13/22):   1. Patient to be IND and compliant with HEP & attendance for duration of therapy.  2. Patient will demonstrate increased SADLER in her L long and ring fingers by 5-10 degrees to restore functional grasp with daily tasks- not met   3. Patient will demonstrate increased L hand  strength by 3-5 lbs. to improve functional grasp for ADLs/work/leisure activities- not met.   4. Patient will report no more than 5/10 pain in L /wrist hand- progressing.   5. Patient will demonstrate increased L wrist extension, RD, and UD by 5 degrees to facilitate functional hand use w/ daily tasks- not met.         Long Term Goals (to be met in 6 weeks or by DC):   1. Patient to be IND and compliant with HEP & attendance for duration of therapy.  2. Patient will demonstrate increased SADLER in her L long and ring fingers by 10-20 degrees to restore functional grasp with daily tasks  3. Patient will demonstrate increased L hand  strength by 5-7 lbs. to improve functional grasp for ADLs/work/leisure activities.   4. Patient will demonstrate increased L 3 pt pinch by 1-2 lbs to restore IND with fine motor  activities  5. Patient will report increase in functional use of her L hand by 20%-30% as evidenced by the FOTO outcome measure.  6. Patient will report no more than 2/10 pain in L wrist/hand.   7. Patient will demonstrate 4+/5-5/5 strength via MMT in R wrist/forearm planes of motion.     PLAN     Progress  resistive exercises for hand as tolerated.       RAMSEY Machado

## 2022-05-11 ENCOUNTER — CLINICAL SUPPORT (OUTPATIENT)
Dept: REHABILITATION | Facility: HOSPITAL | Age: 72
End: 2022-05-11
Attending: PHYSICIAN ASSISTANT
Payer: MEDICARE

## 2022-05-11 DIAGNOSIS — M25.632 DECREASED RANGE OF MOTION OF LEFT WRIST: ICD-10-CM

## 2022-05-11 DIAGNOSIS — R29.898 DECREASED GRIP STRENGTH OF LEFT HAND: Primary | ICD-10-CM

## 2022-05-11 DIAGNOSIS — R60.0 LOCALIZED EDEMA: ICD-10-CM

## 2022-05-11 DIAGNOSIS — Z78.9 ALTERATION IN INSTRUMENTAL ACTIVITIES OF DAILY LIVING (IADL): ICD-10-CM

## 2022-05-11 DIAGNOSIS — M25.642 DECREASED RANGE OF MOTION OF FINGER OF LEFT HAND: ICD-10-CM

## 2022-05-11 PROCEDURE — 97022 WHIRLPOOL THERAPY: CPT | Mod: PN

## 2022-05-11 PROCEDURE — 97110 THERAPEUTIC EXERCISES: CPT | Mod: PN

## 2022-05-12 ENCOUNTER — OFFICE VISIT (OUTPATIENT)
Dept: PRIMARY CARE CLINIC | Facility: CLINIC | Age: 72
End: 2022-05-12
Payer: MEDICARE

## 2022-05-12 VITALS
BODY MASS INDEX: 28.36 KG/M2 | HEIGHT: 62 IN | TEMPERATURE: 98 F | WEIGHT: 154.13 LBS | DIASTOLIC BLOOD PRESSURE: 74 MMHG | OXYGEN SATURATION: 96 % | SYSTOLIC BLOOD PRESSURE: 128 MMHG | RESPIRATION RATE: 18 BRPM | HEART RATE: 57 BPM

## 2022-05-12 DIAGNOSIS — E11.36 TYPE 2 DIABETES MELLITUS WITH DIABETIC CATARACT, WITHOUT LONG-TERM CURRENT USE OF INSULIN: ICD-10-CM

## 2022-05-12 DIAGNOSIS — S42.401S CLOSED FRACTURE OF RIGHT ELBOW, SEQUELA: ICD-10-CM

## 2022-05-12 DIAGNOSIS — C7B.8 OTHER SECONDARY NEUROENDOCRINE TUMORS: ICD-10-CM

## 2022-05-12 DIAGNOSIS — I51.9 LEFT VENTRICULAR DIASTOLIC DYSFUNCTION: ICD-10-CM

## 2022-05-12 DIAGNOSIS — C25.9 ADENOCARCINOMA OF PANCREAS: ICD-10-CM

## 2022-05-12 DIAGNOSIS — Z00.00 ROUTINE GENERAL MEDICAL EXAMINATION AT A HEALTH CARE FACILITY: Primary | ICD-10-CM

## 2022-05-12 DIAGNOSIS — Z90.710 S/P HYSTERECTOMY WITH OOPHORECTOMY: ICD-10-CM

## 2022-05-12 DIAGNOSIS — Z90.721 S/P HYSTERECTOMY WITH OOPHORECTOMY: ICD-10-CM

## 2022-05-12 DIAGNOSIS — I48.0 PAROXYSMAL A-FIB: ICD-10-CM

## 2022-05-12 DIAGNOSIS — H52.7 REFRACTIVE ERROR: ICD-10-CM

## 2022-05-12 DIAGNOSIS — M75.42 SUBACROMIAL IMPINGEMENT, LEFT: ICD-10-CM

## 2022-05-12 DIAGNOSIS — E78.1 HYPERTRIGLYCERIDEMIA WITHOUT HYPERCHOLESTEROLEMIA: ICD-10-CM

## 2022-05-12 DIAGNOSIS — H25.13 NUCLEAR SCLEROSIS OF BOTH EYES: ICD-10-CM

## 2022-05-12 DIAGNOSIS — E11.9 ENCOUNTER FOR DIABETIC FOOT EXAM: ICD-10-CM

## 2022-05-12 DIAGNOSIS — L66.9 SCARRING ALOPECIA: ICD-10-CM

## 2022-05-12 DIAGNOSIS — M67.912 DISORDER OF LEFT ROTATOR CUFF: ICD-10-CM

## 2022-05-12 DIAGNOSIS — D68.9 COAGULATION DEFECT, UNSPECIFIED: ICD-10-CM

## 2022-05-12 DIAGNOSIS — D61.810 ANTINEOPLASTIC CHEMOTHERAPY INDUCED PANCYTOPENIA (CODE): ICD-10-CM

## 2022-05-12 DIAGNOSIS — Z78.9 STATIN INTOLERANCE: ICD-10-CM

## 2022-05-12 DIAGNOSIS — M65.30 TRIGGER FINGER OF LEFT HAND, UNSPECIFIED FINGER: ICD-10-CM

## 2022-05-12 DIAGNOSIS — M85.89 OSTEOPENIA OF MULTIPLE SITES: ICD-10-CM

## 2022-05-12 PROCEDURE — 99999 PR PBB SHADOW E&M-EST. PATIENT-LVL IV: CPT | Mod: PBBFAC,,, | Performed by: FAMILY MEDICINE

## 2022-05-12 PROCEDURE — 1126F AMNT PAIN NOTED NONE PRSNT: CPT | Mod: CPTII,S$GLB,, | Performed by: FAMILY MEDICINE

## 2022-05-12 PROCEDURE — 1160F RVW MEDS BY RX/DR IN RCRD: CPT | Mod: CPTII,S$GLB,, | Performed by: FAMILY MEDICINE

## 2022-05-12 PROCEDURE — 3008F BODY MASS INDEX DOCD: CPT | Mod: CPTII,S$GLB,, | Performed by: FAMILY MEDICINE

## 2022-05-12 PROCEDURE — 1101F PR PT FALLS ASSESS DOC 0-1 FALLS W/OUT INJ PAST YR: ICD-10-PCS | Mod: CPTII,S$GLB,, | Performed by: FAMILY MEDICINE

## 2022-05-12 PROCEDURE — 3288F FALL RISK ASSESSMENT DOCD: CPT | Mod: CPTII,S$GLB,, | Performed by: FAMILY MEDICINE

## 2022-05-12 PROCEDURE — 99214 PR OFFICE/OUTPT VISIT, EST, LEVL IV, 30-39 MIN: ICD-10-PCS | Mod: S$GLB,,, | Performed by: FAMILY MEDICINE

## 2022-05-12 PROCEDURE — 3074F PR MOST RECENT SYSTOLIC BLOOD PRESSURE < 130 MM HG: ICD-10-PCS | Mod: CPTII,S$GLB,, | Performed by: FAMILY MEDICINE

## 2022-05-12 PROCEDURE — 1126F PR PAIN SEVERITY QUANTIFIED, NO PAIN PRESENT: ICD-10-PCS | Mod: CPTII,S$GLB,, | Performed by: FAMILY MEDICINE

## 2022-05-12 PROCEDURE — 3072F LOW RISK FOR RETINOPATHY: CPT | Mod: CPTII,S$GLB,, | Performed by: FAMILY MEDICINE

## 2022-05-12 PROCEDURE — 1159F MED LIST DOCD IN RCRD: CPT | Mod: CPTII,S$GLB,, | Performed by: FAMILY MEDICINE

## 2022-05-12 PROCEDURE — 3288F PR FALLS RISK ASSESSMENT DOCUMENTED: ICD-10-PCS | Mod: CPTII,S$GLB,, | Performed by: FAMILY MEDICINE

## 2022-05-12 PROCEDURE — 1159F PR MEDICATION LIST DOCUMENTED IN MEDICAL RECORD: ICD-10-PCS | Mod: CPTII,S$GLB,, | Performed by: FAMILY MEDICINE

## 2022-05-12 PROCEDURE — 99214 OFFICE O/P EST MOD 30 MIN: CPT | Mod: S$GLB,,, | Performed by: FAMILY MEDICINE

## 2022-05-12 PROCEDURE — 3078F PR MOST RECENT DIASTOLIC BLOOD PRESSURE < 80 MM HG: ICD-10-PCS | Mod: CPTII,S$GLB,, | Performed by: FAMILY MEDICINE

## 2022-05-12 PROCEDURE — 3008F PR BODY MASS INDEX (BMI) DOCUMENTED: ICD-10-PCS | Mod: CPTII,S$GLB,, | Performed by: FAMILY MEDICINE

## 2022-05-12 PROCEDURE — 3072F PR LOW RISK FOR RETINOPATHY: ICD-10-PCS | Mod: CPTII,S$GLB,, | Performed by: FAMILY MEDICINE

## 2022-05-12 PROCEDURE — 1101F PT FALLS ASSESS-DOCD LE1/YR: CPT | Mod: CPTII,S$GLB,, | Performed by: FAMILY MEDICINE

## 2022-05-12 PROCEDURE — 99999 PR PBB SHADOW E&M-EST. PATIENT-LVL IV: ICD-10-PCS | Mod: PBBFAC,,, | Performed by: FAMILY MEDICINE

## 2022-05-12 PROCEDURE — 3074F SYST BP LT 130 MM HG: CPT | Mod: CPTII,S$GLB,, | Performed by: FAMILY MEDICINE

## 2022-05-12 PROCEDURE — 3078F DIAST BP <80 MM HG: CPT | Mod: CPTII,S$GLB,, | Performed by: FAMILY MEDICINE

## 2022-05-12 PROCEDURE — 1160F PR REVIEW ALL MEDS BY PRESCRIBER/CLIN PHARMACIST DOCUMENTED: ICD-10-PCS | Mod: CPTII,S$GLB,, | Performed by: FAMILY MEDICINE

## 2022-05-12 NOTE — PROGRESS NOTES
Subjective:       Patient ID: Zohra Paulino is a 72 y.o. female.    Chief Complaint:  Annual Exam      73 yo female with a past medical history of EMB/D&C for thickened endometrium and increasing fibroid size in postmenopausal female requiring RATLH/BSO on 11/16/17; osteopenia but continues on alendronate for concern of fracture risk; seasonal allergies (patient denies, not using Claritin but takes zyrtec); diastolic dysfunction of left ventricle, primary adenocarcinoma of the pancreas diagnosed on CT June 2020 s/p partial pancreatectomy with spleen sparing, lymph node excision and venorrhaphy  July 2020; diabetes, hypertriglyceridemia, atrial fibrillation.    Follow up with Dr Pereira, cardiology with  privileges  Follow-up with Dr. Camacho oncologist at   Follow with endocrinologist Raegan Townsend: [address], [place] [address]; [phone]    She declines labs or any test. Declines vaccines.  Liver lesion and hx of pancreatic cancer- follows with Neuroendocrine team.  Alopecia- follows with dermatology.  Left shoulder pain- follows with Dr. Alejo  Trigger finger and right elbow fracture follows with hand specialist and does PT    The following portions of the patient's history were reviewed and updated as appropriate: allergies, current medications, past family history, past medical history, past social history, past surgical history and problem list.      Review of Systems   Constitutional: Negative for activity change, appetite change, chills, diaphoresis, fatigue, fever and unexpected weight change.   HENT: Negative for nasal congestion, dental problem, facial swelling, nosebleeds, postnasal drip, rhinorrhea, sore throat, tinnitus and trouble swallowing.    Eyes: Negative for pain, discharge, itching and visual disturbance.   Respiratory: Negative for apnea, chest tightness, shortness of breath, wheezing and stridor.    Cardiovascular: Negative for chest pain, palpitations and  "leg swelling.   Gastrointestinal: Negative for abdominal distention, abdominal pain, constipation, diarrhea, nausea, rectal pain and vomiting.   Endocrine: Negative for cold intolerance, heat intolerance and polyuria.   Genitourinary: Negative for difficulty urinating, dysuria, frequency, hematuria and urgency.   Musculoskeletal: Positive for arthralgias. Negative for gait problem, myalgias, neck pain and neck stiffness.   Integumentary:  Negative for color change and rash.   Neurological: Negative for dizziness, tremors, seizures, syncope, facial asymmetry, weakness and headaches.   Hematological: Negative for adenopathy. Does not bruise/bleed easily.   Psychiatric/Behavioral: Negative for agitation, confusion, hallucinations, self-injury and suicidal ideas. The patient is not hyperactive.           Objective:       Vitals:    05/12/22 0831   BP: 128/74   BP Location: Right arm   Patient Position: Sitting   BP Method: Medium (Manual)   Pulse: (!) 57   Resp: 18   Temp: 98 °F (36.7 °C)   SpO2: 96%   Weight: 69.9 kg (154 lb 1.6 oz)   Height: 5' 2" (1.575 m)     Physical Exam  Constitutional:       General: She is not in acute distress.     Appearance: She is well-developed. She is not diaphoretic.   HENT:      Head: Normocephalic and atraumatic.      Right Ear: External ear normal.      Left Ear: External ear normal.   Eyes:      General: No scleral icterus.        Right eye: No discharge.         Left eye: No discharge.      Conjunctiva/sclera: Conjunctivae normal.      Pupils: Pupils are equal, round, and reactive to light.   Neck:      Thyroid: No thyromegaly.   Cardiovascular:      Rate and Rhythm: Normal rate and regular rhythm.      Pulses:           Dorsalis pedis pulses are 2+ on the right side and 2+ on the left side.        Posterior tibial pulses are 2+ on the right side and 2+ on the left side.      Heart sounds: Normal heart sounds. No murmur heard.    No friction rub. No gallop.   Pulmonary:      Effort: " Pulmonary effort is normal. No respiratory distress.      Breath sounds: Normal breath sounds.   Chest:      Chest wall: No tenderness.   Abdominal:      General: Bowel sounds are normal. There is no distension.      Palpations: Abdomen is soft. There is no mass.      Tenderness: There is no abdominal tenderness. There is no guarding or rebound.   Musculoskeletal:      Cervical back: Normal range of motion and neck supple.      Right foot: Normal range of motion. No deformity, bunion, Charcot foot, foot drop or prominent metatarsal heads.      Left foot: Normal range of motion. No deformity, bunion, Charcot foot, foot drop or prominent metatarsal heads.      Comments: Left shoulder: No misalignment, no asymmetry, no crepitation, no defects, mild tenderness, no masses, no effusions, decreased range of motion to overhead movement, no instability, no atrophy or abnormal strength or tone in the left shoulder    Sensation grossly intact.     Feet:      Right foot:      Protective Sensation: 5 sites tested. 5 sites sensed.      Skin integrity: Dry skin present. No ulcer, blister, skin breakdown, erythema, warmth, callus or fissure.      Left foot:      Protective Sensation: 5 sites tested. 5 sites sensed.      Skin integrity: Dry skin present. No ulcer, blister, skin breakdown, erythema, warmth, callus or fissure.      Comments: Nailpolish on toenails.  Lymphadenopathy:      Cervical: No cervical adenopathy.   Skin:     General: Skin is warm.      Capillary Refill: Capillary refill takes less than 2 seconds.      Findings: No rash.   Neurological:      Mental Status: She is alert and oriented to person, place, and time.      Cranial Nerves: No cranial nerve deficit.      Motor: No abnormal muscle tone.      Coordination: Coordination normal.      Deep Tendon Reflexes: Reflexes normal.   Psychiatric:         Thought Content: Thought content normal.         Judgment: Judgment normal.         Assessment:       1. Routine  general medical examination at a health care facility    2. Disorder of left rotator cuff    3. Subacromial impingement, left    4. Closed fracture of right elbow, sequela    5. Trigger finger of left hand, unspecified finger    6. Nuclear sclerosis of both eyes    7. Refractive error    8. Scarring alopecia    9. Adenocarcinoma of pancreas    10. Other secondary neuroendocrine tumors    11. Antineoplastic chemotherapy induced pancytopenia (CODE)    12. Coagulation defect, unspecified    13. Paroxysmal A-fib    14. Left ventricular diastolic dysfunction    15. Hypertriglyceridemia without hypercholesterolemia    16. Statin intolerance    17. Type 2 diabetes mellitus with diabetic cataract, without long-term current use of insulin    18. Encounter for diabetic foot exam    19. Osteopenia of multiple sites    20. S/P hysterectomy with oophorectomy, RATLH/BSO 11/16/17        Plan:       1. Routine general medical examination at a health care facility  Age appropriate prevention guidelines implemented, unless patient refused  Labs: patient declined  Immunizations reviewed. Encourage yearly influenza vaccine.  Patient Counseling:  --Nutrition: Encourage healthy food choices, adequate water intake, moderate sodium/caffeine intake, diet low in saturated fat and cholesterol. Importance of caloric balance and sufficient intake of fresh fruits, vegetables, fiber, calcium, iron, and 1 mg of folate supplement per day (for females capable of pregnancy).  --Exercise: Stressed the importance of regular exercise.   --Substance Abuse: Abstinence from tobacco products. No more than 2 alcoholic drinks per day in men or 1 alcoholic drink per day in women. Avoid illicit drugs, driving or other dangerous activities under the influence. In the event of abuse, treatment offered.   --Sexuality: Safe sex practices to avoid sexually transmitted diseases; careful partner selection, use of condoms and contraceptive alternatives, avoidance of  unintended pregnancy in fertile women of child-bearing age  --Injury prevention: encourage safety belts, safety helmets, smoke detector.  --Dental health: Discussed importance of regular tooth brushing X2 daily, flossing X1 daily, and routine dental visits.  --Encourage use of sun screen, wear sun protective clothing  --Encourage routine eye exams    2. Disorder of left rotator cuff  3. Subacromial impingement, left  Managed conservatively. Follow with Dr. Alejo.    4. Closed fracture of right elbow, sequela  5. Trigger finger of left hand, unspecified finger  Follows with hand clinic    6. Nuclear sclerosis of both eyes  7. Refractive error  Follows with optometry    8. Scarring alopecia  Follows with dermatology    9. Adenocarcinoma of pancreas  10. Other secondary neuroendocrine tumors  Follows with neuroendocrine    11. Antineoplastic chemotherapy induced pancytopenia (CODE)  12. Coagulation defect, unspecified  Follows with Oncology    13. Paroxysmal A-fib  On Eliquis.   Used to take diltiazem    14. Left ventricular diastolic dysfunction  No signs or symptoms of fluid overload. Follows with cardiology.  BP on target    15. Hypertriglyceridemia without hypercholesterolemia  16. Statin intolerance  No longer on statin medication  Encourage good blood sugar control. Low fat food choices. Exercise.    17. Type 2 diabetes mellitus with diabetic cataract, without long-term current use of insulin  Not on pharmacotherapy. Declined labs    18. Encounter for diabetic foot exam   No sensory deficit by monofilament testing. No ulcers.    19. Osteopenia of multiple sites  High fracture risk  DEXA 2021: - on  alendronate (FOSAMAX) 40 mg tablet; Take 1 tablet (40 mg total) by mouth once a week.  Encouraged adequate calcium (dietary sources are the best) and vitamin-D    20. S/P hysterectomy with oophorectomy, SHYLA/JENNIFER 11/16/17      Disclaimer: This note has been generated using voice-recognition software. There may be  typographical errors that have been missed during proof-reading

## 2022-05-17 ENCOUNTER — OFFICE VISIT (OUTPATIENT)
Dept: NEUROLOGY | Facility: HOSPITAL | Age: 72
End: 2022-05-17
Attending: SURGERY
Payer: MEDICARE

## 2022-05-17 VITALS
BODY MASS INDEX: 28.24 KG/M2 | HEART RATE: 84 BPM | HEIGHT: 62 IN | DIASTOLIC BLOOD PRESSURE: 67 MMHG | SYSTOLIC BLOOD PRESSURE: 129 MMHG | OXYGEN SATURATION: 98 % | WEIGHT: 153.44 LBS

## 2022-05-17 DIAGNOSIS — K43.2 INCISIONAL HERNIA, WITHOUT OBSTRUCTION OR GANGRENE: Primary | ICD-10-CM

## 2022-05-17 PROCEDURE — 99215 OFFICE O/P EST HI 40 MIN: CPT | Performed by: SURGERY

## 2022-05-17 RX ORDER — VANCOMYCIN HCL IN 5 % DEXTROSE 1G/250ML
1000 PLASTIC BAG, INJECTION (ML) INTRAVENOUS
Status: CANCELLED | OUTPATIENT
Start: 2022-05-17

## 2022-05-17 RX ORDER — LIDOCAINE HYDROCHLORIDE 10 MG/ML
1 INJECTION, SOLUTION EPIDURAL; INFILTRATION; INTRACAUDAL; PERINEURAL ONCE
Status: CANCELLED | OUTPATIENT
Start: 2022-05-17 | End: 2022-05-17

## 2022-05-17 RX ORDER — FAMOTIDINE 10 MG/ML
20 INJECTION INTRAVENOUS
Status: CANCELLED | OUTPATIENT
Start: 2022-05-17

## 2022-05-17 RX ORDER — ONDANSETRON 2 MG/ML
8 INJECTION INTRAMUSCULAR; INTRAVENOUS
Status: CANCELLED | OUTPATIENT
Start: 2022-05-17

## 2022-05-17 RX ORDER — ENOXAPARIN SODIUM 100 MG/ML
30 INJECTION SUBCUTANEOUS
Status: CANCELLED | OUTPATIENT
Start: 2022-05-17

## 2022-05-17 NOTE — PATIENT INSTRUCTIONS
Patient Instructions    Name:   Zohra Paulino         Take a Hibiclens shower twice a day for 3 days prior to surgery, including the morning of surgery.  Gargle with Listerine twice a day for 2 days prior to surgery, including the morning of surgery.    Tues, 5/17/22  Appointment with Dr. Ellis    Pending, the pre-op dept will call to schedule  Pre [place] for Hospital Admission - Go to 1st floor of the Medical Office Building, [address].  You will do paper work, get blood drawn,  get x-rays and see Anesthesia at this time.    Sunday, May 22nd, 2022  Do not eat or drink anything after midnight.    Monday, May 23rd, 2022 (These times are tentative, as the surgery dept will call you the day before to confirm)  Hospital Admission for surgery.  Report to 2nd floor, Same Day Surgery desk at 5:30am  Surgery is scheduled for 7:00am        [facility] [place] [address] [place]  [address]  [phone]      INFORMATION REGARDING YOUR PROCEDURE      We look forward to serving you and your family and appreciate that you have chosen [facility] for your healthcare needs. Before, during, and after your surgery, you will be cared for by skilled medical professionals. Our surgeons, anesthesiologists, nurses,  and other healthcare professionals will work with you and your family to ensure a safe, smooth and comfortable surgery and recovery.    In order to best meet your pre-admission needs, your surgeon or ordering physicians office will contact our Scheduling Office to schedule a Pre-Procedure Appointment.  This should preferably be done 24 to 72 hours before your scheduled procedure date.     During your pre-procedure visit your insurance will be verified for your procedure. You will meet with a Registered Nurse and an Anesthesiologist or Nurse Anesthetist. If tests are required, they will be performed during your visit. Please allow about one and a half hours for this visit.       You will need to bring the following information or items with you to your Pre-Procedure Appointment:    1.  Picture Identification  2.  Insurance Card  3.  Current list of medications to include name and dosage  4.  List of allergies  5.  Orders and any other forms your doctor has given to you  6.  Copies of lab results performed at other facilities. This may include blood work, EKGs or chest xrays.   These results can be faxed to [phone].    The Pre-Op Center Registration Desk is located on the first floor of the medical office building ([address] [place] [address]) in the [facility] [address].      Free parking is located in the front of the hospital and medical office building and is easily accessed from [address] and [address].     The Outpatient Surgery desk at [phone] will call you after 2:00pm on the day before your procedure with your arrival time and updated procedure time.      Pre-Op Bathing Instructions    Before surgery, you can play an important role in your own health.    Because skin is not sterile, we need to be sure that your skin is as free of germs as possible. By following the instructions below, you can reduce the number of germs on your skin before surgery.    IMPORTANT: You will need to shower with a special soap called Hibiclens*, available at any pharmacy.  If you are allergic to Chlorhexidine (the antiseptic in Hibiclens), use an antibacterial soap such as Dial Soap for your preoperative shower.  You will shower with Hibiclens both the night before your surgery and the morning of your surgery.  Do not use Hibiclens on the head, face or genitals to avoid injury to those areas.    STEP #1: THE NIGHT BEFORE YOUR SURGERY     Do not shave the area of your body where your surgery will be performed.  Shower and wash your hair and body as usual with your normal soap and shampoo.  Rinse your hair and body thoroughly after you shower to remove all soap  residue.  With your hand, apply one packet of Hibiclens soap to the surgical site.   Wash the site gently for five (5) minutes. Do not scrub your skin too hard.   Do not wash with your regular soap after Hibiclens is used.  Rinse your body thoroughly.  Pat yourself dry with a clean, soft towel.  Do not use lotion, cream, or powder.  Wear clean clothes.    STEP #2: THE MORNING OF YOUR SURGERY     Repeat Step #1.    * Not to be used by people allergic to Chlorhexidine.

## 2022-05-17 NOTE — PROGRESS NOTES
"NOLANETS:  [facility]  A collaboration between [facility] and [facility]      PATIENT: Zohra Paulino  MRN: [id number]  DATE: 5/17/2022    Subjective:      Chief Complaint: Follow up distal pancreatectomy for adenocarcinoma.  Complains of enlarging abdominal hernia and left-sided abdominal pain.  Status post cardiology clearance      Vitals: Blood pressure 129/67, pulse 84, height 5' 2" (1.575 m), weight 69.6 kg (153 lb 7 oz), SpO2 98 %.       ECOG Score: 1 - Symptomatic but completely ambulatory    Diagnosis:   1. Incisional hernia, without obstruction or gangrene         Interval History:  Had a recent fall and broke her elbow proximally 1 month ago has had cast removed.  Recently saw cardiologist who wants her to resume Eliquis for atrial fibrillation.  Complains of abdominal pain with enlarging abdominal hernia midline and left-sided abdominal pain worse at night.    Status post distal pancreatectomy for pancreatic adenocarcinoma.  Here for  follow-up.  Appetite excellent.  Energy level good. Biopsy negative. Repeat scans done. FDG avid area resolved, postop inflammatory changes. HELEN at present.      PATH 2/12/21:  Pancreatic resection bed, CT-guided core biopsy:   Core biopsies reveal fibrous tissue with acute and chronic inflammation,   histiocytic infiltrate and occasional giant cells   No evidence of malignancy   Appropriately controlled immunohistochemical stains are performed.  CD68   highlights histiocytes.  AE1/AE3 is negat    Oncologic History:   Oncologic History Panc ductal adenocarcinoma- dx 6/2020   Oncologic Treatment  7/27 /2020- distal pancreatectomy (Rhonda)   Pathology 6/2020- FNA panc- ductal adenocarcinoma  7/2020-  pancreatic ductal adenocarcinoma with moderately differentiated  Pt2N0        1. Superior pancreatic portahepatis node, biopsy:  No metastatic carcinoma identified in one lymph " node (0/1), supported by negative immunostains for  AE1/AE3 with appropriate controls  2. Retropancreatic tissue, biopsy:  Neurovascular fibroconnective tissue  Negative for malignancy  3. 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, confined to the pancreas  Proximal and distal parenchymal margins negative for invasive carcinoma or high-grade intraepithelial  neoplasia, the tumor measures 1 cm from the proximal margin  Perineural invasion present  No lymphovascular invasion identified  No metastatic carcinoma identified in one peripancreatic lymph node (0/1)    Past Medical History:  Past Medical History:   Diagnosis Date    Bronchitis     Cancer     pancreatic    Diabetes mellitus     Fibroids     Nuclear sclerosis of both eyes 11/15/2021    Osteopetrosis     Uterine fibroid        Past Surgical History:  Past Surgical History:   Procedure Laterality Date    CERVICAL BIOPSY  W/ LOOP ELECTRODE EXCISION      c[facility]  2004    COLONOSCOPY      DISTAL PANCREATECTOMY N/A 7/27/2020    Procedure: PANCREATECTOMY, DISTAL, SUBTOTAL;  Surgeon: GILMA Ellis MD;  Location: [facility] OR;  Service: General;  Laterality: N/A;    ERCP N/A 8/19/2020    Procedure: ERCP (ENDOSCOPIC RETROGRADE CHOLANGIOPANCREATOGRAPHY);  Surgeon: Deion Ivory MD;  Location: [facility] ENDO;  Service: Endoscopy;  Laterality: N/A;  pancreatic duct leak  Rapid Covid test    ERCP N/A 9/16/2020    Procedure: ERCP (ENDOSCOPIC RETROGRADE CHOLANGIOPANCREATOGRAPHY);  Surgeon: Deion Ivory MD;  Location: [facility] ENDO;  Service: Endoscopy;  Laterality: N/A;    HYSTERECTOMY      fibroids    DC REMOVAL OF OVARY/TUBE(S)      TUBAL LIGATION         Family History:  Family History   Problem Relation Age of Onset    Cataracts Mother     No Known Problems Father     Breast cancer Cousin 20    Arthritis Sister     Cataracts Sister     No Known Problems Brother     No Known Problems Maternal  Grandmother     No Known Problems Maternal Grandfather     Pancreatic cancer Paternal Grandmother 80    No Known Problems Paternal Grandfather     Diabetes Son     No Known Problems Son     No Known Problems Daughter     No Known Problems Daughter     No Known Problems Maternal Aunt     No Known Problems Maternal Uncle     No Known Problems Paternal Aunt     No Known Problems Paternal Uncle        Allergies:  Atorvastatin and Codeine    Medications:  Current Outpatient Medications   Medication Sig    alendronate (FOSAMAX) 40 mg tablet Take 1 tablet (40 mg total) by mouth once a week.    apixaban (ELIQUIS) 2.5 mg Tab Take 1 tablet by mouth 2 (two) times daily.    cetirizine (ZYRTEC) 10 MG tablet Take 10 mg by mouth.    cyclobenzaprine (FLEXERIL) 10 MG tablet     DULoxetine (CYMBALTA) 30 MG capsule Take 30 mg by mouth every evening.    ID NOW COVID-19 TEST KIT Kit TEST AS DIRECTED TODAY    iron bis-gly/FA/C/B12/Ca/succ (IRON-150 ORAL) Take by mouth once daily.    LIDOcaine-prilocaine (EMLA) cream APPLY DIME SIZED AMOUNT OVER PORT 1 HOUR PRIOR TO USING    meloxicam (MOBIC) 15 MG tablet     naproxen (NAPROSYN) 500 MG tablet Take 1 tablet (500 mg total) by mouth 2 (two) times daily with meals.    omeprazole (PRILOSEC) 20 MG capsule Take 20 mg by mouth every 12 (twelve) hours.    potassium chloride (MICRO-K) 10 MEQ CpSR Take 10 mEq by mouth once daily.    varicella-zoster gE-AS01B, PF, (SHINGRIX, PF,) 50 mcg/0.5 mL injection Inject 0.5 mLs into the muscle once. for 1 dose    vitamin D 1000 units Tab Take 1,000 Units by mouth once daily.    ACCU-CHEK SOFTCLIX LANCETS [facility]      No current facility-administered medications for this visit.     Facility-Administered Medications Ordered in Other Visits   Medication    0.9%  NaCl infusion    sodium chloride 0.9% flush 10 mL        Review of Systems   Constitutional: Negative.  Negative for activity change, appetite change, chills, fatigue, fever and  unexpected weight change.   HENT: Negative.  Negative for congestion, ear pain, rhinorrhea and sinus pressure.    Eyes: Negative.  Negative for photophobia, pain and redness.   Respiratory: Negative.  Negative for cough, chest tightness, shortness of breath and wheezing.    Cardiovascular: Negative for chest pain, palpitations and leg swelling.   Gastrointestinal: Negative.  Negative for abdominal distention, abdominal pain, anal bleeding, blood in stool, constipation, diarrhea, nausea, rectal pain and vomiting.   C/o abdominal pain and enlarging hernia, L sided abdominal pain at night.  Endocrine: Negative.  Negative for cold intolerance, heat intolerance, polydipsia, polyphagia and polyuria.   Genitourinary: Negative.  Negative for difficulty urinating, dysuria and frequency.   Musculoskeletal: Negative.  Negative for arthralgias, back pain, gait problem, myalgias, neck pain and neck stiffness.   Skin: Negative.  Negative for color change, pallor and rash.   Allergic/Immunologic: Negative.  Negative for environmental allergies, food allergies and immunocompromised state.   Neurological: Negative.  Negative for dizziness, seizures, syncope, weakness and light-headedness.   Hematological: Negative.  Negative for adenopathy. Does not bruise/bleed easily.   Psychiatric/Behavioral: Negative.  Negative for agitation, behavioral problems, confusion, decreased concentration, dysphoric mood, hallucinations, self-injury, sleep disturbance and suicidal ideas. The patient is not nervous/anxious and is not hyperactive.       Objective:      Physical Exam  Constitutional:       Appearance: She is well-developed.   HENT:      Head: Normocephalic and atraumatic.   Eyes:      Pupils: Pupils are equal, round, and reactive to light.   Neck:      Thyroid: No thyromegaly.   Cardiovascular:      Rate and Rhythm: Normal rate and regular rhythm.      Heart sounds: Normal heart sounds.   Pulmonary:      Effort: Pulmonary effort is normal.  No respiratory distress.      Breath sounds: Normal breath sounds. No wheezing or rales.   Abdominal:      General: Bowel sounds are normal. There is no distension.      Palpations: Abdomen is soft. There is no mass.      Tenderness: There is no abdominal tenderness. There is no guarding or rebound.      Hernia: No hernia is present. There is no hernia in the ventral area.   Musculoskeletal:         General: No tenderness. Normal range of motion.      Cervical back: Normal range of motion and neck supple.   Skin:     General: Skin is warm and dry.      Coloration: Skin is not pale.      Findings: No erythema or rash.   Neurological:      Mental Status: She is alert and oriented to person, place, and time.      Cranial Nerves: No cranial nerve deficit.      Deep Tendon Reflexes: Reflexes are normal and symmetric.   Psychiatric:         Behavior: Behavior normal.         Thought Content: Thought content normal.        Assessment:       1. Incisional hernia, without obstruction or gangrene        Laboratory Data:    Neuroendocrine Labs Latest Ref Rng & Units 8/4/2020   CA 19-9 2.0 - 40.0 U/mL    TSH 0.400 - 4.000 uIU/mL    WBC 3.90 - 12.70 K/uL 8.72   HGB 12.0 - 16.0 g/dL 10.5 (L)   HCT 37.0 - 48.5 % 32.6 (L)   PLATLETS 150 - 350 K/uL 298   GLUCOSE 70 - 110 mg/dL 111 (H)   BUN 8 - 23 mg/dL 10   CREATININE 0.5 - 1.4 mg/dL 1.4    - 145 mmol/L 140    3.5 - 5.1 mmol/L 3.7   CHLORIDE 95 - 110 mmol/L 102   CO2 23 - 29 mmol/L 29   CALCIUM 8.7 - 10.5 mg/dL 9.1   PROTEIN, TOTAL 6.0 - 8.4 g/dL 7.0   PHOSPHORUS 2.7 - 4.5 mg/dL 3.8   ALBUMIN 3.5 - 5.2 g/dL 3.2 (L)   URIC ACID 2.4 - 5.7 mg/dL    TOTAL BILIRUBIN 0.1 - 1.0 mg/dL 0.6   ALK PHOSPHATASE 55 - 135 U/L 346 (H)   SGOT (AST) 10 - 40 U/L 42 (H)   SGPT (ALT) 10 - 44 U/L 34   TRIGLYCERIDES 30 - 150 mg/dL    CHOLERSTEROL 120 - 199 mg/dL    HDL 40 - 75 mg/dL    LDL 63.0 - 159.0 mg/dL    MG 1.6 - 2.6 mg/dL 1.7   URINE AMYLASE U/L Not established U/L    24 HR CREATININE  CLEARANCE 15.0 - 325.0 mg/dL    HEMOGLOBIN A1C 4.5 - 6.2 %    Weight       Neuroendocrine Labs Latest Ref Rng & Units 6/4/2021   CA 19-9 2.0 - 40.0 U/mL 81.0 (H)      Ref Range & Units 4/1/22   CA 19-9 - [facility] <34 U/mL 49 High          Ref Range & Units 4/29/21 1/28/21 3 mo ago   CA 19-9 2.0 - 40.0 U/mL 67.0High   38.0  39.0                PREOP Ref Range & Units 7/2/20   CA 19-9 2.0 - 40.0 U/mL 3306.0High       CA 19-9      Ref Range & Units 1/28/21 10/27/2020 7/2/2020   CA 19-9 2.0 - 40.0 U/mL 39.0  178.0High   3306.0High     Resulting Agency      [facility]                                                                                                       9/24/21  CA 19-9 0.0 - 40.0 U/mL 63.2 High         Liver Biopsy 7/21/21:  Liver, biopsy:  Liver with cholestasis  No significant inflammation or steatosis  Negative for neoplasm or malignancy    PREOP:        9/24/21  X-Ray Elbow Complete Right  Narrative: EXAMINATION:  XR ELBOW COMPLETE 3 VIEW RIGHT    CLINICAL HISTORY:  . Unspecified fracture of lower end of right humerus, initial encounter for closed fracture    TECHNIQUE:  AP, lateral, and oblique views of the right elbow were performed.    COMPARISON:  03/23/2022    FINDINGS:  Previously identified cortical irregularity at the radial head is not confidently identified.  There appears to be resolution of the previously identified elbow joint effusion.  No new fractures.  Impression: No definite visualization of the presumed nondisplaced radial head fracture.    Electronically signed by: Prosper Lucas MD  Date:    04/29/2022  Time:    15:09      POSTOP CT 10/27/2020  In this patient with pathology proven adenocarcinoma of the pancreas, there is no definite evidence of hypermetabolic tumor.     Moderately hypermetabolic ill-defined fluid collection in the resection bed favored to be inflammatory or infectious in etiology.  Fat necrosis could have a similar appearance.  Recommend clinical  correlation       FDG PET: 10/28/2020     In this patient with pathology proven adenocarcinoma of the pancreas, there is no definite evidence of hypermetabolic tumor.     Moderately hypermetabolic ill-defined fluid collection in the resection bed favored to be inflammatory or infectious in etiology.  Fat necrosis could have a similar appearance.  Recommend clinical correlation    FDG PET 1/28/21  Impression:     1.  Evolving hypermetabolic findings in the pancreatectomy bed, shifting laterally and now appearing more organized as a masslike consolidation as well as a new 1 cm perigastric nodule concerning for malignancy.  Recommend correlation with tumor markers.     2.  No extra-abdominal findings concerning for malignancy.      FDG PET 4/19/22:    1. Postsurgical change of distal pancreatectomy within tissue PET-CT without evidence of local recurrent or metastatic disease.      CT 1/28/21:     At the distal pancreatectomy surgical bed, induration change/thickening posteriorly adjacent and partially involving the posterior stomach greater curvature which extends just anteroinferior to the spleen with central tubular region of low-density.  Overall findings concerning for component of recurrent/residual disease considering degree of corresponding uptake on same day PET.  Inflammatory/infectious component felt less likely, particularly in the lack of systemic signs or symptoms of infection.     Mid-abdominal nodular focus with corresponding tracer avidity at the level of the distal stomach concerning for metastasis.      FDG PET 4/29/21:  Evolving postsurgical changes status post pancreatectomy with interval resolution of abnormal radiotracer signal within the surgical bed and perigastric soft tissue density.  No abnormal radiotracer uptake on today's exam to suggest recurrent or metastatic disease.      Mri:6/4/21   Status post partial pancreatectomy.  The pancreatectomy bed appears normal.     Subcentimeter  arterially enhancing lesion segment 4A of the liver, too small to characterize.  It could be benign or malignant.     Short-term follow-up advised.     There are no measurable lesions per RECIST criteria.    CT 6/4/21:     Status post partial pancreatectomy.  The pancreatectomy bed appears normal.     Small arterially enhancing lesion 1.0 cm, series 2, image 28, segment 4A of the liver is nonspecific.  It could be a small arterially enhancing arteriovenous or arterial portal vascular malformation.  A small metastases cannot be excluded.  To correlate with PET-CT and follow-up examination.     Diverticulosis coli.     Hysterectomy.     There are no measurable lesions per RECIST criteria.     This report was flagged in Epic as abnormal    CT 1/4/22:  Impression:     1. Stable postoperative changes.  No evidence of metastatic disease in the abdomen or pelvis.  2. Redemonstration of a ventral hernia containing short segments of small and large bowel.   \    FDG PET 1/4/22:     Postsurgical changes of distal pancreatectomy without FDG PET-CT evidence of recurrent or metastatic disease.     Two new non physiologic tracer avid foci in the rectosigmoid colon against a background of diverticulosis may indicate early diverticulitis.  Recommend clinical correlation with GI history and attention on follow-up.     Mild uptake in the distal right femur in keeping with red marrow conversion.  FDG PET 4/19/22:  Impression:   There are numerous diverticuli within the region of increased uptake in throughout the sigmoid colon, however there is no adjacent fat stranding to suggest diverticulitis, therefore, finding is favored to represent physiologic uptake.  Previous 2 discrete hypermetabolic foci are no longer appreciated.  Midline fat containing hernia  1. Postsurgical change of distal pancreatectomy within tissue PET-CT without evidence of local recurrent or metastatic disease.  I, Nicolas Sun MD, attest that I reviewed and  "interpreted the images.   Cardiology:      Impression:  Prior FDG positive area in  post distal pancreatectomy bed resolved. Prior Liver  " lesion " resolved and no longer seen. Negative biopsy.   Dr. Mena states patient does not need colonoscopy just now/  Cardiac clearance done.  Previously rising CA 19-9 not explained on recent FDG PET or CT. . Needs serial imaging.   Slightly elevated  Ca 19-9 is improved but no imageable disease.   Final read on today's CT and FDG PET  No obvious recurrence on my exam  Incisional hernia, increasing in size, tender. Patient asks for repair    Plan:       Hernia repair 5/23/22. Component separation/retrorectus repair  Risks/benefits explained and accepted.  All questions answered.    Recommend continue close follow up    Re-stage in 6 months with CT Abd and FDG PET scan.then if still negative , can probably increase to Q4- 6 months if oncologist agrees.   CA 19 9 monthly and send results to Dr Camacho /Kelli  Copy  scans/labs to Dr Camacho     40 min total time review chart/data/biopsy/d/ew cooper, D/W IR, coordinate care    GILMA Ellis MD, FACS  Professor of Surgery, [facility]  Neuroendocrine Surgery, Hepatic/Pancreatic & General Surgery  [address], [address]  [place]  [address]  ph. [phone]; [phone]  fax. [phone]                          "

## 2022-05-17 NOTE — H&P (VIEW-ONLY)
"NOLANETS:  [facility]  A collaboration between [facility] and [facility]      PATIENT: Zohra Paulino  MRN: [id number]  DATE: 5/17/2022    Subjective:      Chief Complaint: Follow up distal pancreatectomy for adenocarcinoma.  Complains of enlarging abdominal hernia and left-sided abdominal pain.  Status post cardiology clearance      Vitals: Blood pressure 129/67, pulse 84, height 5' 2" (1.575 m), weight 69.6 kg (153 lb 7 oz), SpO2 98 %.       ECOG Score: 1 - Symptomatic but completely ambulatory    Diagnosis:   1. Incisional hernia, without obstruction or gangrene         Interval History:  Had a recent fall and broke her elbow proximally 1 month ago has had cast removed.  Recently saw cardiologist who wants her to resume Eliquis for atrial fibrillation.  Complains of abdominal pain with enlarging abdominal hernia midline and left-sided abdominal pain worse at night.    Status post distal pancreatectomy for pancreatic adenocarcinoma.  Here for  follow-up.  Appetite excellent.  Energy level good. Biopsy negative. Repeat scans done. FDG avid area resolved, postop inflammatory changes. HELEN at present.      PATH 2/12/21:  Pancreatic resection bed, CT-guided core biopsy:   Core biopsies reveal fibrous tissue with acute and chronic inflammation,   histiocytic infiltrate and occasional giant cells   No evidence of malignancy   Appropriately controlled immunohistochemical stains are performed.  CD68   highlights histiocytes.  AE1/AE3 is negat    Oncologic History:   Oncologic History Panc ductal adenocarcinoma- dx 6/2020   Oncologic Treatment  7/27 /2020- distal pancreatectomy (Rhonda)   Pathology 6/2020- FNA panc- ductal adenocarcinoma  7/2020-  pancreatic ductal adenocarcinoma with moderately differentiated  Pt2N0        1. Superior pancreatic portahepatis node, biopsy:  No metastatic carcinoma identified in one lymph " node (0/1), supported by negative immunostains for  AE1/AE3 with appropriate controls  2. Retropancreatic tissue, biopsy:  Neurovascular fibroconnective tissue  Negative for malignancy  3. 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, confined to the pancreas  Proximal and distal parenchymal margins negative for invasive carcinoma or high-grade intraepithelial  neoplasia, the tumor measures 1 cm from the proximal margin  Perineural invasion present  No lymphovascular invasion identified  No metastatic carcinoma identified in one peripancreatic lymph node (0/1)    Past Medical History:  Past Medical History:   Diagnosis Date    Bronchitis     Cancer     pancreatic    Diabetes mellitus     Fibroids     Nuclear sclerosis of both eyes 11/15/2021    Osteopetrosis     Uterine fibroid        Past Surgical History:  Past Surgical History:   Procedure Laterality Date    CERVICAL BIOPSY  W/ LOOP ELECTRODE EXCISION      ckc  2004    COLONOSCOPY      DISTAL PANCREATECTOMY N/A 7/27/2020    Procedure: PANCREATECTOMY, DISTAL, SUBTOTAL;  Surgeon: GILMA Ellis MD;  Location: [facility] OR;  Service: General;  Laterality: N/A;    ERCP N/A 8/19/2020    Procedure: ERCP (ENDOSCOPIC RETROGRADE CHOLANGIOPANCREATOGRAPHY);  Surgeon: Deion Ivory MD;  Location: [facility] ENDO;  Service: Endoscopy;  Laterality: N/A;  pancreatic duct leak  Rapid Covid test    ERCP N/A 9/16/2020    Procedure: ERCP (ENDOSCOPIC RETROGRADE CHOLANGIOPANCREATOGRAPHY);  Surgeon: Deion Ivory MD;  Location: [facility] ENDO;  Service: Endoscopy;  Laterality: N/A;    HYSTERECTOMY      fibroids    AL REMOVAL OF OVARY/TUBE(S)      TUBAL LIGATION         Family History:  Family History   Problem Relation Age of Onset    Cataracts Mother     No Known Problems Father     Breast cancer Cousin 20    Arthritis Sister     Cataracts Sister     No Known Problems Brother     No Known Problems Maternal  Grandmother     No Known Problems Maternal Grandfather     Pancreatic cancer Paternal Grandmother 80    No Known Problems Paternal Grandfather     Diabetes Son     No Known Problems Son     No Known Problems Daughter     No Known Problems Daughter     No Known Problems Maternal Aunt     No Known Problems Maternal Uncle     No Known Problems Paternal Aunt     No Known Problems Paternal Uncle        Allergies:  Atorvastatin and Codeine    Medications:  Current Outpatient Medications   Medication Sig    alendronate (FOSAMAX) 40 mg tablet Take 1 tablet (40 mg total) by mouth once a week.    apixaban (ELIQUIS) 2.5 mg Tab Take 1 tablet by mouth 2 (two) times daily.    cetirizine (ZYRTEC) 10 MG tablet Take 10 mg by mouth.    cyclobenzaprine (FLEXERIL) 10 MG tablet     DULoxetine (CYMBALTA) 30 MG capsule Take 30 mg by mouth every evening.    ID NOW COVID-19 TEST KIT Kit TEST AS DIRECTED TODAY    iron bis-gly/FA/C/B12/Ca/succ (IRON-150 ORAL) Take by mouth once daily.    LIDOcaine-prilocaine (EMLA) cream APPLY DIME SIZED AMOUNT OVER PORT 1 HOUR PRIOR TO USING    meloxicam (MOBIC) 15 MG tablet     naproxen (NAPROSYN) 500 MG tablet Take 1 tablet (500 mg total) by mouth 2 (two) times daily with meals.    omeprazole (PRILOSEC) 20 MG capsule Take 20 mg by mouth every 12 (twelve) hours.    potassium chloride (MICRO-K) 10 MEQ CpSR Take 10 mEq by mouth once daily.    varicella-zoster gE-AS01B, PF, (SHINGRIX, PF,) 50 mcg/0.5 mL injection Inject 0.5 mLs into the muscle once. for 1 dose    vitamin D 1000 units Tab Take 1,000 Units by mouth once daily.    [place] [facility] [place]      No current facility-administered medications for this visit.     Facility-Administered Medications Ordered in Other Visits   Medication    0.9%  NaCl infusion    sodium chloride 0.9% flush 10 mL        Review of Systems   Constitutional: Negative.  Negative for activity change, appetite change, chills, fatigue, fever and  unexpected weight change.   HENT: Negative.  Negative for congestion, ear pain, rhinorrhea and sinus pressure.    Eyes: Negative.  Negative for photophobia, pain and redness.   Respiratory: Negative.  Negative for cough, chest tightness, shortness of breath and wheezing.    Cardiovascular: Negative for chest pain, palpitations and leg swelling.   Gastrointestinal: Negative.  Negative for abdominal distention, abdominal pain, anal bleeding, blood in stool, constipation, diarrhea, nausea, rectal pain and vomiting.   C/o abdominal pain and enlarging hernia, L sided abdominal pain at night.  Endocrine: Negative.  Negative for cold intolerance, heat intolerance, polydipsia, polyphagia and polyuria.   Genitourinary: Negative.  Negative for difficulty urinating, dysuria and frequency.   Musculoskeletal: Negative.  Negative for arthralgias, back pain, gait problem, myalgias, neck pain and neck stiffness.   Skin: Negative.  Negative for color change, pallor and rash.   Allergic/Immunologic: Negative.  Negative for environmental allergies, food allergies and immunocompromised state.   Neurological: Negative.  Negative for dizziness, seizures, syncope, weakness and light-headedness.   Hematological: Negative.  Negative for adenopathy. Does not bruise/bleed easily.   Psychiatric/Behavioral: Negative.  Negative for agitation, behavioral problems, confusion, decreased concentration, dysphoric mood, hallucinations, self-injury, sleep disturbance and suicidal ideas. The patient is not nervous/anxious and is not hyperactive.       Objective:      Physical Exam  Constitutional:       Appearance: She is well-developed.   HENT:      Head: Normocephalic and atraumatic.   Eyes:      Pupils: Pupils are equal, round, and reactive to light.   Neck:      Thyroid: No thyromegaly.   Cardiovascular:      Rate and Rhythm: Normal rate and regular rhythm.      Heart sounds: Normal heart sounds.   Pulmonary:      Effort: Pulmonary effort is normal.  No respiratory distress.      Breath sounds: Normal breath sounds. No wheezing or rales.   Abdominal:      General: Bowel sounds are normal. There is no distension.      Palpations: Abdomen is soft. There is no mass.      Tenderness: There is no abdominal tenderness. There is no guarding or rebound.      Hernia: No hernia is present. There is no hernia in the ventral area.   Musculoskeletal:         General: No tenderness. Normal range of motion.      Cervical back: Normal range of motion and neck supple.   Skin:     General: Skin is warm and dry.      Coloration: Skin is not pale.      Findings: No erythema or rash.   Neurological:      Mental Status: She is alert and oriented to person, place, and time.      Cranial Nerves: No cranial nerve deficit.      Deep Tendon Reflexes: Reflexes are normal and symmetric.   Psychiatric:         Behavior: Behavior normal.         Thought Content: Thought content normal.        Assessment:       1. Incisional hernia, without obstruction or gangrene        Laboratory Data:    Neuroendocrine Labs Latest Ref Rng & Units 8/4/2020   CA 19-9 2.0 - 40.0 U/mL    TSH 0.400 - 4.000 uIU/mL    WBC 3.90 - 12.70 K/uL 8.72   HGB 12.0 - 16.0 g/dL 10.5 (L)   HCT 37.0 - 48.5 % 32.6 (L)   PLATLETS 150 - 350 K/uL 298   GLUCOSE 70 - 110 mg/dL 111 (H)   BUN 8 - 23 mg/dL 10   CREATININE 0.5 - 1.4 mg/dL 1.4    - 145 mmol/L 140    3.5 - 5.1 mmol/L 3.7   CHLORIDE 95 - 110 mmol/L 102   CO2 23 - 29 mmol/L 29   CALCIUM 8.7 - 10.5 mg/dL 9.1   PROTEIN, TOTAL 6.0 - 8.4 g/dL 7.0   PHOSPHORUS 2.7 - 4.5 mg/dL 3.8   ALBUMIN 3.5 - 5.2 g/dL 3.2 (L)   URIC ACID 2.4 - 5.7 mg/dL    TOTAL BILIRUBIN 0.1 - 1.0 mg/dL 0.6   ALK PHOSPHATASE 55 - 135 U/L 346 (H)   SGOT (AST) 10 - 40 U/L 42 (H)   SGPT (ALT) 10 - 44 U/L 34   TRIGLYCERIDES 30 - 150 mg/dL    CHOLERSTEROL 120 - 199 mg/dL    HDL 40 - 75 mg/dL    LDL 63.0 - 159.0 mg/dL    MG 1.6 - 2.6 mg/dL 1.7   URINE AMYLASE U/L Not established U/L    24 HR CREATININE  CLEARANCE 15.0 - 325.0 mg/dL    HEMOGLOBIN A1C 4.5 - 6.2 %    Weight       Neuroendocrine Labs Latest Ref Rng & Units 6/4/2021   CA 19-9 2.0 - 40.0 U/mL 81.0 (H)      Ref Range & Units 4/1/22   CA 19-9 - [facility] <34 U/mL 49 High          Ref Range & Units 4/29/21 1/28/21 3 mo ago   CA 19-9 2.0 - 40.0 U/mL 67.0High   38.0  39.0                PREOP Ref Range & Units 7/2/20   CA 19-9 2.0 - 40.0 U/mL 3306.0High       CA 19-9      Ref Range & Units 1/28/21 10/27/2020 7/2/2020   CA 19-9 2.0 - 40.0 U/mL 39.0  178.0High   3306.0High     Resulting Agency      [facility]                                                                                                       9/24/21  CA 19-9 0.0 - 40.0 U/mL 63.2 High         Liver Biopsy 7/21/21:  Liver, biopsy:  Liver with cholestasis  No significant inflammation or steatosis  Negative for neoplasm or malignancy    PREOP:        9/24/21  X-Ray Elbow Complete Right  Narrative: EXAMINATION:  XR ELBOW COMPLETE 3 VIEW RIGHT    CLINICAL HISTORY:  . Unspecified fracture of lower end of right humerus, initial encounter for closed fracture    TECHNIQUE:  AP, lateral, and oblique views of the right elbow were performed.    COMPARISON:  03/23/2022    FINDINGS:  Previously identified cortical irregularity at the radial head is not confidently identified.  There appears to be resolution of the previously identified elbow joint effusion.  No new fractures.  Impression: No definite visualization of the presumed nondisplaced radial head fracture.    Electronically signed by: Prosper Lucas MD  Date:    04/29/2022  Time:    15:09      POSTOP CT 10/27/2020  In this patient with pathology proven adenocarcinoma of the pancreas, there is no definite evidence of hypermetabolic tumor.     Moderately hypermetabolic ill-defined fluid collection in the resection bed favored to be inflammatory or infectious in etiology.  Fat necrosis could have a similar appearance.  Recommend clinical  correlation       FDG PET: 10/28/2020     In this patient with pathology proven adenocarcinoma of the pancreas, there is no definite evidence of hypermetabolic tumor.     Moderately hypermetabolic ill-defined fluid collection in the resection bed favored to be inflammatory or infectious in etiology.  Fat necrosis could have a similar appearance.  Recommend clinical correlation    FDG PET 1/28/21  Impression:     1.  Evolving hypermetabolic findings in the pancreatectomy bed, shifting laterally and now appearing more organized as a masslike consolidation as well as a new 1 cm perigastric nodule concerning for malignancy.  Recommend correlation with tumor markers.     2.  No extra-abdominal findings concerning for malignancy.      FDG PET 4/19/22:    1. Postsurgical change of distal pancreatectomy within tissue PET-CT without evidence of local recurrent or metastatic disease.      CT 1/28/21:     At the distal pancreatectomy surgical bed, induration change/thickening posteriorly adjacent and partially involving the posterior stomach greater curvature which extends just anteroinferior to the spleen with central tubular region of low-density.  Overall findings concerning for component of recurrent/residual disease considering degree of corresponding uptake on same day PET.  Inflammatory/infectious component felt less likely, particularly in the lack of systemic signs or symptoms of infection.     Mid-abdominal nodular focus with corresponding tracer avidity at the level of the distal stomach concerning for metastasis.      FDG PET 4/29/21:  Evolving postsurgical changes status post pancreatectomy with interval resolution of abnormal radiotracer signal within the surgical bed and perigastric soft tissue density.  No abnormal radiotracer uptake on today's exam to suggest recurrent or metastatic disease.      Mri:6/4/21   Status post partial pancreatectomy.  The pancreatectomy bed appears normal.     Subcentimeter  arterially enhancing lesion segment 4A of the liver, too small to characterize.  It could be benign or malignant.     Short-term follow-up advised.     There are no measurable lesions per RECIST criteria.    CT 6/4/21:     Status post partial pancreatectomy.  The pancreatectomy bed appears normal.     Small arterially enhancing lesion 1.0 cm, series 2, image 28, segment 4A of the liver is nonspecific.  It could be a small arterially enhancing arteriovenous or arterial portal vascular malformation.  A small metastases cannot be excluded.  To correlate with PET-CT and follow-up examination.     Diverticulosis coli.     Hysterectomy.     There are no measurable lesions per RECIST criteria.     This report was flagged in Epic as abnormal    CT 1/4/22:  Impression:     1. Stable postoperative changes.  No evidence of metastatic disease in the abdomen or pelvis.  2. Redemonstration of a ventral hernia containing short segments of small and large bowel.   \    FDG PET 1/4/22:     Postsurgical changes of distal pancreatectomy without FDG PET-CT evidence of recurrent or metastatic disease.     Two new non physiologic tracer avid foci in the rectosigmoid colon against a background of diverticulosis may indicate early diverticulitis.  Recommend clinical correlation with GI history and attention on follow-up.     Mild uptake in the distal right femur in keeping with red marrow conversion.  FDG PET 4/19/22:  Impression:   There are numerous diverticuli within the region of increased uptake in throughout the sigmoid colon, however there is no adjacent fat stranding to suggest diverticulitis, therefore, finding is favored to represent physiologic uptake.  Previous 2 discrete hypermetabolic foci are no longer appreciated.  Midline fat containing hernia  1. Postsurgical change of distal pancreatectomy within tissue PET-CT without evidence of local recurrent or metastatic disease.  I, Nicolas Sun MD, attest that I reviewed and  "interpreted the images.   Cardiology:      Impression:  Prior FDG positive area in  post distal pancreatectomy bed resolved. Prior Liver  " lesion " resolved and no longer seen. Negative biopsy.   Dr. Mena states patient does not need colonoscopy just now/  Cardiac clearance done.  Previously rising CA 19-9 not explained on recent FDG PET or CT. . Needs serial imaging.   Slightly elevated  Ca 19-9 is improved but no imageable disease.   Final read on today's CT and FDG PET  No obvious recurrence on my exam  Incisional hernia, increasing in size, tender. Patient asks for repair    Plan:       Hernia repair 5/23/22. Component separation/retrorectus repair  Risks/benefits explained and accepted.  All questions answered.    Recommend continue close follow up    Re-stage in 6 months with CT Abd and FDG PET scan.then if still negative , can probably increase to Q4- 6 months if oncologist agrees.   CA 19 9 monthly and send results to Dr Camacho /Kelli  Copy  scans/labs to Dr Camacho     40 min total time review chart/data/biopsy/d/ew cooper, D/W IR, coordinate care    GILMA Ellis MD, FACS  Professor of Surgery, [facility]  Neuroendocrine Surgery, Hepatic/Pancreatic & General Surgery  [address], [address]  [place]  [address]  ph. [phone]; [phone]  fax. [phone]                          "

## 2022-05-18 ENCOUNTER — ANESTHESIA EVENT (OUTPATIENT)
Dept: SURGERY | Facility: HOSPITAL | Age: 72
DRG: 354 | End: 2022-05-18
Payer: MEDICARE

## 2022-05-18 ENCOUNTER — DOCUMENTATION ONLY (OUTPATIENT)
Dept: REHABILITATION | Facility: HOSPITAL | Age: 72
End: 2022-05-18
Payer: MEDICARE

## 2022-05-18 ENCOUNTER — HOSPITAL ENCOUNTER (EMERGENCY)
Facility: HOSPITAL | Age: 72
Discharge: HOME | End: 2022-05-18
Attending: EMERGENCY MEDICINE
Payer: MEDICARE

## 2022-05-18 VITALS
TEMPERATURE: 98 F | WEIGHT: 153 LBS | SYSTOLIC BLOOD PRESSURE: 128 MMHG | HEART RATE: 84 BPM | RESPIRATION RATE: 18 BRPM | DIASTOLIC BLOOD PRESSURE: 74 MMHG | OXYGEN SATURATION: 98 % | BODY MASS INDEX: 28.16 KG/M2 | HEIGHT: 62 IN

## 2022-05-18 DIAGNOSIS — M25.642 DECREASED RANGE OF MOTION OF FINGER OF LEFT HAND: ICD-10-CM

## 2022-05-18 DIAGNOSIS — K52.9 GASTROENTERITIS: Primary | ICD-10-CM

## 2022-05-18 DIAGNOSIS — R60.0 LOCALIZED EDEMA: ICD-10-CM

## 2022-05-18 DIAGNOSIS — R29.898 DECREASED GRIP STRENGTH OF LEFT HAND: Primary | ICD-10-CM

## 2022-05-18 DIAGNOSIS — M25.632 DECREASED RANGE OF MOTION OF LEFT WRIST: ICD-10-CM

## 2022-05-18 DIAGNOSIS — Z78.9 ALTERATION IN INSTRUMENTAL ACTIVITIES OF DAILY LIVING (IADL): ICD-10-CM

## 2022-05-18 LAB
ALBUMIN SERPL-MCNC: 4.4 G/DL (ref 3.3–5.5)
ALP SERPL-CCNC: 176 U/L (ref 42–141)
BILIRUB SERPL-MCNC: 0.7 MG/DL (ref 0.2–1.6)
BUN SERPL-MCNC: 23 MG/DL (ref 7–22)
CALCIUM SERPL-MCNC: 10.8 MG/DL (ref 8–10.3)
CHLORIDE SERPL-SCNC: 106 MMOL/L (ref 98–108)
CREAT SERPL-MCNC: 1.3 MG/DL (ref 0.6–1.2)
GLUCOSE SERPL-MCNC: 179 MG/DL (ref 73–118)
HCT, POC: NORMAL
HGB, POC: NORMAL (ref 14–18)
INFLUENZA A ANTIGEN, POC: NEGATIVE
INFLUENZA B ANTIGEN, POC: NEGATIVE
MCH, POC: NORMAL
MCHC, POC: NORMAL
MCV, POC: NORMAL
MPV, POC: NORMAL
POC ALT (SGPT): 25 U/L (ref 10–47)
POC AST (SGOT): 30 U/L (ref 11–38)
POC PLATELET COUNT: NORMAL
POC TCO2: 28 MMOL/L (ref 18–33)
POTASSIUM BLD-SCNC: 5 MMOL/L (ref 3.6–5.1)
PROTEIN, POC: 9.7 G/DL (ref 6.4–8.1)
RBC, POC: NORMAL
RDW, POC: NORMAL
SODIUM BLD-SCNC: 142 MMOL/L (ref 128–145)
WBC, POC: NORMAL

## 2022-05-18 PROCEDURE — 96361 HYDRATE IV INFUSION ADD-ON: CPT | Mod: ER

## 2022-05-18 PROCEDURE — 25000003 PHARM REV CODE 250: Mod: ER | Performed by: EMERGENCY MEDICINE

## 2022-05-18 PROCEDURE — 87502 INFLUENZA DNA AMP PROBE: CPT | Mod: ER

## 2022-05-18 PROCEDURE — 99284 EMERGENCY DEPT VISIT MOD MDM: CPT | Mod: 25,ER

## 2022-05-18 PROCEDURE — 87804 INFLUENZA ASSAY W/OPTIC: CPT | Mod: 59,ER

## 2022-05-18 PROCEDURE — 63600175 PHARM REV CODE 636 W HCPCS: Mod: ER | Performed by: EMERGENCY MEDICINE

## 2022-05-18 PROCEDURE — 96374 THER/PROPH/DIAG INJ IV PUSH: CPT | Mod: ER

## 2022-05-18 PROCEDURE — 80053 COMPREHEN METABOLIC PANEL: CPT | Mod: ER

## 2022-05-18 PROCEDURE — 85025 COMPLETE CBC W/AUTO DIFF WBC: CPT | Mod: ER

## 2022-05-18 RX ORDER — METOCLOPRAMIDE 10 MG/1
10 TABLET ORAL EVERY 6 HOURS PRN
Qty: 30 TABLET | Refills: 0 | Status: ON HOLD | OUTPATIENT
Start: 2022-05-18 | End: 2022-05-23 | Stop reason: CLARIF

## 2022-05-18 RX ORDER — ONDANSETRON 2 MG/ML
4 INJECTION INTRAMUSCULAR; INTRAVENOUS
Status: COMPLETED | OUTPATIENT
Start: 2022-05-18 | End: 2022-05-18

## 2022-05-18 RX ORDER — ONDANSETRON 4 MG/1
4 TABLET, ORALLY DISINTEGRATING ORAL EVERY 6 HOURS PRN
Qty: 10 TABLET | Refills: 0 | Status: SHIPPED | OUTPATIENT
Start: 2022-05-18

## 2022-05-18 RX ADMIN — SODIUM CHLORIDE 1000 ML: 0.9 INJECTION, SOLUTION INTRAVENOUS at 07:05

## 2022-05-18 RX ADMIN — ONDANSETRON 4 MG: 2 INJECTION INTRAMUSCULAR; INTRAVENOUS at 07:05

## 2022-05-18 NOTE — PROGRESS NOTES
Cancellation Note    Patient: Zohra Paulino  Date of Session: 5/18/2022  Diagnosis:   1. Decreased  strength of left hand     2. Decreased range of motion of left wrist     3. Alteration in instrumental activities of daily living (IADL)     4. Localized edema     5. Decreased range of motion of finger of left hand       MRN: [id number]    Zohra Paulino cancelled her scheduled therapy appointment today secondary to not feeling well after the shingles shot.  This is the first appointment that Zohra has not attended. Next appointment is scheduled for 5/20/22 and will follow up with patient at that time. No charges have been posted today.       RAMSEY Machado  5/18/2022

## 2022-05-19 NOTE — ED PROVIDER NOTES
Encounter Date: 5/18/2022    SCRIBE #1 NOTE: I, Cristhian Camarena, am scribing for, and in the presence of,  Prudencio Winters MD. I have scribed the following portions of the note - Other sections scribed: HPI, ROS, PE.       History     Chief Complaint   Patient presents with    Vomiting     Pt c/o NVD x1 day. Pt reports she received Shingles vaccination on 05/17 and had onset of symptoms shortly afterwards.     Zohra Paulino 72 y.o. female, with DM, and others, presents to the ED with nausea, vomiting, and diarrhea today. Patient states she received a Shingles vaccination yesterday and reports onset of symptoms afterwards. Patient notes an appointment for surgery in 5 days. Denies flu vaccination. Patient has no other complaints at this present time.    The history is provided by the patient. No  was used.     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/2021    Osteopetrosis     Uterine fibroid      Past Surgical History:   Procedure Laterality Date    CERVICAL BIOPSY  W/ LOOP ELECTRODE EXCISION      ck  2004    COLONOSCOPY      DISTAL PANCREATECTOMY N/A 7/27/2020    Procedure: PANCREATECTOMY, DISTAL, SUBTOTAL;  Surgeon: GILMA Ellis MD;  Location: [facility] OR;  Service: General;  Laterality: N/A;    ERCP N/A 8/19/2020    Procedure: ERCP (ENDOSCOPIC RETROGRADE CHOLANGIOPANCREATOGRAPHY);  Surgeon: Deion Ivory MD;  Location: [facility] ENDO;  Service: Endoscopy;  Laterality: N/A;  pancreatic duct leak  Rapid Covid test    ERCP N/A 9/16/2020    Procedure: ERCP (ENDOSCOPIC RETROGRADE CHOLANGIOPANCREATOGRAPHY);  Surgeon: Deion Ivory MD;  Location: [facility] ENDO;  Service: Endoscopy;  Laterality: N/A;    HYSTERECTOMY      fibroids    [place] REMOVAL OF OVARY/TUBE(S)      TUBAL  LIGATION       Family History   Problem Relation Age of Onset    Cataracts Mother     No Known Problems Father     Breast cancer Cousin 20    Arthritis Sister     Cataracts Sister     No Known Problems Brother     No Known Problems Maternal Grandmother     No Known Problems Maternal Grandfather     Pancreatic cancer Paternal Grandmother 80    No Known Problems Paternal Grandfather     Diabetes Son     No Known Problems Son     No Known Problems Daughter     No Known Problems Daughter     No Known Problems Maternal Aunt     No Known Problems Maternal Uncle     No Known Problems Paternal Aunt     No Known Problems Paternal Uncle      Social History     Tobacco Use    Smoking status: Never Smoker    Smokeless tobacco: Never Used   Substance Use Topics    Alcohol use: Yes     Comment: rarely     Drug use: No     Review of Systems   Constitutional: Negative.  Negative for fever.   HENT: Negative.  Negative for sore throat.    Eyes: Negative.    Respiratory: Negative.  Negative for shortness of breath.    Cardiovascular: Negative.  Negative for chest pain.   Gastrointestinal: Positive for diarrhea, nausea and vomiting.   Endocrine: Negative.    Genitourinary: Negative.  Negative for dysuria.   Musculoskeletal: Negative.  Negative for myalgias.   Skin: Negative.  Negative for rash.   Allergic/Immunologic: Negative.    Neurological: Negative.  Negative for headaches.   Hematological: Negative.  Negative for adenopathy.   Psychiatric/Behavioral: Negative.  Negative for behavioral problems.   All other systems reviewed and are negative.      Physical Exam     Initial Vitals [05/18/22 1816]   BP Pulse Resp Temp SpO2   110/75 110 16 97.9 °F (36.6 °C) 98 %      MAP       --         Physical Exam    Nursing note and vitals reviewed.  Constitutional: She appears well-developed and well-nourished.   HENT:   Head: Normocephalic and atraumatic.   Right Ear: External ear normal.   Left Ear: External ear normal.    Nose: Nose normal.   Mouth/Throat: Mucous membranes are dry.   Eyes: Conjunctivae are normal.   Neck: Neck supple.   Normal range of motion.  Cardiovascular: Normal rate and intact distal pulses.   Pulmonary/Chest: Effort normal. No respiratory distress.   Abdominal: Abdomen is soft. There is no abdominal tenderness.   Musculoskeletal:         General: Normal range of motion.      Cervical back: Normal range of motion and neck supple.     Neurological: She is alert and oriented to person, place, and time.   Skin: Skin is warm and dry. Capillary refill takes less than 2 seconds.   Psychiatric: She has a normal mood and affect. Her behavior is normal.         ED Course   Procedures  Labs Reviewed   POCT CMP - Abnormal; Notable for the following components:       Result Value    Alkaline Phosphatase,  (*)     POC BUN 23 (*)     Calcium, POC 10.8 (*)     POC Creatinine 1.3 (*)     POC Glucose 179 (*)     Protein, POC 9.7 (*)     All other components within normal limits   POCT CBC   POCT CMP   POCT RAPID INFLUENZA A/B          Imaging Results    None          Medications   sodium chloride 0.9% bolus 1,000 mL (0 mLs Intravenous Stopped 5/18/22 2043)   ondansetron injection 4 mg (4 mg Intravenous Given 5/18/22 1946)     Medical Decision Making:   History:   Old Medical Records: I decided to obtain old medical records.  Clinical Tests:   Lab Tests: Ordered and Reviewed              Scribe Attestation:   Scribe #1: I performed the above scribed service and the documentation accurately describes the services I performed. I attest to the accuracy of the note.               This document was produced by a scribe under my direction and in my presence. I agree with the content of the note and have made any necessary edits.     Prudencio Winters MD    05/19/2022 2:36 AM    Clinical Impression:   Final diagnoses:  [K52.9] Gastroenteritis (Primary)          ED Disposition Condition    Discharge Stable        ED  Prescriptions     Medication Sig Dispense Start Date End Date Auth. Provider    ondansetron (ZOFRAN-ODT) 4 MG TbDL Take 1 tablet (4 mg total) by mouth every 6 (six) hours as needed. 10 tablet 5/18/2022  Prudencio Winters MD    metoclopramide HCl (REGLAN) 10 MG tablet Take 1 tablet (10 mg total) by mouth every 6 (six) hours as needed. 30 tablet 5/18/2022  Prudencio Winters MD        Follow-up Information     Follow up With Specialties Details Why Contact Info    Tracey Martin MD Family Medicine Schedule an appointment as soon as possible for a visit  As needed [address] [facility] [address]  [phone]             Prudencio Winters MD  05/19/22 5966

## 2022-05-19 NOTE — PROGRESS NOTES
[facility]  Occupational Therapy Progress  Note    Date: 5/20/2022  Name: Zohra Sun [place]  Clinic Number: [id number]    Therapy Diagnosis:   Encounter Diagnoses   Name Primary?    Decreased  strength of left hand Yes    Decreased range of motion of left wrist     Alteration in instrumental activities of daily living (IADL)     Localized edema     Decreased range of motion of finger of left hand      Physician: Janeth Ward PA    Medical Diagnosis:   S42.401A (ICD-10-CM) - Occult closed fracture of right elbow, initial encounter   M65.30 (ICD-10-CM) - Trigger finger of left hand, unspecified finger   M25.631,M25.632 (ICD-10-CM) - Decreased range of motion of both wrists     Surgical Date/Procedure: Closed reduction    DOI: 3/18/22 FOOSH injury   Referral Orders: Eval and treat  Plan of Care Certification Period: 4/22/22-6/22/22   Progress Note Due: 5/22/22     Date of Return to MD: 5/27/22    Visit # / Visits authorized: 4/ 20 (includes evaluation)  FOTO: 4 or 5th visit    Time In: 9:15  pm  Time Out: 9:50 pm  Total Billable Time: 35  minutes    Precautions:  Standard and cancer  8 weeks post injury    SUBJECTIVE     Pt reports:  I missed my therapy last time because I couldn't stop throwing up and ended up in ER.  My hand is still feeling stiff but not painful.  I have to leave early for a meeting.   She was compliant with home exercise program given last session.   Response to previous treatment: felt ok  Functional change: nothing reported    Pain: 4/10  stiffness  Location: left fingers      OBJECTIVE       Zohra received the following direct contact modalities after being cleared for contraindications for 8 minutes:  -Patient received fluidotherapy to left hand(s) for 8 minutes to increase blood flow, circulation, desensitization, sensory re-education and for pain management.    Zohra received therapeutic exercises for 27  minutes including:  Active fisting of hand  x 10 reps    Reasessement:   Range of Motion: left active                                             4/22/22 5/11/22 5/20/22  (Ext/Flex) Middle Ring  Middle              ring  Middle    Ring   MP 0*/80*  (R: 0*/85*) 0*/80*  (R: 0*/85*) 0/77 0/73 0/78  0/76   PIP 0*/100*  (R: 0*/100*) 0*/100*  (R:0*/100*) 0/95 0/96 0/90  0/96   DIP 0*/70*  (R: 0*/85*) 0*/70*  (R: 0*/85*) 0/60 0/65 0/65 0/60   SADLER 250*  (R: 270*) 250*  (R: 270*) 232 234 233 232    all tips achieved to palm    Wrist Flex/Ext: 4/22/22 R) 85*/50*  L) 85*/45*                           5/11/22                    L) 60/45                           5/20/22                    L) 60/50  Wrist RD/UD: 4/22/22 R) 25*/35*   L) 20*/30*                          5/11/22                     L) 20/20                          5/20/22                     L) 15/35     Strength: (HALLE Dynamometer in lbs.) Average 3 trials, Position II           4/22/22 5/11/22 5/20/22  Right: 28.8 lbs  Left: 15.8 lbs           15.3#        23.3 #     Pinch Strength: (Pinch Gauge in lbs, Average 3 trials                                        4/22/22 5/11/22 5/20/22  Lateral/Francis Pinch       L) 3.7   R) 3.7      L) 4.1          L) 5.9  3pt Pinch                    L) 3.5   R) 5.2      L) 4.0          L) 5.6  2pt Pinch                    L) 3.6   R) 3.8      L) nt    Yellow therapy putting gripping, key pinch and 3pt pinch x 1 min each      Patient Education and Home Exercises      Education provided:     - Progress towards goals     Written Home Exercises Provided: yes  Exercises were reviewed and Zohra was able to demonstrate them prior to the end of the session.  Zohra demonstrated good  understanding of the HEP provided. See EMR under Patient Instructions for exercises provided during therapy sessions.       Assessment     Pt would continue to benefit from skilled OT. Zohra lewis  demonstrating improvements in  and pinch strengths as noted above. AROM motion of relatively unchanged but she is able to achieve all finger tips to palm presently.      Zohra is progressing well towards her goals and there are no updates to goals at this time. Pt prognosis is Good.     Pt will continue to benefit from skilled outpatient occupational therapy to address the deficits listed in the problem list on initial evaluation provide pt/family education and to maximize pt's level of independence in the home and community environment.     Pt's spiritual, cultural and educational needs considered and pt agreeable to plan of care and goals.    Anticipated barriers to occupational therapy: none noted    Goals:  ShortTerm Goals (to be met in 3 weeks or by 5/13/22):   1. Patient to be IND and compliant with HEP & attendance for duration of therapy- met .  2. Patient will demonstrate increased SADLER in her L long and ring fingers by 5-10 degrees to restore functional grasp with daily tasks- not met but tips achieved to palm  3. Patient will demonstrate increased L hand  strength by 3-5 lbs. to improve functional grasp for ADLs/work/leisure activities- Met 5/20/22.   4. Patient will report no more than 5/10 pain in L /wrist hand- met 5/20/22.   5. Patient will demonstrate increased L wrist extension, RD, and UD by 5 degrees to facilitate functional hand use w/ daily tasks- in progress         Long Term Goals (to be met in 6 weeks or by DC):   1. Patient to be IND and compliant with HEP & attendance for duration of therapy.  2. Patient will demonstrate increased SADLER in her L long and ring fingers by 10-20 degrees to restore functional grasp with daily tasks  3. Patient will demonstrate increased L hand  strength by 5-7 lbs. to improve functional grasp for ADLs/work/leisure activities.   4. Patient will demonstrate increased L 3 pt pinch by 1-2 lbs to restore IND with fine motor activities  5. Patient will  report increase in functional use of her L hand by 20%-30% as evidenced by the FOTO outcome measure.  6. Patient will report no more than 2/10 pain in L wrist/hand.   7. Patient will demonstrate 4+/5-5/5 strength via MMT in R wrist/forearm planes of motion.     PLAN     Patient will be placed on hold and will require release from medical doctor to return to therapy from surgery.     RAMSEY Machado

## 2022-05-20 ENCOUNTER — CLINICAL SUPPORT (OUTPATIENT)
Dept: REHABILITATION | Facility: HOSPITAL | Age: 72
End: 2022-05-20
Attending: PHYSICIAN ASSISTANT
Payer: MEDICARE

## 2022-05-20 DIAGNOSIS — Z78.9 ALTERATION IN INSTRUMENTAL ACTIVITIES OF DAILY LIVING (IADL): ICD-10-CM

## 2022-05-20 DIAGNOSIS — R60.0 LOCALIZED EDEMA: ICD-10-CM

## 2022-05-20 DIAGNOSIS — M25.642 DECREASED RANGE OF MOTION OF FINGER OF LEFT HAND: ICD-10-CM

## 2022-05-20 DIAGNOSIS — R29.898 DECREASED GRIP STRENGTH OF LEFT HAND: Primary | ICD-10-CM

## 2022-05-20 DIAGNOSIS — M25.632 DECREASED RANGE OF MOTION OF LEFT WRIST: ICD-10-CM

## 2022-05-20 PROCEDURE — 97110 THERAPEUTIC EXERCISES: CPT | Mod: PN

## 2022-05-20 PROCEDURE — 97022 WHIRLPOOL THERAPY: CPT | Mod: PN

## 2022-05-20 NOTE — PATIENT INSTRUCTIONS
[facility]  OCCUPATIONAL THERAPY  HOME EXERCISE PROGRAM     Complete the following strengthening program 1-2 x/day.              Perform 1-2 x day for 3 minutes for each exercise           _                   RAMSEY Machado

## 2022-05-23 ENCOUNTER — ANESTHESIA (OUTPATIENT)
Dept: SURGERY | Facility: HOSPITAL | Age: 72
DRG: 354 | End: 2022-05-23
Payer: MEDICARE

## 2022-05-23 ENCOUNTER — HOSPITAL ENCOUNTER (INPATIENT)
Facility: HOSPITAL | Age: 72
LOS: 5 days | Discharge: HOME HEALTH | DRG: 354 | End: 2022-05-28
Attending: SURGERY | Admitting: SURGERY
Payer: MEDICARE

## 2022-05-23 DIAGNOSIS — I95.1 ORTHOSTATIC HYPOTENSION: ICD-10-CM

## 2022-05-23 DIAGNOSIS — I95.9 LOW BP: ICD-10-CM

## 2022-05-23 DIAGNOSIS — R07.9 CHEST PAIN: ICD-10-CM

## 2022-05-23 DIAGNOSIS — D68.9 COAGULOPATHY: ICD-10-CM

## 2022-05-23 DIAGNOSIS — Z85.07 PERSONAL HISTORY OF PANCREATIC CANCER: ICD-10-CM

## 2022-05-23 DIAGNOSIS — C25.9 ADENOCARCINOMA OF PANCREAS: ICD-10-CM

## 2022-05-23 DIAGNOSIS — K43.2 INCISIONAL HERNIA, WITHOUT OBSTRUCTION OR GANGRENE: Primary | ICD-10-CM

## 2022-05-23 LAB
ABO + RH BLD: NORMAL
ALBUMIN SERPL BCP-MCNC: 3.7 G/DL (ref 3.5–5.2)
ALP SERPL-CCNC: 144 U/L (ref 55–135)
ALT SERPL W/O P-5'-P-CCNC: 15 U/L (ref 10–44)
ANION GAP SERPL CALC-SCNC: 11 MMOL/L (ref 8–16)
ANION GAP SERPL CALC-SCNC: 13 MMOL/L (ref 8–16)
ANISOCYTOSIS BLD QL SMEAR: SLIGHT
AST SERPL-CCNC: 17 U/L (ref 10–40)
BASOPHILS # BLD AUTO: 0.03 K/UL (ref 0–0.2)
BASOPHILS # BLD AUTO: 0.06 K/UL (ref 0–0.2)
BASOPHILS NFR BLD: 0.5 % (ref 0–1.9)
BASOPHILS NFR BLD: 1.1 % (ref 0–1.9)
BILIRUB SERPL-MCNC: 0.5 MG/DL (ref 0.1–1)
BLD GP AB SCN CELLS X3 SERPL QL: NORMAL
BUN SERPL-MCNC: 12 MG/DL (ref 8–23)
BUN SERPL-MCNC: 14 MG/DL (ref 8–23)
CALCIUM SERPL-MCNC: 9.7 MG/DL (ref 8.7–10.5)
CALCIUM SERPL-MCNC: 9.8 MG/DL (ref 8.7–10.5)
CHLORIDE SERPL-SCNC: 106 MMOL/L (ref 95–110)
CHLORIDE SERPL-SCNC: 106 MMOL/L (ref 95–110)
CO2 SERPL-SCNC: 19 MMOL/L (ref 23–29)
CO2 SERPL-SCNC: 26 MMOL/L (ref 23–29)
CREAT SERPL-MCNC: 0.8 MG/DL (ref 0.5–1.4)
CREAT SERPL-MCNC: 0.9 MG/DL (ref 0.5–1.4)
DACRYOCYTES BLD QL SMEAR: ABNORMAL
DIFFERENTIAL METHOD: ABNORMAL
DIFFERENTIAL METHOD: ABNORMAL
EOSINOPHIL # BLD AUTO: 0.2 K/UL (ref 0–0.5)
EOSINOPHIL # BLD AUTO: 0.2 K/UL (ref 0–0.5)
EOSINOPHIL NFR BLD: 2.3 % (ref 0–8)
EOSINOPHIL NFR BLD: 3.8 % (ref 0–8)
ERYTHROCYTE [DISTWIDTH] IN BLOOD BY AUTOMATED COUNT: 15.3 % (ref 11.5–14.5)
ERYTHROCYTE [DISTWIDTH] IN BLOOD BY AUTOMATED COUNT: 15.4 % (ref 11.5–14.5)
EST. GFR  (AFRICAN AMERICAN): >60 ML/MIN/1.73 M^2
EST. GFR  (AFRICAN AMERICAN): >60 ML/MIN/1.73 M^2
EST. GFR  (NON AFRICAN AMERICAN): >60 ML/MIN/1.73 M^2
EST. GFR  (NON AFRICAN AMERICAN): >60 ML/MIN/1.73 M^2
GLUCOSE SERPL-MCNC: 111 MG/DL (ref 70–110)
GLUCOSE SERPL-MCNC: 131 MG/DL (ref 70–110)
HCT VFR BLD AUTO: 39 % (ref 37–48.5)
HCT VFR BLD AUTO: 40.8 % (ref 37–48.5)
HGB BLD-MCNC: 12.3 G/DL (ref 12–16)
HGB BLD-MCNC: 12.7 G/DL (ref 12–16)
HYPOCHROMIA BLD QL SMEAR: ABNORMAL
IMM GRANULOCYTES # BLD AUTO: 0.01 K/UL (ref 0–0.04)
IMM GRANULOCYTES # BLD AUTO: 0.02 K/UL (ref 0–0.04)
IMM GRANULOCYTES NFR BLD AUTO: 0.2 % (ref 0–0.5)
IMM GRANULOCYTES NFR BLD AUTO: 0.3 % (ref 0–0.5)
LYMPHOCYTES # BLD AUTO: 1.4 K/UL (ref 1–4.8)
LYMPHOCYTES # BLD AUTO: 2.3 K/UL (ref 1–4.8)
LYMPHOCYTES NFR BLD: 20.9 % (ref 18–48)
LYMPHOCYTES NFR BLD: 42.2 % (ref 18–48)
MCH RBC QN AUTO: 27.8 PG (ref 27–31)
MCH RBC QN AUTO: 28 PG (ref 27–31)
MCHC RBC AUTO-ENTMCNC: 31.1 G/DL (ref 32–36)
MCHC RBC AUTO-ENTMCNC: 31.5 G/DL (ref 32–36)
MCV RBC AUTO: 89 FL (ref 82–98)
MCV RBC AUTO: 89 FL (ref 82–98)
MONOCYTES # BLD AUTO: 0.2 K/UL (ref 0.3–1)
MONOCYTES # BLD AUTO: 0.5 K/UL (ref 0.3–1)
MONOCYTES NFR BLD: 2.9 % (ref 4–15)
MONOCYTES NFR BLD: 8.3 % (ref 4–15)
NEUTROPHILS # BLD AUTO: 2.5 K/UL (ref 1.8–7.7)
NEUTROPHILS # BLD AUTO: 4.7 K/UL (ref 1.8–7.7)
NEUTROPHILS NFR BLD: 44.4 % (ref 38–73)
NEUTROPHILS NFR BLD: 73.1 % (ref 38–73)
NRBC BLD-RTO: 0 /100 WBC
NRBC BLD-RTO: 0 /100 WBC
OVALOCYTES BLD QL SMEAR: ABNORMAL
PLATELET # BLD AUTO: 188 K/UL (ref 150–450)
PLATELET # BLD AUTO: 230 K/UL (ref 150–450)
PLATELET BLD QL SMEAR: ABNORMAL
PMV BLD AUTO: 12.9 FL (ref 9.2–12.9)
PMV BLD AUTO: 13.3 FL (ref 9.2–12.9)
POIKILOCYTOSIS BLD QL SMEAR: SLIGHT
POTASSIUM SERPL-SCNC: 4.4 MMOL/L (ref 3.5–5.1)
POTASSIUM SERPL-SCNC: 4.6 MMOL/L (ref 3.5–5.1)
PROT SERPL-MCNC: 7.4 G/DL (ref 6–8.4)
RBC # BLD AUTO: 4.39 M/UL (ref 4–5.4)
RBC # BLD AUTO: 4.57 M/UL (ref 4–5.4)
SODIUM SERPL-SCNC: 138 MMOL/L (ref 136–145)
SODIUM SERPL-SCNC: 143 MMOL/L (ref 136–145)
TARGETS BLD QL SMEAR: ABNORMAL
WBC # BLD AUTO: 5.54 K/UL (ref 3.9–12.7)
WBC # BLD AUTO: 6.46 K/UL (ref 3.9–12.7)

## 2022-05-23 PROCEDURE — 25000003 PHARM REV CODE 250: Performed by: SURGERY

## 2022-05-23 PROCEDURE — 37000009 HC ANESTHESIA EA ADD 15 MINS: Performed by: SURGERY

## 2022-05-23 PROCEDURE — 63600175 PHARM REV CODE 636 W HCPCS: Performed by: SURGERY

## 2022-05-23 PROCEDURE — 86920 COMPATIBILITY TEST SPIN: CPT | Mod: 59 | Performed by: STUDENT IN AN ORGANIZED HEALTH CARE EDUCATION/TRAINING PROGRAM

## 2022-05-23 PROCEDURE — 85025 COMPLETE CBC W/AUTO DIFF WBC: CPT | Mod: 91 | Performed by: SURGERY

## 2022-05-23 PROCEDURE — 80048 BASIC METABOLIC PNL TOTAL CA: CPT | Mod: XB | Performed by: SURGERY

## 2022-05-23 PROCEDURE — 25000003 PHARM REV CODE 250: Performed by: STUDENT IN AN ORGANIZED HEALTH CARE EDUCATION/TRAINING PROGRAM

## 2022-05-23 PROCEDURE — 27000221 HC OXYGEN, UP TO 24 HOURS

## 2022-05-23 PROCEDURE — 71000033 HC RECOVERY, INTIAL HOUR: Performed by: SURGERY

## 2022-05-23 PROCEDURE — 63600175 PHARM REV CODE 636 W HCPCS: Performed by: STUDENT IN AN ORGANIZED HEALTH CARE EDUCATION/TRAINING PROGRAM

## 2022-05-23 PROCEDURE — 94799 UNLISTED PULMONARY SVC/PX: CPT

## 2022-05-23 PROCEDURE — 80053 COMPREHEN METABOLIC PANEL: CPT | Performed by: SURGERY

## 2022-05-23 PROCEDURE — 71000039 HC RECOVERY, EACH ADD'L HOUR: Performed by: SURGERY

## 2022-05-23 PROCEDURE — 37000008 HC ANESTHESIA 1ST 15 MINUTES: Performed by: SURGERY

## 2022-05-23 PROCEDURE — 99900035 HC TECH TIME PER 15 MIN (STAT)

## 2022-05-23 PROCEDURE — 86901 BLOOD TYPING SEROLOGIC RH(D): CPT | Performed by: SURGERY

## 2022-05-23 PROCEDURE — 11000001 HC ACUTE MED/SURG PRIVATE ROOM

## 2022-05-23 PROCEDURE — 94760 N-INVAS EAR/PLS OXIMETRY 1: CPT

## 2022-05-23 PROCEDURE — 36000706: Performed by: SURGERY

## 2022-05-23 PROCEDURE — C9290 INJ, BUPIVACAINE LIPOSOME: HCPCS | Performed by: SURGERY

## 2022-05-23 PROCEDURE — 86920 COMPATIBILITY TEST SPIN: CPT | Performed by: SURGERY

## 2022-05-23 PROCEDURE — 36415 COLL VENOUS BLD VENIPUNCTURE: CPT | Performed by: SURGERY

## 2022-05-23 PROCEDURE — 36000707: Performed by: SURGERY

## 2022-05-23 PROCEDURE — C1781 MESH (IMPLANTABLE): HCPCS | Performed by: SURGERY

## 2022-05-23 DEVICE — MESH HERNIA PHASIX 8X10IN RND: Type: IMPLANTABLE DEVICE | Site: ABDOMEN | Status: FUNCTIONAL

## 2022-05-23 RX ORDER — HYDROMORPHONE HYDROCHLORIDE 2 MG/ML
0.5 INJECTION, SOLUTION INTRAMUSCULAR; INTRAVENOUS; SUBCUTANEOUS EVERY 5 MIN PRN
Status: DISCONTINUED | OUTPATIENT
Start: 2022-05-23 | End: 2022-05-23 | Stop reason: HOSPADM

## 2022-05-23 RX ORDER — PANTOPRAZOLE SODIUM 40 MG/1
40 TABLET, DELAYED RELEASE ORAL DAILY
Status: DISCONTINUED | OUTPATIENT
Start: 2022-05-23 | End: 2022-05-28 | Stop reason: HOSPADM

## 2022-05-23 RX ORDER — ONDANSETRON 2 MG/ML
4 INJECTION INTRAMUSCULAR; INTRAVENOUS ONCE AS NEEDED
Status: DISCONTINUED | OUTPATIENT
Start: 2022-05-23 | End: 2022-05-23 | Stop reason: HOSPADM

## 2022-05-23 RX ORDER — PROPOFOL 10 MG/ML
VIAL (ML) INTRAVENOUS
Status: DISCONTINUED | OUTPATIENT
Start: 2022-05-23 | End: 2022-05-23

## 2022-05-23 RX ORDER — ONDANSETRON 2 MG/ML
INJECTION INTRAMUSCULAR; INTRAVENOUS
Status: DISCONTINUED | OUTPATIENT
Start: 2022-05-23 | End: 2022-05-23

## 2022-05-23 RX ORDER — CALCIUM GLUCONATE 20 MG/ML
1 INJECTION, SOLUTION INTRAVENOUS
Status: COMPLETED | OUTPATIENT
Start: 2022-05-23 | End: 2022-05-23

## 2022-05-23 RX ORDER — IPRATROPIUM BROMIDE AND ALBUTEROL SULFATE 2.5; .5 MG/3ML; MG/3ML
3 SOLUTION RESPIRATORY (INHALATION) EVERY 4 HOURS PRN
Status: DISCONTINUED | OUTPATIENT
Start: 2022-05-23 | End: 2022-05-23 | Stop reason: HOSPADM

## 2022-05-23 RX ORDER — FENTANYL CITRATE 50 UG/ML
INJECTION, SOLUTION INTRAMUSCULAR; INTRAVENOUS
Status: DISCONTINUED | OUTPATIENT
Start: 2022-05-23 | End: 2022-05-23

## 2022-05-23 RX ORDER — KETOROLAC TROMETHAMINE 30 MG/ML
INJECTION, SOLUTION INTRAMUSCULAR; INTRAVENOUS
Status: DISCONTINUED | OUTPATIENT
Start: 2022-05-23 | End: 2022-05-23

## 2022-05-23 RX ORDER — FAMOTIDINE 10 MG/ML
20 INJECTION INTRAVENOUS
Status: COMPLETED | OUTPATIENT
Start: 2022-05-23 | End: 2022-05-23

## 2022-05-23 RX ORDER — SUCCINYLCHOLINE CHLORIDE 20 MG/ML
INJECTION INTRAMUSCULAR; INTRAVENOUS
Status: DISCONTINUED | OUTPATIENT
Start: 2022-05-23 | End: 2022-05-23

## 2022-05-23 RX ORDER — NEOSTIGMINE METHYLSULFATE 1 MG/ML
INJECTION, SOLUTION INTRAVENOUS
Status: DISCONTINUED | OUTPATIENT
Start: 2022-05-23 | End: 2022-05-23

## 2022-05-23 RX ORDER — MIDAZOLAM HYDROCHLORIDE 1 MG/ML
INJECTION INTRAMUSCULAR; INTRAVENOUS
Status: DISCONTINUED | OUTPATIENT
Start: 2022-05-23 | End: 2022-05-23

## 2022-05-23 RX ORDER — DIPHENHYDRAMINE HYDROCHLORIDE 50 MG/ML
12.5 INJECTION INTRAMUSCULAR; INTRAVENOUS EVERY 4 HOURS PRN
Status: DISCONTINUED | OUTPATIENT
Start: 2022-05-23 | End: 2022-05-24

## 2022-05-23 RX ORDER — DEXAMETHASONE SODIUM PHOSPHATE 4 MG/ML
INJECTION, SOLUTION INTRA-ARTICULAR; INTRALESIONAL; INTRAMUSCULAR; INTRAVENOUS; SOFT TISSUE
Status: DISCONTINUED | OUTPATIENT
Start: 2022-05-23 | End: 2022-05-23

## 2022-05-23 RX ORDER — ACETAMINOPHEN 10 MG/ML
INJECTION, SOLUTION INTRAVENOUS
Status: DISCONTINUED | OUTPATIENT
Start: 2022-05-23 | End: 2022-05-23

## 2022-05-23 RX ORDER — BUPIVACAINE HYDROCHLORIDE 2.5 MG/ML
INJECTION, SOLUTION EPIDURAL; INFILTRATION; INTRACAUDAL
Status: DISCONTINUED | OUTPATIENT
Start: 2022-05-23 | End: 2022-05-23 | Stop reason: HOSPADM

## 2022-05-23 RX ORDER — NALOXONE HCL 0.4 MG/ML
0.02 VIAL (ML) INJECTION
Status: DISCONTINUED | OUTPATIENT
Start: 2022-05-23 | End: 2022-05-28 | Stop reason: HOSPADM

## 2022-05-23 RX ORDER — DEXTROSE MONOHYDRATE, SODIUM CHLORIDE, AND POTASSIUM CHLORIDE 50; 1.49; 4.5 G/1000ML; G/1000ML; G/1000ML
INJECTION, SOLUTION INTRAVENOUS CONTINUOUS
Status: DISCONTINUED | OUTPATIENT
Start: 2022-05-23 | End: 2022-05-24

## 2022-05-23 RX ORDER — LIDOCAINE HYDROCHLORIDE 10 MG/ML
1 INJECTION, SOLUTION EPIDURAL; INFILTRATION; INTRACAUDAL; PERINEURAL ONCE
Status: DISCONTINUED | OUTPATIENT
Start: 2022-05-23 | End: 2022-05-23 | Stop reason: HOSPADM

## 2022-05-23 RX ORDER — SCOLOPAMINE TRANSDERMAL SYSTEM 1 MG/1
1 PATCH, EXTENDED RELEASE TRANSDERMAL
Status: DISCONTINUED | OUTPATIENT
Start: 2022-05-23 | End: 2022-05-24

## 2022-05-23 RX ORDER — ROCURONIUM BROMIDE 10 MG/ML
INJECTION, SOLUTION INTRAVENOUS
Status: DISCONTINUED | OUTPATIENT
Start: 2022-05-23 | End: 2022-05-23

## 2022-05-23 RX ORDER — ONDANSETRON 2 MG/ML
4 INJECTION INTRAMUSCULAR; INTRAVENOUS EVERY 6 HOURS PRN
Status: DISCONTINUED | OUTPATIENT
Start: 2022-05-23 | End: 2022-05-28 | Stop reason: HOSPADM

## 2022-05-23 RX ORDER — KETOROLAC TROMETHAMINE 30 MG/ML
10 INJECTION, SOLUTION INTRAMUSCULAR; INTRAVENOUS EVERY 6 HOURS
Status: DISCONTINUED | OUTPATIENT
Start: 2022-05-23 | End: 2022-05-24

## 2022-05-23 RX ORDER — CEFAZOLIN SODIUM 1 G/3ML
INJECTION, POWDER, FOR SOLUTION INTRAMUSCULAR; INTRAVENOUS
Status: DISCONTINUED | OUTPATIENT
Start: 2022-05-23 | End: 2022-05-23

## 2022-05-23 RX ORDER — ONDANSETRON 2 MG/ML
8 INJECTION INTRAMUSCULAR; INTRAVENOUS
Status: COMPLETED | OUTPATIENT
Start: 2022-05-23 | End: 2022-05-23

## 2022-05-23 RX ORDER — LIDOCAINE HYDROCHLORIDE 20 MG/ML
INJECTION INTRAVENOUS
Status: DISCONTINUED | OUTPATIENT
Start: 2022-05-23 | End: 2022-05-23

## 2022-05-23 RX ORDER — ACETAMINOPHEN 10 MG/ML
1000 INJECTION, SOLUTION INTRAVENOUS EVERY 8 HOURS
Status: COMPLETED | OUTPATIENT
Start: 2022-05-23 | End: 2022-05-24

## 2022-05-23 RX ORDER — HYDROMORPHONE HCL IN 0.9% NACL 6 MG/30 ML
PATIENT CONTROLLED ANALGESIA SYRINGE INTRAVENOUS CONTINUOUS
Status: DISCONTINUED | OUTPATIENT
Start: 2022-05-23 | End: 2022-05-24

## 2022-05-23 RX ORDER — PREGABALIN 75 MG/1
75 CAPSULE ORAL 2 TIMES DAILY
Status: DISCONTINUED | OUTPATIENT
Start: 2022-05-23 | End: 2022-05-24

## 2022-05-23 RX ORDER — ALBUTEROL SULFATE 2.5 MG/.5ML
2.5 SOLUTION RESPIRATORY (INHALATION) EVERY 4 HOURS PRN
Status: DISCONTINUED | OUTPATIENT
Start: 2022-05-23 | End: 2022-05-28 | Stop reason: HOSPADM

## 2022-05-23 RX ORDER — SODIUM CHLORIDE 0.9 % (FLUSH) 0.9 %
10 SYRINGE (ML) INJECTION
Status: DISCONTINUED | OUTPATIENT
Start: 2022-05-23 | End: 2022-05-23 | Stop reason: HOSPADM

## 2022-05-23 RX ORDER — DIPHENHYDRAMINE HYDROCHLORIDE 50 MG/ML
25 INJECTION INTRAMUSCULAR; INTRAVENOUS EVERY 6 HOURS PRN
Status: DISCONTINUED | OUTPATIENT
Start: 2022-05-23 | End: 2022-05-23 | Stop reason: HOSPADM

## 2022-05-23 RX ORDER — HYDROMORPHONE HYDROCHLORIDE 2 MG/ML
0.2 INJECTION, SOLUTION INTRAMUSCULAR; INTRAVENOUS; SUBCUTANEOUS ONCE
Status: DISCONTINUED | OUTPATIENT
Start: 2022-05-23 | End: 2022-05-24

## 2022-05-23 RX ORDER — OXYCODONE AND ACETAMINOPHEN 5; 325 MG/1; MG/1
1 TABLET ORAL
Status: DISCONTINUED | OUTPATIENT
Start: 2022-05-23 | End: 2022-05-23 | Stop reason: HOSPADM

## 2022-05-23 RX ORDER — ENOXAPARIN SODIUM 100 MG/ML
30 INJECTION SUBCUTANEOUS
Status: COMPLETED | OUTPATIENT
Start: 2022-05-23 | End: 2022-05-23

## 2022-05-23 RX ORDER — SODIUM CHLORIDE, SODIUM LACTATE, POTASSIUM CHLORIDE, CALCIUM CHLORIDE 600; 310; 30; 20 MG/100ML; MG/100ML; MG/100ML; MG/100ML
INJECTION, SOLUTION INTRAVENOUS CONTINUOUS PRN
Status: DISCONTINUED | OUTPATIENT
Start: 2022-05-23 | End: 2022-05-23

## 2022-05-23 RX ORDER — VANCOMYCIN HCL IN 5 % DEXTROSE 1G/250ML
1000 PLASTIC BAG, INJECTION (ML) INTRAVENOUS
Status: DISCONTINUED | OUTPATIENT
Start: 2022-05-23 | End: 2022-05-23 | Stop reason: HOSPADM

## 2022-05-23 RX ORDER — MAGNESIUM SULFATE HEPTAHYDRATE 40 MG/ML
2 INJECTION, SOLUTION INTRAVENOUS ONCE
Status: COMPLETED | OUTPATIENT
Start: 2022-05-23 | End: 2022-05-23

## 2022-05-23 RX ADMIN — GLYCOPYRROLATE 0.8 MG: 0.2 INJECTION, SOLUTION INTRAMUSCULAR; INTRAVITREAL at 12:05

## 2022-05-23 RX ADMIN — IRON SUCROSE 100 MG: 20 INJECTION, SOLUTION INTRAVENOUS at 07:05

## 2022-05-23 RX ADMIN — ESMOLOL HYDROCHLORIDE 50 MG: 10 INJECTION INTRAVENOUS at 10:05

## 2022-05-23 RX ADMIN — SODIUM CHLORIDE, SODIUM LACTATE, POTASSIUM CHLORIDE, AND CALCIUM CHLORIDE: .6; .31; .03; .02 INJECTION, SOLUTION INTRAVENOUS at 10:05

## 2022-05-23 RX ADMIN — CALCIUM GLUCONATE 1 G: 20 INJECTION, SOLUTION INTRAVENOUS at 04:05

## 2022-05-23 RX ADMIN — CEFAZOLIN 2 G: 330 INJECTION, POWDER, FOR SOLUTION INTRAMUSCULAR; INTRAVENOUS at 10:05

## 2022-05-23 RX ADMIN — MAGNESIUM SULFATE 2 G: 2 INJECTION INTRAVENOUS at 01:05

## 2022-05-23 RX ADMIN — SCOPALAMINE 1 PATCH: 1 PATCH, EXTENDED RELEASE TRANSDERMAL at 08:05

## 2022-05-23 RX ADMIN — SUGAMMADEX 140 MG: 100 INJECTION, SOLUTION INTRAVENOUS at 12:05

## 2022-05-23 RX ADMIN — ONDANSETRON 8 MG: 2 INJECTION INTRAMUSCULAR; INTRAVENOUS at 09:05

## 2022-05-23 RX ADMIN — LIDOCAINE HYDROCHLORIDE 100 MG: 20 INJECTION, SOLUTION INTRAVENOUS at 10:05

## 2022-05-23 RX ADMIN — NEOSTIGMINE METHYLSULFATE 4 MG: 1 INJECTION INTRAVENOUS at 12:05

## 2022-05-23 RX ADMIN — KETOROLAC TROMETHAMINE 10 MG: 30 INJECTION, SOLUTION INTRAMUSCULAR at 05:05

## 2022-05-23 RX ADMIN — SUCCINYLCHOLINE CHLORIDE 100 MG: 20 INJECTION, SOLUTION INTRAMUSCULAR; INTRAVENOUS at 10:05

## 2022-05-23 RX ADMIN — DEXTROSE, SODIUM CHLORIDE, AND POTASSIUM CHLORIDE: 5; .45; .15 INJECTION INTRAVENOUS at 04:05

## 2022-05-23 RX ADMIN — HYDROCORTISONE SODIUM SUCCINATE 50 MG: 100 INJECTION, POWDER, FOR SOLUTION INTRAMUSCULAR; INTRAVENOUS at 09:05

## 2022-05-23 RX ADMIN — ACETAMINOPHEN 1000 MG: 10 INJECTION, SOLUTION INTRAVENOUS at 10:05

## 2022-05-23 RX ADMIN — DEXAMETHASONE SODIUM PHOSPHATE 8 MG: 4 INJECTION, SOLUTION INTRA-ARTICULAR; INTRALESIONAL; INTRAMUSCULAR; INTRAVENOUS; SOFT TISSUE at 10:05

## 2022-05-23 RX ADMIN — FENTANYL CITRATE 50 MCG: 50 INJECTION, SOLUTION INTRAMUSCULAR; INTRAVENOUS at 11:05

## 2022-05-23 RX ADMIN — ROCURONIUM BROMIDE 50 MG: 10 INJECTION, SOLUTION INTRAVENOUS at 10:05

## 2022-05-23 RX ADMIN — AMPICILLIN SODIUM AND SULBACTAM SODIUM 3 G: 2; 1 INJECTION, POWDER, FOR SOLUTION INTRAMUSCULAR; INTRAVENOUS at 09:05

## 2022-05-23 RX ADMIN — CALCIUM GLUCONATE 1 G: 20 INJECTION, SOLUTION INTRAVENOUS at 06:05

## 2022-05-23 RX ADMIN — KETOROLAC TROMETHAMINE 30 MG: 30 INJECTION, SOLUTION INTRAMUSCULAR; INTRAVENOUS at 12:05

## 2022-05-23 RX ADMIN — FAMOTIDINE 20 MG: 10 INJECTION, SOLUTION INTRAVENOUS at 09:05

## 2022-05-23 RX ADMIN — ACETAMINOPHEN 1000 MG: 10 INJECTION INTRAVENOUS at 09:05

## 2022-05-23 RX ADMIN — AMPICILLIN SODIUM AND SULBACTAM SODIUM 3 G: 2; 1 INJECTION, POWDER, FOR SOLUTION INTRAMUSCULAR; INTRAVENOUS at 05:05

## 2022-05-23 RX ADMIN — ENOXAPARIN SODIUM 30 MG: 30 INJECTION, SOLUTION INTRAVENOUS; SUBCUTANEOUS at 08:05

## 2022-05-23 RX ADMIN — PANTOPRAZOLE SODIUM 40 MG: 40 TABLET, DELAYED RELEASE ORAL at 04:05

## 2022-05-23 RX ADMIN — ESMOLOL HYDROCHLORIDE 30 MG: 10 INJECTION INTRAVENOUS at 12:05

## 2022-05-23 RX ADMIN — ESMOLOL HYDROCHLORIDE 50 MG: 10 INJECTION INTRAVENOUS at 11:05

## 2022-05-23 RX ADMIN — PROPOFOL 200 MG: 10 INJECTION, EMULSION INTRAVENOUS at 10:05

## 2022-05-23 RX ADMIN — KETOROLAC TROMETHAMINE 10 MG: 30 INJECTION, SOLUTION INTRAMUSCULAR at 11:05

## 2022-05-23 RX ADMIN — PREGABALIN 75 MG: 75 CAPSULE ORAL at 09:05

## 2022-05-23 RX ADMIN — ONDANSETRON 4 MG: 2 INJECTION, SOLUTION INTRAMUSCULAR; INTRAVENOUS at 12:05

## 2022-05-23 RX ADMIN — Medication: at 01:05

## 2022-05-23 RX ADMIN — MIDAZOLAM HYDROCHLORIDE 2 MG: 1 INJECTION, SOLUTION INTRAMUSCULAR; INTRAVENOUS at 10:05

## 2022-05-23 NOTE — ANESTHESIA POSTPROCEDURE EVALUATION
Anesthesia Post Evaluation    Patient: Zohra Paulino    Procedure(s) Performed: Procedure(s) (LRB):  REPAIR, HERNIA, INCISIONAL OR VENTRAL, WITHOUT HISTORY OF PRIOR REPAIR (N/A)    Final Anesthesia Type: general      Patient location during evaluation: PACU  Patient participation: Yes- Able to Participate  Level of consciousness: awake and alert  Post-procedure vital signs: reviewed and stable  Pain management: adequate  Airway patency: patent    PONV status at discharge: No PONV  Anesthetic complications: no      Cardiovascular status: blood pressure returned to baseline  Respiratory status: unassisted  Hydration status: euvolemic  Follow-up not needed.          Vitals Value Taken Time   BP 97/53 05/23/22 1554   Temp 36.4 °C (97.6 °F) 05/23/22 1554   Pulse 72 05/23/22 1554   Resp 14 05/23/22 1554   SpO2 100 % 05/23/22 1554         Event Time   Out of Recovery 15:33:38         Pain/Suzanna Score: Pain Rating Prior to Med Admin: 0 (5/23/2022  1:41 PM)  Pain Rating Post Med Admin: 0 (5/23/2022  3:10 PM)  Suzanna Score: 10 (5/23/2022  2:10 PM)

## 2022-05-23 NOTE — ANESTHESIA PROCEDURE NOTES
Intubation    Date/Time: 5/23/2022 10:38 AM  Performed by: Anju Fenton MD  Authorized by: Brody Trevino MD     Intubation:     Induction:  Inhalational - mask    Intubated:  Postinduction    Mask Ventilation:  Easy mask    Attempts:  3    Attempted By:  Resident anesthesiologist    Method of Intubation:  Direct    Blade:  Florencia 3    Laryngeal View Grade: Grade IV - neither epiglottis nor glottis seen      Laryngeal View Grade comment:  Patient not adequately anesthetised    Attempted By (2nd Attempt):  Resident anesthesiologist    Method of Intubation (2nd Attempt):  Direct    Blade (2nd Attempt):  Florencia 3    Laryngeal View Grade (2nd Attempt): Grade III - only epiglottis visible      Laryngeal View Grade (2nd Attempt) comment:  Patient not adequately anesthetized    Attempted By (3rd Attempt):  Resident anesthesiologist    Method of Intubation (3rd Attempt):  Direct    Blade (3rd Attempt):  Florencia 3    Laryngeal View Grade (3rd Attempt): Grade I - full view of cords      Difficult Airway Encountered?: No      Complications:  None    Airway Device:  Oral endotracheal tube    Airway Device Size:  7.0    Style/Cuff Inflation:  Cuffed (inflated to minimal occlusive pressure)    Tube secured:  22    Secured at:  The lips    Placement Verified By:  Capnometry    Complicating Factors:  None    Findings Post-Intubation:  BS equal bilateral and atraumatic/condition of teeth unchanged  Notes:      See quick note regarding course of intubation

## 2022-05-23 NOTE — PLAN OF CARE
Patient has met PACU discharge criteria, VSS, pain well controlled. Family updated by phone. Released from PACU by Dr. Rosas

## 2022-05-23 NOTE — OP NOTE
[facility] - Surgery (Hospital)  Brief Operative Note    SUMMARY     Surgery Date: 5/23/2022     Surgeon(s) and Role:     * GILMA Ellis MD - Primary    Assisting Surgeon: None    Pre-op Diagnosis:  Incisional hernia, without obstruction or gangrene [K43.2]    Post-op Diagnosis:  Post-Op Diagnosis Codes:     * Incisional hernia, without obstruction or gangrene [K43.2]    Procedure(s) (LRB):  REPAIR, HERNIA, INCISIONAL OR VENTRAL, WITHOUT HISTORY OF PRIOR REPAIR (N/A)  INSERTION OF MESH, RETRO RECTUS  TAP INTERCOSTAL NERVE BLOCK MULTILEVEL  PROCEDURE:  With the patient supine the operating table under general tracheal anesthesia sterile prep and drape after time-out the incision was opened through the previous midline scar till the hernia was encountered.  The hernia sac was dissected away from the defect reduced into the abdomen.  Next a component separation was performed  the anterior and posterior rectus sheath until adequate margins could be achieved circumferentially.  The hernia defect was approximately 5 in in length.  The posterior rectus sheath and posterior fascia was closed with running 0 PDS suture hand in a multilevel tap intercostal nerve block was vision.  Next the antibiotic soaked Phasix mesh was trimmed and fashioned to shape and laid posterior to the rectus muscle and anterior to the closure.  Then the anterior rectus sheath was closed over the muscle and the mesh with running 0 PDS suture with 0.5 cm spacing.  Irene was then sewn back down to the midline and to the fascia to Bill obliterate the dead space after irrigation with a biotic saline.  Estimated blood loss less than 50 cc.  Sponge needle counts were correct.  Patient was taken to recovery room in stable condition.  Anesthesia: General    Operative Findings:  Incisional hernia reducible.    Estimated Blood Loss: <50cc  Estimated Blood Loss has been documented.         Specimens:   Specimen (24h ago, onward)            None           [id number]

## 2022-05-23 NOTE — NURSING
Report received from TYESHA Jeffery in [facility]. Pt up to floor. VS taken and recorded. Pt oriented to room and resting comfortably, instructed pt to use pain pump when needed, pt verbalized understanding. Call light and personal items within reach. Daughter at bedside. Bed alarm set and activated.

## 2022-05-23 NOTE — ANESTHESIA PREPROCEDURE EVALUATION
05/23/2022  Zohra Paulino is a 72 y.o., female PMH of Afib on Eliquis(Last dose 5/19), DM2  HLD, Diastolic Dysfunction presents for hernia repair.       TTE 2018: EF 55-60%, There is diastolic dysfunction secondary to relaxation abnormality, no regional wall motion abnormalities.      Past Medical History:   Diagnosis Date    Bronchitis     Cancer     pancreatic    Diabetes mellitus     Fibroids     Nuclear sclerosis of both eyes 11/15/2021    Osteopetrosis     Uterine fibroid      Past Surgical History:   Procedure Laterality Date    CERVICAL BIOPSY  W/ LOOP ELECTRODE EXCISION      [facility]  2004    COLONOSCOPY      DISTAL PANCREATECTOMY N/A 7/27/2020    Procedure: PANCREATECTOMY, DISTAL, SUBTOTAL;  Surgeon: GILMA Ellis MD;  Location: [facility] OR;  Service: General;  Laterality: N/A;    ERCP N/A 8/19/2020    Procedure: ERCP (ENDOSCOPIC RETROGRADE CHOLANGIOPANCREATOGRAPHY);  Surgeon: Deion Ivory MD;  Location: [facility] ENDO;  Service: Endoscopy;  Laterality: N/A;  pancreatic duct leak  Rapid Covid test    ERCP N/A 9/16/2020    Procedure: ERCP (ENDOSCOPIC RETROGRADE CHOLANGIOPANCREATOGRAPHY);  Surgeon: Deion Ivory MD;  Location: [facility] ENDO;  Service: Endoscopy;  Laterality: N/A;    HYSTERECTOMY      fibroids    [place] REMOVAL OF OVARY/TUBE(S)      TUBAL LIGATION           Pre-op Assessment    I have reviewed the Patient Summary Reports.     I have reviewed the Nursing Notes. I have reviewed the NPO Status.   I have reviewed the Medications.     Review of Systems  Anesthesia Hx:  No problems with previous Anesthesia  Neg history of prior surgery. Denies Family Hx of Anesthesia complications.   Denies Personal Hx of Anesthesia complications.   Social:  No Alcohol Use, Non-Smoker    Hematology/Oncology:        Current/Recent Cancer. Oncology Comments: Pancreatic Cancer s/p resection     EENT/Dental:EENT/Dental Normal   Cardiovascular:   Denies MI.  Denies CAD.    Denies CABG/stent. Dysrhythmias atrial fibrillation CHF Diastolic Dysfunction seen on Echo in 2018   Hepatic/GI:  Hepatic/GI Normal    Neurological:  Neurology Normal    Endocrine:   Diabetes, well controlled, type 2    Psych:  Psychiatric Normal           Physical Exam  General: Well nourished and Cooperative    Airway:  Mallampati: II   Mouth Opening: Normal  Neck ROM: Normal ROM    Dental:  Dentures  Upper dentures removed  Chest/Lungs:  Clear to auscultation, Normal Respiratory Rate    Heart:  Rhythm: Irregularly Irregular        Anesthesia Plan  Type of Anesthesia, risks & benefits discussed:    Anesthesia Type: Gen ETT, MAC  Intra-op Monitoring Plan: Standard ASA Monitors  Post Op Pain Control Plan: multimodal analgesia  Induction:  IV  Informed Consent: Informed consent signed with the Patient and all parties understand the risks and agree with anesthesia plan.  All questions answered.   ASA Score: 3    Ready For Surgery From Anesthesia Perspective.     .

## 2022-05-23 NOTE — INTERVAL H&P NOTE
The patient has been examined and the H&P has been reviewed:    I concur with the findings and no changes have occurred since H&P was written.    Surgery risks, benefits and alternative options discussed and understood by patient/family.          There are no hospital problems to display for this patient.        Sasha Portillo  \[facility] [place] [facility]\"" General Surgery HOII

## 2022-05-23 NOTE — PLAN OF CARE
05/23/22 1640   Admission   Initial VN Admission Questions Complete   Communication Issues? None   Shift   Virtual Nurse - Patient Verbalized Approval Of Camera Use;VN Rounding   Safety/Activity   Patient Rounds visualized patient;placement of personal items at bedside;bed in low position;call light in patient/parent reach;bed wheels locked;clutter free environment maintained;ID band on

## 2022-05-23 NOTE — TRANSFER OF CARE
"Anesthesia Transfer of Care Note    Patient: Zohra Paulino    Procedure(s) Performed: Procedure(s) (LRB):  REPAIR, HERNIA, INCISIONAL OR VENTRAL, WITHOUT HISTORY OF PRIOR REPAIR (N/A)    Patient location: PACU    Anesthesia Type: general    Transport from OR: Transported from OR on 6-10 L/min O2 by face mask with adequate spontaneous ventilation    Post pain: adequate analgesia    Post assessment: no apparent anesthetic complications    Post vital signs: stable    Level of consciousness: awake and sedated    Nausea/Vomiting: no nausea/vomiting    Complications: none    Transfer of care protocol was followed      Last vitals:   Visit Vitals  BP (!) 176/82   Pulse 90   Resp 14   Ht 5' 2" (1.575 m)   Wt 68.9 kg (152 lb)   LMP  (LMP Unknown)   SpO2 100%   Breastfeeding No   BMI 27.80 kg/m²     "

## 2022-05-24 LAB
ALBUMIN SERPL BCP-MCNC: 2.7 G/DL (ref 3.5–5.2)
ALLENS TEST: ABNORMAL
ALP SERPL-CCNC: 91 U/L (ref 55–135)
ALT SERPL W/O P-5'-P-CCNC: 12 U/L (ref 10–44)
ANION GAP SERPL CALC-SCNC: 12 MMOL/L (ref 8–16)
AST SERPL-CCNC: 13 U/L (ref 10–40)
BASOPHILS # BLD AUTO: 0.07 K/UL (ref 0–0.2)
BASOPHILS NFR BLD: 0.5 % (ref 0–1.9)
BILIRUB SERPL-MCNC: 0.4 MG/DL (ref 0.1–1)
BLD PROD TYP BPU: NORMAL
BLOOD UNIT EXPIRATION DATE: NORMAL
BLOOD UNIT TYPE CODE: 5100
BLOOD UNIT TYPE: NORMAL
BUN SERPL-MCNC: 21 MG/DL (ref 8–23)
CALCIUM SERPL-MCNC: 8.6 MG/DL (ref 8.7–10.5)
CHLORIDE SERPL-SCNC: 109 MMOL/L (ref 95–110)
CO2 SERPL-SCNC: 16 MMOL/L (ref 23–29)
CODING SYSTEM: NORMAL
CREAT SERPL-MCNC: 1.5 MG/DL (ref 0.5–1.4)
DELSYS: ABNORMAL
DIFFERENTIAL METHOD: ABNORMAL
DISPENSE STATUS: NORMAL
EOSINOPHIL # BLD AUTO: 0.6 K/UL (ref 0–0.5)
EOSINOPHIL NFR BLD: 3.8 % (ref 0–8)
ERYTHROCYTE [DISTWIDTH] IN BLOOD BY AUTOMATED COUNT: 15.9 % (ref 11.5–14.5)
EST. GFR  (AFRICAN AMERICAN): 40 ML/MIN/1.73 M^2
EST. GFR  (NON AFRICAN AMERICAN): 35 ML/MIN/1.73 M^2
GLUCOSE SERPL-MCNC: 183 MG/DL (ref 70–110)
HCO3 UR-SCNC: 23.5 MMOL/L (ref 24–28)
HCT VFR BLD AUTO: 29.7 % (ref 37–48.5)
HCT VFR BLD CALC: 22 %PCV (ref 36–54)
HGB BLD-MCNC: 8 G/DL
HGB BLD-MCNC: 9.2 G/DL (ref 12–16)
IMM GRANULOCYTES # BLD AUTO: 0.08 K/UL (ref 0–0.04)
IMM GRANULOCYTES NFR BLD AUTO: 0.5 % (ref 0–0.5)
LYMPHOCYTES # BLD AUTO: 1.8 K/UL (ref 1–4.8)
LYMPHOCYTES NFR BLD: 11.6 % (ref 18–48)
MAGNESIUM SERPL-MCNC: 1.9 MG/DL (ref 1.6–2.6)
MCH RBC QN AUTO: 28.3 PG (ref 27–31)
MCHC RBC AUTO-ENTMCNC: 31 G/DL (ref 32–36)
MCV RBC AUTO: 91 FL (ref 82–98)
MONOCYTES # BLD AUTO: 1.6 K/UL (ref 0.3–1)
MONOCYTES NFR BLD: 10.5 % (ref 4–15)
NEUTROPHILS # BLD AUTO: 11.2 K/UL (ref 1.8–7.7)
NEUTROPHILS NFR BLD: 73.1 % (ref 38–73)
NRBC BLD-RTO: 0 /100 WBC
NUM UNITS TRANS PACKED RBC: NORMAL
PCO2 BLDA: 35 MMHG (ref 35–45)
PH SMN: 7.43 [PH] (ref 7.35–7.45)
PHOSPHATE SERPL-MCNC: 4.7 MG/DL (ref 2.7–4.5)
PLATELET # BLD AUTO: 193 K/UL (ref 150–450)
PMV BLD AUTO: 12.4 FL (ref 9.2–12.9)
PO2 BLDA: 107 MMHG (ref 80–100)
POC BE: -1 MMOL/L
POC SATURATED O2: 98 % (ref 95–100)
POC TCO2: 25 MMOL/L (ref 23–27)
POTASSIUM BLD-SCNC: 4.2 MMOL/L (ref 3.5–5.1)
POTASSIUM SERPL-SCNC: 5.3 MMOL/L (ref 3.5–5.1)
PROT SERPL-MCNC: 5.7 G/DL (ref 6–8.4)
RBC # BLD AUTO: 3.25 M/UL (ref 4–5.4)
SAMPLE: ABNORMAL
SITE: ABNORMAL
SODIUM BLD-SCNC: 138 MMOL/L (ref 136–145)
SODIUM SERPL-SCNC: 137 MMOL/L (ref 136–145)
WBC # BLD AUTO: 15.3 K/UL (ref 3.9–12.7)

## 2022-05-24 PROCEDURE — 83605 ASSAY OF LACTIC ACID: CPT | Performed by: SURGERY

## 2022-05-24 PROCEDURE — 82803 BLOOD GASES ANY COMBINATION: CPT

## 2022-05-24 PROCEDURE — 80053 COMPREHEN METABOLIC PANEL: CPT | Mod: 91 | Performed by: SURGERY

## 2022-05-24 PROCEDURE — 93010 EKG 12-LEAD: ICD-10-PCS | Mod: ,,, | Performed by: INTERNAL MEDICINE

## 2022-05-24 PROCEDURE — 36569 INSJ PICC 5 YR+ W/O IMAGING: CPT

## 2022-05-24 PROCEDURE — 83735 ASSAY OF MAGNESIUM: CPT | Performed by: SURGERY

## 2022-05-24 PROCEDURE — 36415 COLL VENOUS BLD VENIPUNCTURE: CPT | Performed by: SURGERY

## 2022-05-24 PROCEDURE — 99900035 HC TECH TIME PER 15 MIN (STAT)

## 2022-05-24 PROCEDURE — 93010 ELECTROCARDIOGRAM REPORT: CPT | Mod: ,,, | Performed by: INTERNAL MEDICINE

## 2022-05-24 PROCEDURE — 93005 ELECTROCARDIOGRAM TRACING: CPT

## 2022-05-24 PROCEDURE — 11000001 HC ACUTE MED/SURG PRIVATE ROOM

## 2022-05-24 PROCEDURE — P9016 RBC LEUKOCYTES REDUCED: HCPCS | Performed by: STUDENT IN AN ORGANIZED HEALTH CARE EDUCATION/TRAINING PROGRAM

## 2022-05-24 PROCEDURE — 97530 THERAPEUTIC ACTIVITIES: CPT

## 2022-05-24 PROCEDURE — 25000003 PHARM REV CODE 250: Performed by: STUDENT IN AN ORGANIZED HEALTH CARE EDUCATION/TRAINING PROGRAM

## 2022-05-24 PROCEDURE — 85730 THROMBOPLASTIN TIME PARTIAL: CPT | Performed by: SURGERY

## 2022-05-24 PROCEDURE — 97165 OT EVAL LOW COMPLEX 30 MIN: CPT

## 2022-05-24 PROCEDURE — 97162 PT EVAL MOD COMPLEX 30 MIN: CPT

## 2022-05-24 PROCEDURE — 63600175 PHARM REV CODE 636 W HCPCS: Performed by: STUDENT IN AN ORGANIZED HEALTH CARE EDUCATION/TRAINING PROGRAM

## 2022-05-24 PROCEDURE — 63600175 PHARM REV CODE 636 W HCPCS: Performed by: SURGERY

## 2022-05-24 PROCEDURE — 36600 WITHDRAWAL OF ARTERIAL BLOOD: CPT

## 2022-05-24 PROCEDURE — 84100 ASSAY OF PHOSPHORUS: CPT | Performed by: SURGERY

## 2022-05-24 PROCEDURE — 84484 ASSAY OF TROPONIN QUANT: CPT | Performed by: SURGERY

## 2022-05-24 PROCEDURE — C1751 CATH, INF, PER/CENT/MIDLINE: HCPCS

## 2022-05-24 PROCEDURE — 94760 N-INVAS EAR/PLS OXIMETRY 1: CPT

## 2022-05-24 PROCEDURE — 94761 N-INVAS EAR/PLS OXIMETRY MLT: CPT

## 2022-05-24 PROCEDURE — 85025 COMPLETE CBC W/AUTO DIFF WBC: CPT | Performed by: SURGERY

## 2022-05-24 PROCEDURE — 85384 FIBRINOGEN ACTIVITY: CPT | Performed by: SURGERY

## 2022-05-24 PROCEDURE — 25000003 PHARM REV CODE 250: Performed by: SURGERY

## 2022-05-24 PROCEDURE — 85610 PROTHROMBIN TIME: CPT | Performed by: SURGERY

## 2022-05-24 PROCEDURE — 94799 UNLISTED PULMONARY SVC/PX: CPT

## 2022-05-24 RX ORDER — SIMETHICONE 125 MG
125 TABLET,CHEWABLE ORAL EVERY 6 HOURS PRN
Status: DISCONTINUED | OUTPATIENT
Start: 2022-05-24 | End: 2022-05-28 | Stop reason: HOSPADM

## 2022-05-24 RX ORDER — SODIUM CHLORIDE 9 MG/ML
INJECTION, SOLUTION INTRAVENOUS CONTINUOUS
Status: DISCONTINUED | OUTPATIENT
Start: 2022-05-24 | End: 2022-05-25

## 2022-05-24 RX ORDER — AMOXICILLIN 250 MG
1 CAPSULE ORAL DAILY
Status: DISCONTINUED | OUTPATIENT
Start: 2022-05-24 | End: 2022-05-28 | Stop reason: HOSPADM

## 2022-05-24 RX ORDER — AMOXICILLIN 250 MG
1 CAPSULE ORAL DAILY PRN
Status: DISCONTINUED | OUTPATIENT
Start: 2022-05-24 | End: 2022-05-24

## 2022-05-24 RX ORDER — HYDROCODONE BITARTRATE AND ACETAMINOPHEN 500; 5 MG/1; MG/1
TABLET ORAL
Status: DISCONTINUED | OUTPATIENT
Start: 2022-05-24 | End: 2022-05-25

## 2022-05-24 RX ADMIN — IRON SUCROSE 100 MG: 20 INJECTION, SOLUTION INTRAVENOUS at 08:05

## 2022-05-24 RX ADMIN — SODIUM CHLORIDE: 0.9 INJECTION, SOLUTION INTRAVENOUS at 04:05

## 2022-05-24 RX ADMIN — KETOROLAC TROMETHAMINE 10 MG: 30 INJECTION, SOLUTION INTRAMUSCULAR at 12:05

## 2022-05-24 RX ADMIN — PANTOPRAZOLE SODIUM 40 MG: 40 TABLET, DELAYED RELEASE ORAL at 08:05

## 2022-05-24 RX ADMIN — ACETAMINOPHEN 1000 MG: 10 INJECTION INTRAVENOUS at 02:05

## 2022-05-24 RX ADMIN — SODIUM CHLORIDE, SODIUM LACTATE, POTASSIUM CHLORIDE, AND CALCIUM CHLORIDE 1000 ML: .6; .31; .03; .02 INJECTION, SOLUTION INTRAVENOUS at 02:05

## 2022-05-24 RX ADMIN — DOCUSATE SODIUM AND SENNOSIDES 1 TABLET: 8.6; 5 TABLET, FILM COATED ORAL at 12:05

## 2022-05-24 RX ADMIN — DEXTROSE, SODIUM CHLORIDE, AND POTASSIUM CHLORIDE: 5; .45; .15 INJECTION INTRAVENOUS at 02:05

## 2022-05-24 RX ADMIN — PREGABALIN 75 MG: 75 CAPSULE ORAL at 08:05

## 2022-05-24 RX ADMIN — KETOROLAC TROMETHAMINE 10 MG: 30 INJECTION, SOLUTION INTRAMUSCULAR at 05:05

## 2022-05-24 RX ADMIN — ACETAMINOPHEN 1000 MG: 10 INJECTION INTRAVENOUS at 05:05

## 2022-05-24 RX ADMIN — AMPICILLIN SODIUM AND SULBACTAM SODIUM 3 G: 2; 1 INJECTION, POWDER, FOR SOLUTION INTRAMUSCULAR; INTRAVENOUS at 03:05

## 2022-05-24 NOTE — NURSING
"RAPID RESPONSE NURSE PROACTIVE ROUNDING NOTE     Time of Visit: 1525    Admit Date: 2022  LOS: 1  Code Status: Prior   Date of Visit: 2022  : 1950  Age: 72 y.o.  Sex: female  Race: Black or   Bed: K512/K512 A:   MRN: [id number]  Was the patient discharged from an ICU this admission? no   Was the patient discharged from a PACU within last 24 hours?  yes  Did the patient receive conscious sedation/general anesthesia in last 24 hours?  yes  Was the patient in the ED within the past 24 hours?  no  Was the patient started on NIPPV within the past 24 hours?  no  Attending Physician: GILMA Ellis MD  Primary Service: Networked reference to record PCT     ASSESSMENT     Notified by overhead page and call for proactive round  Reason for alert: hypotension     Diagnosis: Incisional hernia, without obstruction or gangrene    Abnormal Vital Signs: BP (!) 101/49 (BP Location: Right arm, Patient Position: Lying)   Pulse 92   Temp 97 °F (36.1 °C) (Oral)   Resp 18   Ht 5' 2" (1.575 m)   Wt 72.9 kg (160 lb 11.5 oz)   LMP  (LMP Unknown) Comment: [facility] BSO 17  SpO2 98%   Breastfeeding No   BMI 29.40 kg/m²      Clinical Issues: Circulatory    Patient  has a past medical history of Bronchitis, Cancer, Diabetes mellitus, Fibroids, Nuclear sclerosis of both eyes, Osteopetrosis, and Uterine fibroid.      Pt lying in bed awake and alert. Denies weakness/dizziness. BP 76/43, HR 89. Received 1L bolus and has continuous IVF. Repeat BP 97/44; BP 94/53; 101/49 with HR 90's.      INTERVENTIONS/ RECOMMENDATIONS     Stat CBC, PTT, CMP. Pressure responded to fluid bolus. Monitor BP for the next hour.     Discussed plan of care with RNJaneth.    PHYSICIAN ESCALATION     Yes/No  yes    Orders received and case discussed with Dr. Portillo and Dr. Gentile present during MET.    Disposition: Remain in room 512.    FOLLOW-UP     Call back the Rapid Response Nurse, Elvira Reaves RN at [phone] for " additional questions or concerns.

## 2022-05-24 NOTE — PLAN OF CARE
CM attempted to meet with pt -   MET called on pt -   CM will meet with pt at a later time.      5/23 - s/p hernia repair        Future Appointments   Date Time Provider Department Center   5/27/2022 10:00 AM [facility]ROP DR ELLIE KILGORE OP [facility]   5/27/2022 10:15 AM [facility] X[facility]OP DR ELLIE KILGORE OP [facility]   5/27/2022 11:00 AM ZULEYMA Hilliard [facility] HAND [facility]   6/2/2022 10:00 AM Tracey Martin MD [facility]        05/24/22 4608   Discharge Planning   Assessment Type Discharge Planning Brief Assessment

## 2022-05-24 NOTE — PROGRESS NOTES
Neuroendocrine Surgery Progress Note    S:  vss  650 UOP  No bm or flatus  Denies nausea or vomiting  Pain from gas  Has not gotten out of bed yet    O:  Temp:  [97.5 °F (36.4 °C)-98.3 °F (36.8 °C)] 97.5 °F (36.4 °C)  Pulse:  [66-92] 84  Resp:  [10-18] 16  SpO2:  [95 %-100 %] 96 %  BP: ()/(53-82) 109/55    Physical Exam:  Gen: NAD, AAOx3  HEENT: EOMI, NCAT  CV: RR  Resp: no shortness of breath, normal WOB  Abd: soft, non-distended, NT. Midline incision c/d/i  Ext: no edema      A/P:  72M s/p rectrorectus hernia repair  -advance diet today  -likely remove sofia  -wean PCA  -ambulate      Ricky Worthington MD  [employer] General Surgery, PGY-4

## 2022-05-24 NOTE — PLAN OF CARE
Pt would benefit from cont OT services in order to maximize functional independence. Recommending TBD pending progress. Pt limited this date by increased pain & lightheadedness upon standing. OT eval performed with PT, pt agreeable to therapy. Pt with 0/10 pain at rest. Pt requires ModA for sup>sit, EOB /52, HR 92. Pt with mild dizziness, improving after sitting with ankle pumps. Pt requires MaxA x2 for sit<>stand from elevated EOB with use of RW, pt unable to maintain static standing long enough to obtain BP with Dinamap 2/2 increased pain, lightheadedness. Pt answering questions appropriately. Pt returned to supine & bed placed in trendelenburg, BP taken at 108/56, pt reporting symptoms lessening. End of session /53, HR 72, pt reporting symptoms resolved & pain lessening.     Problem: Occupational Therapy  Goal: Occupational Therapy Goal  Description: Goals to be met by: 6/24/2022     Patient will increase functional independence with ADLs by performing:    UE Dressing with Modified [place].  LE Dressing with Set-up Assistance.  Grooming while standing with Set-up Assistance.  Toileting from toilet with Modified [place] for hygiene and clothing management.   Supine to sit with Modified [place].  Step transfer with Supervision & appropriate AD.  Toilet transfer to toilet with Supervision & appropriate AD.    Outcome: Ongoing, Progressing

## 2022-05-24 NOTE — PROGRESS NOTES
Arrived in room following MET call. Patient sats 88% on room air placed on 2lnc, BP was patietns main issue, Dr Gentile in room. . Out of room at 1537

## 2022-05-24 NOTE — PT/OT/SLP EVAL
Physical Therapy Evaluation    Patient Name:  Zohra Paulino   MRN:  [id number]    Recommendations:     Discharge Recommendations:   (TBD)   Discharge Equipment Recommendations:  (TBD)   Barriers to discharge: OH, pain, impaired functional mobility    Assessment:     Zohra Paulino is a 72 y.o. female admitted with a medical diagnosis of Incisional hernia, without obstruction or gangrene.  She presents with the following impairments/functional limitations:  weakness, impaired endurance, impaired functional mobilty, gait instability, impaired balance, impaired self care skills, impaired cardiopulmonary response to activity, pain, impaired skin, decreased lower extremity function, decreased upper extremity function, decreased coordination, decreased ROM .Pt required ModA for sup>sit and MaxA x 2 and RW for sit>stand with bed height elevated. Pt reporting mild dizziness upon sitting EOB which worsened upon standing. Pt unable to tolerate standing long enough to obtain a BP in standing. Pt reporting increased lower abdominal pain and demonstrating decreased alertness but still responding/answering questions. Pt returned supine 2/2 pt orthostatic and symptomatic / unable to obtain seated BP post stand. Pt reporting symptoms improved once returned supine / bed in trendelenburg. D/c recs TBD pending resolution of OH.     Rehab Prognosis: Good; patient would benefit from acute skilled PT services to address these deficits and reach maximum level of function.    Recent Surgery: Procedure(s) (LRB):  REPAIR, HERNIA, INCISIONAL OR VENTRAL, WITHOUT HISTORY OF PRIOR REPAIR (N/A) 1 Day Post-Op    Plan:     During this hospitalization, patient to be seen 5 x/week to address the identified rehab impairments via gait training, therapeutic activities, therapeutic exercises, neuromuscular re-education and progress toward the following goals:    · Plan of Care Expires:  06/24/22    Subjective     Chief Complaint: Dizziness,  pain  Patient/Family Comments/goals: Improve function, decrease pain  Pain/Comfort:  · Pain Rating 1: 0/10  · Location - Orientation 1: lower  · Location 1: abdomen (below incision site)  · Pain Addressed 1: Reposition, Distraction, Cessation of Activity, Nurse notified  · Pain Rating Post-Intervention 1: 7/10    Patients cultural, spiritual, Gnosticism conflicts given the current situation: no    Living Environment:  Pt lives with her two sons in a 2 SH with 0 MARYLOU, bedroom is on the 1st floor and T/S is on 2nd floor with a full flight of stairs with R HR.   Prior to admission, patients level of function was Indep with no AD needed for mobility. Pt driving and Indep with ADL's.  Equipment used at home: walker, rolling.  DME owned (not currently used): rolling walker.  Upon discharge, patient will have assistance from family.    Objective:     Communicated with nsg prior to session.  Patient found HOB elevated with sofia catheter, bed alarm  upon PT entry to room.    General Precautions: Standard, fall   Orthopedic Precautions:N/A   Braces: N/A  Respiratory Status: Room air    Exams:  · Cognitive Exam:  Patient is oriented to Person, Place, Time and Situation  · Postural Exam:  Patient presented with the following abnormalities:    · -       Rounded shoulders  · -       Forward head  · Sensation:    · -       Impaired  light/touch B toes and distal half of B feet pt reporting numbness and tingling that is chronic from previous sx and chemo  · Skin Integrity/Edema:      · -       Skin integrity: incision abdomen   · -       Edema: None noted BLE  · RLE ROM: WFL except hip flexion limited by pain  · RLE Strength: WFL except hip flexion not tested 2/2 pain  · LLE ROM: WFL except hip flexion limited by pain  · LLE Strength: WFL except hip flexion not tested 2/2 pain    Functional Mobility:  · Bed Mobility:     · Rolling Left:  moderate assistance, VC's for log roll   · Scooting: maximal assistance and of 2 persons  scooting supine toward HOB via drawsheet  · Supine to Sit: moderate assistance and for trunk control and 2/2 pain  · Sit to Supine: total assistance and of 2 persons 2/2 pt with decreased alertness, OH  · Transfers:     · Sit to Stand:  maximal assistance and of 2 persons with rolling walker    Therapeutic Activities and Exercises:   Pt educated on log rolling, bracing with pillow, role of PT/POC, and HEP provided. Pt required ModA for sup>sit and MaxA x 2 and RW for sit>stand with bed height elevated.   Pt reporting mild dizziness upon sitting EOB which worsened upon standing. Pt sat EOB several minutes and performed LE therex before attempting stand.    Pt unable to tolerate standing long enough to obtain a BP in standing. Pt demo increased B knee flexion and trunk flexion 2/2 pain - VC's for upright.    Pt reporting increased lower abdominal pain and demonstrating decreased alertness but still responding/answering questions. Pt reporting need to return seated. Unable to take steps or remain standing.   Pt forward flexed over RW seated EOB and cont to demonstrate decreased alertness.   Pt returned supine 2/2 pt orthostatic and symptomatic / unable to obtain seated BP post stand.   Pt reporting symptoms improved once returned supine / bed in trendelenburg.   Pt educated on gradually elevated HOB to assist with stabilizing blood pressure.    5/24/22 05/24/22 0956 05/24/22 0958   Vital Signs   BP  106/52 (!) 108/56 (!) 112/53   MAP (mmHg)   77 77   BP Location Right arm  Right arm Right arm   Patient Position Sitting EOB Lying Lying         AM-PAC 6 CLICK MOBILITY  Total Score:9     Patient left HOB elevated with all lines intact, call button in reach, bed alarm on and nsg notified.    GOALS:   Multidisciplinary Problems     Physical Therapy Goals        Problem: Physical Therapy    Goal Priority Disciplines Outcome Goal Variances Interventions   Physical Therapy Goal     PT, PT/OT Ongoing, Progressing      Description: Goals to be met by: 22     Patient will increase functional independence with mobility by performin. Supine to sit with Stand-by Assistance  2. Rolling to Left and Right with Stand-by Assistance.  3. Sit to stand transfer with Stand-by Assistance  4. Bed to chair transfer with Stand-by Assistance using Rolling Walker  5. Gait  x 80 feet with Stand-by Assistance using Rolling Walker.   6. Ascend/descend 1 flight of stairs with right Handrails Stand-by Assistance using No Assistive Device.                      History:     Past Medical History:   Diagnosis Date    Bronchitis     Cancer     pancreatic    Diabetes mellitus     Fibroids     Nuclear sclerosis of both eyes 11/15/2021    Osteopetrosis     Uterine fibroid        Past Surgical History:   Procedure Laterality Date    CERVICAL BIOPSY  W/ LOOP ELECTRODE EXCISION      ckc      COLONOSCOPY      DISTAL PANCREATECTOMY N/A 2020    Procedure: PANCREATECTOMY, DISTAL, SUBTOTAL;  Surgeon: GILMA Ellis MD;  Location: [facility] OR;  Service: General;  Laterality: N/A;    ERCP N/A 2020    Procedure: ERCP (ENDOSCOPIC RETROGRADE CHOLANGIOPANCREATOGRAPHY);  Surgeon: Deion Ivory MD;  Location: [facility] ENDO;  Service: Endoscopy;  Laterality: N/A;  pancreatic duct leak  Rapid Covid test    ERCP N/A 2020    Procedure: ERCP (ENDOSCOPIC RETROGRADE CHOLANGIOPANCREATOGRAPHY);  Surgeon: Deion Ivory MD;  Location: [facility] ENDO;  Service: Endoscopy;  Laterality: N/A;    HYSTERECTOMY      fibroids    [place] REMOVAL OF OVARY/TUBE(S)      TUBAL LIGATION         Time Tracking:     PT Received On: 22  PT Start Time: 928     PT Stop Time: 1005  PT Total Time (min): 37 min     Billable Minutes: Evaluation 10 and Therapeutic Activity 10 (cotx with OT)      2022

## 2022-05-24 NOTE — PLAN OF CARE
Problem: Physical Therapy  Goal: Physical Therapy Goal  Description: Goals to be met by: 22     Patient will increase functional independence with mobility by performin. Supine to sit with Stand-by Assistance  2. Rolling to Left and Right with Stand-by Assistance.  3. Sit to stand transfer with Stand-by Assistance  4. Bed to chair transfer with Stand-by Assistance using Rolling Walker  5. Gait  x 80 feet with Stand-by Assistance using Rolling Walker.   6. Ascend/descend 1 flight of stairs with right Handrails Stand-by Assistance using No Assistive Device.     Outcome: Ongoing, Progressing     PT Eval completed, note to follow. Pt required ModA for sup>sit and MaxA x 2 and RW for sit>stand with bed height elevated. Pt reporting mild dizziness upon sitting EOB which worsened upon standing. Pt unable to tolerate standing long enough to obtain a BP in standing. Pt reporting increased lower abdominal pain and demonstrating decreased alertness but still responding/answering questions. Pt returned supine 2/2 pt orthostatic and symptomatic / unable to obtain seated BP post stand. Pt reporting symptoms improved once returned supine / bed in trendelenburg. D/c recs TBD pending resolution of OH.      22 0956 22 0958   Vital Signs   BP  106/52 (!) 108/56 (!) 112/53   MAP (mmHg)  77 77   BP Location Right arm  Right arm Right arm   Patient Position Sitting EOB Lying Lying

## 2022-05-24 NOTE — PROGRESS NOTES
Future Appointments   Date Time Provider Department Center   5/27/2022 10:00 AM [facility] XROP DR ELLIE KILGORE OP [facility]   5/27/2022 10:15 AM [facility] XROP DR ELLIE KILGORE OP [facility]   5/27/2022 11:00 AM ZULEYMA Hilliard [facility] HAND [facility]

## 2022-05-24 NOTE — PLAN OF CARE
Pt awake and alert throughout shift. IV fluids infusing per orders. Regular diet, tolerated fairly with poor appetite. 2L/NC. Midline abdominal incision closed with dermabond CDI. No complaints of pain throughout shift. Denies nausea. Safety maintained, bed locked and in lowest position. Call light and personal items within reach. Advised pt to call for assistance, pt verbalized understanding.

## 2022-05-24 NOTE — PT/OT/SLP EVAL
Occupational Therapy   Evaluation    Name: Zohra Paulino  MRN: [id number]  Admitting Diagnosis:  Incisional hernia, without obstruction or gangrene  Recent Surgery: Procedure(s) (LRB):  REPAIR, HERNIA, INCISIONAL OR VENTRAL, WITHOUT HISTORY OF PRIOR REPAIR (N/A) 1 Day Post-Op    Recommendations:     Discharge Recommendations: other (see comments) (TBD)  Discharge Equipment Recommendations:  other (see comments) (TBD)  Barriers to discharge:  Other (Comment) (Pt requires increased assistance at this time)    Assessment:     Zohra Paulino is a 72 y.o. female with a medical diagnosis of Incisional hernia, without obstruction or gangrene.  She presents with The primary encounter diagnosis was Incisional hernia, without obstruction or gangrene. A diagnosis of Personal history of pancreatic cancer was also pertinent to this visit. Performance deficits affecting function: weakness, impaired functional mobilty, impaired cardiopulmonary response to activity, pain, impaired skin, decreased ROM, decreased lower extremity function, decreased upper extremity function, impaired balance, impaired self care skills, gait instability, impaired endurance, decreased coordination.      Pt would benefit from cont OT services in order to maximize functional independence. Recommending TBD pending progress. Pt limited this date by increased pain & lightheadedness upon standing. OT eval performed with PT, pt agreeable to therapy. Pt with 0/10 pain at rest. Pt requires ModA for sup>sit, EOB /52, HR 92. Pt with mild dizziness, improving after sitting with ankle pumps. Pt requires MaxA x2 for sit<>stand from elevated EOB with use of RW, pt unable to maintain static standing long enough to obtain BP with Dinamap 2/2 increased pain, lightheadedness. Pt answering questions appropriately. Pt returned to supine & bed placed in trendelenburg, BP taken at 108/56, pt reporting symptoms lessening. End of session /53, HR 72, pt  reporting symptoms resolved & pain lessening.     Rehab Prognosis: Good; patient would benefit from acute skilled OT services to address these deficits and reach maximum level of function.       Plan:     Patient to be seen 5 x/week to address the above listed problems via self-care/home management, therapeutic activities, therapeutic exercises  · Plan of Care Expires: 06/24/22  · Plan of Care Reviewed with: patient    Subjective     Chief Complaint: Increased pain & lightheadedness upon standing  Patient/Family Comments/goals: Return to PLOF    Occupational Profile:  Living Environment: Pt lives w/2 sons in 2SH, 0STE, bed on 1st floor, bathroom with t/s combo on 2nd floor, 12-14 steps to 2nd floor with RHR  Previous level of function: [place] with no AD   Roles and Routines: Driving, has not returned to work  Equipment Used at Home:  walker, rolling  Assistance upon Discharge: Family     Pain/Comfort:  Pain Rating 1: 0/10  Location - Orientation 1: lower  Location 1: abdomen (below incision site)  Pain Addressed 1: Reposition, Distraction, Cessation of Activity, Nurse notified  Pain Rating Post-Intervention 1: 7/10    Patients cultural, spiritual, Muslim conflicts given the current situation: no    Objective:     Communicated with:  nsg prior to session.  Patient found HOB elevated with sofia catheter, bed alarm upon OT entry to room.    General Precautions: Standard, fall   Orthopedic Precautions:N/A   Braces: N/A  Respiratory Status: Room air    Occupational Performance:    Bed Mobility:    · Patient completed Scooting/Bridging with maximal assistance and 2 persons to scoot to HOB with use of draw sheet  · Patient completed Supine to Sit with moderate assistance 2/2 decreased trunk control & pain; pt educated on use of log roll technique for sup>sit  · Patient completed Sit to Supine with maximal assistance and dependent    Functional Mobility/Transfers:  · Patient completed Sit <> Stand Transfer with maximal  assistance and of 2 persons  with  rolling walker and elevated bed height; less than ~1 min 2/2 increased pain & lightheadedness. Unable to obtain BP in standing, pt answering questions appropriately.     Activities of Daily Living:  · NT    Cognitive/Visual Perceptual:  Cognitive/Psychosocial Skills:     -       Oriented to: Person, Place, Time and Situation   -       Follows Commands/attention:Follows multistep  commands  -       Memory: No Deficits noted  -       Mood/Affect/Coping skills/emotional control: Cooperative and Pleasant    Physical Exam:  Skin integrity: abdominal incision  Sensation:    -       Intact  light/touch BUE  Upper Extremity Range of Motion:     -       Right Upper Extremity: WFL except decreased shoulder flex 2/2 abdominal pain  -       Left Upper Extremity: decreased shoulder flex 2/2 abdominal pain  Upper Extremity Strength:    -       Right Upper Extremity: grossly 3+/5  -       Left Upper Extremity: grossly 3+/5   Strength:    -       Right Upper Extremity: Fair  -       Left Upper Extremity: Fair    AMPAC 6 Click ADL:  AMPAC Total Score: 16    Treatment & Education:  Pt limited this date by increased pain & lightheadedness upon standing.   OT eval performed with PT, pt agreeable to therapy.   Pt with 0/10 pain at rest.   Pt requires ModA for sup>sit, EOB /52, HR 92.   Pt with mild dizziness, improving after sitting with ankle pumps.   Pt requires MaxA x2 for sit<>stand from elevated EOB with use of RW, pt unable to maintain static standing long enough to obtain BP with Dinamap 2/2 increased pain, lightheadedness.   Pt answering questions appropriately. Pt returned to supine & bed placed in trendelenburg, BP taken at 108/56, pt reporting symptoms lessening.   End of session /53, HR 72, pt reporting symptoms resolved & pain lessening.   Education:    Patient left HOB elevated with all lines intact, call button in reach, bed alarm on and nsg notified    GOALS:    Multidisciplinary Problems     Occupational Therapy Goals        Problem: Occupational Therapy    Goal Priority Disciplines Outcome Interventions   Occupational Therapy Goal     OT, PT/OT Ongoing, Progressing    Description: Goals to be met by: 6/24/2022     Patient will increase functional independence with ADLs by performing:    UE Dressing with Modified [place].  LE Dressing with Set-up Assistance.  Grooming while standing with Set-up Assistance.  Toileting from toilet with Modified [place] for hygiene and clothing management.   Supine to sit with Modified [place].  Step transfer with Supervision & appropriate AD.  Toilet transfer to toilet with Supervision & appropriate AD.                     History:     Past Medical History:   Diagnosis Date    Bronchitis     Cancer     pancreatic    Diabetes mellitus     Fibroids     Nuclear sclerosis of both eyes 11/15/2021    Osteopetrosis     Uterine fibroid          Past Surgical History:   Procedure Laterality Date    CERVICAL BIOPSY  W/ LOOP ELECTRODE EXCISION      ckc  2004    COLONOSCOPY      DISTAL PANCREATECTOMY N/A 7/27/2020    Procedure: PANCREATECTOMY, DISTAL, SUBTOTAL;  Surgeon: GILMA Ellis MD;  Location: [facility] OR;  Service: General;  Laterality: N/A;    ERCP N/A 8/19/2020    Procedure: ERCP (ENDOSCOPIC RETROGRADE CHOLANGIOPANCREATOGRAPHY);  Surgeon: Deion Ivory MD;  Location: [facility] ENDO;  Service: Endoscopy;  Laterality: N/A;  pancreatic duct leak  Rapid Covid test    ERCP N/A 9/16/2020    Procedure: ERCP (ENDOSCOPIC RETROGRADE CHOLANGIOPANCREATOGRAPHY);  Surgeon: Deion Ivory MD;  Location: [facility] ENDO;  Service: Endoscopy;  Laterality: N/A;    HYSTERECTOMY      fibroids    [place] REMOVAL OF OVARY/TUBE(S)      TUBAL LIGATION         Time Tracking:     OT Date of Treatment: 05/24/22  OT Start Time: 0928  OT Stop Time: 1003  OT Total Time (min): 35 min    Billable Minutes:Evaluation 10  Therapeutic Activity 12  cotx    5/24/2022

## 2022-05-24 NOTE — PLAN OF CARE
Pt AAOx4. No complaints of pain, N/V. PCA in use. Medications administered per MAR. IVFs infusing. On 2L NC. Remains NPO ex meds. Incision to abdomen clean and dry with dermabond intact. Pt states she feels like she needs to have a BM, placed on bedpan but unable to have BM. Safety maintained with bed alarm set, side rails raised, and call light in reach.

## 2022-05-24 NOTE — NURSING
MD Portillo notified of patients current BP, pt asymptomatic. 1L of LR bolus ordered for patient. VS documented per flowsheets.

## 2022-05-24 NOTE — PLAN OF CARE
MET called at 325pm, labs ordered during met and drawn promptly. Labs have still not resulted at 517pm    Ricky Worthington MD  [facility] General Surgery PGY4

## 2022-05-24 NOTE — NURSING
MD Worthington updated on delay in blood collection that was ordered during MET. 3x unsuccessful lab techs have attempted to collect blood with no blood return. Charge nurse notified.

## 2022-05-25 ENCOUNTER — TELEPHONE (OUTPATIENT)
Dept: ORTHOPEDICS | Facility: CLINIC | Age: 72
End: 2022-05-25
Payer: MEDICARE

## 2022-05-25 LAB
ALBUMIN SERPL BCP-MCNC: 2.4 G/DL (ref 3.5–5.2)
ALBUMIN SERPL BCP-MCNC: 2.5 G/DL (ref 3.5–5.2)
ALP SERPL-CCNC: 87 U/L (ref 55–135)
ALP SERPL-CCNC: 90 U/L (ref 55–135)
ALT SERPL W/O P-5'-P-CCNC: 11 U/L (ref 10–44)
ALT SERPL W/O P-5'-P-CCNC: 8 U/L (ref 10–44)
ANION GAP SERPL CALC-SCNC: 13 MMOL/L (ref 8–16)
ANION GAP SERPL CALC-SCNC: 4 MMOL/L (ref 8–16)
ANION GAP SERPL CALC-SCNC: 7 MMOL/L (ref 8–16)
ANION GAP SERPL CALC-SCNC: 8 MMOL/L (ref 8–16)
ANISOCYTOSIS BLD QL SMEAR: SLIGHT
APTT BLDCRRT: 32.9 SEC (ref 21–32)
APTT BLDCRRT: 35.3 SEC (ref 21–32)
AST SERPL-CCNC: 13 U/L (ref 10–40)
AST SERPL-CCNC: 14 U/L (ref 10–40)
BASO STIPL BLD QL SMEAR: ABNORMAL
BASOPHILS # BLD AUTO: 0.02 K/UL (ref 0–0.2)
BASOPHILS # BLD AUTO: 0.02 K/UL (ref 0–0.2)
BASOPHILS # BLD AUTO: 0.03 K/UL (ref 0–0.2)
BASOPHILS # BLD AUTO: 0.04 K/UL (ref 0–0.2)
BASOPHILS NFR BLD: 0.2 % (ref 0–1.9)
BASOPHILS NFR BLD: 0.2 % (ref 0–1.9)
BASOPHILS NFR BLD: 0.3 % (ref 0–1.9)
BASOPHILS NFR BLD: 0.4 % (ref 0–1.9)
BILIRUB SERPL-MCNC: 0.5 MG/DL (ref 0.1–1)
BILIRUB SERPL-MCNC: 0.8 MG/DL (ref 0.1–1)
BLD PROD TYP BPU: NORMAL
BLOOD UNIT EXPIRATION DATE: NORMAL
BLOOD UNIT TYPE CODE: 5100
BLOOD UNIT TYPE CODE: 6200
BLOOD UNIT TYPE: NORMAL
BUN SERPL-MCNC: 16 MG/DL (ref 8–23)
BUN SERPL-MCNC: 18 MG/DL (ref 8–23)
BUN SERPL-MCNC: 23 MG/DL (ref 8–23)
BUN SERPL-MCNC: 24 MG/DL (ref 8–23)
CALCIUM SERPL-MCNC: 8 MG/DL (ref 8.7–10.5)
CALCIUM SERPL-MCNC: 8 MG/DL (ref 8.7–10.5)
CALCIUM SERPL-MCNC: 8.2 MG/DL (ref 8.7–10.5)
CALCIUM SERPL-MCNC: 9.7 MG/DL (ref 8.7–10.5)
CHLORIDE SERPL-SCNC: 102 MMOL/L (ref 95–110)
CHLORIDE SERPL-SCNC: 108 MMOL/L (ref 95–110)
CHLORIDE SERPL-SCNC: 109 MMOL/L (ref 95–110)
CHLORIDE SERPL-SCNC: 110 MMOL/L (ref 95–110)
CO2 SERPL-SCNC: 23 MMOL/L (ref 23–29)
CO2 SERPL-SCNC: 23 MMOL/L (ref 23–29)
CO2 SERPL-SCNC: 26 MMOL/L (ref 23–29)
CO2 SERPL-SCNC: 26 MMOL/L (ref 23–29)
CODING SYSTEM: NORMAL
CREAT SERPL-MCNC: 1.1 MG/DL (ref 0.5–1.4)
CREAT SERPL-MCNC: 1.1 MG/DL (ref 0.5–1.4)
CREAT SERPL-MCNC: 1.3 MG/DL (ref 0.5–1.4)
CREAT SERPL-MCNC: 1.5 MG/DL (ref 0.5–1.4)
DIFFERENTIAL METHOD: ABNORMAL
DISPENSE STATUS: NORMAL
EOSINOPHIL # BLD AUTO: 0.3 K/UL (ref 0–0.5)
EOSINOPHIL # BLD AUTO: 0.5 K/UL (ref 0–0.5)
EOSINOPHIL NFR BLD: 2.7 % (ref 0–8)
EOSINOPHIL NFR BLD: 4.6 % (ref 0–8)
EOSINOPHIL NFR BLD: 4.7 % (ref 0–8)
EOSINOPHIL NFR BLD: 4.7 % (ref 0–8)
ERYTHROCYTE [DISTWIDTH] IN BLOOD BY AUTOMATED COUNT: 15.7 % (ref 11.5–14.5)
ERYTHROCYTE [DISTWIDTH] IN BLOOD BY AUTOMATED COUNT: 15.9 % (ref 11.5–14.5)
ERYTHROCYTE [DISTWIDTH] IN BLOOD BY AUTOMATED COUNT: 17.2 % (ref 11.5–14.5)
ERYTHROCYTE [DISTWIDTH] IN BLOOD BY AUTOMATED COUNT: 17.7 % (ref 11.5–14.5)
EST. GFR  (AFRICAN AMERICAN): 40 ML/MIN/1.73 M^2
EST. GFR  (AFRICAN AMERICAN): 47 ML/MIN/1.73 M^2
EST. GFR  (AFRICAN AMERICAN): 58 ML/MIN/1.73 M^2
EST. GFR  (AFRICAN AMERICAN): 58 ML/MIN/1.73 M^2
EST. GFR  (NON AFRICAN AMERICAN): 35 ML/MIN/1.73 M^2
EST. GFR  (NON AFRICAN AMERICAN): 41 ML/MIN/1.73 M^2
EST. GFR  (NON AFRICAN AMERICAN): 50 ML/MIN/1.73 M^2
EST. GFR  (NON AFRICAN AMERICAN): 50 ML/MIN/1.73 M^2
FIBRINOGEN PPP-MCNC: 448 MG/DL (ref 182–400)
FIBRINOGEN PPP-MCNC: 514 MG/DL (ref 182–400)
GLUCOSE SERPL-MCNC: 113 MG/DL (ref 70–110)
GLUCOSE SERPL-MCNC: 115 MG/DL (ref 70–110)
GLUCOSE SERPL-MCNC: 116 MG/DL (ref 70–110)
GLUCOSE SERPL-MCNC: 116 MG/DL (ref 70–110)
HCT VFR BLD AUTO: 18.7 % (ref 37–48.5)
HCT VFR BLD AUTO: 22.7 % (ref 37–48.5)
HCT VFR BLD AUTO: 26.8 % (ref 37–48.5)
HCT VFR BLD AUTO: 31.3 % (ref 37–48.5)
HGB BLD-MCNC: 10.4 G/DL (ref 12–16)
HGB BLD-MCNC: 5.9 G/DL (ref 12–16)
HGB BLD-MCNC: 7.4 G/DL (ref 12–16)
HGB BLD-MCNC: 8.6 G/DL (ref 12–16)
HYPOCHROMIA BLD QL SMEAR: ABNORMAL
IMM GRANULOCYTES # BLD AUTO: 0.05 K/UL (ref 0–0.04)
IMM GRANULOCYTES # BLD AUTO: 0.05 K/UL (ref 0–0.04)
IMM GRANULOCYTES # BLD AUTO: 0.07 K/UL (ref 0–0.04)
IMM GRANULOCYTES # BLD AUTO: 0.13 K/UL (ref 0–0.04)
IMM GRANULOCYTES NFR BLD AUTO: 0.4 % (ref 0–0.5)
IMM GRANULOCYTES NFR BLD AUTO: 0.4 % (ref 0–0.5)
IMM GRANULOCYTES NFR BLD AUTO: 0.6 % (ref 0–0.5)
IMM GRANULOCYTES NFR BLD AUTO: 1.2 % (ref 0–0.5)
INR PPP: 1.4 (ref 0.8–1.2)
INR PPP: 1.8 (ref 0.8–1.2)
INR PPP: 1.9 (ref 0.8–1.2)
LACTATE SERPL-SCNC: 0.5 MMOL/L (ref 0.5–2.2)
LYMPHOCYTES # BLD AUTO: 1.1 K/UL (ref 1–4.8)
LYMPHOCYTES # BLD AUTO: 1.3 K/UL (ref 1–4.8)
LYMPHOCYTES NFR BLD: 10.1 % (ref 18–48)
LYMPHOCYTES NFR BLD: 11.6 % (ref 18–48)
LYMPHOCYTES NFR BLD: 9.2 % (ref 18–48)
LYMPHOCYTES NFR BLD: 9.8 % (ref 18–48)
MAGNESIUM SERPL-MCNC: 1.8 MG/DL (ref 1.6–2.6)
MAGNESIUM SERPL-MCNC: 1.8 MG/DL (ref 1.6–2.6)
MCH RBC QN AUTO: 27.1 PG (ref 27–31)
MCH RBC QN AUTO: 28.2 PG (ref 27–31)
MCH RBC QN AUTO: 28.4 PG (ref 27–31)
MCH RBC QN AUTO: 28.8 PG (ref 27–31)
MCHC RBC AUTO-ENTMCNC: 31.6 G/DL (ref 32–36)
MCHC RBC AUTO-ENTMCNC: 32.1 G/DL (ref 32–36)
MCHC RBC AUTO-ENTMCNC: 32.6 G/DL (ref 32–36)
MCHC RBC AUTO-ENTMCNC: 33.2 G/DL (ref 32–36)
MCV RBC AUTO: 85 FL (ref 82–98)
MCV RBC AUTO: 86 FL (ref 82–98)
MCV RBC AUTO: 88 FL (ref 82–98)
MCV RBC AUTO: 90 FL (ref 82–98)
MONOCYTES # BLD AUTO: 0.9 K/UL (ref 0.3–1)
MONOCYTES # BLD AUTO: 1.1 K/UL (ref 0.3–1)
MONOCYTES NFR BLD: 8 % (ref 4–15)
MONOCYTES NFR BLD: 8.3 % (ref 4–15)
MONOCYTES NFR BLD: 8.3 % (ref 4–15)
MONOCYTES NFR BLD: 9.4 % (ref 4–15)
NEUTROPHILS # BLD AUTO: 8.3 K/UL (ref 1.8–7.7)
NEUTROPHILS # BLD AUTO: 8.6 K/UL (ref 1.8–7.7)
NEUTROPHILS # BLD AUTO: 8.6 K/UL (ref 1.8–7.7)
NEUTROPHILS # BLD AUTO: 9 K/UL (ref 1.8–7.7)
NEUTROPHILS NFR BLD: 74.3 % (ref 38–73)
NEUTROPHILS NFR BLD: 76.5 % (ref 38–73)
NEUTROPHILS NFR BLD: 76.6 % (ref 38–73)
NEUTROPHILS NFR BLD: 77.5 % (ref 38–73)
NRBC BLD-RTO: 0 /100 WBC
NUM UNITS TRANS FFP: NORMAL
NUM UNITS TRANS PACKED RBC: NORMAL
NUM UNITS TRANS PACKED RBC: NORMAL
OVALOCYTES BLD QL SMEAR: ABNORMAL
PHOSPHATE SERPL-MCNC: 2.7 MG/DL (ref 2.7–4.5)
PHOSPHATE SERPL-MCNC: 3.3 MG/DL (ref 2.7–4.5)
PLATELET # BLD AUTO: 139 K/UL (ref 150–450)
PLATELET # BLD AUTO: 142 K/UL (ref 150–450)
PLATELET # BLD AUTO: 148 K/UL (ref 150–450)
PLATELET # BLD AUTO: 154 K/UL (ref 150–450)
PLATELET BLD QL SMEAR: ABNORMAL
PMV BLD AUTO: 11.4 FL (ref 9.2–12.9)
PMV BLD AUTO: 11.8 FL (ref 9.2–12.9)
PMV BLD AUTO: 11.8 FL (ref 9.2–12.9)
PMV BLD AUTO: 12.3 FL (ref 9.2–12.9)
POCT GLUCOSE: 120 MG/DL (ref 70–110)
POIKILOCYTOSIS BLD QL SMEAR: ABNORMAL
POTASSIUM SERPL-SCNC: 3.6 MMOL/L (ref 3.5–5.1)
POTASSIUM SERPL-SCNC: 4.2 MMOL/L (ref 3.5–5.1)
POTASSIUM SERPL-SCNC: 4.3 MMOL/L (ref 3.5–5.1)
POTASSIUM SERPL-SCNC: 4.3 MMOL/L (ref 3.5–5.1)
PROT SERPL-MCNC: 5 G/DL (ref 6–8.4)
PROT SERPL-MCNC: 5.1 G/DL (ref 6–8.4)
PROTHROMBIN TIME: 14.6 SEC (ref 9–12.5)
PROTHROMBIN TIME: 17.8 SEC (ref 9–12.5)
PROTHROMBIN TIME: 19.3 SEC (ref 9–12.5)
RBC # BLD AUTO: 2.09 M/UL (ref 4–5.4)
RBC # BLD AUTO: 2.57 M/UL (ref 4–5.4)
RBC # BLD AUTO: 3.17 M/UL (ref 4–5.4)
RBC # BLD AUTO: 3.66 M/UL (ref 4–5.4)
SODIUM SERPL-SCNC: 139 MMOL/L (ref 136–145)
SODIUM SERPL-SCNC: 139 MMOL/L (ref 136–145)
SODIUM SERPL-SCNC: 140 MMOL/L (ref 136–145)
SODIUM SERPL-SCNC: 141 MMOL/L (ref 136–145)
TROPONIN I SERPL DL<=0.01 NG/ML-MCNC: 0.06 NG/ML (ref 0–0.03)
TROPONIN I SERPL DL<=0.01 NG/ML-MCNC: 0.08 NG/ML (ref 0–0.03)
UNIT NUMBER: NORMAL
WBC # BLD AUTO: 11.17 K/UL (ref 3.9–12.7)
WBC # BLD AUTO: 11.21 K/UL (ref 3.9–12.7)
WBC # BLD AUTO: 11.29 K/UL (ref 3.9–12.7)
WBC # BLD AUTO: 11.55 K/UL (ref 3.9–12.7)

## 2022-05-25 PROCEDURE — 85384 FIBRINOGEN ACTIVITY: CPT | Performed by: STUDENT IN AN ORGANIZED HEALTH CARE EDUCATION/TRAINING PROGRAM

## 2022-05-25 PROCEDURE — 27000221 HC OXYGEN, UP TO 24 HOURS

## 2022-05-25 PROCEDURE — P9016 RBC LEUKOCYTES REDUCED: HCPCS | Performed by: STUDENT IN AN ORGANIZED HEALTH CARE EDUCATION/TRAINING PROGRAM

## 2022-05-25 PROCEDURE — 63600175 PHARM REV CODE 636 W HCPCS: Performed by: STUDENT IN AN ORGANIZED HEALTH CARE EDUCATION/TRAINING PROGRAM

## 2022-05-25 PROCEDURE — 80053 COMPREHEN METABOLIC PANEL: CPT | Performed by: SURGERY

## 2022-05-25 PROCEDURE — 25000242 PHARM REV CODE 250 ALT 637 W/ HCPCS: Performed by: STUDENT IN AN ORGANIZED HEALTH CARE EDUCATION/TRAINING PROGRAM

## 2022-05-25 PROCEDURE — P9035 PLATELET PHERES LEUKOREDUCED: HCPCS | Performed by: STUDENT IN AN ORGANIZED HEALTH CARE EDUCATION/TRAINING PROGRAM

## 2022-05-25 PROCEDURE — 94799 UNLISTED PULMONARY SVC/PX: CPT

## 2022-05-25 PROCEDURE — 25000242 PHARM REV CODE 250 ALT 637 W/ HCPCS: Performed by: SURGERY

## 2022-05-25 PROCEDURE — 25000003 PHARM REV CODE 250: Performed by: STUDENT IN AN ORGANIZED HEALTH CARE EDUCATION/TRAINING PROGRAM

## 2022-05-25 PROCEDURE — 11000001 HC ACUTE MED/SURG PRIVATE ROOM

## 2022-05-25 PROCEDURE — 93005 ELECTROCARDIOGRAM TRACING: CPT

## 2022-05-25 PROCEDURE — 84484 ASSAY OF TROPONIN QUANT: CPT | Performed by: STUDENT IN AN ORGANIZED HEALTH CARE EDUCATION/TRAINING PROGRAM

## 2022-05-25 PROCEDURE — 94640 AIRWAY INHALATION TREATMENT: CPT

## 2022-05-25 PROCEDURE — 83735 ASSAY OF MAGNESIUM: CPT | Performed by: SURGERY

## 2022-05-25 PROCEDURE — P9016 RBC LEUKOCYTES REDUCED: HCPCS | Performed by: SURGERY

## 2022-05-25 PROCEDURE — 80048 BASIC METABOLIC PNL TOTAL CA: CPT | Mod: 91,XB | Performed by: STUDENT IN AN ORGANIZED HEALTH CARE EDUCATION/TRAINING PROGRAM

## 2022-05-25 PROCEDURE — P9017 PLASMA 1 DONOR FRZ W/IN 8 HR: HCPCS | Performed by: STUDENT IN AN ORGANIZED HEALTH CARE EDUCATION/TRAINING PROGRAM

## 2022-05-25 PROCEDURE — 36430 TRANSFUSION BLD/BLD COMPNT: CPT

## 2022-05-25 PROCEDURE — 94761 N-INVAS EAR/PLS OXIMETRY MLT: CPT

## 2022-05-25 PROCEDURE — 85025 COMPLETE CBC W/AUTO DIFF WBC: CPT | Performed by: SURGERY

## 2022-05-25 PROCEDURE — 85025 COMPLETE CBC W/AUTO DIFF WBC: CPT | Mod: 91 | Performed by: STUDENT IN AN ORGANIZED HEALTH CARE EDUCATION/TRAINING PROGRAM

## 2022-05-25 PROCEDURE — 63600175 PHARM REV CODE 636 W HCPCS: Performed by: SURGERY

## 2022-05-25 PROCEDURE — 93010 EKG 12-LEAD: ICD-10-PCS | Mod: ,,, | Performed by: INTERNAL MEDICINE

## 2022-05-25 PROCEDURE — 85730 THROMBOPLASTIN TIME PARTIAL: CPT | Performed by: STUDENT IN AN ORGANIZED HEALTH CARE EDUCATION/TRAINING PROGRAM

## 2022-05-25 PROCEDURE — 25000003 PHARM REV CODE 250: Performed by: SURGERY

## 2022-05-25 PROCEDURE — 99900035 HC TECH TIME PER 15 MIN (STAT)

## 2022-05-25 PROCEDURE — 93010 ELECTROCARDIOGRAM REPORT: CPT | Mod: 76,,, | Performed by: INTERNAL MEDICINE

## 2022-05-25 PROCEDURE — 84100 ASSAY OF PHOSPHORUS: CPT | Performed by: SURGERY

## 2022-05-25 PROCEDURE — 85610 PROTHROMBIN TIME: CPT | Performed by: STUDENT IN AN ORGANIZED HEALTH CARE EDUCATION/TRAINING PROGRAM

## 2022-05-25 RX ORDER — LABETALOL HYDROCHLORIDE 5 MG/ML
10 INJECTION, SOLUTION INTRAVENOUS EVERY 6 HOURS PRN
Status: DISCONTINUED | OUTPATIENT
Start: 2022-05-25 | End: 2022-05-28 | Stop reason: HOSPADM

## 2022-05-25 RX ORDER — FUROSEMIDE 10 MG/ML
40 INJECTION INTRAMUSCULAR; INTRAVENOUS ONCE
Status: COMPLETED | OUTPATIENT
Start: 2022-05-25 | End: 2022-05-25

## 2022-05-25 RX ORDER — HYDROCODONE BITARTRATE AND ACETAMINOPHEN 500; 5 MG/1; MG/1
TABLET ORAL
Status: DISCONTINUED | OUTPATIENT
Start: 2022-05-25 | End: 2022-05-25

## 2022-05-25 RX ORDER — POTASSIUM CHLORIDE 750 MG/1
10 TABLET, EXTENDED RELEASE ORAL DAILY
Status: DISCONTINUED | OUTPATIENT
Start: 2022-05-25 | End: 2022-05-28 | Stop reason: HOSPADM

## 2022-05-25 RX ORDER — SODIUM CHLORIDE, SODIUM LACTATE, POTASSIUM CHLORIDE, CALCIUM CHLORIDE 600; 310; 30; 20 MG/100ML; MG/100ML; MG/100ML; MG/100ML
INJECTION, SOLUTION INTRAVENOUS CONTINUOUS
Status: DISCONTINUED | OUTPATIENT
Start: 2022-05-25 | End: 2022-05-25

## 2022-05-25 RX ORDER — BUTALBITAL, ACETAMINOPHEN AND CAFFEINE 50; 325; 40 MG/1; MG/1; MG/1
1 TABLET ORAL EVERY 4 HOURS PRN
Status: DISCONTINUED | OUTPATIENT
Start: 2022-05-25 | End: 2022-05-28 | Stop reason: HOSPADM

## 2022-05-25 RX ORDER — FUROSEMIDE 10 MG/ML
20 INJECTION INTRAMUSCULAR; INTRAVENOUS EVERY 6 HOURS
Status: COMPLETED | OUTPATIENT
Start: 2022-05-25 | End: 2022-05-26

## 2022-05-25 RX ORDER — NITROGLYCERIN 0.4 MG/1
0.4 TABLET SUBLINGUAL ONCE
Status: COMPLETED | OUTPATIENT
Start: 2022-05-25 | End: 2022-05-25

## 2022-05-25 RX ORDER — CHOLECALCIFEROL (VITAMIN D3) 25 MCG
1000 TABLET ORAL DAILY
Status: DISCONTINUED | OUTPATIENT
Start: 2022-05-25 | End: 2022-05-28 | Stop reason: HOSPADM

## 2022-05-25 RX ORDER — CALCIUM GLUCONATE 20 MG/ML
2 INJECTION, SOLUTION INTRAVENOUS ONCE
Status: COMPLETED | OUTPATIENT
Start: 2022-05-25 | End: 2022-05-25

## 2022-05-25 RX ORDER — NITROGLYCERIN 0.4 MG/1
0.4 TABLET SUBLINGUAL
Status: DISCONTINUED | OUTPATIENT
Start: 2022-05-25 | End: 2022-05-25

## 2022-05-25 RX ORDER — MAGNESIUM SULFATE HEPTAHYDRATE 40 MG/ML
2 INJECTION, SOLUTION INTRAVENOUS ONCE
Status: COMPLETED | OUTPATIENT
Start: 2022-05-25 | End: 2022-05-25

## 2022-05-25 RX ORDER — LABETALOL HYDROCHLORIDE 5 MG/ML
5 INJECTION, SOLUTION INTRAVENOUS EVERY 6 HOURS PRN
Status: DISCONTINUED | OUTPATIENT
Start: 2022-05-25 | End: 2022-05-25

## 2022-05-25 RX ORDER — HYDRALAZINE HYDROCHLORIDE 20 MG/ML
5 INJECTION INTRAMUSCULAR; INTRAVENOUS EVERY 8 HOURS PRN
Status: DISCONTINUED | OUTPATIENT
Start: 2022-05-25 | End: 2022-05-28 | Stop reason: HOSPADM

## 2022-05-25 RX ORDER — HYDROCODONE BITARTRATE AND ACETAMINOPHEN 500; 5 MG/1; MG/1
TABLET ORAL
Status: DISCONTINUED | OUTPATIENT
Start: 2022-05-25 | End: 2022-05-26

## 2022-05-25 RX ORDER — MUPIROCIN 20 MG/G
OINTMENT TOPICAL 2 TIMES DAILY
Status: DISCONTINUED | OUTPATIENT
Start: 2022-05-25 | End: 2022-05-28 | Stop reason: HOSPADM

## 2022-05-25 RX ADMIN — BUTALBITAL, ACETAMINOPHEN, AND CAFFEINE 1 TABLET: 50; 325; 40 TABLET ORAL at 01:05

## 2022-05-25 RX ADMIN — POTASSIUM CHLORIDE 10 MEQ: 750 TABLET, EXTENDED RELEASE ORAL at 09:05

## 2022-05-25 RX ADMIN — NITROGLYCERIN 0.4 MG: 0.4 TABLET, ORALLY DISINTEGRATING SUBLINGUAL at 10:05

## 2022-05-25 RX ADMIN — PHYTONADIONE 10 MG: 10 INJECTION, EMULSION INTRAMUSCULAR; INTRAVENOUS; SUBCUTANEOUS at 03:05

## 2022-05-25 RX ADMIN — FUROSEMIDE 20 MG: 10 INJECTION, SOLUTION INTRAMUSCULAR; INTRAVENOUS at 10:05

## 2022-05-25 RX ADMIN — LABETALOL HYDROCHLORIDE 10 MG: 5 INJECTION INTRAVENOUS at 08:05

## 2022-05-25 RX ADMIN — BUTALBITAL, ACETAMINOPHEN, AND CAFFEINE 1 TABLET: 50; 325; 40 TABLET ORAL at 07:05

## 2022-05-25 RX ADMIN — HYDRALAZINE HYDROCHLORIDE 5 MG: 20 INJECTION, SOLUTION INTRAMUSCULAR; INTRAVENOUS at 10:05

## 2022-05-25 RX ADMIN — ALBUTEROL SULFATE 2.5 MG: 2.5 SOLUTION RESPIRATORY (INHALATION) at 09:05

## 2022-05-25 RX ADMIN — ALBUTEROL SULFATE 2.5 MG: 2.5 SOLUTION RESPIRATORY (INHALATION) at 05:05

## 2022-05-25 RX ADMIN — PHYTONADIONE 10 MG: 10 INJECTION, EMULSION INTRAMUSCULAR; INTRAVENOUS; SUBCUTANEOUS at 10:05

## 2022-05-25 RX ADMIN — DOCUSATE SODIUM AND SENNOSIDES 1 TABLET: 8.6; 5 TABLET, FILM COATED ORAL at 09:05

## 2022-05-25 RX ADMIN — CALCIUM GLUCONATE 2 G: 20 INJECTION, SOLUTION INTRAVENOUS at 09:05

## 2022-05-25 RX ADMIN — Medication 1000 UNITS: at 09:05

## 2022-05-25 RX ADMIN — IRON SUCROSE 100 MG: 20 INJECTION, SOLUTION INTRAVENOUS at 11:05

## 2022-05-25 RX ADMIN — BUTALBITAL, ACETAMINOPHEN, AND CAFFEINE 1 TABLET: 50; 325; 40 TABLET ORAL at 04:05

## 2022-05-25 RX ADMIN — SODIUM CHLORIDE, SODIUM LACTATE, POTASSIUM CHLORIDE, AND CALCIUM CHLORIDE: .6; .31; .03; .02 INJECTION, SOLUTION INTRAVENOUS at 08:05

## 2022-05-25 RX ADMIN — BUTALBITAL, ACETAMINOPHEN, AND CAFFEINE 1 TABLET: 50; 325; 40 TABLET ORAL at 10:05

## 2022-05-25 RX ADMIN — FUROSEMIDE 40 MG: 10 INJECTION, SOLUTION INTRAMUSCULAR; INTRAVENOUS at 09:05

## 2022-05-25 RX ADMIN — PANTOPRAZOLE SODIUM 40 MG: 40 TABLET, DELAYED RELEASE ORAL at 09:05

## 2022-05-25 RX ADMIN — MAGNESIUM SULFATE 2 G: 2 INJECTION INTRAVENOUS at 09:05

## 2022-05-25 RX ADMIN — MUPIROCIN: 20 OINTMENT TOPICAL at 10:05

## 2022-05-25 NOTE — PT/OT/SLP PROGRESS
Occupational Therapy  Visit Attempt    Patient Name:  Zohra Paulino   MRN:  [id number]    Patient not seen today secondary to Nurse/ KIMBERLY hold (11:25 - Patient getting blood and increased nsg care required this morning.).     5/25/2022

## 2022-05-25 NOTE — PROGRESS NOTES
Neuroendocrine Surgery Progress Note    S:  Met called yesterday for hypotension  Difficulty with IV access  hgb 8 on istat  CBC from picc hgb 5.9, 7.4 after 1U RBCs  2ffp, plts and vit k pending    States she feels better, has some soreness in her throat and chest when coughing    O:  Temp:  [96.8 °F (36 °C)-99.5 °F (37.5 °C)] 98.1 °F (36.7 °C)  Pulse:  [] 114  Resp:  [16-20] 18  SpO2:  [90 %-98 %] 98 %  BP: ()/(43-58) 123/58    Physical Exam:  Gen: NAD, AAOx3  HEENT: EOMI, NCAT  CV: RR  Resp: no shortness of breath, normal WOB  Abd: soft, non-distended, NT. Midline incision c/d/i  Ext: no edema      A/P:  72M s/p rectrorectus hernia repair  -cld  -fu repeat coags and CBC/BMP  -minimize narcotics  -abd chanel Worthington MD  [employer] General Surgery, PGY-4

## 2022-05-25 NOTE — NURSING
VSS. AAOx4. Occasional productive cough. Given Nebulizer tx once for c/o SOB after coughing. Received 3 units FFP, 1 unit platelets, 1 unit RBCs and 2 IVPB infusions of Vitamin K. Only c/o pain when coughing or rolling. Relieved with pain medication as ordered. Voiding large amts with use of external catheter. Is not passing gas. Appetite poor. LR continuously infusing to left peripheral iv site. PICC used for blood products. In NAD.

## 2022-05-25 NOTE — TELEPHONE ENCOUNTER
Spoke with pt's daughter and she stated pt had surgery on Monday.  Ms Hernandez was informed I will canceled her appt and xray on 5/27/22 and she can call back to reschedule.

## 2022-05-25 NOTE — PT/OT/SLP PROGRESS
Physical Therapy      Patient Name:  Zohra Paulino   MRN:  [id number]    Patient not seen today secondary to pt getting blood this morning, nsg hold. Will follow-up in PM if time allows.

## 2022-05-25 NOTE — PLAN OF CARE
Pt awake and alert this AM, oriented x4. No complaints of pain, N/V overnight. 2 units PRBC infused overnight. IVFs on hold at this time while 2nd unit PRBCs infusing. Pending 2 units FFP. On 2L NC. Abdominal incision clean and dry with dermabond intact. Wang removed this AM, due to void by 12pm. Safety maintained with bed alarm set, side rails raised, and call light in reach.

## 2022-05-25 NOTE — PROCEDURES
"Zhora Paulino is a 72 y.o. female patient.    Temp: 97.8 °F (36.6 °C) (05/24/22 1915)  Pulse: 93 (05/24/22 1915)  Resp: 16 (05/24/22 1915)  BP: (!) 97/46 (05/24/22 1915)  SpO2: (!) 94 % (05/24/22 2022)  Weight: 72.9 kg (160 lb 11.5 oz) (05/23/22 2323)  Height: 5' 2" (157.5 cm) (05/23/22 0732)    PICC  Performed by: Arcadio Bai RN  Supervising provider: Arcadio Bai RN  Consent Done: Yes  Indications: med administration and vascular access  Anesthesia: local infiltration  Local anesthetic: lidocaine 1% without epinephrine  Anesthetic Total (mL): 3  Preparation: skin prepped with ChloraPrep  Skin prep agent dried: skin prep agent completely dried prior to procedure  Sterile barriers: all five maximum sterile barriers used - cap, mask, sterile gown, sterile gloves, and large sterile sheet  Hand hygiene: hand hygiene performed prior to central venous catheter insertion  Location details: right basilic  Catheter type: double lumen  Catheter size: 5 Fr  Catheter Length: 34cm    Ultrasound guidance: yes  Vessel Caliber: medium and patent, compressibility normal  Vascular Doppler: not done  Needle advanced into vessel with real time Ultrasound guidance.  Guidewire confirmed in vessel.  Sterile sheath used.  no esophageal manometryNumber of attempts: 1  Post-procedure: blood return through all ports, chlorhexidine patch and sterile dressing applied  Estimated blood loss (mL): 0  Specimens: No  Implants: No          Name TYESHA Ervin  5/24/2022  "

## 2022-05-25 NOTE — PLAN OF CARE
CM met with pt and daughter Danny  [phone]   pt lives with son George  [phone]       independent prior to admit, no dme, no hh   - pt drives   family will transport to home at d/c      5/23 s/p Hernia Repair -  MET yesterday due to hypotension      d/c needs TBD        Future Appointments   Date Time Provider Department Center   5/27/2022 10:00 AM [facility] XROP DR ELLIE KILGORE OP [facility]   5/27/2022 10:15 AM [facility] XROP DR ELLIE KILGORE [facility]   5/27/2022 11:00 AM ZULEYMA Hilliard [facility] HAND [facility]   6/2/2022 10:00 AM Tracey Martin MD [facility]        05/25/22 1212   Discharge Planning   Assessment Type Discharge Planning Brief Assessment   Resource/Environmental Concerns none   Support Systems Children   Equipment Currently Used at Home none   Current Living Arrangements home/apartment/condo   Patient/Family Anticipates Transition to home;home with family   Patient/Family Anticipated Services at Transition none   DME Needed Upon Discharge    (tbd)   Discharge Plan A Home;Home with family

## 2022-05-26 LAB
ALBUMIN SERPL BCP-MCNC: 3.3 G/DL (ref 3.5–5.2)
ALP SERPL-CCNC: 198 U/L (ref 55–135)
ALT SERPL W/O P-5'-P-CCNC: 16 U/L (ref 10–44)
ANION GAP SERPL CALC-SCNC: 15 MMOL/L (ref 8–16)
ANION GAP SERPL CALC-SCNC: 19 MMOL/L (ref 8–16)
AST SERPL-CCNC: 23 U/L (ref 10–40)
BASOPHILS # BLD AUTO: 0.02 K/UL (ref 0–0.2)
BASOPHILS NFR BLD: 0.2 % (ref 0–1.9)
BILIRUB SERPL-MCNC: 1.6 MG/DL (ref 0.1–1)
BUN SERPL-MCNC: 15 MG/DL (ref 8–23)
BUN SERPL-MCNC: 15 MG/DL (ref 8–23)
CALCIUM SERPL-MCNC: 9.4 MG/DL (ref 8.7–10.5)
CALCIUM SERPL-MCNC: 9.8 MG/DL (ref 8.7–10.5)
CHLORIDE SERPL-SCNC: 100 MMOL/L (ref 95–110)
CHLORIDE SERPL-SCNC: 94 MMOL/L (ref 95–110)
CO2 SERPL-SCNC: 28 MMOL/L (ref 23–29)
CO2 SERPL-SCNC: 28 MMOL/L (ref 23–29)
CREAT SERPL-MCNC: 1 MG/DL (ref 0.5–1.4)
CREAT SERPL-MCNC: 1 MG/DL (ref 0.5–1.4)
DIFFERENTIAL METHOD: ABNORMAL
EOSINOPHIL # BLD AUTO: 0.6 K/UL (ref 0–0.5)
EOSINOPHIL NFR BLD: 6.2 % (ref 0–8)
ERYTHROCYTE [DISTWIDTH] IN BLOOD BY AUTOMATED COUNT: 17.4 % (ref 11.5–14.5)
EST. GFR  (AFRICAN AMERICAN): >60 ML/MIN/1.73 M^2
EST. GFR  (AFRICAN AMERICAN): >60 ML/MIN/1.73 M^2
EST. GFR  (NON AFRICAN AMERICAN): 56 ML/MIN/1.73 M^2
EST. GFR  (NON AFRICAN AMERICAN): 56 ML/MIN/1.73 M^2
GLUCOSE SERPL-MCNC: 111 MG/DL (ref 70–110)
GLUCOSE SERPL-MCNC: 121 MG/DL (ref 70–110)
HCT VFR BLD AUTO: 30.8 % (ref 37–48.5)
HGB BLD-MCNC: 10.2 G/DL (ref 12–16)
IMM GRANULOCYTES # BLD AUTO: 0.07 K/UL (ref 0–0.04)
IMM GRANULOCYTES NFR BLD AUTO: 0.7 % (ref 0–0.5)
LYMPHOCYTES # BLD AUTO: 1.4 K/UL (ref 1–4.8)
LYMPHOCYTES NFR BLD: 14.2 % (ref 18–48)
MAGNESIUM SERPL-MCNC: 1.8 MG/DL (ref 1.6–2.6)
MAGNESIUM SERPL-MCNC: 2 MG/DL (ref 1.6–2.6)
MCH RBC QN AUTO: 28.1 PG (ref 27–31)
MCHC RBC AUTO-ENTMCNC: 33.1 G/DL (ref 32–36)
MCV RBC AUTO: 85 FL (ref 82–98)
MONOCYTES # BLD AUTO: 0.9 K/UL (ref 0.3–1)
MONOCYTES NFR BLD: 9 % (ref 4–15)
NEUTROPHILS # BLD AUTO: 6.9 K/UL (ref 1.8–7.7)
NEUTROPHILS NFR BLD: 69.7 % (ref 38–73)
NRBC BLD-RTO: 0 /100 WBC
PHOSPHATE SERPL-MCNC: 2.4 MG/DL (ref 2.7–4.5)
PHOSPHATE SERPL-MCNC: 2.5 MG/DL (ref 2.7–4.5)
PLATELET # BLD AUTO: 150 K/UL (ref 150–450)
PMV BLD AUTO: 11.4 FL (ref 9.2–12.9)
POTASSIUM SERPL-SCNC: 3.5 MMOL/L (ref 3.5–5.1)
POTASSIUM SERPL-SCNC: 3.7 MMOL/L (ref 3.5–5.1)
PROT SERPL-MCNC: 7.2 G/DL (ref 6–8.4)
RBC # BLD AUTO: 3.63 M/UL (ref 4–5.4)
SODIUM SERPL-SCNC: 141 MMOL/L (ref 136–145)
SODIUM SERPL-SCNC: 143 MMOL/L (ref 136–145)
TROPONIN I SERPL DL<=0.01 NG/ML-MCNC: 0.09 NG/ML (ref 0–0.03)
TROPONIN I SERPL DL<=0.01 NG/ML-MCNC: 0.09 NG/ML (ref 0–0.03)
TROPONIN I SERPL DL<=0.01 NG/ML-MCNC: 0.1 NG/ML (ref 0–0.03)
WBC # BLD AUTO: 9.85 K/UL (ref 3.9–12.7)

## 2022-05-26 PROCEDURE — 97530 THERAPEUTIC ACTIVITIES: CPT | Mod: CQ

## 2022-05-26 PROCEDURE — 99900035 HC TECH TIME PER 15 MIN (STAT)

## 2022-05-26 PROCEDURE — 97530 THERAPEUTIC ACTIVITIES: CPT

## 2022-05-26 PROCEDURE — 25000003 PHARM REV CODE 250: Performed by: SURGERY

## 2022-05-26 PROCEDURE — 84484 ASSAY OF TROPONIN QUANT: CPT | Mod: 91 | Performed by: SURGERY

## 2022-05-26 PROCEDURE — 80053 COMPREHEN METABOLIC PANEL: CPT | Performed by: SURGERY

## 2022-05-26 PROCEDURE — 83735 ASSAY OF MAGNESIUM: CPT | Performed by: SURGERY

## 2022-05-26 PROCEDURE — 63600175 PHARM REV CODE 636 W HCPCS: Performed by: SURGERY

## 2022-05-26 PROCEDURE — 94761 N-INVAS EAR/PLS OXIMETRY MLT: CPT

## 2022-05-26 PROCEDURE — 84100 ASSAY OF PHOSPHORUS: CPT | Performed by: SURGERY

## 2022-05-26 PROCEDURE — 94760 N-INVAS EAR/PLS OXIMETRY 1: CPT

## 2022-05-26 PROCEDURE — 36415 COLL VENOUS BLD VENIPUNCTURE: CPT | Performed by: STUDENT IN AN ORGANIZED HEALTH CARE EDUCATION/TRAINING PROGRAM

## 2022-05-26 PROCEDURE — 83735 ASSAY OF MAGNESIUM: CPT | Mod: 91 | Performed by: STUDENT IN AN ORGANIZED HEALTH CARE EDUCATION/TRAINING PROGRAM

## 2022-05-26 PROCEDURE — 84100 ASSAY OF PHOSPHORUS: CPT | Mod: 91 | Performed by: STUDENT IN AN ORGANIZED HEALTH CARE EDUCATION/TRAINING PROGRAM

## 2022-05-26 PROCEDURE — 63600175 PHARM REV CODE 636 W HCPCS: Performed by: STUDENT IN AN ORGANIZED HEALTH CARE EDUCATION/TRAINING PROGRAM

## 2022-05-26 PROCEDURE — 25000003 PHARM REV CODE 250: Performed by: STUDENT IN AN ORGANIZED HEALTH CARE EDUCATION/TRAINING PROGRAM

## 2022-05-26 PROCEDURE — 80048 BASIC METABOLIC PNL TOTAL CA: CPT | Mod: XB | Performed by: STUDENT IN AN ORGANIZED HEALTH CARE EDUCATION/TRAINING PROGRAM

## 2022-05-26 PROCEDURE — 25000242 PHARM REV CODE 250 ALT 637 W/ HCPCS: Performed by: SURGERY

## 2022-05-26 PROCEDURE — 93010 EKG 12-LEAD: ICD-10-PCS | Mod: ,,, | Performed by: INTERNAL MEDICINE

## 2022-05-26 PROCEDURE — 11000001 HC ACUTE MED/SURG PRIVATE ROOM

## 2022-05-26 PROCEDURE — 27000221 HC OXYGEN, UP TO 24 HOURS

## 2022-05-26 PROCEDURE — 94640 AIRWAY INHALATION TREATMENT: CPT

## 2022-05-26 PROCEDURE — 93005 ELECTROCARDIOGRAM TRACING: CPT

## 2022-05-26 PROCEDURE — 85025 COMPLETE CBC W/AUTO DIFF WBC: CPT | Performed by: SURGERY

## 2022-05-26 PROCEDURE — 93010 ELECTROCARDIOGRAM REPORT: CPT | Mod: ,,, | Performed by: INTERNAL MEDICINE

## 2022-05-26 PROCEDURE — 94799 UNLISTED PULMONARY SVC/PX: CPT

## 2022-05-26 PROCEDURE — 84484 ASSAY OF TROPONIN QUANT: CPT | Mod: 91 | Performed by: STUDENT IN AN ORGANIZED HEALTH CARE EDUCATION/TRAINING PROGRAM

## 2022-05-26 RX ORDER — NITROGLYCERIN 0.4 MG/1
0.4 TABLET SUBLINGUAL EVERY 5 MIN PRN
Status: DISCONTINUED | OUTPATIENT
Start: 2022-05-26 | End: 2022-05-27

## 2022-05-26 RX ORDER — FUROSEMIDE 10 MG/ML
40 INJECTION INTRAMUSCULAR; INTRAVENOUS EVERY 6 HOURS
Status: COMPLETED | OUTPATIENT
Start: 2022-05-26 | End: 2022-05-28

## 2022-05-26 RX ORDER — POTASSIUM CHLORIDE 7.45 MG/ML
30 INJECTION INTRAVENOUS
Status: DISCONTINUED | OUTPATIENT
Start: 2022-05-26 | End: 2022-05-26

## 2022-05-26 RX ORDER — TRAMADOL HYDROCHLORIDE 50 MG/1
50 TABLET ORAL EVERY 6 HOURS PRN
Status: DISCONTINUED | OUTPATIENT
Start: 2022-05-26 | End: 2022-05-28 | Stop reason: HOSPADM

## 2022-05-26 RX ORDER — POTASSIUM CHLORIDE 14.9 MG/ML
20 INJECTION INTRAVENOUS
Status: COMPLETED | OUTPATIENT
Start: 2022-05-27 | End: 2022-05-27

## 2022-05-26 RX ORDER — POTASSIUM CHLORIDE 750 MG/1
10 TABLET, EXTENDED RELEASE ORAL ONCE
Status: COMPLETED | OUTPATIENT
Start: 2022-05-26 | End: 2022-05-26

## 2022-05-26 RX ADMIN — TRAMADOL HYDROCHLORIDE 50 MG: 50 TABLET, COATED ORAL at 08:05

## 2022-05-26 RX ADMIN — Medication 1000 UNITS: at 08:05

## 2022-05-26 RX ADMIN — ALBUTEROL SULFATE 2.5 MG: 2.5 SOLUTION RESPIRATORY (INHALATION) at 08:05

## 2022-05-26 RX ADMIN — MUPIROCIN: 20 OINTMENT TOPICAL at 08:05

## 2022-05-26 RX ADMIN — IRON SUCROSE 100 MG: 20 INJECTION, SOLUTION INTRAVENOUS at 08:05

## 2022-05-26 RX ADMIN — DOCUSATE SODIUM AND SENNOSIDES 1 TABLET: 8.6; 5 TABLET, FILM COATED ORAL at 08:05

## 2022-05-26 RX ADMIN — FUROSEMIDE 40 MG: 40 INJECTION, SOLUTION INTRAMUSCULAR; INTRAVENOUS at 08:05

## 2022-05-26 RX ADMIN — POTASSIUM CHLORIDE 10 MEQ: 750 TABLET, EXTENDED RELEASE ORAL at 08:05

## 2022-05-26 RX ADMIN — FUROSEMIDE 40 MG: 40 INJECTION, SOLUTION INTRAMUSCULAR; INTRAVENOUS at 05:05

## 2022-05-26 RX ADMIN — PANTOPRAZOLE SODIUM 40 MG: 40 TABLET, DELAYED RELEASE ORAL at 08:05

## 2022-05-26 RX ADMIN — LABETALOL HYDROCHLORIDE 10 MG: 5 INJECTION INTRAVENOUS at 08:05

## 2022-05-26 RX ADMIN — SODIUM PHOSPHATE, MONOBASIC, MONOHYDRATE 39.99 MMOL: 276; 142 INJECTION, SOLUTION INTRAVENOUS at 08:05

## 2022-05-26 RX ADMIN — FUROSEMIDE 20 MG: 10 INJECTION, SOLUTION INTRAMUSCULAR; INTRAVENOUS at 05:05

## 2022-05-26 NOTE — PLAN OF CARE
Pt progressing towards goals, pt limited this date by increased fatigue. Pt agreeable to sitting EOB. Pt performing sup>sit with MaxA & use of log roll technique. No c/o dizziness/lightheadedness or chest pain throughout session. Pt sat EOB ~11 min with ModA 2/2 fatigue & lower abdominal pain. Pt requries MaxA x2 for sit>sup. Pt positioned in L side lying at end of session. O2 90% & above throughout session.     Problem: Occupational Therapy  Goal: Occupational Therapy Goal  Description: Goals to be met by: 6/24/2022     Patient will increase functional independence with ADLs by performing:    UE Dressing with Modified [place].  LE Dressing with Set-up Assistance.  Grooming while standing with Set-up Assistance.  Toileting from toilet with Modified [place] for hygiene and clothing management.   Supine to sit with Modified [place].  Step transfer with Supervision & appropriate AD.  Toilet transfer to toilet with Supervision & appropriate AD.    Outcome: Ongoing, Progressing

## 2022-05-26 NOTE — PLAN OF CARE
Problem: Physical Therapy  Goal: Physical Therapy Goal  Description: Goals to be met by: 22     Patient will increase functional independence with mobility by performin. Supine to sit with Stand-by Assistance  2. Rolling to Left and Right with Stand-by Assistance.  3. Sit to stand transfer with Stand-by Assistance  4. Bed to chair transfer with Stand-by Assistance using Rolling Walker  5. Gait  x 80 feet with Stand-by Assistance using Rolling Walker.   6. Ascend/descend 1 flight of stairs with right Handrails Stand-by Assistance using No Assistive Device.     Outcome: Ongoing, Progressing      Attending Attestation (For Attendings USE Only)...

## 2022-05-26 NOTE — PLAN OF CARE
CM spoke with pt's daughter  Danny [phone]   ok to consult [facility]  - referral sent per Xuan   s/p rectrorectus hernia repair w/ post-op bleeding  pt on CLD    therapy recc pending - pt lives with son George          05/26/22 2120   Post-Acute Status   Post-Acute Authorization Home Health   Home Health Status Referrals Sent  [facility] daughter Danny [facility]   Discharge Plan   Discharge Plan A Home;Home with family

## 2022-05-26 NOTE — PLAN OF CARE
Patient AAOx4, VSS, patient free from falls. Incision to abdomen clean, dry, and intact. Abdominal binder in use. SCDs in use. Call bell in reach. Bed alarm in place. Safety maintained. Will continue to monitor.   Problem: Adult Inpatient Plan of Care  Goal: Plan of Care Review  Outcome: Ongoing, Progressing  Goal: Patient-Specific Goal (Individualized)  Outcome: Ongoing, Progressing  Goal: Absence of Hospital-Acquired Illness or Injury  Outcome: Ongoing, Progressing  Goal: Optimal Comfort and Wellbeing  Outcome: Ongoing, Progressing  Goal: Readiness for Transition of Care  Outcome: Ongoing, Progressing     Problem: Diabetes Comorbidity  Goal: Blood Glucose Level Within Targeted Range  Outcome: Ongoing, Progressing     Problem: Infection  Goal: Absence of Infection Signs and Symptoms  Outcome: Ongoing, Progressing     Problem: Bleeding (Surgery Nonspecified)  Goal: Absence of Bleeding  Outcome: Ongoing, Progressing     Problem: Bowel Motility Impaired (Surgery Nonspecified)  Goal: Effective Bowel Elimination  Outcome: Ongoing, Progressing     Problem: Fluid and Electrolyte Imbalance (Surgery Nonspecified)  Goal: Fluid and Electrolyte Balance  Outcome: Ongoing, Progressing     Problem: Glycemic Control Impaired (Surgery Nonspecified)  Goal: Blood Glucose Level Within Targeted Range  Outcome: Ongoing, Progressing     Problem: Infection (Surgery Nonspecified)  Goal: Absence of Infection Signs and Symptoms  Outcome: Ongoing, Progressing     Problem: Ongoing Anesthesia Effects (Surgery Nonspecified)  Goal: Anesthesia/Sedation Recovery  Outcome: Ongoing, Progressing     Problem: Pain (Surgery Nonspecified)  Goal: Acceptable Pain Control  Outcome: Ongoing, Progressing     Problem: Postoperative Nausea and Vomiting (Surgery Nonspecified)  Goal: Nausea and Vomiting Relief  Outcome: Ongoing, Progressing     Problem: Postoperative Urinary Retention (Surgery Nonspecified)  Goal: Effective Urinary Elimination  Outcome: Ongoing,  Progressing     Problem: Respiratory Compromise (Surgery Nonspecified)  Goal: Effective Oxygenation and Ventilation  Outcome: Ongoing, Progressing     Problem: Fall Injury Risk  Goal: Absence of Fall and Fall-Related Injury  Outcome: Ongoing, Progressing     Problem: Skin Injury Risk Increased  Goal: Skin Health and Integrity  Outcome: Ongoing, Progressing

## 2022-05-26 NOTE — PT/OT/SLP PROGRESS
Occupational Therapy   Treatment    Name: Zohra Paulino  MRN: [id number]  Admitting Diagnosis:  Incisional hernia, without obstruction or gangrene  3 Days Post-Op    Recommendations:     Discharge Recommendations: other (see comments) (TBD)  Discharge Equipment Recommendations:  other (see comments) (TBD)  Barriers to discharge:  Other (Comment) (Pt requires increased level of assistance at this time)    Assessment:     Zohra Paulino is a 72 y.o. female with a medical diagnosis of Incisional hernia, without obstruction or gangrene.  She presents with The primary encounter diagnosis was Incisional hernia, without obstruction or gangrene. Diagnoses of Personal history of pancreatic cancer, Low BP, Orthostatic hypotension, Coagulopathy, and Chest pain were also pertinent to this visit. Performance deficits affecting function are weakness, impaired endurance, impaired functional mobilty, gait instability, impaired balance, impaired self care skills, impaired skin, pain, decreased ROM, impaired cardiopulmonary response to activity, decreased coordination, decreased upper extremity function, decreased lower extremity function.     Pt progressing towards goals, pt limited this date by increased fatigue. Pt agreeable to sitting EOB. Pt performing sup>sit with MaxA & use of log roll technique. No c/o dizziness/lightheadedness or chest pain throughout session. Pt sat EOB ~11 min with ModA 2/2 fatigue & lower abdominal pain. Pt requries MaxA x2 for sit>sup. Pt positioned in L side lying at end of session. O2 90% & above throughout session.     Rehab Prognosis:  Good; patient would benefit from acute skilled OT services to address these deficits and reach maximum level of function.       Plan:     Patient to be seen 5 x/week to address the above listed problems via self-care/home management, therapeutic activities, therapeutic exercises  · Plan of Care Expires: 06/24/22  · Plan of Care Reviewed with:  patient    Subjective     Pain/Comfort:  · Pain Rating 1: other (see comments) (none stated)  · Location - Side 1: Bilateral  · Location - Orientation 1: lower  · Location 1: abdomen  · Pain Addressed 1: Reposition, Distraction, Cessation of Activity  · Pain Rating Post-Intervention 1: 8/10    Objective:     Communicated with: nsg prior to session.  Patient found HOB elevated with bed alarm, pulse ox (continuous), telemetry, oxygen, SCD upon OT entry to room.    General Precautions: Standard, fall, respiratory   Orthopedic Precautions:N/A   Braces: N/A  Respiratory Status: Nasal cannula     Occupational Performance:     Bed Mobility:    · Patient completed Rolling/Turning to Left with  maximal assistance  · Patient completed Supine to Sit with maximal assistance  · Patient completed Sit to Supine with maximal assistance and 2 persons     Functional Mobility/Transfers:  · NT    [facility] 6 Click ADL: 16    Treatment & Education:  Pt progressing towards goals, pt limited this date by increased fatigue.   Pt agreeable to sitting EOB. Pt performing sup>sit with MaxA & use of log roll technique.   No c/o dizziness/lightheadedness or chest pain throughout session.   Pt sat EOB ~11 min with ModA 2/2 fatigue & lower abdominal pain.   Pt requries MaxA x2 for sit>sup.   Pt positioned in L side lying at end of session.   O2 90% & above throughout session.     Patient left left sidelying with all lines intact, call button in reach, bed alarm on, nsg notified and pillow placed between BLEEducation:      GOALS:   Multidisciplinary Problems     Occupational Therapy Goals        Problem: Occupational Therapy    Goal Priority Disciplines Outcome Interventions   Occupational Therapy Goal     OT, PT/OT Ongoing, Progressing    Description: Goals to be met by: 6/24/2022     Patient will increase functional independence with ADLs by performing:    UE Dressing with Modified [place].  LE Dressing with Set-up Assistance.  Grooming while  standing with Set-up Assistance.  Toileting from toilet with Modified [place] for hygiene and clothing management.   Supine to sit with Modified [place].  Step transfer with Supervision & appropriate AD.  Toilet transfer to toilet with Supervision & appropriate AD.                     Time Tracking:     OT Date of Treatment: 05/26/22  OT Start Time: 1435  OT Stop Time: 1458  OT Total Time (min): 23 min    Billable Minutes:Therapeutic Activity 12 cotx with PT    OT/MANUEL: OT     MANUEL Visit Number: 0    5/26/2022

## 2022-05-26 NOTE — PT/OT/SLP PROGRESS
Physical Therapy Treatment    Patient Name:  Zohra Paulino   MRN:  [id number]    Recommendations:     Discharge Recommendations:   (TBD)   Discharge Equipment Recommendations:  (TBD)   Barriers to discharge: decreased mobility,strength and endurance    Assessment:     Zohra Paulino is a 72 y.o. female admitted with a medical diagnosis of Incisional hernia, without obstruction or gangrene.  She presents with the following impairments/functional limitations:  weakness, impaired endurance, impaired functional mobilty, impaired balance, decreased upper extremity function, decreased lower extremity function, pain, decreased ROM, impaired skin, impaired cardiopulmonary response to activity,pt with good participation and requires increased assistance with all mobility at this time,disposition TBD.    Rehab Prognosis: Fair; patient would benefit from acute skilled PT services to address these deficits and reach maximum level of function.    Recent Surgery: Procedure(s) (LRB):  REPAIR, HERNIA, INCISIONAL OR VENTRAL, WITHOUT HISTORY OF PRIOR REPAIR (N/A) 3 Days Post-Op    Plan:     During this hospitalization, patient to be seen 5 x/week to address the identified rehab impairments via therapeutic activities, gait training, therapeutic exercises, neuromuscular re-education and progress toward the following goals:    · Plan of Care Expires:  06/24/22    Subjective     Chief Complaint: n/a  Patient/Family Comments/goals: pt with increased fatigue.  Pain/Comfort:  · Pain Rating 1:  (no rating)  · Location - Orientation 1: generalized  · Location 1: abdomen  · Pain Addressed 1: Reposition, Distraction, Cessation of Activity      Objective:     Communicated with nsg prior to session.  Patient found supine with bed alarm, oxygen, peripheral IV, pulse ox (continuous), SCD, telemetry upon PT entry to room.     General Precautions: Standard, fall   Orthopedic Precautions:N/A   Braces: N/A  Respiratory Status: Nasal cannula,  flow 2 L/min     Functional Mobility:  · Bed Mobility:     · Supine to Sit: maximal assistance  · Sit to Supine: maximal assistance and of 2 persons  · Balance: poor sitting balance      AM-PAC 6 CLICK MOBILITY  Turning over in bed (including adjusting bedclothes, sheets and blankets)?: 2  Sitting down on and standing up from a chair with arms (e.g., wheelchair, bedside commode, etc.): 2  Moving from lying on back to sitting on the side of the bed?: 2  Moving to and from a bed to a chair (including a wheelchair)?: 1  Need to walk in hospital room?: 1  Climbing 3-5 steps with a railing?: 1  Basic Mobility Total Score: 9       Therapeutic Activities and Exercises: pt sat EOB ~11 mins with Mod A.       Patient left supine with all lines intact, call button in reach, bed alarm on and nsg notified..    GOALS: see general POC  Multidisciplinary Problems     Physical Therapy Goals        Problem: Physical Therapy    Goal Priority Disciplines Outcome Goal Variances Interventions   Physical Therapy Goal     PT, PT/OT Ongoing, Progressing     Description: Goals to be met by: 22     Patient will increase functional independence with mobility by performin. Supine to sit with Stand-by Assistance  2. Rolling to Left and Right with Stand-by Assistance.  3. Sit to stand transfer with Stand-by Assistance  4. Bed to chair transfer with Stand-by Assistance using Rolling Walker  5. Gait  x 80 feet with Stand-by Assistance using Rolling Walker.   6. Ascend/descend 1 flight of stairs with right Handrails Stand-by Assistance using No Assistive Device.                      Time Tracking:     PT Received On: 22  PT Start Time: 1435     PT Stop Time: 1458  PT Total Time (min): 23 min     Billable Minutes: Therapeutic Activity 11 and Total Time 23       PT/PTA: PTA     PTA Visit Number: 1     2022

## 2022-05-26 NOTE — PROGRESS NOTES
Patient c/o chest pain, light headed and dizziness in AM during resting in bed. VSS. Notified MD Worthington, new orders for lasix to be given. MD Ellis at bedside and aware of patients condition. Will continue to monitor.

## 2022-05-26 NOTE — NURSING
RAPID RESPONSE NURSE NOTE        Admit Date: 2022  LOS: 1  Code Status: Prior   Date of Consult: 2022  : 1950  Age: 72 y.o.  Weight:   Wt Readings from Last 1 Encounters:   22 74.9 kg (165 lb 2 oz)     Sex: female  Race: Black or    Bed: [place] [address] A:   MRN: [id number]  Time Rapid Response Team page Received:   Time Rapid Response Team at Bedside:   Time Rapid Response Team left Bedside:   Was the patient discharged from an ICU this admission? No   Was the patient discharged from a PACU within last 24 hours? No   Did the patient receive conscious sedation/general anesthesia in last 24 hours? No   Was the patient in the ED within the past 24 hours? No   Was the patient on NIPPV within the past 24 hours? No   Did this progress into an ARC or CPA:  NO  Attending Physician: GILMA Ellis MD  Primary Service: General Surgery     SITUATION     Notified by overhead page.  Reason for alert: SOB associated with chest pain  Called to evaluate the patient for Respiratory    Why is the patient in the hospital?: Incisional hernia, without obstruction or gangrene    Patient has a past medical history of Bronchitis, Cancer, Diabetes mellitus, Fibroids, Nuclear sclerosis of both eyes, Osteopetrosis, and Uterine fibroid.    Last Vitals:  Temp: 98.2 °F (36.8 °C) (1954)  Pulse: 107 (2149)  Resp: 24 (2149)  BP: 210/86 (2137)  SpO2: 94 % (2149)    24 Hours Vitals Range:  Temp:  [96.8 °F (36 °C)-100.1 °F (37.8 °C)]   Pulse:  []   Resp:  [16-24]   BP: ()/(49-93)   SpO2:  [93 %-99 %]     Labs:  Recent Labs     22  0442 22  0852 22  1722   WBC 11.29 11.21 11.17   HGB 7.4* 8.6* 10.4*   HCT 22.7* 26.8* 31.3*   * 139* 154       Recent Labs     22  0621 22  2358 22  0442 22  0852 22  1722    140 139 139 141   K 5.3* 4.2 4.3 4.3 3.6    110 109 108 102   CO2 16* 26 23 23 26    CREATININE 1.5* 1.5* 1.3 1.1 1.1   * 116* 116* 115* 113*   PHOS 4.7* 3.3 2.7  --   --    MG 1.9 1.8 1.8  --   --         Recent Labs     05/24/22  2107   PH 7.435   PCO2 35.0   PO2 107*   HCO3 23.5*   POCSATURATED 98   BE -1        ASSESSMENT/INTERVENTIONS    On arrival pt laying in bed, AAOx4, on 3L NC, and pt describing her CP as heaviness, and denying radiating pain. 's, and 's. Nurse at bedside, verbalizing pt had recent surgery, and required multiple transfusions, and IVF.     Notified Dr Camarena of pt status, STAT CXR and EKG ordered. Discussed possible need for diuresis, MD agreeing to diuresis, and placing orders for lasix 20mg, Sublingual Nitroglycerin, and PRN hydralazine 5mg. Primary RN, and Charge updated on POC.      RECOMMENDATIONS    We recommend: Chest xray, troponin, CBC, and Stat EKG, and possible diuresis.  Discussed plan of care with Charge RNRomero    PROVIDER ESCALATION    Orders received and case discussed with Dr. Camarena.     Disposition: Remain in room 512       FOLLOW UP  Call the Rapid Response Nurse, Charlene Bliss at x [phone] for additional questions or concerns.

## 2022-05-26 NOTE — PLAN OF CARE
Pt vitals were not maintained, during shift change night nurse questioned about pt recent vital sign which were elevated. Asked if MD was notified but wasn't,  Pt BP, Hr and RR was high. O2 was destating to the 80s. And very SOB And pt was c/o chest pain and tightness, MD was notified but during order input pt started to cry out, and couldn't speak. MET was called, stat ekg, cxr, labs were ordered, pt was given, subl nitro, lasix and hydralazine. Pt was then put on cont pulse ox. As well. Fluids were d/c . Xray and ekg ws read to MD. Pt incision site have some moderate drainge to Abd binder which pt kept on all night. Pt became more stable around 11pm which pt was able to breathe a little better and get some sleep. Troponin levels were creeping up but started to go back down in the am. Pt still fell a little congested but stated the chest tightness and some of the SOB went away. Wctm   Problem: Bowel Motility Impaired (Surgery Nonspecified)  Goal: Effective Bowel Elimination  Outcome: Ongoing, Not Progressing     Problem: Fluid and Electrolyte Imbalance (Surgery Nonspecified)  Goal: Fluid and Electrolyte Balance  Outcome: Ongoing, Not Progressing     Problem: Respiratory Compromise (Surgery Nonspecified)  Goal: Effective Oxygenation and Ventilation  Outcome: Ongoing, Not Progressing     Problem: Adult Inpatient Plan of Care  Goal: Optimal Comfort and Wellbeing  Outcome: Ongoing, Progressing     Problem: Diabetes Comorbidity  Goal: Blood Glucose Level Within Targeted Range  Outcome: Ongoing, Progressing     Problem: Bleeding (Surgery Nonspecified)  Goal: Absence of Bleeding  Outcome: Ongoing, Progressing     Problem: Pain (Surgery Nonspecified)  Goal: Acceptable Pain Control  Outcome: Ongoing, Progressing

## 2022-05-26 NOTE — PROGRESS NOTES
Neuroendocrine Surgery Progress Note    S:  CP and SOB overnight  Trop downtrending  States she feels better this am  CXR showing fluid overload with bilateral pulm effusions  BP elevated  On 3L NC this am  Given lasix  Hgb stable this am  5L UOP via purewick    O:  Temp:  [97.2 °F (36.2 °C)-100.1 °F (37.8 °C)] 98.2 °F (36.8 °C)  Pulse:  [] 91  Resp:  [16-24] 16  SpO2:  [93 %-100 %] 100 %  BP: (113-210)/(58-93) 141/65    Physical Exam:  Gen: NAD, AAOx3  HEENT: EOMI, NCAT  CV: RR  Resp: no shortness of breath, normal WOB  Abd: soft, non-distended, NT. Midline incision c/d/i  Ext: no edema      A/P:  72M s/p rectrorectus hernia repair w/ post-op bleeding  -cld  -abd binder  -lasix q6hr  -ambulate      Ricky Worthington MD  [employer] General Surgery, PGY-4

## 2022-05-26 NOTE — PT/OT/SLP PROGRESS
Occupational Therapy      Patient Name:  Zohra Paulino   MRN:  [id number]    Patient not seen today secondary to Other (Comment) (Pt with chest pain, lightheadedness/dizziness this AM.) Pt now resting in bed 2/2 little sleep last night. Will follow-up as able.    5/26/2022

## 2022-05-27 ENCOUNTER — PATIENT OUTREACH (OUTPATIENT)
Dept: ADMINISTRATIVE | Facility: OTHER | Age: 72
End: 2022-05-27
Payer: MEDICARE

## 2022-05-27 LAB
ALBUMIN SERPL BCP-MCNC: 3.1 G/DL (ref 3.5–5.2)
ALP SERPL-CCNC: 383 U/L (ref 55–135)
ALT SERPL W/O P-5'-P-CCNC: 21 U/L (ref 10–44)
ANION GAP SERPL CALC-SCNC: 15 MMOL/L (ref 8–16)
AST SERPL-CCNC: 29 U/L (ref 10–40)
BASOPHILS # BLD AUTO: 0.02 K/UL (ref 0–0.2)
BASOPHILS NFR BLD: 0.2 % (ref 0–1.9)
BILIRUB SERPL-MCNC: 1.2 MG/DL (ref 0.1–1)
BUN SERPL-MCNC: 17 MG/DL (ref 8–23)
CALCIUM SERPL-MCNC: 9.4 MG/DL (ref 8.7–10.5)
CHLORIDE SERPL-SCNC: 95 MMOL/L (ref 95–110)
CO2 SERPL-SCNC: 30 MMOL/L (ref 23–29)
CREAT SERPL-MCNC: 1 MG/DL (ref 0.5–1.4)
DIFFERENTIAL METHOD: ABNORMAL
EOSINOPHIL # BLD AUTO: 0.7 K/UL (ref 0–0.5)
EOSINOPHIL NFR BLD: 6.7 % (ref 0–8)
ERYTHROCYTE [DISTWIDTH] IN BLOOD BY AUTOMATED COUNT: 17.2 % (ref 11.5–14.5)
EST. GFR  (AFRICAN AMERICAN): >60 ML/MIN/1.73 M^2
EST. GFR  (NON AFRICAN AMERICAN): 56 ML/MIN/1.73 M^2
GLUCOSE SERPL-MCNC: 115 MG/DL (ref 70–110)
HCT VFR BLD AUTO: 34.7 % (ref 37–48.5)
HGB BLD-MCNC: 11.4 G/DL (ref 12–16)
IMM GRANULOCYTES # BLD AUTO: 0.05 K/UL (ref 0–0.04)
IMM GRANULOCYTES NFR BLD AUTO: 0.5 % (ref 0–0.5)
LYMPHOCYTES # BLD AUTO: 2 K/UL (ref 1–4.8)
LYMPHOCYTES NFR BLD: 19.6 % (ref 18–48)
MAGNESIUM SERPL-MCNC: 1.8 MG/DL (ref 1.6–2.6)
MCH RBC QN AUTO: 28.1 PG (ref 27–31)
MCHC RBC AUTO-ENTMCNC: 32.9 G/DL (ref 32–36)
MCV RBC AUTO: 86 FL (ref 82–98)
MONOCYTES # BLD AUTO: 1.1 K/UL (ref 0.3–1)
MONOCYTES NFR BLD: 10.4 % (ref 4–15)
NEUTROPHILS # BLD AUTO: 6.3 K/UL (ref 1.8–7.7)
NEUTROPHILS NFR BLD: 62.6 % (ref 38–73)
NRBC BLD-RTO: 0 /100 WBC
PHOSPHATE SERPL-MCNC: 4.5 MG/DL (ref 2.7–4.5)
PLATELET # BLD AUTO: 197 K/UL (ref 150–450)
PMV BLD AUTO: 12 FL (ref 9.2–12.9)
POTASSIUM SERPL-SCNC: 4.4 MMOL/L (ref 3.5–5.1)
PROT SERPL-MCNC: 8 G/DL (ref 6–8.4)
RBC # BLD AUTO: 4.05 M/UL (ref 4–5.4)
SODIUM SERPL-SCNC: 140 MMOL/L (ref 136–145)
WBC # BLD AUTO: 10.05 K/UL (ref 3.9–12.7)

## 2022-05-27 PROCEDURE — 85025 COMPLETE CBC W/AUTO DIFF WBC: CPT | Performed by: SURGERY

## 2022-05-27 PROCEDURE — 63600175 PHARM REV CODE 636 W HCPCS: Performed by: SURGERY

## 2022-05-27 PROCEDURE — 25000003 PHARM REV CODE 250: Performed by: STUDENT IN AN ORGANIZED HEALTH CARE EDUCATION/TRAINING PROGRAM

## 2022-05-27 PROCEDURE — 63600175 PHARM REV CODE 636 W HCPCS: Performed by: STUDENT IN AN ORGANIZED HEALTH CARE EDUCATION/TRAINING PROGRAM

## 2022-05-27 PROCEDURE — 97530 THERAPEUTIC ACTIVITIES: CPT | Mod: CO

## 2022-05-27 PROCEDURE — 11000001 HC ACUTE MED/SURG PRIVATE ROOM

## 2022-05-27 PROCEDURE — 84100 ASSAY OF PHOSPHORUS: CPT | Performed by: SURGERY

## 2022-05-27 PROCEDURE — 80053 COMPREHEN METABOLIC PANEL: CPT | Performed by: SURGERY

## 2022-05-27 PROCEDURE — 94799 UNLISTED PULMONARY SVC/PX: CPT

## 2022-05-27 PROCEDURE — 97116 GAIT TRAINING THERAPY: CPT | Mod: CQ

## 2022-05-27 PROCEDURE — 83735 ASSAY OF MAGNESIUM: CPT | Performed by: SURGERY

## 2022-05-27 PROCEDURE — 99900035 HC TECH TIME PER 15 MIN (STAT)

## 2022-05-27 PROCEDURE — 25000003 PHARM REV CODE 250: Performed by: SURGERY

## 2022-05-27 PROCEDURE — 94761 N-INVAS EAR/PLS OXIMETRY MLT: CPT

## 2022-05-27 RX ORDER — SCOLOPAMINE TRANSDERMAL SYSTEM 1 MG/1
1 PATCH, EXTENDED RELEASE TRANSDERMAL
Status: DISCONTINUED | OUTPATIENT
Start: 2022-05-27 | End: 2022-05-28 | Stop reason: HOSPADM

## 2022-05-27 RX ORDER — SUCRALFATE 1 G/10ML
1 SUSPENSION ORAL ONCE
Status: COMPLETED | OUTPATIENT
Start: 2022-05-27 | End: 2022-05-27

## 2022-05-27 RX ADMIN — SCOPALAMINE 1 PATCH: 1 PATCH, EXTENDED RELEASE TRANSDERMAL at 10:05

## 2022-05-27 RX ADMIN — FUROSEMIDE 40 MG: 40 INJECTION, SOLUTION INTRAMUSCULAR; INTRAVENOUS at 06:05

## 2022-05-27 RX ADMIN — ONDANSETRON 4 MG: 2 INJECTION INTRAMUSCULAR; INTRAVENOUS at 09:05

## 2022-05-27 RX ADMIN — POTASSIUM CHLORIDE 20 MEQ: 14.9 INJECTION, SOLUTION INTRAVENOUS at 03:05

## 2022-05-27 RX ADMIN — DOCUSATE SODIUM AND SENNOSIDES 1 TABLET: 8.6; 5 TABLET, FILM COATED ORAL at 10:05

## 2022-05-27 RX ADMIN — POTASSIUM CHLORIDE 10 MEQ: 750 TABLET, EXTENDED RELEASE ORAL at 10:05

## 2022-05-27 RX ADMIN — FUROSEMIDE 40 MG: 40 INJECTION, SOLUTION INTRAMUSCULAR; INTRAVENOUS at 01:05

## 2022-05-27 RX ADMIN — FUROSEMIDE 40 MG: 40 INJECTION, SOLUTION INTRAMUSCULAR; INTRAVENOUS at 12:05

## 2022-05-27 RX ADMIN — POTASSIUM CHLORIDE 20 MEQ: 14.9 INJECTION, SOLUTION INTRAVENOUS at 01:05

## 2022-05-27 RX ADMIN — PANTOPRAZOLE SODIUM 40 MG: 40 TABLET, DELAYED RELEASE ORAL at 10:05

## 2022-05-27 RX ADMIN — MUPIROCIN: 20 OINTMENT TOPICAL at 10:05

## 2022-05-27 RX ADMIN — POTASSIUM CHLORIDE 20 MEQ: 14.9 INJECTION, SOLUTION INTRAVENOUS at 12:05

## 2022-05-27 RX ADMIN — IRON SUCROSE 100 MG: 20 INJECTION, SOLUTION INTRAVENOUS at 10:05

## 2022-05-27 RX ADMIN — SODIUM PHOSPHATE, MONOBASIC, MONOHYDRATE 30 MMOL: 276; 142 INJECTION, SOLUTION INTRAVENOUS at 12:05

## 2022-05-27 RX ADMIN — FUROSEMIDE 40 MG: 40 INJECTION, SOLUTION INTRAMUSCULAR; INTRAVENOUS at 11:05

## 2022-05-27 RX ADMIN — Medication 1000 UNITS: at 10:05

## 2022-05-27 RX ADMIN — FUROSEMIDE 40 MG: 40 INJECTION, SOLUTION INTRAMUSCULAR; INTRAVENOUS at 05:05

## 2022-05-27 RX ADMIN — MUPIROCIN: 20 OINTMENT TOPICAL at 08:05

## 2022-05-27 RX ADMIN — SUCRALFATE 1 G: 1 SUSPENSION ORAL at 10:05

## 2022-05-27 NOTE — PT/OT/SLP PROGRESS
Occupational Therapy   Treatment    Name: Zohra Paulino  MRN: [id number]  Admitting Diagnosis:  Incisional hernia, without obstruction or gangrene  4 Days Post-Op    Recommendations:     Discharge Recommendations: other (see comments) (TBD)  Discharge Equipment Recommendations:  other (see comments) (TBD)  Barriers to discharge:  Other (Comment) (increased level of assistance)    Assessment:     Zohra Paulino is a 72 y.o. female with a medical diagnosis of Incisional hernia, without obstruction or gangrene.  Performance deficits affecting function are weakness, impaired endurance, impaired self care skills, impaired functional mobilty, gait instability, impaired balance, decreased coordination, decreased upper extremity function, decreased lower extremity function, decreased safety awareness, pain, impaired skin, impaired cardiopulmonary response to activity. Pt found in bed, agreeable to therapy despite feeling nauseated.  Pt with improved performance with bed mobility and was able to stand and take side steps with CGA.  She reports feeling very weak however no dizziness.  Increased O2 to 1L during functional activities 2/2 desats to 87% on RA.  RN notified. Continue OT services to address functional goals, progressing as able.      Rehab Prognosis:  Good; patient would benefit from acute skilled OT services to address these deficits and reach maximum level of function.       Plan:     Patient to be seen 5 x/week to address the above listed problems via self-care/home management, therapeutic activities, therapeutic exercises  · Plan of Care Expires: 06/24/22  · Plan of Care Reviewed with: patient    Subjective     Pain/Comfort:  · Pain Rating 1:  (abdominal discomfort with coughing-did not rate-provided with pillow to hug when coughing)    Objective:     Communicated with: RN prior to session.  Patient found HOB elevated with bed alarm, pulse ox (continuous), telemetry, peripheral IV upon OT entry to  room.    General Precautions: Standard, fall, respiratory   Orthopedic Precautions:N/A   Braces: N/A  Respiratory Status: Room air     Occupational Performance:     Bed Mobility:    · Patient completed Rolling/Turning to Left with  minimum assistance and with side rail  · Patient completed Scooting/Bridging with contact guard assistance to scoot seated  · Patient completed Supine to Sit with minimum assistance, with side rail and HOB elevated, increased time and effort, vc's for effective technique   · Patient completed Sit to Supine with minimum assistance for LE assist    Functional Mobility/Transfers:  · Patient completed Sit <> Stand Transfer with contact guard assistance  with  rolling walker and vcs for hand placement and upright posture x 2 trials   · Functional Mobility: Pt side stepped to L side with Min A using RW.     Activities of Daily Living:  · Grooming: stand by assistance wipe face and swab mouth with toothette sitting EOB.      [facility] 6 Click ADL: 16    Treatment & Education:  Pt sat EOB with SBA ~15 min.while performing G./H tasks.     Vitals BP HR O2 Symptoms   Supine 115/74 72 94 RA none   Seated /96 100 92  1L weak   Standing  119/65 111 92 weak         Patient left HOB elevated with all lines intact, call button in reach, bed alarm on and RN notifiedEducation:      GOALS:   Multidisciplinary Problems     Occupational Therapy Goals        Problem: Occupational Therapy    Goal Priority Disciplines Outcome Interventions   Occupational Therapy Goal     OT, PT/OT Ongoing, Progressing    Description: Goals to be met by: 6/24/2022     Patient will increase functional independence with ADLs by performing:    UE Dressing with Modified [place].  LE Dressing with Set-up Assistance.  Grooming while standing with Set-up Assistance.  Toileting from toilet with Modified [place] for hygiene and clothing management.   Supine to sit with Modified [place].  Step transfer with Supervision &  appropriate AD.  Toilet transfer to toilet with Supervision & appropriate AD.                     Time Tracking:     OT Date of Treatment: 05/27/22  OT Start Time: 0918  OT Stop Time: 0945  OT Total Time (min): 27 min    Billable Minutes:Therapeutic Activity 13   *cotx with PT (pt no longer a cotx as she has progressed with mobility)            5/27/2022

## 2022-05-27 NOTE — PROGRESS NOTES
"IP Liaison - Initial Visit Note    Patient: Zohra Paulino  MRN:  [id number]  Date of Service:  5/27/2022  Completed by:  ARIELLA Aragon    Reason for Visit   Patient presents with    IP Liaison Initial Visit       RSW met with patient at bedside in order to complete SDOH questionnaire and liaison assessment. 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 Pt has identified barriers to care of financial resource strain. Pt stated "I was collecting disability but I am not anymore and I don't know if I'll be able to start again next school year. RSLYDIA has provided pt with [facility] on Aging information, [facility], [facility], and [facility] Power to Care information.    The following were addressed during this visit:  - Review SDOH Questions   - Complete patient assessment   - Complete initial visit with patient        Patient Summary     IP Liaison Patient Assessment    General  Level of Caregiver support: Member independent and does not need caregiver assistance  Have you had to make a decision between paying for any of the following in the last 2 months?: None  Transportation means: Family, Friend  Employment status: Employed but on leave for health reasons  Assessments  Was the PHQ Depression Screening completed this visit?: No  Was the KIP-7 Screening completed this visit?: No         ARIELLA Aragon  "

## 2022-05-27 NOTE — PLAN OF CARE
Pt rested well. No c/o pain. Scheduled meds were given. Pt slept majority of the day. Bed in lowest position; call light within reach.

## 2022-05-27 NOTE — PLAN OF CARE
Problem: Occupational Therapy  Goal: Occupational Therapy Goal  Description: Goals to be met by: 6/24/2022     Patient will increase functional independence with ADLs by performing:    UE Dressing with Modified [place].  LE Dressing with Set-up Assistance.  Grooming while standing with Set-up Assistance.  Toileting from toilet with Modified [place] for hygiene and clothing management.   Supine to sit with Modified [place].  Step transfer with Supervision & appropriate AD.  Toilet transfer to toilet with Supervision & appropriate AD.    Outcome: Ongoing, Progressing   Zohra Paulino is a 72 y.o. female with a medical diagnosis of Incisional hernia, without obstruction or gangrene.  Performance deficits affecting function are weakness, impaired endurance, impaired self care skills, impaired functional mobilty, gait instability, impaired balance, decreased coordination, decreased upper extremity function, decreased lower extremity function, decreased safety awareness, pain, impaired skin, impaired cardiopulmonary response to activity. Pt found in bed, agreeable to therapy despite feeling nauseated.  Pt with improved performance with bed mobility and was able to stand and take side steps with CGA.  She reports feeling very weak however no dizziness.  Increased O2 to 1L during functional activities 2/2 desats to 87% on RA.  RN notified. Continue OT services to address functional goals, progressing as able.

## 2022-05-27 NOTE — PLAN OF CARE
Problem: Physical Therapy  Goal: Physical Therapy Goal  Description: Goals to be met by: 22     Patient will increase functional independence with mobility by performin. Supine to sit with Stand-by Assistance  2. Rolling to Left and Right with Stand-by Assistance.  3. Sit to stand transfer with Stand-by Assistance  4. Bed to chair transfer with Stand-by Assistance using Rolling Walker  5. Gait  x 80 feet with Stand-by Assistance using Rolling Walker.   6. Ascend/descend 1 flight of stairs with right Handrails Stand-by Assistance using No Assistive Device.     Outcome: Ongoing, Progressing

## 2022-05-27 NOTE — PT/OT/SLP PROGRESS
Orthopedic Surgery Consult / History and Physical    Verito Chicas MRN# [id number]   Age: 87 year old YOB: 1933     Date of Admission:   7/19/2021  Date of Consult: 07/20/21   Location:    [facility]             Assessment and Plan:   Assessment:  87-year-old female with suspected new compression fractures L1, L2, possible L3, possible lower sacrum.     Plan:  Recommend mobilization as tolerated  No bracing necessary  No spinal precautions  No procedures planeed.  Recommend discussion with PCP, or could refer to endocrinology, for discussion of osteoporosis medication options  Recommend follow up with Tova Harmon NP within [facility] for further osteoporosis discussion, such as fall prevention and Vitamin D / Calcium supplementation.  [phone] to schedule.  These injuries often take weeks to months to resolve, but most resolve without further intervention.  The patient may follow up with me in 6 weeks if pain not improving at that time point, or sooner if develops neurologic changes.               Chief Complaint:   Procedure back in           History of Present Illness:   This patient is a 87 year old female with a significant past medical history of Alzheimer's dementia (though still lives in her own home with her ) who presents with back pain.    She sustained a mechanical ground-level fall 2 nights ago, new onset back pain at that time.  She presented the emergency department and CT scan of the lumbar spine demonstrated multiple compression fractures.  She was thereafter admitted to internal medicine for mobilization and pain control.     I examined the patient with her daughter present here today.  The patient was able to mobilize and walk a few steps in the room earlier today, demonstrating improved mobility.  Reports focal low back pain.  No radicular pain.  No numbness or tingling in the lower extremities.  No weakness in the lower extremities.  No  Physical Therapy Treatment    Patient Name:  Zohra Paulino   MRN:  [id number]    Recommendations:     Discharge Recommendations:   (TBD)   Discharge Equipment Recommendations:  (TBD)   Barriers to discharge: decreased mobility,strength and endurance    Assessment:     Zohra Paulino is a 72 y.o. female admitted with a medical diagnosis of Incisional hernia, without obstruction or gangrene.  She presents with the following impairments/functional limitations:  weakness, impaired endurance, impaired functional mobilty, gait instability, impaired balance, decreased upper extremity function, decreased lower extremity function, decreased ROM, impaired coordination, impaired skin, impaired cardiopulmonary response to activity,pt with good participation and improved status from yesterday,pt remains with decreased strength and mobility and will benefit from continuing PT services upon discharge,disposition TBD.    Rehab Prognosis: Fair; patient would benefit from acute skilled PT services to address these deficits and reach maximum level of function.    Recent Surgery: Procedure(s) (LRB):  REPAIR, HERNIA, INCISIONAL OR VENTRAL, WITHOUT HISTORY OF PRIOR REPAIR (N/A) 4 Days Post-Op    Plan:     During this hospitalization, patient to be seen 5 x/week to address the identified rehab impairments via gait training, therapeutic activities, therapeutic exercises, neuromuscular re-education and progress toward the following goals:    · Plan of Care Expires:  06/24/22    Subjective     Chief Complaint: weakness and nausea  Patient/Family Comments/goals: pt is trying to eat a little.  Pain/Comfort:  · Pain Rating 1:  (no c/o's pain just increased weakness and nausea)      Objective:     Communicated with nsg prior to session.  Patient found supine with bed alarm, oxygen, pulse ox (continuous), SCD, telemetry upon PT entry to room.     General Precautions: Standard, fall, respiratory   Orthopedic Precautions:N/A   Braces:  N/A  Respiratory Status: Nasal cannula, flow 2 L/min     Functional Mobility:  · Bed Mobility:     · Supine to Sit: minimum assistance  · Sit to Supine: minimum assistance  · Transfers:     · Sit to Stand:  contact guard assistance and X 2 trials with rolling walker  · Gait: 3-4 sidesteps to HOB with RW and Min A  · Balance: fair standing balance with RW      AM-PAC 6 CLICK MOBILITY  Turning over in bed (including adjusting bedclothes, sheets and blankets)?: 3  Sitting down on and standing up from a chair with arms (e.g., wheelchair, bedside commode, etc.): 3  Moving from lying on back to sitting on the side of the bed?: 3  Moving to and from a bed to a chair (including a wheelchair)?: 3  Need to walk in hospital room?: 1  Climbing 3-5 steps with a railing?: 1  Basic Mobility Total Score: 14       Therapeutic Activities and Exercises: pt sat EOB ~15 mins with SBA,see OT note for recorded BP's.       Patient left supine with all lines intact, call button in reach and bed alarm on..    GOALS: see general POC  Multidisciplinary Problems     Physical Therapy Goals        Problem: Physical Therapy    Goal Priority Disciplines Outcome Goal Variances Interventions   Physical Therapy Goal     PT, PT/OT Ongoing, Progressing     Description: Goals to be met by: 22     Patient will increase functional independence with mobility by performin. Supine to sit with Stand-by Assistance  2. Rolling to Left and Right with Stand-by Assistance.  3. Sit to stand transfer with Stand-by Assistance  4. Bed to chair transfer with Stand-by Assistance using Rolling Walker  5. Gait  x 80 feet with Stand-by Assistance using Rolling Walker.   6. Ascend/descend 1 flight of stairs with right Handrails Stand-by Assistance using No Assistive Device.                      Time Tracking:     PT Received On: 22  PT Start Time: 918     PT Stop Time: 945  PT Total Time (min): 27 min     Billable Minutes: Gait Training 14 and Total Time  change in urinary status.    Symptom Profile  Location of symptoms:   Low back  Duration of symptoms:   2 days  Quality of symptoms:   Achy  Severity:   Moderate  Exacerbating:    Movement  Alleviate:   rest, pain meds            Past Medical History:   Alzheimer's  Past Medical History:   Diagnosis Date     Alzheimer disease (H)      Cancer (H)     breast cancer     GERD (gastroesophageal reflux disease)      Hypertension             Past Surgical History:     Past Surgical History:   Procedure Laterality Date     APPENDECTOMY       BIOPSY      lumpectomy right breast     COLONOSCOPY N/A 1/20/2016    Procedure: COMBINED COLONOSCOPY, SINGLE OR MULTIPLE BIOPSY/POLYPECTOMY BY BIOPSY;  Surgeon: Flynn Pineda MD;  Location:  GI     COLONOSCOPY N/A 10/14/2019    Procedure: COLONOSCOPY, WITH POLYPECTOMY AND BIOPSY;  Surgeon: Rere Leigh MD;  Location:  GI     EXTRACTION(S) DENTAL N/A 8/20/2015    Procedure: EXTRACTION(S) DENTAL;  Surgeon: Fahad Webster DDS;  Location:  OR     EYE SURGERY      cataract bilat     ORTHOPEDIC SURGERY      arthroscopy knee left     ORTHOPEDIC SURGERY      shoulder surg, fx left     ORTHOPEDIC SURGERY      fx wrist left            Social History:   Living situation: In own home with   Ambulatory status: Independent, no gait aid    Social History     Tobacco Use     Smoking status: Light Tobacco Smoker     Packs/day: 0.25     Types: Cigarettes     Smokeless tobacco: Never Used   Substance Use Topics     Alcohol use: No            Family History:   Denies family history of bleeding or clotting disorders  Family History   Problem Relation Age of Onset     Colon Cancer Brother             Allergies:   No Known Allergies         Medications:   Anticoagulants: None  Medications Prior to Admission   Medication Sig Dispense Refill Last Dose     amLODIPine (NORVASC) 5 MG tablet Take 5 mg by mouth daily    7/18/2021 at Unknown time     carboxymethylcellulose PF (REFRESH PLUS)  0.5 % ophthalmic solution Place 1 drop into both eyes 4 times daily as needed for dry eyes   PRN     cetirizine (ZYRTEC) 10 MG tablet Take 10 mg by mouth every evening   7/18/2021 at Unknown time     donepezil (ARICEPT) 10 MG tablet Take 10 mg by mouth At Bedtime   7/18/2021 at Unknown time     DULoxetine (CYMBALTA) 30 MG capsule Take 30 mg by mouth every morning    7/18/2021 at Unknown time     fluticasone (FLONASE) 50 MCG/ACT nasal spray Spray 1 spray into both nostrils every morning   7/18/2021 at Unknown time     fluticasone-vilanterol (BREO ELLIPTA) 200-25 MCG/INH inhaler Inhale 1 puff into the lungs every morning   7/18/2021 at Unknown time     ibuprofen (ADVIL/MOTRIN) 200 MG tablet Take 800 mg by mouth every 6 hours as needed for mild pain   PRN     labetalol (NORMODYNE) 200 MG tablet Take 400 mg by mouth 2 times daily 2 x 200 mg    7/18/2021 at Unknown time     losartan (COZAAR) 100 MG tablet Take 100 mg by mouth every evening   7/18/2021 at Unknown time     omeprazole (PRILOSEC) 40 MG DR capsule Take 40 mg by mouth every morning    7/18/2021 at Unknown time     polyethylene glycol (MIRALAX) 17 GM/Dose powder Take 17 g (1 capful) by mouth daily   7/18/2021 at Unknown time     psyllium (METAMUCIL/KONSYL) capsule Take 2 capsules by mouth daily (Patient taking differently: Take 1 capsule by mouth 2 times daily ) 180 capsule 3 7/18/2021 at Unknown time     simvastatin (ZOCOR) 40 MG tablet Take 20 mg by mouth At Bedtime 1/2 x 40 mg tab   7/18/2021 at Unknown time             Review of Systems:   A 10 point review of systems was performed, and was negative except as noted in HPI.         Physical Exam:     Patient Vitals for the past 8 hrs:   BP Temp Temp src Pulse Resp SpO2 Weight   07/20/21 0801 -- -- -- 59 -- -- --   07/20/21 0740 (!) 144/56 98.3  F (36.8  C) Oral 55 17 91 % --   07/20/21 0540 -- -- -- -- -- -- 51.9 kg (114 lb 6.4 oz)       General: awake, alert, cooperative, no apparent distress, appears  27       PT/PTA: PTA     PTA Visit Number: 2     05/27/2022   stated age  HEENT: normal  Respiratory: breathing non-labored  Cardiovascular: skin warm and well perfused  Skin: no rashes or lesions  Abdomen:  non-distended  Lymph:  No abnormal swelling of uninjured extremities  Heme:  No petechiae  Neurological: CN II-XII grossly intact  Musculoskeletal:       Spine:   Sensation:      R       L    L3:   Intact   Intact    L4:   Intact   Intact    L5:   Intact   Intact    S1:   Intact   Intact     Motor:     R L    L3: Psoas    5  5    L4:   Quad    5  5    L4: TA   5 5    L5: EHL    5  5    S1: Eversion/PF  5  5       Reflexes:  1+ achilles bilateral         Data:   CT L-spine, sacrum independently reviewed.  These demonstrate compression fracture present at L1, L2.  Possible mild superior endplate compression fracture of L3.  Cortical irregularity in the lower sacrum also possibly new fracture.              Ivan Dove MD  Orthopaedic Spine Surgery  [facility]

## 2022-05-27 NOTE — PROGRESS NOTES
Neuroendocrine Surgery Progress Note    S:  vss this am  Feels better  On minimal nc  Had meatloaf and carrots last night   2700 UOP  2x BM  EKG--sinus tach 104, PACs, bigeminy  WBC 10  hgb 11.4  cmp wnl    O:  Temp:  [97.9 °F (36.6 °C)-98.9 °F (37.2 °C)] 97.9 °F (36.6 °C)  Pulse:  [] 74  Resp:  [16-20] 20  SpO2:  [93 %-99 %] 96 %  BP: (111-193)/(55-94) 111/65    Physical Exam:  Gen: NAD, AAOx3  CV: RRR  Resp: no shortness of breath, normal WOB  Abd: soft, non-distended, NT. Midline incision c/d/i  Ext: no edema      A/P:  72M s/p rectrorectus hernia repair w/ post-op bleeding now stable  -abd binder  -reg diet  -ambulate in jerry  -likely discharge tomorrow       Ricky Worthington MD  [employer] General Surgery, PGY-4

## 2022-05-27 NOTE — PLAN OF CARE
per Xuan - referral sent 5/26 rec'd.   referral sent to other area [facility] agencies   for possible d/c to home this weekend     Future Appointments   Date Time Provider Department Center   6/2/2022 10:00 AM Tracey Martin MD [facility]   6/14/2022  2:00 PM GILMA Ellis MD [facility]        05/27/22 1283   Post-Acute Status   Post-Acute Authorization Home Health   Home Health Status Referrals Sent   Discharge Delays None known at this time   Discharge Plan   Discharge Plan A Home;Home with family;Home Health

## 2022-05-28 VITALS
TEMPERATURE: 98 F | BODY MASS INDEX: 27.1 KG/M2 | HEIGHT: 62 IN | WEIGHT: 147.25 LBS | DIASTOLIC BLOOD PRESSURE: 71 MMHG | SYSTOLIC BLOOD PRESSURE: 120 MMHG | OXYGEN SATURATION: 92 % | RESPIRATION RATE: 18 BRPM | HEART RATE: 87 BPM

## 2022-05-28 PROBLEM — K43.2 INCISIONAL HERNIA, WITHOUT OBSTRUCTION OR GANGRENE: Status: RESOLVED | Noted: 2022-04-19 | Resolved: 2022-05-28

## 2022-05-28 PROCEDURE — 25000003 PHARM REV CODE 250: Performed by: STUDENT IN AN ORGANIZED HEALTH CARE EDUCATION/TRAINING PROGRAM

## 2022-05-28 PROCEDURE — 63600175 PHARM REV CODE 636 W HCPCS: Performed by: STUDENT IN AN ORGANIZED HEALTH CARE EDUCATION/TRAINING PROGRAM

## 2022-05-28 PROCEDURE — 94799 UNLISTED PULMONARY SVC/PX: CPT

## 2022-05-28 PROCEDURE — 63600175 PHARM REV CODE 636 W HCPCS: Performed by: SURGERY

## 2022-05-28 PROCEDURE — 99900035 HC TECH TIME PER 15 MIN (STAT)

## 2022-05-28 PROCEDURE — 25000003 PHARM REV CODE 250: Performed by: SURGERY

## 2022-05-28 PROCEDURE — 94761 N-INVAS EAR/PLS OXIMETRY MLT: CPT

## 2022-05-28 RX ORDER — TRAMADOL HYDROCHLORIDE 50 MG/1
50 TABLET ORAL EVERY 6 HOURS PRN
Qty: 15 TABLET | Refills: 0 | Status: SHIPPED | OUTPATIENT
Start: 2022-05-28 | End: 2022-06-02 | Stop reason: SDUPTHER

## 2022-05-28 RX ADMIN — DOCUSATE SODIUM AND SENNOSIDES 1 TABLET: 8.6; 5 TABLET, FILM COATED ORAL at 08:05

## 2022-05-28 RX ADMIN — TRAMADOL HYDROCHLORIDE 50 MG: 50 TABLET, COATED ORAL at 12:05

## 2022-05-28 RX ADMIN — Medication 1000 UNITS: at 08:05

## 2022-05-28 RX ADMIN — IRON SUCROSE 100 MG: 20 INJECTION, SOLUTION INTRAVENOUS at 09:05

## 2022-05-28 RX ADMIN — POTASSIUM CHLORIDE 10 MEQ: 750 TABLET, EXTENDED RELEASE ORAL at 08:05

## 2022-05-28 RX ADMIN — PANTOPRAZOLE SODIUM 40 MG: 40 TABLET, DELAYED RELEASE ORAL at 08:05

## 2022-05-28 RX ADMIN — FUROSEMIDE 40 MG: 40 INJECTION, SOLUTION INTRAMUSCULAR; INTRAVENOUS at 05:05

## 2022-05-28 RX ADMIN — MUPIROCIN: 20 OINTMENT TOPICAL at 08:05

## 2022-05-28 RX ADMIN — TRAMADOL HYDROCHLORIDE 50 MG: 50 TABLET, COATED ORAL at 08:05

## 2022-05-28 NOTE — PLAN OF CARE
Problem: Adult Inpatient Plan of Care  Goal: Plan of Care Review  Outcome: Ongoing, Progressing   DC orders placed. AVS complete from VN standpoint.

## 2022-05-28 NOTE — PLAN OF CARE
Problem: Adult Inpatient Plan of Care  Goal: Plan of Care Review  Outcome: Met  Goal: Patient-Specific Goal (Individualized)  Outcome: Met  Goal: Absence of Hospital-Acquired Illness or Injury  Outcome: Met  Goal: Optimal Comfort and Wellbeing  Outcome: Met  Goal: Readiness for Transition of Care  Outcome: Met     Problem: Diabetes Comorbidity  Goal: Blood Glucose Level Within Targeted Range  Outcome: Met     Problem: Infection  Goal: Absence of Infection Signs and Symptoms  Outcome: Met     Problem: Bleeding (Surgery Nonspecified)  Goal: Absence of Bleeding  Outcome: Met     Problem: Bowel Motility Impaired (Surgery Nonspecified)  Goal: Effective Bowel Elimination  Outcome: Met     Problem: Fluid and Electrolyte Imbalance (Surgery Nonspecified)  Goal: Fluid and Electrolyte Balance  Outcome: Met     Problem: Glycemic Control Impaired (Surgery Nonspecified)  Goal: Blood Glucose Level Within Targeted Range  Outcome: Met     Problem: Infection (Surgery Nonspecified)  Goal: Absence of Infection Signs and Symptoms  Outcome: Met     Problem: Ongoing Anesthesia Effects (Surgery Nonspecified)  Goal: Anesthesia/Sedation Recovery  Outcome: Met     Problem: Pain (Surgery Nonspecified)  Goal: Acceptable Pain Control  Outcome: Met     Problem: Postoperative Nausea and Vomiting (Surgery Nonspecified)  Goal: Nausea and Vomiting Relief  Outcome: Met     Problem: Postoperative Urinary Retention (Surgery Nonspecified)  Goal: Effective Urinary Elimination  Outcome: Met     Problem: Respiratory Compromise (Surgery Nonspecified)  Goal: Effective Oxygenation and Ventilation  Outcome: Met     Problem: Fall Injury Risk  Goal: Absence of Fall and Fall-Related Injury  Outcome: Met     Problem: Skin Injury Risk Increased  Goal: Skin Health and Integrity  Outcome: Met     Problem: Occupational Therapy  Goal: Occupational Therapy Goal  Description: Goals to be met by: 6/24/2022     Patient will increase functional independence with ADLs by  performing:    UE Dressing with Modified [place].  LE Dressing with Set-up Assistance.  Grooming while standing with Set-up Assistance.  Toileting from toilet with Modified [place] for hygiene and clothing management.   Supine to sit with Modified [place].  Step transfer with Supervision & appropriate AD.  Toilet transfer to toilet with Supervision & appropriate AD.    Outcome: Met     Problem: Physical Therapy  Goal: Physical Therapy Goal  Description: Goals to be met by: 22     Patient will increase functional independence with mobility by performin. Supine to sit with Stand-by Assistance  2. Rolling to Left and Right with Stand-by Assistance.  3. Sit to stand transfer with Stand-by Assistance  4. Bed to chair transfer with Stand-by Assistance using Rolling Walker  5. Gait  x 80 feet with Stand-by Assistance using Rolling Walker.   6. Ascend/descend 1 flight of stairs with right Handrails Stand-by Assistance using No Assistive Device.     Outcome: Met

## 2022-05-28 NOTE — HOSPITAL COURSE
Patient presented for incision hernia repair. This was performed in the OR without complication. Patient has cardiac hx and on eliquis. Patient had hypotension and coagulopathy post-op despite having held eliquis. She was resuscitated with blood products and her BP improved. Her diet was advanced and her pain was well-controlled. She tolerated a reg diet and ambulated. She was discharged 5/28 with proper followup.

## 2022-05-28 NOTE — PLAN OF CARE
pt for d/c today   confirmed with Francisca with [facility]  - pt will be seen Tues - she will make every effort to have pt seen on Monday if an opening occurs.      discharging nurse to review f/u meds/instructions   family to transport to home     Future Appointments   Date Time Provider Department Center   6/2/2022 10:00 AM Tracey Martin MD [facility] PRICARE [place]   6/14/2022  2:00 PM GILMA Ellis MD [facility] TUMOR [place] [facility]            05/28/22 1630   Final Note   Assessment Type Final Discharge Note   Anticipated Discharge Disposition Home-Health  ([facility])   What phone number can be called within the next 1-3 days to see how you are doing after discharge? [phone]   Hospital Resources/Appts/Education Provided Appointments scheduled and added to AVS;Post-Acute resouces added to AVS   Post-Acute Status   Post-Acute Authorization Home Health   Home Health Status Set-up Complete/Auth obtained   Discharge Delays None known at this time

## 2022-05-28 NOTE — DISCHARGE SUMMARY
[facility]  General Surgery  Discharge Summary      Patient Name: Zohra Paulino  MRN: [id number]  Admission Date: 5/23/2022  Hospital Length of Stay: 4 days  Discharge Date and Time:  05/28/2022 10:35 AM  Attending Physician: GILMA Ellis MD   Discharging Provider: Ricky Alanis MD  Primary Care Provider: Tracey Martin MD    HPI:   No notes on file    Procedure(s) (LRB):  REPAIR, HERNIA, INCISIONAL OR VENTRAL, WITHOUT HISTORY OF PRIOR REPAIR (N/A)      Indwelling Lines/Drains at time of discharge:   Lines/Drains/Airways     Peripherally Inserted Central Catheter Line  Duration           PICC Double Lumen 05/24/22 2204 right basilic 3 days              Hospital Course: Patient presented for incision hernia repair. This was performed in the OR without complication. Patient has cardiac hx and on eliquis. Patient had hypotension and coagulopathy post-op despite having held eliquis. She was resuscitated with blood products and her BP improved. Her diet was advanced and her pain was well-controlled. She tolerated a reg diet and ambulated. She was discharged 5/28 with proper followup.      Goals of Care Treatment Preferences:  Code Status: Full Code      Consults:   Consults (From admission, onward)        Status Ordering Provider     Inpatient consult to PICC team (NIAS)  Once        Provider:  (Not yet assigned)    Acknowledged RICKY ALANIS          Pending Diagnostic Studies:     None        Final Active Diagnoses:    Diagnosis Date Noted POA    Personal history of pancreatic cancer [Z85.07]  Yes      Problems Resolved During this Admission:    Diagnosis Date Noted Date Resolved POA    PRINCIPAL PROBLEM:  Incisional hernia, without obstruction or gangrene [K43.2] 04/19/2022 05/28/2022 Yes    Low BP [I95.9]  05/28/2022 Yes    Coagulopathy [D68.9]  05/28/2022 Yes      Discharged Condition: stable    Disposition: Home or Self Care    Follow Up:   Follow-up Information     Tracey  MD Veronica Follow up on 6/2/2022.    Specialty: Family Medicine  Why: 10:00 am  Contact information:  [address] [facility] [address]  [phone]             MARC Ellis MD Follow up on 6/14/2022.    Specialty: General Surgery  Why: 2:00 pm  Contact information:  [address]  [address]  [place] [address]  [phone]             [facility] [place] Follow up.    Specialty: Home Health Services  Contact information:  [address]  [address]  [place] [address]  [phone]                       Patient Instructions:      Ambulatory referral/consult to Home Health   Standing Status: Future   Referral Priority: Routine Referral Type: Home Health   Referral Reason: Specialty Services Required   Requested Specialty: Home Health Services   Number of Visits Requested: 1     Diet Adult Regular     Other restrictions (specify):   Order Comments: No heavy lifting more than 20lbs for at least 6 weeks. Okay to shower. No baths or swimming for 2 weeks. Wear abdominal binder as much as possible. Can wash binder in washing machine. Do not restart your eliquis until Monday 5/30/22     Notify your health care provider if you experience any of the following:  increased confusion or weakness     Notify your health care provider if you experience any of the following:  persistent dizziness, light-headedness, or visual disturbances     Notify your health care provider if you experience any of the following:  worsening rash     Notify your health care provider if you experience any of the following:  severe persistent headache     Notify your health care provider if you experience any of the following:  difficulty breathing or increased cough     Notify your health care provider if you experience any of the following:  redness, tenderness, or signs of infection (pain, swelling, redness, odor or green/yellow discharge around incision site)     Notify your health care provider if you  experience any of the following:  severe uncontrolled pain     Notify your health care provider if you experience any of the following:  persistent nausea and vomiting or diarrhea     Notify your health care provider if you experience any of the following:  temperature >100.4     No dressing needed   Order Comments: Do not restart your eliquis until Monday 5/30/22     Medications:  Reconciled Home Medications:      Medication List      START taking these medications    traMADoL 50 mg tablet  Commonly known as: ULTRAM  Take 1 tablet (50 mg total) by mouth every 6 (six) hours as needed for Pain.        CONTINUE taking these medications    ACCU-CHEK SOFTCLIX LANCETS Misc  Generic drug: lancets     alendronate 40 mg tablet  Commonly known as: FOSAMAX  Take 1 tablet (40 mg total) by mouth once a week.     cetirizine 10 MG tablet  Commonly known as: ZYRTEC  Take 10 mg by mouth.     cyclobenzaprine 10 MG tablet  Commonly known as: FLEXERIL     DULoxetine 30 MG capsule  Commonly known as: CYMBALTA  Take 30 mg by mouth every evening.     HIBICLENS 4 % external liquid  Generic drug: chlorhexidine  Wash surgical area twice a day upto 3 days prior to surgery and morning of surgery     ID NOW COVID-19 TEST KIT Kit  Generic drug: COVID-19 molecular test assay  TEST AS DIRECTED TODAY     IRON-150 ORAL  Take by mouth once daily.     LIDOcaine-prilocaine cream  Commonly known as: EMLA  APPLY DIME SIZED AMOUNT OVER PORT 1 HOUR PRIOR TO USING     meloxicam 15 MG tablet  Commonly known as: MOBIC     naproxen 500 MG tablet  Commonly known as: NAPROSYN  Take 1 tablet (500 mg total) by mouth 2 (two) times daily with meals.     omeprazole 20 MG capsule  Commonly known as: PRILOSEC  Take 20 mg by mouth every 12 (twelve) hours.     ondansetron 4 MG Tbdl  Commonly known as: ZOFRAN-ODT  Take 1 tablet (4 mg total) by mouth every 6 (six) hours as needed.     potassium chloride 10 MEQ Cpsr  Commonly known as: MICRO-K  Take 10 mEq by mouth once  daily.     vitamin D 1000 units Tab  Commonly known as: VITAMIN D3  Take 1,000 Units by mouth once daily.        STOP taking these medications    apixaban 2.5 mg Tab  Commonly known as: ELIQUIS          Time spent on the discharge of patient: >30 minutes    Ricky Worthington MD  General Surgery  [facility]

## 2022-05-28 NOTE — PROGRESS NOTES
Patient has received discharge instructions.  Instructions reviewed with pt using teachback method. All questions answered to pt satisfaction. Any non-implanted  IV access and telemetry present,  have been  removed per bedside nurse. Transport will be requested for discharge by bedside nurse.           05/28/22 1335   Admission   Communication Issues? None   Shift   Virtual Nurse - Rounding Complete  (discharge)   Virtual Nurse - Patient Verbalized Approval Of Camera Use   Safety/Activity   Patient Rounds bed in low position;call light in patient/parent reach;visualized patient   Positioning   Body Position supine   Head of Bed (HOB) Positioning HOB at 30-45 degrees

## 2022-05-30 ENCOUNTER — PATIENT OUTREACH (OUTPATIENT)
Dept: ADMINISTRATIVE | Facility: OTHER | Age: 72
End: 2022-05-30
Payer: MEDICARE

## 2022-05-30 ENCOUNTER — PATIENT MESSAGE (OUTPATIENT)
Dept: ADMINISTRATIVE | Facility: HOSPITAL | Age: 72
End: 2022-05-30
Payer: MEDICARE

## 2022-05-30 NOTE — PROGRESS NOTES
IP Liaison - Final Visit Note    Patient: Zohra Paulino  MRN:  [id number]  Date of Service:  5/30/2022  Completed by:  ARIELLA Aragon    Reason for Visit   Patient presents with    IP Liaison Chart Review     Patient discharged from hospital before LAIW was able to complete follow-up visit.        Patient Summary     Discharge Date: 5/28/2022  Discharge telephone number/address: [phone] / [address] [address]  Follow up provider: Tracey Martin MD / GILMA Ellis MD  Follow up appointments: 6/2/2022 @ 10:00am / 6/14/2022 @ 2:00pm  Home Health agency & telephone number: [facility]  DME ordered &  name: n/a  Assigned OPCM RN/SW: n/a  Report sent to follow up team (PCP/OPCM) via in basket message: n/a  Community Resources arranged including agency name & contact info: n/a      ARIELLA Aragon

## 2022-05-31 ENCOUNTER — TELEPHONE (OUTPATIENT)
Dept: NEUROLOGY | Facility: HOSPITAL | Age: 72
End: 2022-05-31
Payer: MEDICARE

## 2022-05-31 ENCOUNTER — PATIENT OUTREACH (OUTPATIENT)
Dept: ADMINISTRATIVE | Facility: CLINIC | Age: 72
End: 2022-05-31
Payer: MEDICARE

## 2022-05-31 NOTE — PROGRESS NOTES
C3 nurse attempted to contact Zohra Paulino   for a TCC post hospital discharge follow up call.Left voicemail with callback information. The patient has a scheduled HOSFU appointment with Tracey Martin MD   on 6/2/22 @ 10am.   Also, spoke with daughter and gave number for pt to return call

## 2022-05-31 NOTE — TELEPHONE ENCOUNTER
----- Message from Ana Hernandez sent at 5/30/2022  9:46 AM CDT -----  Contact: [phone]  Type:  Needs Medical Advice    Who Called: pt's daughter Danny called   Would the patient rather a call back or a response via [employer]? Call back  Best Call Back Number: [phone]  Additional Information: pt's daughter calling because her mother is in a lot of pain and can barely speak. Please call to advice

## 2022-06-01 NOTE — PROGRESS NOTES
C3 nurse spoke with Zohra Paulino's daughter Danny for a TCC post hospital discharge follow up call. The patient has a scheduled HOSFU appointment with Tracey Martin MD   on 6/2/22 @ 10am.

## 2022-06-02 ENCOUNTER — OFFICE VISIT (OUTPATIENT)
Dept: PRIMARY CARE CLINIC | Facility: CLINIC | Age: 72
End: 2022-06-02
Payer: MEDICARE

## 2022-06-02 VITALS
DIASTOLIC BLOOD PRESSURE: 74 MMHG | BODY MASS INDEX: 26.49 KG/M2 | HEART RATE: 50 BPM | WEIGHT: 143.94 LBS | OXYGEN SATURATION: 97 % | SYSTOLIC BLOOD PRESSURE: 128 MMHG | TEMPERATURE: 98 F | HEIGHT: 62 IN | RESPIRATION RATE: 18 BRPM

## 2022-06-02 DIAGNOSIS — N18.31 CHRONIC KIDNEY DISEASE, STAGE 3A: ICD-10-CM

## 2022-06-02 DIAGNOSIS — N17.9 AKI (ACUTE KIDNEY INJURY): ICD-10-CM

## 2022-06-02 DIAGNOSIS — D50.8 OTHER IRON DEFICIENCY ANEMIA: ICD-10-CM

## 2022-06-02 DIAGNOSIS — Z98.890 S/P HERNIA REPAIR: ICD-10-CM

## 2022-06-02 DIAGNOSIS — Z09 HOSPITAL DISCHARGE FOLLOW-UP: Primary | ICD-10-CM

## 2022-06-02 DIAGNOSIS — R26.89 IMPAIRED GAIT AND MOBILITY: ICD-10-CM

## 2022-06-02 DIAGNOSIS — K59.03 DRUG INDUCED CONSTIPATION: ICD-10-CM

## 2022-06-02 DIAGNOSIS — Z87.19 S/P HERNIA REPAIR: ICD-10-CM

## 2022-06-02 DIAGNOSIS — S80.02XA CONTUSION OF LEFT KNEE, INITIAL ENCOUNTER: ICD-10-CM

## 2022-06-02 DIAGNOSIS — Z91.81 AT RISK FOR FALLING: ICD-10-CM

## 2022-06-02 PROCEDURE — 1101F PT FALLS ASSESS-DOCD LE1/YR: CPT | Mod: CPTII,S$GLB,, | Performed by: FAMILY MEDICINE

## 2022-06-02 PROCEDURE — 3078F DIAST BP <80 MM HG: CPT | Mod: CPTII,S$GLB,, | Performed by: FAMILY MEDICINE

## 2022-06-02 PROCEDURE — 99496 TRANSJ CARE MGMT HIGH F2F 7D: CPT | Mod: S$GLB,,, | Performed by: FAMILY MEDICINE

## 2022-06-02 PROCEDURE — 3072F PR LOW RISK FOR RETINOPATHY: ICD-10-PCS | Mod: CPTII,S$GLB,, | Performed by: FAMILY MEDICINE

## 2022-06-02 PROCEDURE — 99496 TRANSITIONAL CARE MANAGE SERVICE 7 DAY DISCHARGE: ICD-10-PCS | Mod: S$GLB,,, | Performed by: FAMILY MEDICINE

## 2022-06-02 PROCEDURE — 1125F AMNT PAIN NOTED PAIN PRSNT: CPT | Mod: CPTII,S$GLB,, | Performed by: FAMILY MEDICINE

## 2022-06-02 PROCEDURE — 3074F PR MOST RECENT SYSTOLIC BLOOD PRESSURE < 130 MM HG: ICD-10-PCS | Mod: CPTII,S$GLB,, | Performed by: FAMILY MEDICINE

## 2022-06-02 PROCEDURE — 1160F PR REVIEW ALL MEDS BY PRESCRIBER/CLIN PHARMACIST DOCUMENTED: ICD-10-PCS | Mod: CPTII,S$GLB,, | Performed by: FAMILY MEDICINE

## 2022-06-02 PROCEDURE — 3072F LOW RISK FOR RETINOPATHY: CPT | Mod: CPTII,S$GLB,, | Performed by: FAMILY MEDICINE

## 2022-06-02 PROCEDURE — 1160F RVW MEDS BY RX/DR IN RCRD: CPT | Mod: CPTII,S$GLB,, | Performed by: FAMILY MEDICINE

## 2022-06-02 PROCEDURE — 3008F PR BODY MASS INDEX (BMI) DOCUMENTED: ICD-10-PCS | Mod: CPTII,S$GLB,, | Performed by: FAMILY MEDICINE

## 2022-06-02 PROCEDURE — 3078F PR MOST RECENT DIASTOLIC BLOOD PRESSURE < 80 MM HG: ICD-10-PCS | Mod: CPTII,S$GLB,, | Performed by: FAMILY MEDICINE

## 2022-06-02 PROCEDURE — 1125F PR PAIN SEVERITY QUANTIFIED, PAIN PRESENT: ICD-10-PCS | Mod: CPTII,S$GLB,, | Performed by: FAMILY MEDICINE

## 2022-06-02 PROCEDURE — 3008F BODY MASS INDEX DOCD: CPT | Mod: CPTII,S$GLB,, | Performed by: FAMILY MEDICINE

## 2022-06-02 PROCEDURE — 1159F MED LIST DOCD IN RCRD: CPT | Mod: CPTII,S$GLB,, | Performed by: FAMILY MEDICINE

## 2022-06-02 PROCEDURE — 99999 PR PBB SHADOW E&M-EST. PATIENT-LVL IV: CPT | Mod: PBBFAC,,, | Performed by: FAMILY MEDICINE

## 2022-06-02 PROCEDURE — 1159F PR MEDICATION LIST DOCUMENTED IN MEDICAL RECORD: ICD-10-PCS | Mod: CPTII,S$GLB,, | Performed by: FAMILY MEDICINE

## 2022-06-02 PROCEDURE — 3074F SYST BP LT 130 MM HG: CPT | Mod: CPTII,S$GLB,, | Performed by: FAMILY MEDICINE

## 2022-06-02 PROCEDURE — 99999 PR PBB SHADOW E&M-EST. PATIENT-LVL IV: ICD-10-PCS | Mod: PBBFAC,,, | Performed by: FAMILY MEDICINE

## 2022-06-02 PROCEDURE — 3288F FALL RISK ASSESSMENT DOCD: CPT | Mod: CPTII,S$GLB,, | Performed by: FAMILY MEDICINE

## 2022-06-02 PROCEDURE — 3288F PR FALLS RISK ASSESSMENT DOCUMENTED: ICD-10-PCS | Mod: CPTII,S$GLB,, | Performed by: FAMILY MEDICINE

## 2022-06-02 PROCEDURE — 1101F PR PT FALLS ASSESS DOC 0-1 FALLS W/OUT INJ PAST YR: ICD-10-PCS | Mod: CPTII,S$GLB,, | Performed by: FAMILY MEDICINE

## 2022-06-02 RX ORDER — TRAMADOL HYDROCHLORIDE 50 MG/1
50 TABLET ORAL EVERY 6 HOURS PRN
Qty: 15 TABLET | Refills: 0 | Status: SHIPPED | OUTPATIENT
Start: 2022-06-02

## 2022-06-02 RX ORDER — DOCUSATE SODIUM 100 MG/1
100 CAPSULE, LIQUID FILLED ORAL 2 TIMES DAILY PRN
Qty: 180 CAPSULE | Refills: 1 | Status: SHIPPED | OUTPATIENT
Start: 2022-06-02 | End: 2023-05-29 | Stop reason: SDUPTHER

## 2022-06-02 NOTE — PROGRESS NOTES
Subjective:       Patient ID: Zohra Paulino is a 72 y.o. female.    Chief Complaint:  Follow-up      Transitional Care Note    Family and/or Caretaker present at visit?  Yes.  Diagnostic tests reviewed/disposition: No diagnosic tests pending after this hospitalization.  Disease/illness education: yes  Home health/community services discussion/referrals: Patient has home health established.  Establishment or re-establishment of referral orders for community resources: No other necessary community resources.   Discussion with other health care providers: Keep follow up appointment with MARC Ellis on 6/14/22    71 yo female with a past medical history of EMB/D&C for thickened endometrium and increasing fibroid size in postmenopausal female requiring RATLH/BSO on 11/16/17; osteopenia but continues on alendronate for concern of fracture risk; seasonal allergies (patient denies, not using Claritin but takes zyrtec); diastolic dysfunction of left ventricle, primary adenocarcinoma of the pancreas diagnosed on CT June 2020 s/p partial pancreatectomy with spleen sparing, lymph node excision and venorrhaphy  July 2020; diabetes, hypertriglyceridemia, atrial fibrillation.    She presents after 4 day hospitalization for incisional hernia repair with discharge on 5/28/22.  Her hospital course was complicated by post-operative hypotension and coagulopathy despite having held eliquis. She was resuscitated with blood products and her BP improved. Son reports that patient accidental fell after hospital discharge. She had transient left knee pain but continues to have pain at surgical site. She ran out of Tramadol and is requesting refill. She notes improvement of pain with some left over muscle relaxants from a shoulder issue. She is on oral iron without constipation. Normal appetite. Able to tolerate diet. Passing urine, flatus+.     Follow up with Dr Pereira, cardiology with  privileges  Follow-up with   Doug oncologist at   Follow with endocrinologist Raegan Townsend: [address], [place] [address]; [phone]  Left shoulder pain- follows with Dr. Alejo  Trigger finger and right elbow fracture follows with hand specialist and does PT    The following portions of the patient's history were reviewed and updated as appropriate: allergies, current medications, past family history, past medical history, past social history, past surgical history and problem list.      Follow-up  Associated symptoms include abdominal pain (post surgical, at incision) and arthralgias. Pertinent negatives include no chest pain, chills, congestion, diaphoresis, fatigue, fever, headaches, myalgias, nausea, neck pain, rash, sore throat, vomiting or weakness.     Review of Systems   Constitutional: Negative for activity change, appetite change, chills, diaphoresis, fatigue, fever and unexpected weight change.   HENT: Negative for nasal congestion, dental problem, facial swelling, nosebleeds, postnasal drip, rhinorrhea, sore throat, tinnitus and trouble swallowing.    Eyes: Negative for pain, discharge, itching and visual disturbance.   Respiratory: Negative for apnea, chest tightness, shortness of breath, wheezing and stridor.    Cardiovascular: Negative for chest pain, palpitations and leg swelling.   Gastrointestinal: Positive for abdominal pain (post surgical, at incision). Negative for abdominal distention, constipation, diarrhea, nausea, rectal pain and vomiting.   Endocrine: Negative for cold intolerance, heat intolerance and polyuria.   Genitourinary: Negative for difficulty urinating, dysuria, frequency, hematuria and urgency.   Musculoskeletal: Positive for arthralgias and gait problem. Negative for myalgias, neck pain and neck stiffness.   Integumentary:  Negative for color change and rash.   Neurological: Negative for dizziness, tremors, seizures, syncope, facial asymmetry, weakness and headaches.  "  Hematological: Negative for adenopathy. Does not bruise/bleed easily.   Psychiatric/Behavioral: Negative for agitation, confusion, hallucinations, self-injury and suicidal ideas. The patient is not hyperactive.           Objective:       Vitals:    06/02/22 0959   BP: 128/74   BP Location: Right arm   Patient Position: Sitting   BP Method: Medium (Manual)   Pulse: (!) 50   Resp: 18   Temp: 97.8 °F (36.6 °C)   SpO2: 97%   Weight: 65.3 kg (143 lb 15.4 oz)   Height: 5' 2" (1.575 m)     Physical Exam  Constitutional:       General: She is not in acute distress.     Appearance: She is well-developed. She is not diaphoretic.   HENT:      Head: Normocephalic and atraumatic.   Eyes:      General: No scleral icterus.        Right eye: No discharge.         Left eye: No discharge.      Conjunctiva/sclera: Conjunctivae normal.   Neck:      Thyroid: No thyromegaly.   Cardiovascular:      Rate and Rhythm: Normal rate and regular rhythm.      Heart sounds: Normal heart sounds. No murmur heard.    No friction rub. No gallop.   Pulmonary:      Effort: Pulmonary effort is normal. No respiratory distress.      Breath sounds: Normal breath sounds.   Chest:      Chest wall: No tenderness.   Abdominal:      General: Bowel sounds are normal. There is no distension.      Palpations: Abdomen is soft. There is no mass.      Tenderness: There is abdominal tenderness (at surgical site, wears abdominal binder). There is no guarding or rebound.   Musculoskeletal:      Cervical back: Neck supple.      Comments: Left knee: No misalignment, no asymmetry, no crepitation, no defects, no tenderness, no masses, no effusions, no decreased range of motion, no instability, no atrophy or abnormal strength or tone. Sensation grossly intact.     Skin:     General: Skin is warm.      Capillary Refill: Capillary refill takes less than 2 seconds.      Findings: No rash.   Neurological:      Mental Status: She is alert and oriented to person, place, and time. "      Cranial Nerves: No cranial nerve deficit.      Motor: No abnormal muscle tone.      Coordination: Coordination normal.      Gait: Gait abnormal.      Deep Tendon Reflexes: Reflexes normal.   Psychiatric:         Thought Content: Thought content normal.         Judgment: Judgment normal.         Assessment:       1. Hospital discharge follow-up    2. S/P hernia repair    3. At risk for falling    4. Impaired gait and mobility    5. Contusion of left knee, initial encounter    6. Chronic kidney disease, stage 3a    7. ALISSA (acute kidney injury)    8. Drug induced constipation    9. post op anemia        Plan:     1. Hospital discharge follow-up  2. S/P hernia repair  -     traMADoL (ULTRAM) 50 mg tablet; Take 1 tablet (50 mg total) by mouth every 6 (six) hours as needed for Pain.  Dispense: 15 tablet; Refill: 0  Short term supply of Tramadol to take for severe post operative pain.  Discussed that she could take Tylenol scheduled TID for baseline control of pain.    3. At risk for falling  4. Impaired gait and mobility  Plan to start home PT.  Fall precautions advised.    5. Contusion of left knee, initial encounter  In the event of pain or swelling which does not respond to rest/ ice/ elevated will get X-ray in 2 weeks. Exam today is unremarkable.    6. Chronic kidney disease, stage 3a  7. ALISSA (acute kidney injury)  Encourage adequate hydration. eGFR returned to normal prior to discharge.    8. Drug induced constipation  -     docusate sodium (COLACE) 100 MG capsule; Take 1 capsule (100 mg total) by mouth 2 (two) times daily as needed for Constipation.  Dispense: 180 capsule; Refill: 1    9. Post op anemia  On oral iron. Hb trending upwards prior to discharge. Has been taking Eliquis with no adverse bleeding events.  Instructed to avoid NSAIDs as much as tolerated    Disclaimer: This note has been generated using voice-recognition software. There may be typographical errors that have been missed during  proof-reading

## 2022-06-03 ENCOUNTER — HOSPITAL ENCOUNTER (EMERGENCY)
Facility: HOSPITAL | Age: 72
Discharge: ANOTHER HOSPITAL | End: 2022-06-04
Attending: EMERGENCY MEDICINE
Payer: MEDICARE

## 2022-06-03 ENCOUNTER — NURSE TRIAGE (OUTPATIENT)
Dept: ADMINISTRATIVE | Facility: CLINIC | Age: 72
End: 2022-06-03
Payer: MEDICARE

## 2022-06-03 DIAGNOSIS — K91.870 POSTOPERATIVE HEMATOMA INVOLVING DIGESTIVE SYSTEM FOLLOWING DIGESTIVE SYSTEM PROCEDURE: Primary | ICD-10-CM

## 2022-06-03 DIAGNOSIS — K66.1 HEMOPERITONEUM: ICD-10-CM

## 2022-06-03 DIAGNOSIS — K91.89 POSTOPERATIVE SURGICAL COMPLICATION INVOLVING DIGESTIVE SYSTEM ASSOCIATED WITH DIGESTIVE SYSTEM PROCEDURE, UNSPECIFIED COMPLICATION: ICD-10-CM

## 2022-06-03 DIAGNOSIS — E87.5 HYPERKALEMIA: ICD-10-CM

## 2022-06-03 LAB
ALBUMIN SERPL-MCNC: 3.8 G/DL (ref 3.3–5.5)
ALLENS TEST: ABNORMAL
ALP SERPL-CCNC: 342 U/L (ref 42–141)
BILIRUB SERPL-MCNC: 0.8 MG/DL (ref 0.2–1.6)
BUN SERPL-MCNC: 17 MG/DL (ref 7–22)
CALCIUM SERPL-MCNC: 10.6 MG/DL (ref 8–10.3)
CHLORIDE SERPL-SCNC: 104 MMOL/L (ref 98–108)
CREAT SERPL-MCNC: 0.9 MG/DL (ref 0.6–1.2)
GLUCOSE SERPL-MCNC: 126 MG/DL (ref 73–118)
HCO3 UR-SCNC: 29.5 MMOL/L (ref 24–28)
LDH SERPL L TO P-CCNC: 0.89 MMOL/L (ref 0.5–2.2)
PCO2 BLDA: 44.8 MMHG (ref 35–45)
PH SMN: 7.43 [PH] (ref 7.35–7.45)
PO2 BLDA: 35 MMHG (ref 40–60)
POC ALT (SGPT): 26 U/L (ref 10–47)
POC AST (SGOT): 48 U/L (ref 11–38)
POC BE: 4 MMOL/L
POC SATURATED O2: 68 % (ref 95–100)
POC TCO2: 30 MMOL/L (ref 18–33)
POC TCO2: 31 MMOL/L (ref 24–29)
POTASSIUM BLD-SCNC: 5.5 MMOL/L (ref 3.6–5.1)
PROTEIN, POC: 8.9 G/DL (ref 6.4–8.1)
SAMPLE: ABNORMAL
SITE: ABNORMAL
SODIUM BLD-SCNC: 141 MMOL/L (ref 128–145)

## 2022-06-03 PROCEDURE — 25500020 PHARM REV CODE 255: Mod: ER | Performed by: NURSE PRACTITIONER

## 2022-06-03 PROCEDURE — 93010 EKG 12-LEAD: ICD-10-PCS | Mod: ,,, | Performed by: INTERNAL MEDICINE

## 2022-06-03 PROCEDURE — 99285 EMERGENCY DEPT VISIT HI MDM: CPT | Mod: 25,ER

## 2022-06-03 PROCEDURE — 85025 COMPLETE CBC W/AUTO DIFF WBC: CPT | Mod: ER

## 2022-06-03 PROCEDURE — 82803 BLOOD GASES ANY COMBINATION: CPT | Mod: ER

## 2022-06-03 PROCEDURE — 93005 ELECTROCARDIOGRAM TRACING: CPT | Mod: ER

## 2022-06-03 PROCEDURE — 93010 ELECTROCARDIOGRAM REPORT: CPT | Mod: ,,, | Performed by: INTERNAL MEDICINE

## 2022-06-03 PROCEDURE — 80053 COMPREHEN METABOLIC PANEL: CPT | Mod: ER

## 2022-06-03 RX ADMIN — IOHEXOL 100 ML: 300 INJECTION, SOLUTION INTRAVENOUS at 09:06

## 2022-06-03 NOTE — TELEPHONE ENCOUNTER
Patient recently had surgery. She c/o bloody drainage from the incision. Denies fever, worsening of pain, or redness. Advised per protocol to be seen within the next 24 hours for a wound check. Patient VU. Advised the patient to call back with any further questions or if symptoms worsen.      Reason for Disposition   [1] Clear or blood-tinged fluid draining from wound AND [2] no fever    Additional Information   Negative: [1] Major abdominal surgical incision AND [2] wound gaping open AND [3] visible internal organs   Negative: Sounds like a life-threatening emergency to the triager   Negative: [1] Bleeding from incision AND [2] won't stop after 10 minutes of direct pressure   Negative: [1] Widespread rash AND [2] bright red, sunburn-like   Negative: Severe pain in the incision   Negative: [1] Incision gaping open AND [2] < 48 hours since wound re-opened   Negative: [1] Incision gaping open AND [2] length of opening > 2 inches (5 cm)   Negative: Patient sounds very sick or weak to the triager   Negative: Sounds like a serious complication to the triager   Negative: Fever > 100.4 F (38.0 C)   Negative: [1] Incision looks infected (spreading redness, pain) AND [2] fever > 99.5 F (37.5 C)   Negative: [1] Incision looks infected (spreading redness, pain) AND [2] large red area (> 2 in. or 5 cm)   Negative: [1] Incision looks infected (spreading redness, pain) AND [2] face wound   Negative: [1] Red streak runs from the incision AND [2] longer than 1 inch (2.5 cm)   Negative: [1] Pus or bad-smelling fluid draining from incision AND [2] no fever   Negative: [1] Post-op pain AND [2] not controlled with pain medications   Negative: Dressing soaked with blood or body fluid (e.g., drainage)   Negative: [1] Raised bruise and [2] size > 2 inches (5 cm) and expanding   Negative: [1] Caller has URGENT question AND [2] triager unable to answer question   Negative: [1] INCREASING pain in incision AND [2] > 2  days (48 hours) since surgery   Negative: [1] Small red area or streak AND [2] no fever    Protocols used: POST-OP INCISION SYMPTOMS AND [id number]

## 2022-06-04 ENCOUNTER — HOSPITAL ENCOUNTER (INPATIENT)
Facility: HOSPITAL | Age: 72
LOS: 1 days | Discharge: HOME | DRG: 919 | End: 2022-06-05
Attending: SURGERY | Admitting: SURGERY
Payer: MEDICARE

## 2022-06-04 VITALS
BODY MASS INDEX: 26.31 KG/M2 | DIASTOLIC BLOOD PRESSURE: 54 MMHG | RESPIRATION RATE: 17 BRPM | SYSTOLIC BLOOD PRESSURE: 95 MMHG | HEIGHT: 62 IN | HEART RATE: 87 BPM | OXYGEN SATURATION: 100 % | TEMPERATURE: 99 F | WEIGHT: 143 LBS

## 2022-06-04 DIAGNOSIS — Z87.19 S/P HERNIA REPAIR: Primary | ICD-10-CM

## 2022-06-04 DIAGNOSIS — Z98.890 S/P HERNIA REPAIR: Primary | ICD-10-CM

## 2022-06-04 DIAGNOSIS — K66.1 HEMOPERITONEUM: ICD-10-CM

## 2022-06-04 DIAGNOSIS — R60.0 LOCALIZED EDEMA: ICD-10-CM

## 2022-06-04 LAB
ALBUMIN SERPL BCP-MCNC: 3.3 G/DL (ref 3.5–5.2)
ALP SERPL-CCNC: 330 U/L (ref 55–135)
ALT SERPL W/O P-5'-P-CCNC: 23 U/L (ref 10–44)
ANION GAP SERPL CALC-SCNC: 12 MMOL/L (ref 8–16)
APTT BLDCRRT: 22.5 SEC (ref 21–32)
AST SERPL-CCNC: 27 U/L (ref 10–40)
BASOPHILS # BLD AUTO: 0.06 K/UL (ref 0–0.2)
BASOPHILS # BLD AUTO: 0.09 K/UL (ref 0–0.2)
BASOPHILS NFR BLD: 0.7 % (ref 0–1.9)
BASOPHILS NFR BLD: 1 % (ref 0–1.9)
BILIRUB SERPL-MCNC: 0.8 MG/DL (ref 0.1–1)
BUN SERPL-MCNC: 17 MG/DL (ref 8–23)
CALCIUM SERPL-MCNC: 10 MG/DL (ref 8.7–10.5)
CHLORIDE SERPL-SCNC: 106 MMOL/L (ref 95–110)
CO2 SERPL-SCNC: 22 MMOL/L (ref 23–29)
CREAT SERPL-MCNC: 0.8 MG/DL (ref 0.5–1.4)
DIFFERENTIAL METHOD: ABNORMAL
DIFFERENTIAL METHOD: ABNORMAL
EOSINOPHIL # BLD AUTO: 0 K/UL (ref 0–0.5)
EOSINOPHIL # BLD AUTO: 0 K/UL (ref 0–0.5)
EOSINOPHIL NFR BLD: 0.4 % (ref 0–8)
EOSINOPHIL NFR BLD: 0.4 % (ref 0–8)
ERYTHROCYTE [DISTWIDTH] IN BLOOD BY AUTOMATED COUNT: 17.3 % (ref 11.5–14.5)
ERYTHROCYTE [DISTWIDTH] IN BLOOD BY AUTOMATED COUNT: 17.6 % (ref 11.5–14.5)
EST. GFR  (AFRICAN AMERICAN): >60 ML/MIN/1.73 M^2
EST. GFR  (NON AFRICAN AMERICAN): >60 ML/MIN/1.73 M^2
ESTIMATED AVG GLUCOSE: 146 MG/DL (ref 68–131)
GLUCOSE SERPL-MCNC: 128 MG/DL (ref 70–110)
HBA1C MFR BLD: 6.7 % (ref 4–5.6)
HCT VFR BLD AUTO: 38 % (ref 37–48.5)
HCT VFR BLD AUTO: 40.1 % (ref 37–48.5)
HGB BLD-MCNC: 12 G/DL (ref 12–16)
HGB BLD-MCNC: 12.5 G/DL (ref 12–16)
IMM GRANULOCYTES # BLD AUTO: 0.03 K/UL (ref 0–0.04)
IMM GRANULOCYTES # BLD AUTO: 0.04 K/UL (ref 0–0.04)
IMM GRANULOCYTES NFR BLD AUTO: 0.3 % (ref 0–0.5)
IMM GRANULOCYTES NFR BLD AUTO: 0.4 % (ref 0–0.5)
INR PPP: 1.4 (ref 0.8–1.2)
LYMPHOCYTES # BLD AUTO: 1.9 K/UL (ref 1–4.8)
LYMPHOCYTES # BLD AUTO: 2 K/UL (ref 1–4.8)
LYMPHOCYTES NFR BLD: 20.5 % (ref 18–48)
LYMPHOCYTES NFR BLD: 22.4 % (ref 18–48)
MCH RBC QN AUTO: 28 PG (ref 27–31)
MCH RBC QN AUTO: 28 PG (ref 27–31)
MCHC RBC AUTO-ENTMCNC: 31.2 G/DL (ref 32–36)
MCHC RBC AUTO-ENTMCNC: 31.6 G/DL (ref 32–36)
MCV RBC AUTO: 89 FL (ref 82–98)
MCV RBC AUTO: 90 FL (ref 82–98)
MONOCYTES # BLD AUTO: 0.7 K/UL (ref 0.3–1)
MONOCYTES # BLD AUTO: 0.8 K/UL (ref 0.3–1)
MONOCYTES NFR BLD: 8 % (ref 4–15)
MONOCYTES NFR BLD: 8.5 % (ref 4–15)
NEUTROPHILS # BLD AUTO: 6.1 K/UL (ref 1.8–7.7)
NEUTROPHILS # BLD AUTO: 6.2 K/UL (ref 1.8–7.7)
NEUTROPHILS NFR BLD: 68.2 % (ref 38–73)
NEUTROPHILS NFR BLD: 69.2 % (ref 38–73)
NRBC BLD-RTO: 0 /100 WBC
NRBC BLD-RTO: 0 /100 WBC
PLATELET # BLD AUTO: 324 K/UL (ref 150–450)
PLATELET # BLD AUTO: 388 K/UL (ref 150–450)
PMV BLD AUTO: 11 FL (ref 9.2–12.9)
PMV BLD AUTO: 11.6 FL (ref 9.2–12.9)
POTASSIUM SERPL-SCNC: 4.3 MMOL/L (ref 3.5–5.1)
PROT SERPL-MCNC: 8.1 G/DL (ref 6–8.4)
PROTHROMBIN TIME: 14.6 SEC (ref 9–12.5)
RBC # BLD AUTO: 4.28 M/UL (ref 4–5.4)
RBC # BLD AUTO: 4.46 M/UL (ref 4–5.4)
SODIUM SERPL-SCNC: 140 MMOL/L (ref 136–145)
WBC # BLD AUTO: 8.93 K/UL (ref 3.9–12.7)
WBC # BLD AUTO: 9.02 K/UL (ref 3.9–12.7)

## 2022-06-04 PROCEDURE — 85730 THROMBOPLASTIN TIME PARTIAL: CPT | Performed by: STUDENT IN AN ORGANIZED HEALTH CARE EDUCATION/TRAINING PROGRAM

## 2022-06-04 PROCEDURE — 11000001 HC ACUTE MED/SURG PRIVATE ROOM

## 2022-06-04 PROCEDURE — 85025 COMPLETE CBC W/AUTO DIFF WBC: CPT | Performed by: STUDENT IN AN ORGANIZED HEALTH CARE EDUCATION/TRAINING PROGRAM

## 2022-06-04 PROCEDURE — 94760 N-INVAS EAR/PLS OXIMETRY 1: CPT

## 2022-06-04 PROCEDURE — 25000003 PHARM REV CODE 250: Performed by: STUDENT IN AN ORGANIZED HEALTH CARE EDUCATION/TRAINING PROGRAM

## 2022-06-04 PROCEDURE — 63600175 PHARM REV CODE 636 W HCPCS: Performed by: STUDENT IN AN ORGANIZED HEALTH CARE EDUCATION/TRAINING PROGRAM

## 2022-06-04 PROCEDURE — 94761 N-INVAS EAR/PLS OXIMETRY MLT: CPT

## 2022-06-04 PROCEDURE — 36415 COLL VENOUS BLD VENIPUNCTURE: CPT | Performed by: STUDENT IN AN ORGANIZED HEALTH CARE EDUCATION/TRAINING PROGRAM

## 2022-06-04 PROCEDURE — 85610 PROTHROMBIN TIME: CPT | Performed by: STUDENT IN AN ORGANIZED HEALTH CARE EDUCATION/TRAINING PROGRAM

## 2022-06-04 PROCEDURE — 25000003 PHARM REV CODE 250: Performed by: SURGERY

## 2022-06-04 PROCEDURE — 83036 HEMOGLOBIN GLYCOSYLATED A1C: CPT | Performed by: STUDENT IN AN ORGANIZED HEALTH CARE EDUCATION/TRAINING PROGRAM

## 2022-06-04 PROCEDURE — 80053 COMPREHEN METABOLIC PANEL: CPT | Performed by: STUDENT IN AN ORGANIZED HEALTH CARE EDUCATION/TRAINING PROGRAM

## 2022-06-04 RX ORDER — OXYCODONE HYDROCHLORIDE 5 MG/1
5 TABLET ORAL EVERY 6 HOURS PRN
Status: DISCONTINUED | OUTPATIENT
Start: 2022-06-04 | End: 2022-06-05 | Stop reason: HOSPADM

## 2022-06-04 RX ORDER — TALC
6 POWDER (GRAM) TOPICAL NIGHTLY PRN
Status: DISCONTINUED | OUTPATIENT
Start: 2022-06-04 | End: 2022-06-05 | Stop reason: HOSPADM

## 2022-06-04 RX ORDER — ACETAMINOPHEN 325 MG/1
650 TABLET ORAL EVERY 8 HOURS PRN
Status: DISCONTINUED | OUTPATIENT
Start: 2022-06-04 | End: 2022-06-05 | Stop reason: HOSPADM

## 2022-06-04 RX ORDER — MORPHINE SULFATE 2 MG/ML
2 INJECTION, SOLUTION INTRAMUSCULAR; INTRAVENOUS EVERY 4 HOURS PRN
Status: DISCONTINUED | OUTPATIENT
Start: 2022-06-04 | End: 2022-06-05 | Stop reason: HOSPADM

## 2022-06-04 RX ORDER — SODIUM CHLORIDE, SODIUM LACTATE, POTASSIUM CHLORIDE, CALCIUM CHLORIDE 600; 310; 30; 20 MG/100ML; MG/100ML; MG/100ML; MG/100ML
INJECTION, SOLUTION INTRAVENOUS CONTINUOUS
Status: DISCONTINUED | OUTPATIENT
Start: 2022-06-04 | End: 2022-06-04

## 2022-06-04 RX ORDER — GABAPENTIN 300 MG/1
300 CAPSULE ORAL 3 TIMES DAILY
Status: DISCONTINUED | OUTPATIENT
Start: 2022-06-04 | End: 2022-06-05 | Stop reason: HOSPADM

## 2022-06-04 RX ORDER — MUPIROCIN 20 MG/G
OINTMENT TOPICAL 2 TIMES DAILY
Status: DISCONTINUED | OUTPATIENT
Start: 2022-06-04 | End: 2022-06-05 | Stop reason: HOSPADM

## 2022-06-04 RX ORDER — ONDANSETRON 8 MG/1
8 TABLET, ORALLY DISINTEGRATING ORAL EVERY 8 HOURS PRN
Status: DISCONTINUED | OUTPATIENT
Start: 2022-06-04 | End: 2022-06-05 | Stop reason: HOSPADM

## 2022-06-04 RX ADMIN — GABAPENTIN 300 MG: 300 CAPSULE ORAL at 10:06

## 2022-06-04 RX ADMIN — SODIUM CHLORIDE, SODIUM LACTATE, POTASSIUM CHLORIDE, AND CALCIUM CHLORIDE: .6; .31; .03; .02 INJECTION, SOLUTION INTRAVENOUS at 04:06

## 2022-06-04 RX ADMIN — MUPIROCIN: 20 OINTMENT TOPICAL at 08:06

## 2022-06-04 RX ADMIN — OXYCODONE 5 MG: 5 TABLET ORAL at 10:06

## 2022-06-04 RX ADMIN — GABAPENTIN 300 MG: 300 CAPSULE ORAL at 08:06

## 2022-06-04 RX ADMIN — GABAPENTIN 300 MG: 300 CAPSULE ORAL at 03:06

## 2022-06-04 NOTE — PLAN OF CARE
Problem: Adult Inpatient Plan of Care  Goal: Plan of Care Review  Outcome: Ongoing, Progressing     VIRTUAL NURSE:  Cued into patient's room.  Permission received per patient to turn camera to view patient.  Introduced as VN for night shift that will be working with floor nurse and nursing assistant; family member at bedside.  Educated patient on VN's role in patient care and  VIP model.  Plan of care reviewed with patient.  Education per flowsheet.   Informed patient that staff will round on them every 2 hours but to use call light for any other needs they may have; informed of fall risk and fall precautions.  Patient verbalized understanding.  Call light within reach; bed siderails up x3.  Opportunity given for questions and questions answered.  Admission assessment questions answered.  Patient denies complaints or any needs at this time. Instructed to call for assistance.  Will cont to monitor and intervene as needed.    Labs, notes, orders, and careplan reviewed.       06/04/22 0300   Patient Request   Patient Requested family member at bedside; no complaints or needs currently   Admission   Initial VN Admission Questions Complete   Communication Issues? None   Shift   Virtual Nurse - Rounding Complete   Pain Management Interventions pain management plan reviewed with patient/caregiver   Virtual Nurse - Patient Verbalized Approval Of Camera Use;VN Rounding   Type of Frequent Check   Type Patient Rounds   Safety/Activity   Patient Rounds bed in low position;visualized patient;call light in patient/parent reach;bed wheels locked;placement of personal items at bedside;clutter free environment maintained   Safety Promotion/Fall Prevention assistive device/personal item within reach;diversional activities provided;Fall Risk reviewed with patient/family;family to remain at bedside;high risk medications identified;medications reviewed;nonskid shoes/socks when out of bed;side rails raised x 3;supervised  activity;instructed to call staff for mobility   Safety Precautions emergency equipment at bedside   Positioning   Body Position neutral body alignment;neutral head position   Head of Bed (HOB) Positioning HOB at 45 degrees   Pain/Comfort/Sleep   Preferred Pain Scale number (Numeric Rating Pain Scale)   Comfort/Acceptable Pain Level 0   Pain Rating (0-10): Rest 0   Sleep/Rest/Relaxation no problem identified;awake

## 2022-06-04 NOTE — PLAN OF CARE
Pt is AAOX4. Scheduled meds were given. Pt rested well. No c/o pain or nausea. Bed in lowest position; call light within reach.

## 2022-06-04 NOTE — H&P
General Surgery  History and Physical    SUBJECTIVE:     Chief Complaint:   Per triage note--No chief complaint on file.      History of Present Illness:  Ms. Paulino is a 72 y.o. female with a PMH of pancreatic adenocarcinoma (s/p distal pancreatectomy), atrial fibrillation (on eliquis), and ventral hernia who underwent a retrorectus ventral hernia repair with mesh on 5/23/22 that was complicated by a post operative bleed into the abdominal wall. She was discharged home on 5/28/22. She represented to the ED last night with scant bloody drainage from her midline. The drainage is consistent with old hematoma. She denies nausea, vomiting, fevers, chills, abdominal pain, constipation, or diarrhea.    Allergies:  Review of patient's allergies indicates:   Allergen Reactions    Codeine Palpitations       Home Medications:  Current Facility-Administered Medications on File Prior to Encounter   Medication    0.9%  NaCl infusion    [COMPLETED] iohexoL (OMNIPAQUE 300) injection 100 mL    sodium chloride 0.9% flush 10 mL     Current Outpatient Medications on File Prior to Encounter   Medication Sig    cetirizine (ZYRTEC) 10 MG tablet Take 10 mg by mouth.    cyclobenzaprine (FLEXERIL) 10 MG tablet     DULoxetine (CYMBALTA) 30 MG capsule Take 30 mg by mouth every evening.    iron bis-gly/FA/C/B12/Ca/succ (IRON-150 ORAL) Take by mouth once daily.    meloxicam (MOBIC) 15 MG tablet     omeprazole (PRILOSEC) 20 MG capsule Take 20 mg by mouth every 12 (twelve) hours.    potassium chloride (MICRO-K) 10 MEQ CpSR Take 10 mEq by mouth once daily.    vitamin D 1000 units Tab Take 1,000 Units by mouth once daily.    ACCU-CHEK SOFTCLIX LANCETS Misc     alendronate (FOSAMAX) 40 mg tablet Take 1 tablet (40 mg total) by mouth once a week.    docusate sodium (COLACE) 100 MG capsule Take 1 capsule (100 mg total) by mouth 2 (two) times daily as needed for Constipation.    ID NOW COVID-19 TEST KIT Kit TEST AS DIRECTED TODAY     LIDOcaine-prilocaine (EMLA) cream APPLY DIME SIZED AMOUNT OVER PORT 1 HOUR PRIOR TO USING    naproxen (NAPROSYN) 500 MG tablet Take 1 tablet (500 mg total) by mouth 2 (two) times daily with meals. (Patient not taking: No sig reported)    ondansetron (ZOFRAN-ODT) 4 MG TbDL Take 1 tablet (4 mg total) by mouth every 6 (six) hours as needed.    traMADoL (ULTRAM) 50 mg tablet Take 1 tablet (50 mg total) by mouth every 6 (six) hours as needed for Pain.    [DISCONTINUED] chlorhexidine (HIBICLENS) 4 % external liquid Wash surgical area twice a day upto 3 days prior to surgery and morning of surgery (Patient not taking: No sig reported)       Past Medical History:   Diagnosis Date    Bronchitis     Cancer     pancreatic    Diabetes mellitus     Fibroids     Nuclear sclerosis of both eyes 11/15/2021    Osteopetrosis     Uterine fibroid      Past Surgical History:   Procedure Laterality Date    CERVICAL BIOPSY  W/ LOOP ELECTRODE EXCISION      ck  2004    COLONOSCOPY      DISTAL PANCREATECTOMY N/A 7/27/2020    Procedure: PANCREATECTOMY, DISTAL, SUBTOTAL;  Surgeon: GILMA Ellis MD;  Location: [facility] OR;  Service: General;  Laterality: N/A;    ERCP N/A 8/19/2020    Procedure: ERCP (ENDOSCOPIC RETROGRADE CHOLANGIOPANCREATOGRAPHY);  Surgeon: Deion Ivory MD;  Location: [facility] ENDO;  Service: Endoscopy;  Laterality: N/A;  pancreatic duct leak  Rapid Covid test    ERCP N/A 9/16/2020    Procedure: ERCP (ENDOSCOPIC RETROGRADE CHOLANGIOPANCREATOGRAPHY);  Surgeon: Deion Ivory MD;  Location: [facility] ENDO;  Service: Endoscopy;  Laterality: N/A;    HYSTERECTOMY      fibroids    WA REMOVAL OF OVARY/TUBE(S)      TUBAL LIGATION       Family History   Problem Relation Age of Onset    Cataracts Mother     No Known Problems Father     Breast cancer Cousin 20    Arthritis Sister     Cataracts Sister     No Known Problems Brother     No Known Problems Maternal Grandmother     No Known Problems Maternal  Grandfather     Pancreatic cancer Paternal Grandmother 80    No Known Problems Paternal Grandfather     Diabetes Son     No Known Problems Son     No Known Problems Daughter     No Known Problems Daughter     No Known Problems Maternal Aunt     No Known Problems Maternal Uncle     No Known Problems Paternal Aunt     No Known Problems Paternal Uncle      Social History     Tobacco Use    Smoking status: Never Smoker    Smokeless tobacco: Never Used   Substance Use Topics    Alcohol use: Yes     Comment: rarely     Drug use: No        Review of Systems:  All 10 ROS negative except that which is indicated in the HPI    OBJECTIVE:     Vital Signs:  Temp: 97.9 °F (36.6 °C) (06/04/22 0752)  Pulse: 84 (06/04/22 0752)  Resp: 18 (06/04/22 1004)  BP: (!) 109/58 (06/04/22 0752)  SpO2: 98 % (06/04/22 0831)    Physical Exam:  General: well developed, well nourished, no distress  HEENT: NCAT, EOMI  Neck: supple, symmetrical  Lungs: Normal WOB, No SOB  Cardiovascular: regular rate  Abdomen: soft, nondistended, nontender to palpation. Midline incision with scant drainage of old blood clot. See photo.  Skin: Skin color, texture, turgor normal. No rashes or lesions  Musculoskeletal:no clubbing, cyanosis, no deformities  Neurologic: No focal numbness or weakness  Psych/Behavioral:  Alert and oriented, appropriate affect.            Laboratory:  Labs Reviewed   HEMOGLOBIN A1C - Abnormal; Notable for the following components:       Result Value    Hemoglobin A1C 6.7 (*)     Estimated Avg Glucose 146 (*)     All other components within normal limits   CBC W/ AUTO DIFFERENTIAL - Abnormal; Notable for the following components:    MCHC 31.6 (*)     RDW 17.3 (*)     All other components within normal limits   COMPREHENSIVE METABOLIC PANEL - Abnormal; Notable for the following components:    CO2 22 (*)     Glucose 128 (*)     Albumin 3.3 (*)     Alkaline Phosphatase 330 (*)     All other components within normal limits    PROTIME-INR - Abnormal; Notable for the following components:    Prothrombin Time 14.6 (*)     INR 1.4 (*)     All other components within normal limits   CBC W/ AUTO DIFFERENTIAL - Abnormal; Notable for the following components:    MCHC 31.2 (*)     RDW 17.6 (*)     All other components within normal limits    Narrative:     Collection has been rescheduled by LL3 at 06/04/2022 09:04 Reason:                   Unable to collect   APTT         Diagnostic Results:       ASSESSMENT:     72 y.o. female with a PMH of pancreatic adenocarcinoma (s/p distal pancreatectomy), atrial fibrillation (on eliquis), and ventral hernia who underwent a retrorectus ventral hernia repair with mesh on 5/23/22 that was complicated by a post operative bleed into the abdominal wall. Represents with scant sanginous drainage from midline.    PLAN:     - Admit to floor  - Patient is not suffering an acute bleed. Serial hemoglobins are stable and she is not hemodynamically unstable.  - Reg diet, no IVMF  - Daily labs  - Abdominal binder  - Hold eliquis  - Ambulate TID    Will discuss timing of discharge with staff    Fuentes Logan MD  General Surgery HO2

## 2022-06-04 NOTE — ED PROVIDER NOTES
SCRIBE #1 NOTE: I, Shereen Zamorano, am scribing for, and in the presence of,  Yanni Packer NP. I have scribed the following portions of the note - Other sections scribed: HPI; ROS; PE.         Source of History:  Patient    Chief complaint:  Wound Check (Reports having hernia surgery on 5/23/2022, discharged from hospital on 5/28/2022. Having drainage from around derma-bond today. Reports having an ABD binder as Rx.)      HPI:  Zohra Paulino is a 72 y.o. female presenting with drainage present from a surgical incision. Patient reports she had hernia surgery on 05/23/22 and noticed blood draining from the incision today. She states she is not experiencing pain, but has pressure in the umbilical area. Patient notes she has not been moving around more than she was instructed.  Patient denies any worsening pain, nausea, vomiting or weakness.    This is the extent to the patients complaints today here in the emergency department.    ROS: As per HPI and below:  Constitutional: No fever.  No chills.  Eyes: No visual changes.  ENT: No sore throat. No ear pain    Cardiovascular: No chest pain.  Respiratory: No shortness of breath.  GI: No abdominal pain.  No nausea, vomiting or diarrhea  Genitourinary: No abnormal urination.  Neurologic: No headache. No focal weakness.  No numbness.  MSK: no back pain.  Integument: No rashes or lesions.  Positive for surgical incision to mid abdomen  Hematologic: No easy bruising.  Endocrine: No excessive thirst or urination.    Review of patient's allergies indicates:   Allergen Reactions    Codeine Palpitations       PMH:  As per HPI and below:  Past Medical History:   Diagnosis Date    Bronchitis     Cancer     pancreatic    Diabetes mellitus     Fibroids     Nuclear sclerosis of both eyes 11/15/2021    Osteopetrosis     Uterine fibroid      Past Surgical History:   Procedure Laterality Date    CERVICAL BIOPSY  W/ LOOP ELECTRODE EXCISION      [facility]  2004    COLONOSCOPY    "   DISTAL PANCREATECTOMY N/A 7/27/2020    Procedure: PANCREATECTOMY, DISTAL, SUBTOTAL;  Surgeon: GILMA Ellis MD;  Location: [facility] OR;  Service: General;  Laterality: N/A;    ERCP N/A 8/19/2020    Procedure: ERCP (ENDOSCOPIC RETROGRADE CHOLANGIOPANCREATOGRAPHY);  Surgeon: Deion Ivory MD;  Location: [facility] ENDO;  Service: Endoscopy;  Laterality: N/A;  pancreatic duct leak  Rapid Covid test    ERCP N/A 9/16/2020    Procedure: ERCP (ENDOSCOPIC RETROGRADE CHOLANGIOPANCREATOGRAPHY);  Surgeon: Deion Ivory MD;  Location: [facility] ENDO;  Service: Endoscopy;  Laterality: N/A;    HYSTERECTOMY      fibroids    ID REMOVAL OF OVARY/TUBE(S)      TUBAL LIGATION         Social History     Tobacco Use    Smoking status: Never Smoker    Smokeless tobacco: Never Used   Substance Use Topics    Alcohol use: Yes     Comment: rarely     Drug use: No       Physical Exam:    /66   Pulse 93   Temp 98.8 °F (37.1 °C) (Oral)   Resp 18   Ht 5' 2" (1.575 m)   Wt 64.9 kg (143 lb)   LMP  (LMP Unknown) Comment: [facility]SO 11/16/17  SpO2 100%   BMI 26.16 kg/m²   Nursing note and vital signs reviewed.  Constitutional: No acute distress.  Nontoxic  Eyes: No conjunctival injection.  Extraocular muscles are intact.  ENT: Oropharynx clear.  Normal phonation.  Cardiovascular: Regular rate and rhythm.  No murmurs. No gallops. No rubs  Respiratory: Clear to auscultation bilaterally.  Good air movement.  No wheezes.  No rhonchi. No rales. No accessory muscle use.  Abdomen:  Surgical incision mid abdomen.  Picture in chart.  Blood noted from incision. No erythema or redness.   Musculoskeletal: Good range of motion all joints.  No deformities.  Neck supple.  No meningismus.  Skin: No rashes seen.  Poor skin turgor.  No abrasions.  No ecchymoses.  Neuro: alert and oriented x3,  no focal neurological deficits.  Psych: Appropriate, conversant    Labs that have been ordered have been independently reviewed and interpreted by " myself.    I decided to obtain the patient's medical records.  Summary of Medical Records:  Recent surgery at [facility] for hernia.  Dr. Ellis patient.    Labs Reviewed   POCT CMP - Abnormal; Notable for the following components:       Result Value    Alkaline Phosphatase,  (*)     AST (SGOT), POC 48 (*)     Calcium, POC 10.6 (*)     POC Glucose 126 (*)     POC Potassium 5.5 (*)     Protein, POC 8.9 (*)     All other components within normal limits   POCT CBC   POCT CMP   POCT CMP       CT Abdomen Pelvis With Contrast   Final Result   Abnormal      1. Recent postop changes of ventral wall hernia repair.   2. There is a hyperdense heterogeneous complex collection in the anterior abdomen abutting the anterior wall and surgical scar at the midline consistent with a recent hematoma.  See above comments.  Additionally there is mild hyperdense free fluid/hemoperitoneum in the pelvis also.  Recommend surgical consultation and follow-up.   3. Diverticulosis of the sigmoid colon.   4. Possible constipation.   5. Mild bibasilar atelectasis.   6.  This report was flagged in Epic as abnormal.   ILEANA Packer was messaged with the findings at the time of interpretation.         Electronically signed by: Khadar Akhtar   Date:    06/03/2022   Time:    21:31                  MDM/ Differential Dx:   Emergent evaluation of a 73 yo female presenting for dark drainage from surgical site.  Patient states she had hernia repair completed on 05/25 at [facility] from Dr. Ellis.  Patient states she has had no complications since discharge from the hospital on 05/28.  Patient states today she started noticing drainage from the incision site.  Patient states she placed 2 large Band-Aids which were saturated with blood by the time she got to the ED.  Patient denies any increased pain.  Patient states she has been wearing her abdominal binder as advised.  On exam pt is A&Ox3. VSS. Nonfebrile and nontoxic appearing.  Patient is sickly  appearing.  Breath sounds clear bilaterally. Mucous membranes pink and moist.  Abdomen soft and nontender. No rebound or guarding appreciated on exam.   Surgical incision noted to mid abdomen.  Wound appears slightly dehisced.  Pictures in chart.  Dark brown drainage/blood noted from lower incision.  No bruising or ecchymosis noted.  Pt speaking in full sentences.  Cap refill < 3 seconds.      Differential diagnoses include but are not limited to postop bleeding, postop complication, wound dehiscence, wound infection, postop bleeding, others.      I will get labs, imaging and reassess.  I discussed this case with my supervising physician.    ED Course as of 06/03/22 2233 Fri Jun 03, 2022 2059 CBC unremarkable.  No leukocytosis noted.  H&H stable.  CMP shows hyperkalemia at 5.5.  Alk-phos elevated at 342. AST elevated 48.  [RZ]   2216 CT concerning for hyperdense heterogeneous complex collection in the anterior abdomen abutting the anterior wall and surgical scar at the midline consistent with a recent hematoma.  See above comments.  Additionally there is mild hyperdense free fluid/hemoperitoneum in the pelvis also.  Due to surgery being completed at [facility].  Discussed case with surgery team.  They will accept patient as a transfer.  Patient to be admitted to Dr. Ellis service.  Team aware that labs facility our point of care.  Reviewed H& H. Advised that CMP hemolyzed.  EKG within normal limits.  No STEMI.  Team states they will get labs once patient arrived at [facility].  Awaiting transfer and bed assignment. [RZ]   2231 Lactic negative at 0.89.  Awaiting transfer to [facility]. [RZ]      ED Course User Index  [RZ] Yanni Packer NP              Scribe Attestation:   Scribe #1: I performed the above scribed service and the documentation accurately describes the services I performed. I attest to the accuracy of the note.    I, Yanni Packer, personally performed the services described in this  documentation. All medical record entries made by the scribe were at my direction and in my presence.  I have reviewed the chart and agree that the record reflects my personal performance and is accurate and complete.     Yanni Packer, [email or web address], FNP        Diagnostic Impression:    1. Postoperative hematoma involving digestive system following digestive system procedure    2. Hyperkalemia    3. Hemoperitoneum    4. Postoperative surgical complication involving digestive system associated with digestive system procedure, unspecified complication         ED Disposition Condition    Transfer to Another Facility Stable                  Yanni Packer NP  06/03/22 7355

## 2022-06-05 VITALS
WEIGHT: 128.75 LBS | TEMPERATURE: 98 F | HEIGHT: 62 IN | DIASTOLIC BLOOD PRESSURE: 56 MMHG | SYSTOLIC BLOOD PRESSURE: 107 MMHG | OXYGEN SATURATION: 96 % | BODY MASS INDEX: 23.69 KG/M2 | RESPIRATION RATE: 16 BRPM | HEART RATE: 85 BPM

## 2022-06-05 LAB
ANION GAP SERPL CALC-SCNC: 9 MMOL/L (ref 8–16)
BASOPHILS # BLD AUTO: 0.08 K/UL (ref 0–0.2)
BASOPHILS NFR BLD: 0.9 % (ref 0–1.9)
BUN SERPL-MCNC: 18 MG/DL (ref 8–23)
CALCIUM SERPL-MCNC: 9.7 MG/DL (ref 8.7–10.5)
CHLORIDE SERPL-SCNC: 106 MMOL/L (ref 95–110)
CO2 SERPL-SCNC: 25 MMOL/L (ref 23–29)
CREAT SERPL-MCNC: 0.8 MG/DL (ref 0.5–1.4)
DIFFERENTIAL METHOD: ABNORMAL
EOSINOPHIL # BLD AUTO: 0.1 K/UL (ref 0–0.5)
EOSINOPHIL NFR BLD: 1.5 % (ref 0–8)
ERYTHROCYTE [DISTWIDTH] IN BLOOD BY AUTOMATED COUNT: 17.4 % (ref 11.5–14.5)
EST. GFR  (AFRICAN AMERICAN): >60 ML/MIN/1.73 M^2
EST. GFR  (NON AFRICAN AMERICAN): >60 ML/MIN/1.73 M^2
GLUCOSE SERPL-MCNC: 112 MG/DL (ref 70–110)
HCT VFR BLD AUTO: 36.6 % (ref 37–48.5)
HGB BLD-MCNC: 11.4 G/DL (ref 12–16)
IMM GRANULOCYTES # BLD AUTO: 0.03 K/UL (ref 0–0.04)
IMM GRANULOCYTES NFR BLD AUTO: 0.3 % (ref 0–0.5)
LYMPHOCYTES # BLD AUTO: 1.7 K/UL (ref 1–4.8)
LYMPHOCYTES NFR BLD: 19.6 % (ref 18–48)
MAGNESIUM SERPL-MCNC: 1.8 MG/DL (ref 1.6–2.6)
MCH RBC QN AUTO: 28.4 PG (ref 27–31)
MCHC RBC AUTO-ENTMCNC: 31.1 G/DL (ref 32–36)
MCV RBC AUTO: 91 FL (ref 82–98)
MONOCYTES # BLD AUTO: 0.8 K/UL (ref 0.3–1)
MONOCYTES NFR BLD: 8.7 % (ref 4–15)
NEUTROPHILS # BLD AUTO: 6 K/UL (ref 1.8–7.7)
NEUTROPHILS NFR BLD: 69 % (ref 38–73)
NRBC BLD-RTO: 0 /100 WBC
PHOSPHATE SERPL-MCNC: 2.8 MG/DL (ref 2.7–4.5)
PLATELET # BLD AUTO: 351 K/UL (ref 150–450)
PMV BLD AUTO: 11.6 FL (ref 9.2–12.9)
POTASSIUM SERPL-SCNC: 4.1 MMOL/L (ref 3.5–5.1)
RBC # BLD AUTO: 4.02 M/UL (ref 4–5.4)
SODIUM SERPL-SCNC: 140 MMOL/L (ref 136–145)
WBC # BLD AUTO: 8.74 K/UL (ref 3.9–12.7)

## 2022-06-05 PROCEDURE — 94761 N-INVAS EAR/PLS OXIMETRY MLT: CPT

## 2022-06-05 PROCEDURE — 25000003 PHARM REV CODE 250: Performed by: STUDENT IN AN ORGANIZED HEALTH CARE EDUCATION/TRAINING PROGRAM

## 2022-06-05 PROCEDURE — 85025 COMPLETE CBC W/AUTO DIFF WBC: CPT | Performed by: STUDENT IN AN ORGANIZED HEALTH CARE EDUCATION/TRAINING PROGRAM

## 2022-06-05 PROCEDURE — 94760 N-INVAS EAR/PLS OXIMETRY 1: CPT

## 2022-06-05 PROCEDURE — 83735 ASSAY OF MAGNESIUM: CPT | Performed by: STUDENT IN AN ORGANIZED HEALTH CARE EDUCATION/TRAINING PROGRAM

## 2022-06-05 PROCEDURE — 36415 COLL VENOUS BLD VENIPUNCTURE: CPT | Performed by: STUDENT IN AN ORGANIZED HEALTH CARE EDUCATION/TRAINING PROGRAM

## 2022-06-05 PROCEDURE — 84100 ASSAY OF PHOSPHORUS: CPT | Performed by: STUDENT IN AN ORGANIZED HEALTH CARE EDUCATION/TRAINING PROGRAM

## 2022-06-05 PROCEDURE — 80048 BASIC METABOLIC PNL TOTAL CA: CPT | Performed by: STUDENT IN AN ORGANIZED HEALTH CARE EDUCATION/TRAINING PROGRAM

## 2022-06-05 PROCEDURE — 63600175 PHARM REV CODE 636 W HCPCS: Performed by: STUDENT IN AN ORGANIZED HEALTH CARE EDUCATION/TRAINING PROGRAM

## 2022-06-05 RX ORDER — MAGNESIUM SULFATE HEPTAHYDRATE 40 MG/ML
2 INJECTION, SOLUTION INTRAVENOUS ONCE
Status: COMPLETED | OUTPATIENT
Start: 2022-06-05 | End: 2022-06-05

## 2022-06-05 RX ADMIN — MUPIROCIN: 20 OINTMENT TOPICAL at 08:06

## 2022-06-05 RX ADMIN — GABAPENTIN 300 MG: 300 CAPSULE ORAL at 08:06

## 2022-06-05 RX ADMIN — MAGNESIUM SULFATE 2 G: 2 INJECTION INTRAVENOUS at 08:06

## 2022-06-05 RX ADMIN — SODIUM PHOSPHATE, MONOBASIC, MONOHYDRATE 30 MMOL: 276; 142 INJECTION, SOLUTION INTRAVENOUS at 11:06

## 2022-06-05 NOTE — DISCHARGE SUMMARY
[facility] Surg  DISCHARGE SUMMARY  General Surgery      Admit Date:  6/4/2022    Discharge Date and Time:  6/5/2022  12:00 PM    Attending Physician:  GILMA Ellis MD     Discharge Provider:  Fuentes Logan MD     Reason for Admission:  S/P hernia repair     Procedures Performed:  * No surgery found *    Hospital Course:  Please see the preoperative H&P and other available documentation for full details related to history prior to this admission.  Briefly, Zohra Paulino is a 72 y.o. female with a PMH of pancreatic adenocarcinoma (s/p distal pancreatectomy), atrial fibrillation (on eliquis), and ventral hernia who underwent a retrorectus ventral hernia repair with mesh on 5/23/22 that was complicated by a post operative bleed into the abdominal wall. She was re-admitted on 6/4/22 with scant sanginous drainage from midline. CT scan showed no evidence of an active bleed. She tolerated a regular diet, ambulated, and her pain was well controlled during her hospital stay. She was discharged home with an abdominal binder on 6/5/22. She will follow up in 1 week with Dr. Ellis in clinic for a wound check.    Consults:  None    Significant Diagnostic Studies:   Recent Labs   Lab 06/04/22  0400 06/04/22  0928 06/05/22  0600   WBC 8.93 9.02 8.74   HGB 12.0 12.5 11.4*   HCT 38.0 40.1 36.6*    324 351   HGBA1C 6.7*  --   --      Recent Labs   Lab 06/04/22  0400 06/05/22  0600    140   K 4.3 4.1    106   CO2 22* 25   BUN 17 18   CREATININE 0.8 0.8   * 112*   CALCIUM 10.0 9.7   MG  --  1.8   PHOS  --  2.8   AST 27  --    ALT 23  --    ALKPHOS 330*  --    BILITOT 0.8  --    PROT 8.1  --    ALBUMIN 3.3*  --      Recent Labs   Lab 06/04/22  0400   INR 1.4*     Recent Labs   Lab 06/03/22  2232   PH 7.427   PCO2 44.8   PO2 35*   HCO3 29.5*         Final Diagnoses:   Principal Problem:  S/P hernia repair   Secondary Diagnoses:    Active Hospital Problems    Diagnosis  POA    *S/P  hernia repair [Z98.890, Z87.19]  Not Applicable      Resolved Hospital Problems    Diagnosis Date Resolved POA    Hemoperitoneum [K66.1] 06/05/2022 Unknown       Discharged Condition:  stable    Disposition:  Home or Self Care    Follow Up/Patient Instructions: Follow up in 1 week with Dr. Ellis. Wear abdominal binder at all times until clinic visit.    Medications:  Reconciled Home Medications:    Current Discharge Medication List      CONTINUE these medications which have NOT CHANGED    Details   cetirizine (ZYRTEC) 10 MG tablet Take 10 mg by mouth.      cyclobenzaprine (FLEXERIL) 10 MG tablet       DULoxetine (CYMBALTA) 30 MG capsule Take 30 mg by mouth every evening.      iron bis-gly/FA/C/B12/Ca/succ (IRON-150 ORAL) Take by mouth once daily.      meloxicam (MOBIC) 15 MG tablet       omeprazole (PRILOSEC) 20 MG capsule Take 20 mg by mouth every 12 (twelve) hours.      potassium chloride (MICRO-K) 10 MEQ CpSR Take 10 mEq by mouth once daily.      vitamin D 1000 units Tab Take 1,000 Units by mouth once daily.      ACCU-CHEK SOFTCLIX LANCETS Misc       alendronate (FOSAMAX) 40 mg tablet Take 1 tablet (40 mg total) by mouth once a week.  Qty: 12 tablet, Refills: 3    Associated Diagnoses: Osteopenia of multiple sites      docusate sodium (COLACE) 100 MG capsule Take 1 capsule (100 mg total) by mouth 2 (two) times daily as needed for Constipation.  Qty: 180 capsule, Refills: 1    Associated Diagnoses: Drug induced constipation      ID NOW COVID-19 TEST KIT Kit TEST AS DIRECTED TODAY      LIDOcaine-prilocaine (EMLA) cream APPLY DIME SIZED AMOUNT OVER PORT 1 HOUR PRIOR TO USING      ondansetron (ZOFRAN-ODT) 4 MG TbDL Take 1 tablet (4 mg total) by mouth every 6 (six) hours as needed.  Qty: 10 tablet, Refills: 0      traMADoL (ULTRAM) 50 mg tablet Take 1 tablet (50 mg total) by mouth every 6 (six) hours as needed for Pain.  Qty: 15 tablet, Refills: 0    Comments: Quantity prescribed more than 7 day supply?  No  Associated Diagnoses: S/P hernia repair         STOP taking these medications       chlorhexidine (HIBICLENS) 4 % external liquid Comments:   Reason for Stopping:         naproxen (NAPROSYN) 500 MG tablet Comments:   Reason for Stopping:             Discharge Procedure Orders   Diet Adult Regular     Change dressing (specify)   Order Comments: Apply dry guaze to midline PRN drainage.     Notify your health care provider if you experience any of the following:  redness, tenderness, or signs of infection (pain, swelling, redness, odor or green/yellow discharge around incision site)     Weight bearing restrictions (specify):   Order Comments: No heavy lifting >20 lbs for 6 weeks.      Follow-up Information     J Carlton Ellis MD. Call in 1 week(s).    Specialty: General Surgery  Why: For wound re-check  Contact information:  [address]  [address]  [place] [address] [phone]                         Fuentes Logan MD

## 2022-06-05 NOTE — PLAN OF CARE
Discharge orders noted. No DME or HH ordered. Hospital follow-up scheduled. No further CM needs.    1153-- called and spoke with patient. She confirmed she did not have HH prior to hospital stay. She is aware of appointment scheduled on 6/14. All questions answered. Patient verbalized understanding.    Patient Contacts    Name Relation Home Work Mobile   Danny Hernandez Daughter   [phone]   George Paulino   [phone]     Future Appointments   Date Time Provider Department Center   6/14/2022  2:00 PM GILMA Ellis MD [facility] [place] [facility]         06/05/22 1148   Final Note   Assessment Type Final Discharge Note   Anticipated Discharge Disposition Home   Hospital Resources/Appts/Education Provided Appointments scheduled by Navigator/Coordinator   Post-Acute Status   Discharge Delays None known at this time     Germaine Bose RN    [phone]

## 2022-06-05 NOTE — PLAN OF CARE
Pt resting in bed,aao,denies pain,abd appears distended, binder intact over abd dressing,had bm this shift,bed alarm set.

## 2022-06-05 NOTE — PROGRESS NOTES
Patient has received discharge instructions. Instructions reviewed with pt using teachback method. All questions answered to pt satisfaction. Any non-implanted  IV access and telemetry present,  have been  removed per bedside nurse. Transport for discharge will be requested by bedside nurse, once iv is complete and patient's ride is here.           06/05/22 1345   Admission   Communication Issues? None   Shift   Virtual Nurse - Rounding Complete  (Discharge)   Virtual Nurse - Patient Verbalized Approval Of Camera Use   Safety/Activity   Patient Rounds bed in low position;call light in patient/parent reach;visualized patient   Safety Promotion/Fall Prevention side rails raised x 2   Positioning   Body Position supine   Head of Bed (HOB) Positioning HOB elevated

## 2022-06-05 NOTE — PLAN OF CARE
Problem: Adult Inpatient Plan of Care  Goal: Plan of Care Review  6/5/2022 1350 by Penny Pendleton RN  Outcome: Met  6/5/2022 1238 by Penny Pendleton RN  Outcome: Ongoing, Progressing  Goal: Patient-Specific Goal (Individualized)  Outcome: Met  Goal: Absence of Hospital-Acquired Illness or Injury  Outcome: Met  Goal: Optimal Comfort and Wellbeing  Outcome: Met  Goal: Readiness for Transition of Care  Outcome: Met     Problem: Diabetes Comorbidity  Goal: Blood Glucose Level Within Targeted Range  Outcome: Met     Problem: Infection (Surgery Nonspecified)  Goal: Absence of Infection Signs and Symptoms  Outcome: Met     Problem: Pain (Surgery Nonspecified)  Goal: Acceptable Pain Control  Outcome: Met     Problem: Coping Ineffective (Oncology Care)  Goal: Effective Coping  Outcome: Met     Problem: Fatigue (Oncology Care)  Goal: Improved Activity Tolerance  Outcome: Met     Problem: Oral Intake Altered (Oncology Care)  Goal: Optimal Oral Intake  Outcome: Met     Problem: Pain Acute (Oncology Care)  Goal: Optimal Pain Control  Outcome: Met     Problem: Constipation  Goal: Effective Bowel Elimination  Outcome: Met     Problem: Fall Injury Risk  Goal: Absence of Fall and Fall-Related Injury  Outcome: Met

## 2022-06-05 NOTE — PLAN OF CARE
Pt meets criteria for discharge. VSS. AVS given to pt, states understanding of discharge instructions. IV removed. Pt to be discharged to home.

## 2022-06-06 ENCOUNTER — TELEPHONE (OUTPATIENT)
Dept: NEUROLOGY | Facility: HOSPITAL | Age: 72
End: 2022-06-06
Payer: MEDICARE

## 2022-06-06 ENCOUNTER — PATIENT OUTREACH (OUTPATIENT)
Dept: ADMINISTRATIVE | Facility: CLINIC | Age: 72
End: 2022-06-06
Payer: MEDICARE

## 2022-06-06 NOTE — PROGRESS NOTES
C3 nurse spoke with Zohra Paulino   for a TCC post hospital discharge follow up call. Nurse offered to scheduled TCC hospital follow-up appointment. The patient declined appointment at this time.

## 2022-06-06 NOTE — TELEPHONE ENCOUNTER
----- Message from Heidi Wagoner sent at 6/6/2022 12:05 PM CDT -----  Regarding: Sooner Appt  Type:  Sooner Apoointment Request    Caller is requesting a sooner appointment.  Caller declined first available appointment listed below.  Caller will not accept being placed on the waitlist and is requesting a message be sent to doctor.  Name of Caller:pt  When is the first available appointment? 06-14-22  Symptoms: bleeding  Would the patient rather a call back or a response via [facility]? call  Best Call Back Number: [phone]  Additional Information: pt was discharged from hospital yesterday, told to be seen within a week

## 2022-06-06 NOTE — TELEPHONE ENCOUNTER
Returned patients call, states she is feeling ok. Her incision still looks good just like when she left the hospital on yesterday. She denies any drainage, odor or redness to the site. Tolerating solid foods, voiding without difficulty, having bowel movements, ambulating with out difficulty. Pain controlled with meds. Denies nausea, vomiting or fever. Patient stated she called the office today, as she was instructed to do so by the hospital. Patient was able to repeat back date & time of future and verbalize her understanding.    Hospital Discharge Phone Call    Admit Date: 6/4/22                  Discharge Date: 6/5/22   Reason for Admission / Surgery/Procedure: 5/23/2022 - Repair, Hernia, Incisional Or Ventral, Without History Of Prior Repair     Future Appointments   Date Time Provider Department Center   6/14/2022  2:00 PM GILMA Ellis MD [facility] TUMOR Todd Estradai

## 2022-06-14 ENCOUNTER — OFFICE VISIT (OUTPATIENT)
Dept: NEUROLOGY | Facility: HOSPITAL | Age: 72
End: 2022-06-14
Attending: SURGERY
Payer: MEDICARE

## 2022-06-14 VITALS
BODY MASS INDEX: 26.51 KG/M2 | WEIGHT: 144.06 LBS | SYSTOLIC BLOOD PRESSURE: 116 MMHG | DIASTOLIC BLOOD PRESSURE: 77 MMHG | HEART RATE: 85 BPM | HEIGHT: 62 IN

## 2022-06-14 DIAGNOSIS — Z98.890 S/P HERNIA REPAIR: ICD-10-CM

## 2022-06-14 DIAGNOSIS — Z85.07 PERSONAL HISTORY OF PANCREATIC CANCER: Primary | ICD-10-CM

## 2022-06-14 DIAGNOSIS — Z87.19 S/P HERNIA REPAIR: ICD-10-CM

## 2022-06-14 PROCEDURE — 99214 OFFICE O/P EST MOD 30 MIN: CPT | Performed by: SURGERY

## 2022-06-14 NOTE — PROGRESS NOTES
" NOLANETS:  [facility]  A collaboration between [facility] and [facility]        Chief Complaint:  post op follow up    S/p:   5/23/2022 - Repair, Hernia, Incisional Or Ventral, Without History Of Prior Repair     Pathology:     Vital Sign:   Vitals:    06/14/22 1413   BP: 116/77   Pulse: 85   Weight: 65.4 kg (144 lb 1.1 oz)   Height: 5' 2" (1.575 m)     Incision: healing well    Appetite: good    Restrictions: Restriction as listed:no lifting over 20 lb    Return to clinic: 1 month                  "

## 2022-06-14 NOTE — PATIENT INSTRUCTIONS
Restrictions: no lifting over 20 lb     Return Clinic Appointment: in 1 month with Dr. Ellis --- patient will call to schedule

## 2022-06-16 ENCOUNTER — PATIENT OUTREACH (OUTPATIENT)
Dept: ADMINISTRATIVE | Facility: HOSPITAL | Age: 72
End: 2022-06-16
Payer: MEDICARE

## 2022-06-16 NOTE — LETTER
AUTHORIZATION FOR RELEASE OF   CONFIDENTIAL INFORMATION    Dear Dr. Pereira,    We are seeing Zohra Paulino, date of birth 1950, in the clinic at [facility]. Tracey Martin MD is the patient's PCP. Zohra Paulino has an outstanding lab/procedure at the time we reviewed her chart. In order to help keep her health information updated, she has authorized us to request the following medical record(s):        (  )  MAMMOGRAM                                      ( X )  COLONOSCOPY      (  )  PAP SMEAR                                          (  )  OUTSIDE LAB RESULTS     (  )  DEXA SCAN                                          (  )  EYE EXAM            (  )  FOOT EXAM                                          (  )  ENTIRE RECORD     (  )  OUTSIDE IMMUNIZATIONS                 (  )  _______________         Please fax records to [facility], Tenille Ottley-Sharpe, MD, [phone]     If you have any questions, please contact [facility] at [phone].           Patient Name: Zohra Paulino  : 1950  Patient Phone #: [phone]

## 2022-06-16 NOTE — PROGRESS NOTES
Patient due for the following    Low Dose Statin     Diabetes Urine Screening     Pneumococcal Vaccines (Age 65+) (2 - PPSV23 or PCV20)    Lipid Panel     COVID-19 Vaccine (4 - Booster for Moderna series)      E-faxed Dr. Pereira for most recent c-scope records.    Immunizations: reviewed and updated  Care Everywhere: triggered  Care Teams: up to date  Outreach: none needed

## 2022-06-21 ENCOUNTER — PATIENT OUTREACH (OUTPATIENT)
Dept: ADMINISTRATIVE | Facility: HOSPITAL | Age: 72
End: 2022-06-21
Payer: MEDICARE

## 2022-06-21 NOTE — PROGRESS NOTES
Patient due for the following    Low Dose Statin     Diabetes Urine Screening     Pneumococcal Vaccines (Age 65+) (2 - PPSV23 or PCV20)    Lipid Panel     COVID-19 Vaccine (4 - Booster for Moderna series)      Received colon cancer screening.  Scanned to media.  updated    Immunizations: reviewed and updated  Care Everywhere: triggered  Care Teams: up to date  Outreach: none needed

## 2022-06-29 DIAGNOSIS — E11.36 TYPE 2 DIABETES MELLITUS WITH DIABETIC CATARACT, WITHOUT LONG-TERM CURRENT USE OF INSULIN: Primary | ICD-10-CM

## 2022-06-29 RX ORDER — INSULIN PUMP SYRINGE, 3 ML
EACH MISCELLANEOUS
Qty: 1 EACH | Refills: 0 | Status: SHIPPED | OUTPATIENT
Start: 2022-06-29 | End: 2023-06-29

## 2022-06-29 RX ORDER — LANCETS
1 EACH MISCELLANEOUS DAILY
Qty: 100 EACH | Refills: 3 | Status: SHIPPED | OUTPATIENT
Start: 2022-06-29 | End: 2023-06-29

## 2022-06-29 RX ORDER — LANCING DEVICE
EACH MISCELLANEOUS
Qty: 1 EACH | Refills: 0 | Status: SHIPPED | OUTPATIENT
Start: 2022-06-29

## 2022-06-29 NOTE — TELEPHONE ENCOUNTER
No new care gaps identified.  [facility] Embedded Care Gaps. Reference number: [id number]. 6/29/2022   10:52:04 AM KASIAT

## 2022-07-01 ENCOUNTER — EXTERNAL HOME HEALTH (OUTPATIENT)
Dept: HOME HEALTH SERVICES | Facility: HOSPITAL | Age: 72
End: 2022-07-01
Payer: MEDICARE

## 2022-07-14 RX ORDER — CYCLOBENZAPRINE HCL 10 MG
TABLET ORAL
Qty: 30 TABLET | OUTPATIENT
Start: 2022-07-14

## 2022-07-14 NOTE — TELEPHONE ENCOUNTER
Received refill request. Recent surgery with addition of tramadol. Fall risk also noted ini progress notes. Please call patient.  Is she taking tramadol - how often? Is she taking cyclobenzaprine (muscle relaxer)? How often? Has she had falls? Is she sedated? Does she need this medication refilled?

## 2022-07-14 NOTE — TELEPHONE ENCOUNTER
Spoke with patient and she states that she is not taking the flexeril and does not need this refill, she is not sure why [facility] sent in this refill request.  The only refill that she needs is her Alendronate.  Advised that Dr. Veras sent that in to [facility] on 7/12/22.  States they didn't notify her and she will pick them up.  Reminded patient that she needs to choose a new primary care provider and that she can choose one on the [facility] if that is more convenient for her.  States that she probably will when she gets a chance.

## 2022-12-28 ENCOUNTER — IMMUNIZATION (OUTPATIENT)
Dept: PHARMACY | Facility: CLINIC | Age: 72
End: 2022-12-28
Payer: MEDICARE

## 2022-12-28 DIAGNOSIS — Z23 NEED FOR VACCINATION: Primary | ICD-10-CM

## 2023-01-25 ENCOUNTER — PATIENT MESSAGE (OUTPATIENT)
Dept: ADMINISTRATIVE | Facility: HOSPITAL | Age: 73
End: 2023-01-25
Payer: MEDICARE

## 2023-03-28 ENCOUNTER — PATIENT MESSAGE (OUTPATIENT)
Dept: RESEARCH | Facility: HOSPITAL | Age: 73
End: 2023-03-28
Payer: MEDICARE

## 2023-04-04 ENCOUNTER — PATIENT MESSAGE (OUTPATIENT)
Dept: RESEARCH | Facility: HOSPITAL | Age: 73
End: 2023-04-04
Payer: MEDICARE

## 2023-05-10 ENCOUNTER — OFFICE VISIT (OUTPATIENT)
Dept: OBSTETRICS AND GYNECOLOGY | Facility: CLINIC | Age: 73
End: 2023-05-10
Payer: MEDICARE

## 2023-05-10 VITALS
SYSTOLIC BLOOD PRESSURE: 120 MMHG | BODY MASS INDEX: 28.39 KG/M2 | WEIGHT: 155.19 LBS | DIASTOLIC BLOOD PRESSURE: 78 MMHG

## 2023-05-10 DIAGNOSIS — Z01.419 WELL WOMAN EXAM WITH ROUTINE GYNECOLOGICAL EXAM: Primary | ICD-10-CM

## 2023-05-10 DIAGNOSIS — N89.8 VAGINAL DISCHARGE: ICD-10-CM

## 2023-05-10 DIAGNOSIS — Z12.31 BREAST CANCER SCREENING BY MAMMOGRAM: ICD-10-CM

## 2023-05-10 PROCEDURE — 3074F PR MOST RECENT SYSTOLIC BLOOD PRESSURE < 130 MM HG: ICD-10-PCS | Mod: CPTII,S$GLB,, | Performed by: OBSTETRICS & GYNECOLOGY

## 2023-05-10 PROCEDURE — 3008F PR BODY MASS INDEX (BMI) DOCUMENTED: ICD-10-PCS | Mod: CPTII,S$GLB,, | Performed by: OBSTETRICS & GYNECOLOGY

## 2023-05-10 PROCEDURE — G0101 PR CA SCREEN;PELVIC/BREAST EXAM: ICD-10-PCS | Mod: GZ,S$GLB,, | Performed by: OBSTETRICS & GYNECOLOGY

## 2023-05-10 PROCEDURE — 99999 PR PBB SHADOW E&M-EST. PATIENT-LVL III: CPT | Mod: PBBFAC,,, | Performed by: OBSTETRICS & GYNECOLOGY

## 2023-05-10 PROCEDURE — 81514 NFCT DS BV&VAGINITIS DNA ALG: CPT | Performed by: OBSTETRICS & GYNECOLOGY

## 2023-05-10 PROCEDURE — G0101 CA SCREEN;PELVIC/BREAST EXAM: HCPCS | Mod: GZ,S$GLB,, | Performed by: OBSTETRICS & GYNECOLOGY

## 2023-05-10 PROCEDURE — 99999 PR PBB SHADOW E&M-EST. PATIENT-LVL III: ICD-10-PCS | Mod: PBBFAC,,, | Performed by: OBSTETRICS & GYNECOLOGY

## 2023-05-10 PROCEDURE — 1159F PR MEDICATION LIST DOCUMENTED IN MEDICAL RECORD: ICD-10-PCS | Mod: CPTII,S$GLB,, | Performed by: OBSTETRICS & GYNECOLOGY

## 2023-05-10 PROCEDURE — 3288F PR FALLS RISK ASSESSMENT DOCUMENTED: ICD-10-PCS | Mod: CPTII,S$GLB,, | Performed by: OBSTETRICS & GYNECOLOGY

## 2023-05-10 PROCEDURE — 1101F PT FALLS ASSESS-DOCD LE1/YR: CPT | Mod: CPTII,S$GLB,, | Performed by: OBSTETRICS & GYNECOLOGY

## 2023-05-10 PROCEDURE — 3288F FALL RISK ASSESSMENT DOCD: CPT | Mod: CPTII,S$GLB,, | Performed by: OBSTETRICS & GYNECOLOGY

## 2023-05-10 PROCEDURE — 3078F DIAST BP <80 MM HG: CPT | Mod: CPTII,S$GLB,, | Performed by: OBSTETRICS & GYNECOLOGY

## 2023-05-10 PROCEDURE — 1159F MED LIST DOCD IN RCRD: CPT | Mod: CPTII,S$GLB,, | Performed by: OBSTETRICS & GYNECOLOGY

## 2023-05-10 PROCEDURE — 3078F PR MOST RECENT DIASTOLIC BLOOD PRESSURE < 80 MM HG: ICD-10-PCS | Mod: CPTII,S$GLB,, | Performed by: OBSTETRICS & GYNECOLOGY

## 2023-05-10 PROCEDURE — 1101F PR PT FALLS ASSESS DOC 0-1 FALLS W/OUT INJ PAST YR: ICD-10-PCS | Mod: CPTII,S$GLB,, | Performed by: OBSTETRICS & GYNECOLOGY

## 2023-05-10 PROCEDURE — 3074F SYST BP LT 130 MM HG: CPT | Mod: CPTII,S$GLB,, | Performed by: OBSTETRICS & GYNECOLOGY

## 2023-05-10 PROCEDURE — 3008F BODY MASS INDEX DOCD: CPT | Mod: CPTII,S$GLB,, | Performed by: OBSTETRICS & GYNECOLOGY

## 2023-05-10 NOTE — PROGRESS NOTES
SUBJECTIVE:   Zohra Paulino is a 73 y.o. female   for annual well woman exam. No LMP recorded (lmp unknown). Patient is postmenopausal..    .      S/p robotic assisted TLH/BSO for fibroid/PMB  She denies h/o HRT  Reported vaginal discharge x 1 - 2 month, white and no odor  Associated with itching  Denies dryness    Past Medical History:   Diagnosis Date    Bronchitis     Cancer     pancreatic    Diabetes mellitus     Fibroids     Nuclear sclerosis of both eyes 11/15/2021    Osteopetrosis     Uterine fibroid      Past Surgical History:   Procedure Laterality Date    CERVICAL BIOPSY  W/ LOOP ELECTRODE EXCISION      ck      COLONOSCOPY      DISTAL PANCREATECTOMY N/A 2020    Procedure: PANCREATECTOMY, DISTAL, SUBTOTAL;  Surgeon: GILMA Ellis MD;  Location: [facility] OR;  Service: General;  Laterality: N/A;    ERCP N/A 2020    Procedure: ERCP (ENDOSCOPIC RETROGRADE CHOLANGIOPANCREATOGRAPHY);  Surgeon: Deion Ivory MD;  Location: [facility] ENDO;  Service: Endoscopy;  Laterality: N/A;  pancreatic duct leak  Rapid Covid test    ERCP N/A 2020    Procedure: ERCP (ENDOSCOPIC RETROGRADE CHOLANGIOPANCREATOGRAPHY);  Surgeon: Deion Ivory MD;  Location: [facility] ENDO;  Service: Endoscopy;  Laterality: N/A;    HYSTERECTOMY      fibroids    AR REMOVAL OF OVARY/TUBE(S)      TUBAL LIGATION       Social History     Socioeconomic History    Marital status: Single   Occupational History    Occupation: Paraprofessional     Comment: Elementary school   Tobacco Use    Smoking status: Never    Smokeless tobacco: Never   Substance and Sexual Activity    Alcohol use: Yes     Comment: rarely     Drug use: No    Sexual activity: Yes     Partners: Male     Birth control/protection: None   Social History Narrative    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     Social Determinants of Health     Financial Resource Strain: Medium Risk    Difficulty of Paying Living Expenses: Somewhat hard   Food Insecurity: No  Food Insecurity    Worried About Running Out of Food in the Last Year: Never true    Ran Out of Food in the Last Year: Never true   Transportation Needs: No Transportation Needs    Lack of Transportation (Medical): No    Lack of Transportation (Non-Medical): No   Stress: Stress Concern Present    Feeling of Stress : Rather much   Housing Stability: Low Risk     Unable to Pay for Housing in the Last Year: No    Number of Places Lived in the Last Year: 1    Unstable Housing in the Last Year: No     Family History   Problem Relation Age of Onset    Cataracts Mother     No Known Problems Father     Breast cancer Cousin 20    Arthritis Sister     Cataracts Sister     No Known Problems Brother     No Known Problems Maternal Grandmother     No Known Problems Maternal Grandfather     Pancreatic cancer Paternal Grandmother 80    No Known Problems Paternal Grandfather     Diabetes Son     No Known Problems Son     No Known Problems Daughter     No Known Problems Daughter     No Known Problems Maternal Aunt     No Known Problems Maternal Uncle     No Known Problems Paternal Aunt     No Known Problems Paternal Uncle      OB History    Para Term  AB Living   5 4 4   1 4   SAB IAB Ectopic Multiple Live Births   1       4      # Outcome Date GA Lbr Prakash/2nd Weight Sex Delivery Anes PTL Lv   5 Term 04/10/77 40w0d  3.232 kg (7 lb 2 oz) M Vag-Spont EPI N ROMY   4 Term 12/15/75 40w0d  3.175 kg (7 lb) F Vag-Spont EPI N ROMY   3 Term 73 40w0d  3.317 kg (7 lb 5 oz) F Vag-Spont EPI N ROMY   2 SAB 1970           1 Term 69 40w0d  2.892 kg (6 lb 6 oz) M Vag-Spont None N ROMY      Obstetric Comments   Reg menses. Heavy. H/o fibroid   H/o chlamydia.   Denies abnl pap   Denies abnl MMG   Denies abnl c-scope         Current Outpatient Medications   Medication Sig Dispense Refill    ACCU-CHEK SOFTCLIX LANCETS Misc       alendronate (FOSAMAX) 70 MG tablet TAKE 1 TABLET WEEKLY AS DIRECTED 4 tablet 5    blood sugar diagnostic  (TRUE METRIX GLUCOSE TEST STRIP) Strp 1 each by Misc.(Non-Drug; Combo Route) route once daily. 100 each 3    blood-glucose meter (TRUE METRIX GLUCOSE METER) kit Use as instructed 1 each 0    cetirizine (ZYRTEC) 10 MG tablet Take 10 mg by mouth.      cyclobenzaprine (FLEXERIL) 10 MG tablet       docusate sodium (COLACE) 100 MG capsule Take 1 capsule (100 mg total) by mouth 2 (two) times daily as needed for Constipation. 180 capsule 1    DULoxetine (CYMBALTA) 30 MG capsule Take 30 mg by mouth every evening.      ID NOW COVID-19 TEST KIT Kit TEST AS DIRECTED TODAY      iron bis-gly/FA/C/B12/Ca/succ (IRON-150 ORAL) Take by mouth once daily.      lancets Misc 1 each by Misc.(Non-Drug; Combo Route) route once daily. 100 each 3    lancing device Misc As directed 1 each 0    LIDOcaine-prilocaine (EMLA) cream APPLY DIME SIZED AMOUNT OVER PORT 1 HOUR PRIOR TO USING      meloxicam (MOBIC) 15 MG tablet       omeprazole (PRILOSEC) 20 MG capsule Take 20 mg by mouth every 12 (twelve) hours.      ondansetron (ZOFRAN-ODT) 4 MG TbDL Take 1 tablet (4 mg total) by mouth every 6 (six) hours as needed. 10 tablet 0    potassium chloride (MICRO-K) 10 MEQ CpSR Take 10 mEq by mouth once daily.      traMADoL (ULTRAM) 50 mg tablet Take 1 tablet (50 mg total) by mouth every 6 (six) hours as needed for Pain. 15 tablet 0    vitamin D 1000 units Tab Take 1,000 Units by mouth once daily.       No current facility-administered medications for this visit.     Facility-Administered Medications Ordered in Other Visits   Medication Dose Route Frequency Provider Last Rate Last Admin    0.9%  NaCl infusion   Intravenous Continuous Deion Ivory MD 20 mL/hr at 08/19/20 1337 20 mL/hr at 08/19/20 1337    sodium chloride 0.9% flush 10 mL  10 mL Intravenous PRN Deion Ivory MD         Allergies: Codeine       ROS:  GENERAL: Denies weight gain or weight loss. Feeling well overall.   SKIN: Denies rash or lesions.   HEAD: Denies head injury or  headache.   NODES: Denies enlarged lymph nodes.   CHEST: Denies chest pain or shortness of breath.   CARDIOVASCULAR: Denies palpitations or left sided chest pain.   ABDOMEN: No abdominal pain, constipation, diarrhea, nausea, vomiting or rectal bleeding.   URINARY: No frequency, dysuria, hematuria, or burning on urination.  REPRODUCTIVE: Denies vaginal discharge, abnormal vaginal bleeding, lesions, pelvic pain  BREASTS: The patient performs breast self-examination and denies pain, lumps, or nipple discharge.   HEMATOLOGIC: No easy bruisability or excessive bleeding.  MUSCULOSKELETAL: Denies joint pain or swelling.   NEUROLOGIC: Denies syncope or weakness.   PSYCHIATRIC: Denies depression, anxiety or mood swings.      OBJECTIVE:   /78   Wt 70.4 kg (155 lb 3.3 oz)   LMP  (LMP Unknown) Comment: [facility] 11/16/17  BMI 28.39 kg/m²   The patient appears well, alert, oriented x 3, in no distress.  PSYCH:  Normal, full range of affect  NECK: negative, no thyromegaly, trachea midline  SKIN: normal, good color, good turgor and no acne, striae, hirsutism  BREAST EXAM: breasts appear normal, no suspicious masses, no skin or nipple changes or axillary nodes  ABDOMEN: soft, non-tender; bowel sounds normal; no masses,  no organomegaly and no hernias, masses, or hepatosplenomegaly  GENITALIA: normal external genitalia, no erythema, no discharge  BLADDER: soft  URETHRA: normal appearing urethra with no masses, tenderness or lesions and normal urethra, normal urethral meatus  VAGINA: mucosal atrophy  CERVIX: absent  UTERUS: uterus absent  ADNEXA: no mass, fullness, tenderness      ASSESSMENT:   Kisha was seen today for well woman.    Diagnoses and all orders for this visit:    Well woman exam with routine gynecological exam    Breast cancer screening by mammogram  -     Mammo Digital Screening Bilat w/ [facility]; Future        1. Health maintenance  -pap not indicated  -screening MMG ordered  -counseled on exercise and healthy  diet,   -bone health:  Discussed Vitamin D and Calcium supplementation, weight bearing exercises  2.  Discussed safety at home/school/work, healthy and balanced diet, sleep hygiene, as well as violence/weapons exposure.   3.  Vaginal discharge:  affirm done,  symptoms can be due to atrophic vaginitis as well.  Will await for affirm result - if negative, consider vaginal estrace

## 2023-05-12 LAB
BACTERIAL VAGINOSIS DNA: NEGATIVE
CANDIDA GLABRATA DNA: NEGATIVE
CANDIDA KRUSEI DNA: NEGATIVE
CANDIDA RRNA VAG QL PROBE: NEGATIVE
T VAGINALIS RRNA GENITAL QL PROBE: NEGATIVE

## 2023-05-16 NOTE — PROGRESS NOTES
Hi Alana Washington  Your culture is negative  I do think your symptoms are due to dryness  Would you like to try the vaginal estrogen cream as we discussed?

## 2023-05-24 ENCOUNTER — TELEPHONE (OUTPATIENT)
Dept: OBSTETRICS AND GYNECOLOGY | Facility: CLINIC | Age: 73
End: 2023-05-24
Payer: MEDICARE

## 2023-05-24 NOTE — TELEPHONE ENCOUNTER
Pt advised she possibly left her earbuds on 5/10/2023. Pt was advised no earbuds found or turned in    ----- Message from Juan Antonio Whiting sent at 5/24/2023  1:36 PM CDT -----  Type:  Patient Returning Call    Who Called:pt  Who Left Message for Patient:  Does the patient know what this is regarding?: personal item  Would the patient rather a call back or a response via [facility]? call  Best Call Back Number:[phone]  Additional Information: pt states she left something in office and would like to come pick it up.

## 2023-05-29 DIAGNOSIS — K59.03 DRUG INDUCED CONSTIPATION: ICD-10-CM

## 2023-05-29 RX ORDER — DOCUSATE SODIUM 100 MG/1
100 CAPSULE, LIQUID FILLED ORAL 2 TIMES DAILY PRN
Qty: 180 CAPSULE | Refills: 1 | Status: SHIPPED | OUTPATIENT
Start: 2023-05-29

## 2023-10-06 ENCOUNTER — OFFICE VISIT (OUTPATIENT)
Dept: PODIATRY | Facility: CLINIC | Age: 73
End: 2023-10-06
Payer: MEDICARE

## 2023-10-06 VITALS — WEIGHT: 155.19 LBS | HEIGHT: 62 IN | BODY MASS INDEX: 28.56 KG/M2

## 2023-10-06 DIAGNOSIS — E11.42 TYPE 2 DIABETES MELLITUS WITH DIABETIC POLYNEUROPATHY, UNSPECIFIED WHETHER LONG TERM INSULIN USE: ICD-10-CM

## 2023-10-06 DIAGNOSIS — R22.41 MASS OF FOOT, RIGHT: ICD-10-CM

## 2023-10-06 DIAGNOSIS — I73.9 PAD (PERIPHERAL ARTERY DISEASE): Primary | ICD-10-CM

## 2023-10-06 PROCEDURE — 99204 OFFICE O/P NEW MOD 45 MIN: CPT | Mod: S$GLB,,, | Performed by: PODIATRIST

## 2023-10-06 PROCEDURE — 1101F PR PT FALLS ASSESS DOC 0-1 FALLS W/OUT INJ PAST YR: ICD-10-PCS | Mod: CPTII,S$GLB,, | Performed by: PODIATRIST

## 2023-10-06 PROCEDURE — 3008F PR BODY MASS INDEX (BMI) DOCUMENTED: ICD-10-PCS | Mod: CPTII,S$GLB,, | Performed by: PODIATRIST

## 2023-10-06 PROCEDURE — 99204 PR OFFICE/OUTPT VISIT, NEW, LEVL IV, 45-59 MIN: ICD-10-PCS | Mod: S$GLB,,, | Performed by: PODIATRIST

## 2023-10-06 PROCEDURE — 3288F FALL RISK ASSESSMENT DOCD: CPT | Mod: CPTII,S$GLB,, | Performed by: PODIATRIST

## 2023-10-06 PROCEDURE — 99999 PR PBB SHADOW E&M-EST. PATIENT-LVL III: CPT | Mod: PBBFAC,,, | Performed by: PODIATRIST

## 2023-10-06 PROCEDURE — 1101F PT FALLS ASSESS-DOCD LE1/YR: CPT | Mod: CPTII,S$GLB,, | Performed by: PODIATRIST

## 2023-10-06 PROCEDURE — 1159F MED LIST DOCD IN RCRD: CPT | Mod: CPTII,S$GLB,, | Performed by: PODIATRIST

## 2023-10-06 PROCEDURE — 3008F BODY MASS INDEX DOCD: CPT | Mod: CPTII,S$GLB,, | Performed by: PODIATRIST

## 2023-10-06 PROCEDURE — 1159F PR MEDICATION LIST DOCUMENTED IN MEDICAL RECORD: ICD-10-PCS | Mod: CPTII,S$GLB,, | Performed by: PODIATRIST

## 2023-10-06 PROCEDURE — 1126F AMNT PAIN NOTED NONE PRSNT: CPT | Mod: CPTII,S$GLB,, | Performed by: PODIATRIST

## 2023-10-06 PROCEDURE — 1126F PR PAIN SEVERITY QUANTIFIED, NO PAIN PRESENT: ICD-10-PCS | Mod: CPTII,S$GLB,, | Performed by: PODIATRIST

## 2023-10-06 PROCEDURE — 3288F PR FALLS RISK ASSESSMENT DOCUMENTED: ICD-10-PCS | Mod: CPTII,S$GLB,, | Performed by: PODIATRIST

## 2023-10-06 PROCEDURE — 99999 PR PBB SHADOW E&M-EST. PATIENT-LVL III: ICD-10-PCS | Mod: PBBFAC,,, | Performed by: PODIATRIST

## 2023-10-06 RX ORDER — LIDOCAINE AND PRILOCAINE 25; 25 MG/G; MG/G
CREAM TOPICAL
Qty: 30 G | Refills: 3 | Status: SHIPPED | OUTPATIENT
Start: 2023-10-06

## 2023-10-06 RX ORDER — LIDOCAINE AND PRILOCAINE 25; 25 MG/G; MG/G
CREAM TOPICAL
Qty: 30 G | Refills: 3 | Status: SHIPPED | OUTPATIENT
Start: 2023-10-06 | End: 2023-10-06

## 2023-10-06 NOTE — PROGRESS NOTES
Subjective:      Patient ID: Zohra Paulino is a 73 y.o. female.    Chief Complaint: Foot Problem (Numbness )    Wants circulation study - insurance nurse could not find pulses at home with hand doppler. Diabetes, increased risk amputation needing evaluation/management/optomization of foot care.      [id number]/pain both feet at toes.  Gradual onset, worsening over past several weeks, aggravated by increased weight bearing, shoe gear, pressure.  No previous medical treatment.  OTC pain med not helping. Denies trauma, surgery.    Cc3 bump bottom right foot.  Gradual onset, no change > 10 years, aggravated by increased weight bearing, shoe gear, pressure.  No previous medical treatment.  OTC pain med not helping.     Chief Complaint   Patient presents with    Foot Problem     Numbness      Casual shoes    Review of Systems   Constitutional: Negative for chills, diaphoresis, fever, malaise/fatigue and night sweats.   Cardiovascular:  Negative for claudication, cyanosis, leg swelling and syncope.   Skin:  Positive for suspicious lesions. Negative for color change, dry skin, nail changes, rash and unusual hair distribution.   Musculoskeletal:  Negative for falls, joint pain, joint swelling, muscle cramps, muscle weakness and stiffness.   Gastrointestinal:  Negative for constipation, diarrhea, nausea and vomiting.   Neurological:  Positive for numbness, paresthesias and sensory change. Negative for brief paralysis, disturbances in coordination, focal weakness and tremors.         Objective:      Physical Exam  Constitutional:       General: She is not in acute distress.     Appearance: She is well-developed. She is not diaphoretic.   Cardiovascular:      Pulses:           Popliteal pulses are 2+ on the right side and 2+ on the left side.        Dorsalis pedis pulses are 2+ on the right side and 2+ on the left side.        Posterior tibial pulses are 1+ on the right side and 1+ on the left side.      Comments:  Capillary refill 3 seconds all toes/distal feet, all toes/both feet warm to touch.      Negative lymphadenopathy bilateral popliteal fossa and tarsal tunnel.      Negavie lower extremity edema bilateral.    Musculoskeletal:      Right ankle: No swelling, deformity, ecchymosis or lacerations. Normal range of motion. Normal pulse.      Right Achilles Tendon: Normal. No defects. Pollard's test negative.      Comments: Soft nonmobile mass plantar forefoot right without pain, and without loss of function orsigns acute trauma.    Otherwise, Normal angle, base, station of gait. All ten toes without clubbing, cyanosis, or signs of ischemia.  No pain to palpation bilateral lower extremities.  Range of motion, stability, muscle strength, and muscle tone normal bilateral feet and legs.    Lymphadenopathy:      Lower Body: No right inguinal adenopathy. No left inguinal adenopathy.      Comments: Negative lymphadenopathy bilateral popliteal fossa and tarsal tunnel.    Negative lymphangitic streaking bilateral feet/ankles/legs.   Skin:     General: Skin is warm and dry.      Capillary Refill: Capillary refill takes 2 to 3 seconds.      Coloration: Skin is not pale.      Findings: No abrasion, bruising, burn, ecchymosis, erythema, laceration, lesion or rash.      Nails: There is no clubbing.      Comments: Skin is normal age and health appropriate color, turgor, texture, and temperature bilateral lower extremities without ulceration, hyperpigmentation, discoloration, masses nodules or cords palpated.  No ecchymosis, erythema, edema, or cardinal signs of infection bilateral lower extremities.         Neurological:      Mental Status: She is alert and oriented to person, place, and time.      Sensory: Sensory deficit present.      Motor: No tremor, atrophy or abnormal muscle tone.      Gait: Gait normal.      Comments: Negative tinel sign to percussion sural, superficial peroneal, deep peroneal, saphenous, and posterior tibial  nerves right and left ankles and feet.    Paresthesias, and burning bilateral feet with no clearly identified trigger or source.    Decreased/absent vibratory sensation bilateral feet to 128Hz tuning fork.     Psychiatric:         Behavior: Behavior is cooperative.           Assessment:       Encounter Diagnoses   Name Primary?    PAD (peripheral artery disease) Yes    Mass of foot, right     Type 2 diabetes mellitus with diabetic polyneuropathy, unspecified whether long term insulin use          Plan:       Zohra was seen today for foot problem.    Diagnoses and all orders for this visit:    PAD (peripheral artery disease)  -     US Lower Extrem Arteries Bilat with DELIA (xpd); Future    Mass of foot, right  -     US Lower Extrem Arteries Bilat with DELIA (xpd); Future    Type 2 diabetes mellitus with diabetic polyneuropathy, unspecified whether long term insulin use  -     US Lower Extrem Arteries Bilat with DELIA (xpd); Future    Other orders  -     Discontinue: LIDOcaine-prilocaine (EMLA) cream; Apply topically as needed.  -     LIDOcaine-prilocaine (EMLA) cream; Apply topically as needed.      I counseled the patient on her conditions, their implications and medical management.    Discussed very low chance of cancer with any mass in body only disprovable by biopsy and pathology.  Patient verbalizes understanding and declines biopsy/immaging today.      Dopplers - PAD?    Emla topically both feet once nightly for pain until pCP or oncolgy manage chronic neuropathic pain.    Discussed conservative treatment with shoes of adequate dimensions, material, and style to alleviate symptoms and delay or prevent surgical intervention.    Inspect feet multiple times daily for signs of occurrence/recurrence ulceration.            No follow-ups on file.

## 2023-10-09 ENCOUNTER — HOSPITAL ENCOUNTER (OUTPATIENT)
Dept: RADIOLOGY | Facility: HOSPITAL | Age: 73
Discharge: HOME | End: 2023-10-09
Attending: PODIATRIST
Payer: MEDICARE

## 2023-10-09 DIAGNOSIS — E11.42 TYPE 2 DIABETES MELLITUS WITH DIABETIC POLYNEUROPATHY, UNSPECIFIED WHETHER LONG TERM INSULIN USE: ICD-10-CM

## 2023-10-09 DIAGNOSIS — R22.41 MASS OF FOOT, RIGHT: ICD-10-CM

## 2023-10-09 DIAGNOSIS — I73.9 PAD (PERIPHERAL ARTERY DISEASE): ICD-10-CM

## 2023-10-09 PROCEDURE — 93925 US LOWER EXTREMITY ARTERIES BILATERAL: ICD-10-PCS | Mod: 26,,, | Performed by: RADIOLOGY

## 2023-10-09 PROCEDURE — 93925 LOWER EXTREMITY STUDY: CPT | Mod: 26,,, | Performed by: RADIOLOGY

## 2023-10-09 PROCEDURE — 93925 LOWER EXTREMITY STUDY: CPT | Mod: TC

## 2024-05-30 LAB — CRC RECOMMENDATION EXT: NORMAL

## 2024-05-31 ENCOUNTER — PATIENT OUTREACH (OUTPATIENT)
Dept: ADMINISTRATIVE | Facility: HOSPITAL | Age: 74
End: 2024-05-31
Payer: MEDICARE

## 2024-09-25 ENCOUNTER — OFFICE VISIT (OUTPATIENT)
Dept: PODIATRY | Facility: CLINIC | Age: 74
End: 2024-09-25
Payer: MEDICARE

## 2024-09-25 VITALS — HEIGHT: 62 IN | BODY MASS INDEX: 28.56 KG/M2 | WEIGHT: 155.19 LBS

## 2024-09-25 DIAGNOSIS — T45.1X5A CHEMOTHERAPY-INDUCED PERIPHERAL NEUROPATHY: Primary | ICD-10-CM

## 2024-09-25 DIAGNOSIS — G62.0 CHEMOTHERAPY-INDUCED PERIPHERAL NEUROPATHY: Primary | ICD-10-CM

## 2024-09-25 DIAGNOSIS — R22.41 MASS OF FOOT, RIGHT: ICD-10-CM

## 2024-09-25 PROCEDURE — 1101F PT FALLS ASSESS-DOCD LE1/YR: CPT | Mod: CPTII,S$GLB,, | Performed by: PODIATRIST

## 2024-09-25 PROCEDURE — 1125F AMNT PAIN NOTED PAIN PRSNT: CPT | Mod: CPTII,S$GLB,, | Performed by: PODIATRIST

## 2024-09-25 PROCEDURE — 3008F BODY MASS INDEX DOCD: CPT | Mod: CPTII,S$GLB,, | Performed by: PODIATRIST

## 2024-09-25 PROCEDURE — 1160F RVW MEDS BY RX/DR IN RCRD: CPT | Mod: CPTII,S$GLB,, | Performed by: PODIATRIST

## 2024-09-25 PROCEDURE — 3288F FALL RISK ASSESSMENT DOCD: CPT | Mod: CPTII,S$GLB,, | Performed by: PODIATRIST

## 2024-09-25 PROCEDURE — 99214 OFFICE O/P EST MOD 30 MIN: CPT | Mod: S$GLB,,, | Performed by: PODIATRIST

## 2024-09-25 PROCEDURE — 99999 PR PBB SHADOW E&M-EST. PATIENT-LVL V: CPT | Mod: PBBFAC,,, | Performed by: PODIATRIST

## 2024-09-25 PROCEDURE — 1159F MED LIST DOCD IN RCRD: CPT | Mod: CPTII,S$GLB,, | Performed by: PODIATRIST

## 2024-09-25 NOTE — PATIENT INSTRUCTIONS
Over the counter pain creams: Voltaren Gel, Biofreeze, Bengay, tiger balm, two old goat, lidocaine gel,  Absorbine Veterinary Liniment Gel Topical Analgesic Sore Muscle and Joint Pain Relief    Recommend lotions: eucerin, eucerin for diabetics, aquaphor, A&D ointment, gold bond for diabetics, sween, [place]'s Bees all purpose baby ointment,  urea 40 with aloe or SkinIntegra rapid crack repair (found on amazon.com)    Shoe recommendations: (try 6pm.com, zappos.com , nordstromrack.Scour Prevention, or shoes.Scour Prevention for discounted prices) you can visit varsity shoes in [place], DSW shoes in [place]  or sheba rack in the [facility] (there are also several shoe brand outlets in the [facility])    ONLY purchase stability style tennis shoes AVOID flex, foam, free, yoga mat style shoes or shoes that claim to feel like clouds  If you have a flatter foot purchase shoes for PRONATION  If you have a high arch purchase shoes for SUPINATION    Shoe examples:    Asics (GT or gel foundations, gel-kayano-30), new balance stability type shoes (such as the 940 series or the [id number]), saprimitivoony (Guide 16, stabil c3),  Lui (GTS or Beast or   transcend), propet, HokaOne (minerva 7, arahi, cali) Bebeto (tennis shoes and boots)    Sofft Brand (women) Booker&Thierry (men), clarks, croshabbir, aerosoles, naturalizers, SAS, ecco, born, sherley reynolds, rockports (dress shoes)    Vionic, burkenstocks, fitflops, propet, taos, baretraps, Hoka or vionic recovery slides/sandals (sandals)    Hoka or vionic recovery slides/sandals, crocs, propet (house shoes)      Nail Home remedy:  Vicks Vapor rub or Emuaid to nails for easier manageability      Occasional soaks for 15-20 mins in luke warm water with 1 cup of listerine and 1 cup of apple cider vinegar are ok You may add several drops of oil of oregano or tea tree oil as well      Wearing Proper Shoes                    You walk on your feet every day, forcing them to support the weight of your body. Repeated stress  on your feet can cause damage over time. The right shoes can help protect your feet. The wrong shoes can cause more foot problems. Read the information below to help you find a shoe that fits your foot needs.      A good shoe fit will cover your foot outline. A shoe that doesnt cover the outline is a bad fit.   Whats your foot shape?  To get a good fit, you need to know the shape of your foot. Do this simple test: While standing, place your foot on a piece of paper and trace around it. Is your foot straight or curved? Do you have a foot problem, such as a bunion, that causes your foot outline to show a bulge on the side of your big toe?  Finding your fit  Bring your foot outline to the shoe store to help you find the right shoe. Place a shoe you like on top of the outline to see if it matches the shape. The shoe should cover the outline. (If you have a bunion, the shoe may not cover the bulge on the outline. Look for soft leather shoes to stretch over the bunion.) Once youve found a pair of proper shoes, put them on. Walk around. Be sure the shoes dont rub or pinch. If the shoes feel good, youve found your fit!  The right shoe for you  A good shoe has features that provide comfort and support. It must also be the right size and shape for your feet. Look for a shoe made of breathable fabric and lining, such as leather or canvas. Be sure that shoes have enough tread to prevent slipping. Go to a good shoe store for help finding the right shoe.  Good shoe features  An ideal shoe has the following:  Laces for support. If tying laces is a problem for you, try shoes with Velcro fasteners or sandra.  A front of the shoe (toe box) with ½ inch space in front of your longest toes.  An arch shape that supports your foot.  No more than 1½ inches of heel.  A stiff, snug back of the shoe to keep your foot from sliding around.  A smooth lining with no rough seams.  Shoe shopping tips  Below are some dos and donts for when  you go to the shoe store.  Do:  Select the shoes that feel right. Wear them around the house. Then bring them to your foot healthcare provider to check for fit. If they dont fit well, return them.  Shop late in the day, when your feet will be slightly bigger.  Each time you buy shoes, have both your feet measured while you are standing. Foot size changes with time.  Pick shoes to suit their purpose. High heels are OK for an occasional night on the town. But for everyday wear, choose a more sensible shoe.  Try on shoes while wearing any inserts specially made for your feet (orthoses).  Try on both the right and left shoes. If your feet are different sizes, pick a pair that fits the larger foot.  Dont:  Dont buy shoes based on shoe size alone. Always try on shoes, as sizes differ from brand to brand and within brands.  Dont expect shoes to break in. If they dont fit at the store, dont buy them.  Dont buy a shoe that doesnt match your foot shape.  What about socks?  Always wear socks with shoes. Socks help absorb sweat and reduce friction and blistering. When shopping for shoes, choose soft, padded socks with seams that dont irritate your feet.  If you have foot problems  Some foot problems cause deformities. This can make it hard to find a good fit. Look for shoes made of soft leather to stretch over the deformity. If you have bunions, buy shoes with a wider toe box. To fit hammertoes, look for shoes with a tall toe box. If you have arch problems, you may need inserts. In some cases, youll need to have custom footwear or orthoses made for your feet.  Suggested footwear  Ask your healthcare provider what kind of footwear you need. He or she may recommend a certain brand or shoe store.  Date Last Reviewed: 8/1/2016  © 8354-4144 [employer]. [address] [place] [address], [place] [address]. All rights reserved. This information is not intended as a substitute for professional medical care. Always  follow your healthcare professional's instructions.        Step-by-Step:  Inspecting Your Feet   Date Last Reviewed: 10/1/2016  © 9783-8238 [employer]. [address] [place] [address], [place] [address]. All rights reserved. This information is not intended as a substitute for professional medical care. Always follow your healthcare professional's instructions.

## 2024-09-25 NOTE — PROGRESS NOTES
Subjective:      Patient ID: Zohra Paulino is a 74 y.o. female.    Chief Complaint: Foot Pain    Zohra is a 74 y.o. female who presents to the clinic upon referral from Dr. Garcia  for evaluation and treatment of diabetic feet. Zohra has a past medical history of Bronchitis, Cancer, Diabetes mellitus, Fibroids, Nuclear sclerosis of both eyes (11/15/2021), Osteopetrosis, and Uterine fibroid. Patient relates no acute problems with feet. She has had neuropathy since her first round of chemotherapy and has abnormal sensations to the feet. She also has a nonpainful lesion to the plantar right forefoot, she denies color change or drainage.     PCP: Davin Crisostomo MD    Date Last Seen by PCP:   Chief Complaint   Patient presents with    Foot Pain       Previously seen by my colleague, new to me.    Current shoe gear: Flat reebok high top    Hemoglobin A1C   Date Value Ref Range Status   06/04/2022 6.7 (H) 4.0 - 5.6 % Final     Comment:     ADA Screening Guidelines:  5.7-6.4%  Consistent with prediabetes  >or=6.5%  Consistent with diabetes    High levels of fetal hemoglobin interfere with the HbA1C  assay. Heterozygous hemoglobin variants (HbS, HgC, etc)do  not significantly interfere with this assay.   However, presence of multiple variants may affect accuracy.     12/04/2020 10.3 (H) 4.7 - 5.6 % Final   01/18/2016 5.7 4.5 - 6.2 % Final       Patient Active Problem List   Diagnosis    S/P hysterectomy with oophorectomy, RATLH/BSO 11/16/17    Left ventricular diastolic dysfunction    Osteopenia of multiple sites    Primary adenocarcinoma of pancreas    Adenocarcinoma of pancreas    Pancreas cancer    At risk for malnutrition    Localized edema due to fluid overload    Pancreatic duct leak    Paroxysmal A-fib    Hypertriglyceridemia without hypercholesterolemia    Liver lesion    Scarring alopecia    Type 2 diabetes mellitus with diabetic cataract, without long-term current use of insulin    Chronic left shoulder  pain    Refractive error    Nuclear sclerosis of both eyes    Cortical age-related cataract, bilateral    Occult closed fracture of elbow, right, initial encounter    Trigger finger of left hand    Decreased range of motion of both wrists    Decreased  strength of left hand    Decreased range of motion of left wrist    Alteration in instrumental activities of daily living (IADL)    Localized edema    Decreased range of motion of finger of left hand    Other secondary neuroendocrine tumors    Antineoplastic chemotherapy induced pancytopenia (CODE)    Coagulation defect, unspecified    Personal history of pancreatic cancer    S/P hernia repair    Chronic kidney disease, stage 3a       Current Outpatient Medications on File Prior to Visit   Medication Sig Dispense Refill    ACCU-CHEK SOFTCLIX LANCETS Misc       alendronate (FOSAMAX) 70 MG tablet TAKE 1 TABLET WEEKLY AS DIRECTED 4 tablet 5    cetirizine (ZYRTEC) 10 MG tablet Take 10 mg by mouth.      cyclobenzaprine (FLEXERIL) 10 MG tablet       docusate sodium (COLACE) 100 MG capsule Take 1 capsule (100 mg total) by mouth 2 (two) times daily as needed for Constipation. 180 capsule 1    DULoxetine (CYMBALTA) 30 MG capsule Take 30 mg by mouth every evening.      ID NOW COVID-19 TEST KIT Kit TEST AS DIRECTED TODAY      iron bis-gly/FA/C/B12/Ca/succ (IRON-150 ORAL) Take by mouth once daily.      lancing device Misc As directed 1 each 0    LIDOcaine-prilocaine (EMLA) cream Apply topically as needed. 30 g 3    meloxicam (MOBIC) 15 MG tablet       omeprazole (PRILOSEC) 20 MG capsule Take 20 mg by mouth every 12 (twelve) hours.      ondansetron (ZOFRAN-ODT) 4 MG TbDL Take 1 tablet (4 mg total) by mouth every 6 (six) hours as needed. 10 tablet 0    potassium chloride (MICRO-K) 10 MEQ CpSR Take 10 mEq by mouth once daily.      traMADoL (ULTRAM) 50 mg tablet Take 1 tablet (50 mg total) by mouth every 6 (six) hours as needed for Pain. 15 tablet 0    vitamin D 1000 units Tab  Take 1,000 Units by mouth once daily.      blood-glucose meter (TRUE METRIX GLUCOSE METER) kit Use as instructed 1 each 0     Current Facility-Administered Medications on File Prior to Visit   Medication Dose Route Frequency Provider Last Rate Last Admin    0.9%  NaCl infusion   Intravenous Continuous Deion Ivory MD 20 mL/hr at 08/19/20 1337 20 mL/hr at 08/19/20 1337    sodium chloride 0.9% flush 10 mL  10 mL Intravenous PRN Deion Ivory MD           Review of patient's allergies indicates:   Allergen Reactions    Codeine Palpitations       Past Surgical History:   Procedure Laterality Date    CERVICAL BIOPSY  W/ LOOP ELECTRODE EXCISION      ck  2004    COLONOSCOPY      DISTAL PANCREATECTOMY N/A 7/27/2020    Procedure: PANCREATECTOMY, DISTAL, SUBTOTAL;  Surgeon: GILMA Ellis MD;  Location: [facility] OR;  Service: General;  Laterality: N/A;    ERCP N/A 8/19/2020    Procedure: ERCP (ENDOSCOPIC RETROGRADE CHOLANGIOPANCREATOGRAPHY);  Surgeon: Deion Ivory MD;  Location: [facility] ENDO;  Service: Endoscopy;  Laterality: N/A;  pancreatic duct leak  Rapid Covid test    ERCP N/A 9/16/2020    Procedure: ERCP (ENDOSCOPIC RETROGRADE CHOLANGIOPANCREATOGRAPHY);  Surgeon: Deion Ivory MD;  Location: [facility] END[facility];  Service: Endoscopy;  Laterality: N/A;    HYSTERECTOMY      fibroids    CA REMOVAL OF OVARY/TUBE(S)      TUBAL LIGATION         Family History   Problem Relation Name Age of Onset    Cataracts Mother      No Known Problems Father      Breast cancer Cousin  20    Arthritis Sister x5     Cataracts Sister x5     No Known Problems Brother x2     No Known Problems Maternal Grandmother      No Known Problems Maternal Grandfather      Pancreatic cancer Paternal Grandmother  80    No Known Problems Paternal Grandfather      Diabetes Son      No Known Problems Son      No Known Problems Daughter      No Known Problems Daughter      No Known Problems Maternal Aunt      No Known Problems Maternal Uncle       No Known Problems Paternal Aunt      No Known Problems Paternal Uncle         Social History     Socioeconomic History    Marital status: Single   Occupational History    Occupation: Paraprofessional     Comment: Elementary school   Tobacco Use    Smoking status: Never    Smokeless tobacco: Never   Substance and Sexual Activity    Alcohol use: Yes     Comment: rarely     Drug use: No    Sexual activity: Yes     Partners: Male     Birth control/protection: None   Social History Narrative    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     Social Drivers of Health     Financial Resource Strain: Patient Declined (7/8/2024)    Received from [facility]    Overall Financial Resource Strain (CARDIA)     Difficulty of Paying Living Expenses: Patient declined   Food Insecurity: Patient Declined (7/8/2024)    Received from [facility]    Hunger Vital Sign     Worried About Running Out of Food in the Last Year: Patient declined     Ran Out of Food in the Last Year: Patient declined   Transportation Needs: Patient Declined (7/8/2024)    Received from [facility]    PRAPARE - Transportation     Lack of Transportation (Medical): Patient declined     Lack of Transportation (Non-Medical): Patient declined   Physical Activity: Inactive (7/8/2024)    Received from [facility]    Exercise Vital Sign     Days of Exercise per Week: 0 days     Minutes of Exercise per Session: 0 min   Stress: No Stress Concern Present (7/8/2024)    Received from [facility]    [place] [employer] - Occupational Stress Questionnaire     Feeling of Stress : Not at all   Housing Stability: Low Risk  (5/27/2022)    Housing Stability Vital Sign     Unable to Pay for Housing in the Last Year: No     Number of Places Lived in the Last Year: 1     Unstable Housing in the Last Year: No       Review of Systems   Constitutional: Negative for chills and fever.   Cardiovascular:  Negative for claudication and leg swelling.   Respiratory:  Negative for  "cough and shortness of breath.    Skin:  Positive for dry skin. Negative for itching and rash.   Musculoskeletal:  Positive for arthritis, joint pain, myalgias and stiffness. Negative for falls, joint swelling and muscle weakness.   Gastrointestinal:  Negative for diarrhea, nausea and vomiting.   Neurological:  Positive for paresthesias. Negative for numbness, tremors and weakness.   Psychiatric/Behavioral:  Negative for altered mental status and hallucinations.            Objective:      Vitals:    09/25/24 1353   Weight: 70.4 kg (155 lb 3.3 oz)   Height: 5' 2" (1.575 m)   PainSc:   2   PainLoc: Foot       Physical Exam  Vitals and nursing note reviewed.   Constitutional:       General: She is not in acute distress.     Appearance: She is well-developed. She is not toxic-appearing or diaphoretic.      Comments: alert and oriented x 3.    Cardiovascular:      Pulses:           Dorsalis pedis pulses are 2+ on the right side and 2+ on the left side.        Posterior tibial pulses are 2+ on the right side and 2+ on the left side.      Comments:  Capillary refill time is within normal limits. Digital hair present.   Pulmonary:      Effort: No respiratory distress.   Musculoskeletal:         General: No deformity.      Right ankle: No tenderness. No lateral malleolus, medial malleolus, AITF ligament, CF ligament or posterior TF ligament tenderness.      Right Achilles Tendon: No defects. Pollard's test negative.      Left ankle: No tenderness. No lateral malleolus, medial malleolus, AITF ligament, CF ligament or posterior TF ligament tenderness.      Left Achilles Tendon: No defects. Pollard's test negative.      Right foot: No tenderness or bony tenderness.      Left foot: No tenderness or bony tenderness.      Comments: Muscle strength is 5/5 in all groups bilaterally.           Feet:      Right foot:      Protective Sensation: 10 sites tested.  10 sites sensed.      Skin integrity: Skin integrity normal.      Left " foot:      Protective Sensation: 10 sites tested.  10 sites sensed.      Skin integrity: Skin integrity normal.      Comments: Soft mobile mass on the plantar right forefoot, the mass transilluminates with the pen light. No open wounds, no rashes or maceration noted.  Lymphadenopathy:      Comments: No lymphatic streaking     Skin:     General: Skin is warm and dry.      Coloration: Skin is not pale.      Findings: No rash.      Nails: There is no clubbing.      Comments: Skin is of normal turgor.   Normal temperature gradient.   Neurological:      Sensory: No sensory deficit.      Motor: No atrophy.      Comments: .Light touch present    Dakota-Ralph 5.07 monofilament is intact bilateral feet. Sharp/dull sensation is also intact Bilateral feet.    proprioception intact    Vibratory intact    Paresthesias, and hyperesthesia bilateral feet with no clearly identified trigger or source.           Psychiatric:         Attention and Perception: She is attentive.         Mood and Affect: Mood is not anxious. Affect is not inappropriate.         Speech: She is communicative. Speech is not slurred.         Behavior: Behavior is not combative.         Narrative & Impression  EXAMINATION:  US LOWER EXTREMITY ARTERIES BILATERAL     CLINICAL HISTORY:  Peripheral vascular disease, unspecified     TECHNIQUE:  Bilateral lower extremity arterial duplex ultrasound examination performed. Multiple gray scale and color doppler images were obtained in addition to waveform analysis.     COMPARISON:  None     FINDINGS:  The peak systolic velocities on the right are as follows, in centimeters/second:     Common femoral artery: 122, triphasic     Deep profundus: 79, triphasic     Superficial femoral artery, proximal: 125, triphasic     Superficial femoral artery, mid portion: 80, triphasic     Superficial femoral artery, distal: 82, triphasic     Popliteal artery: 74, triphasic     Distal popliteal artery: 71, triphasic     Anterior  tibial artery: 112, biphasic     Posterior tibial artery: 98, triphasic     Peroneal artery: 79, biphasic     Dorsalis pedis: Patent     The peak systolic velocities on the left are as follows, in centimeters/second:     Common femoral artery: 105, triphasic     Deep profundus: 101     Superficial femoral artery, proximal: 138, triphasic     Superficial femoral artery, mid portion: 79, triphasic     Superficial femoral artery, distal: 77, triphasic     Popliteal artery: 73, triphasic     Distal popliteal artery: 59, triphasic     Anterior tibial artery: 84, biphasic     Posterior tibial artery: 97, triphasic     Peroneal artery: 66, biphasic     Dorsalis pedis: Patent.     Impression:     Sampled lower extremity velocities as above.  No abnormal velocity doubling to suggest hemodynamically significant stenosis.         Assessment:       Encounter Diagnoses   Name Primary?    Chemotherapy-induced peripheral neuropathy Yes    Mass of foot, right          Plan:     Problem List Items Addressed This Visit    None  Visit Diagnoses       Chemotherapy-induced peripheral neuropathy    -  Primary    Relevant Orders    Ambulatory referral/consult to Neurology    Mass of foot, right        Relevant Orders    Ambulatory referral/consult to Neurology             I counseled the patient on her conditions, their implications and medical management.    Explained possible causes of patients paresthesias including but not limited to systemic illness vs idiopathic vs edema vs low back pathology vs shoe gear. Seems in her case secondary to chemotherapy. Counseled patient on conservative management of her complaint including compression stockings, inserts, extra depth and width shoe gear avoiding flats, slippers, sandals, and going barefoot. Referral placed to neurology    Patient education on cystic type lesion to plantar foot.  I discussed condition and treatment which includes conservative and surgical options.  All questions were  answered.       Since the cyst is not interfering with daily function, the patient would like to continue monitoring it and will return to the clinic when patient wants intervention.  I provided reassurance.    Discussed the importance of well padded and supportive shoe gear to ease pressure and friction.  Advised patient to limiting movements or activities that increase pain may bring relief.  Patient instructed on adequate icing techniques. Patient should ice the affected area at least once per day x 10 minutes for 10 days . I advised the  patient that extra icing would also be beneficial to ensure adequate anti inflammatory effect     RTC PRN

## 2025-08-02 ENCOUNTER — HOSPITAL ENCOUNTER (OUTPATIENT)
Facility: HOSPITAL | Age: 75
Discharge: HOME | End: 2025-08-03
Attending: EMERGENCY MEDICINE | Admitting: EMERGENCY MEDICINE
Payer: MEDICARE

## 2025-08-02 DIAGNOSIS — E11.65 TYPE 2 DIABETES MELLITUS WITH HYPERGLYCEMIA, WITHOUT LONG-TERM CURRENT USE OF INSULIN: ICD-10-CM

## 2025-08-02 DIAGNOSIS — R73.9 HYPERGLYCEMIA: Primary | ICD-10-CM

## 2025-08-02 DIAGNOSIS — R07.9 CHEST PAIN: ICD-10-CM

## 2025-08-02 DIAGNOSIS — R53.83 FATIGUE: ICD-10-CM

## 2025-08-02 DIAGNOSIS — R79.89 ELEVATED TROPONIN: ICD-10-CM

## 2025-08-02 LAB
ALBUMIN SERPL-MCNC: 3.1 G/DL (ref 3.3–5.5)
ALBUMIN SERPL-MCNC: 3.3 G/DL (ref 3.3–5.5)
ALBUMIN SERPL-MCNC: 4.3 G/DL (ref 3.3–5.5)
ALLENS TEST: ABNORMAL
ALP SERPL-CCNC: 53 U/L (ref 42–141)
ALP SERPL-CCNC: 55 U/L (ref 42–141)
ALP SERPL-CCNC: 70 U/L (ref 42–141)
BILIRUB SERPL-MCNC: 0.8 MG/DL (ref 0.2–1.6)
BILIRUB SERPL-MCNC: 0.8 MG/DL (ref 0.2–1.6)
BILIRUB SERPL-MCNC: 0.9 MG/DL (ref 0.2–1.6)
BILIRUBIN, POC UA: NEGATIVE
BLOOD, POC UA: ABNORMAL
BUN SERPL-MCNC: 29 MG/DL (ref 7–22)
BUN SERPL-MCNC: 34 MG/DL (ref 7–22)
BUN SERPL-MCNC: 36 MG/DL (ref 7–22)
CALCIUM SERPL-MCNC: 10.9 MG/DL (ref 8–10.3)
CALCIUM SERPL-MCNC: 9.6 MG/DL (ref 8–10.3)
CALCIUM SERPL-MCNC: 9.8 MG/DL (ref 8–10.3)
CHLORIDE SERPL-SCNC: 101 MMOL/L (ref 98–108)
CHLORIDE SERPL-SCNC: 105 MMOL/L (ref 98–108)
CHLORIDE SERPL-SCNC: 93 MMOL/L (ref 98–108)
CLARITY, UA: CLEAR
COLOR, UA: YELLOW
CREAT SERPL-MCNC: 1 MG/DL (ref 0.6–1.2)
CTP QC/QA: YES
GLUCOSE SERPL-MCNC: 301 MG/DL (ref 73–118)
GLUCOSE SERPL-MCNC: 518 MG/DL (ref 73–118)
GLUCOSE SERPL-MCNC: 617 MG/DL (ref 73–118)
GLUCOSE, POC UA: ABNORMAL
HCO3 UR-SCNC: 32 MMOL/L (ref 24–28)
HCT, POC: NORMAL
HGB, POC: NORMAL (ref 14–18)
INFLUENZA A ANTIGEN, POC: NEGATIVE
INFLUENZA B ANTIGEN, POC: NEGATIVE
KETONES, POC UA: ABNORMAL
LDH SERPL L TO P-CCNC: 1.52 MMOL/L (ref 0.5–2.2)
LEUKOCYTE EST, POC UA: NEGATIVE
MCH, POC: NORMAL
MCHC, POC: NORMAL
MCV, POC: NORMAL
MPV, POC: NORMAL
NITRITE, POC UA: NEGATIVE
PCO2 BLDA: 55.1 MMHG (ref 35–45)
PH SMN: 7.37 [PH] (ref 7.35–7.45)
PH UR STRIP: 5.5 [PH] (ref 5–8)
PO2 BLDA: 17 MMHG (ref 40–60)
POC ALT (SGPT): 20 U/L (ref 10–47)
POC ALT (SGPT): 20 U/L (ref 10–47)
POC ALT (SGPT): 22 U/L (ref 10–47)
POC AST (SGOT): 24 U/L (ref 11–38)
POC AST (SGOT): 29 U/L (ref 11–38)
POC AST (SGOT): 30 U/L (ref 11–38)
POC B-TYPE NATRIURETIC PEPTIDE: 99 PG/ML (ref 0–100)
POC BE: 5 MMOL/L (ref -2–2)
POC D-DI: 398 NG/ML (ref 0–450)
POC PLATELET COUNT: NORMAL
POC SATURATED O2: 23 % (ref 95–100)
POC TCO2: 28 MMOL/L (ref 18–33)
POC TCO2: 29 MMOL/L (ref 18–33)
POC TCO2: 29 MMOL/L (ref 18–33)
POC TCO2: 34 MMOL/L (ref 24–29)
POCT GLUCOSE: 310 MG/DL (ref 70–110)
POCT GLUCOSE: >500 MG/DL (ref 70–110)
POTASSIUM BLD-SCNC: 4.3 MMOL/L (ref 3.6–5.1)
POTASSIUM BLD-SCNC: 5 MMOL/L (ref 3.6–5.1)
POTASSIUM BLD-SCNC: 6.3 MMOL/L (ref 3.6–5.1)
PROTEIN, POC UA: NEGATIVE
PROTEIN, POC: 5.9 G/DL (ref 6.4–8.1)
PROTEIN, POC: 6 G/DL (ref 6.4–8.1)
PROTEIN, POC: 7.8 G/DL (ref 6.4–8.1)
RBC, POC: NORMAL
RDW, POC: NORMAL
SAMPLE: ABNORMAL
SARS-COV-2 RDRP RESP QL NAA+PROBE: NEGATIVE
SITE: ABNORMAL
SODIUM BLD-SCNC: 135 MMOL/L (ref 128–145)
SODIUM BLD-SCNC: 137 MMOL/L (ref 128–145)
SODIUM BLD-SCNC: 138 MMOL/L (ref 128–145)
SPECIFIC GRAVITY, POC UA: 1.01 (ref 1–1.03)
UROBILINOGEN, POC UA: 0.2 E.U./DL
WBC, POC: NORMAL

## 2025-08-02 PROCEDURE — 81003 URINALYSIS AUTO W/O SCOPE: CPT | Mod: ER

## 2025-08-02 PROCEDURE — 96376 TX/PRO/DX INJ SAME DRUG ADON: CPT | Mod: ER

## 2025-08-02 PROCEDURE — 87635 SARS-COV-2 COVID-19 AMP PRB: CPT | Mod: ER | Performed by: EMERGENCY MEDICINE

## 2025-08-02 PROCEDURE — 96361 HYDRATE IV INFUSION ADD-ON: CPT

## 2025-08-02 PROCEDURE — 80048 BASIC METABOLIC PNL TOTAL CA: CPT | Performed by: EMERGENCY MEDICINE

## 2025-08-02 PROCEDURE — 93005 ELECTROCARDIOGRAM TRACING: CPT | Mod: ER

## 2025-08-02 PROCEDURE — 25000003 PHARM REV CODE 250: Mod: ER | Performed by: EMERGENCY MEDICINE

## 2025-08-02 PROCEDURE — 84100 ASSAY OF PHOSPHORUS: CPT | Performed by: EMERGENCY MEDICINE

## 2025-08-02 PROCEDURE — 63600175 PHARM REV CODE 636 W HCPCS: Mod: ER | Performed by: EMERGENCY MEDICINE

## 2025-08-02 PROCEDURE — 96365 THER/PROPH/DIAG IV INF INIT: CPT | Mod: ER

## 2025-08-02 PROCEDURE — 85025 COMPLETE CBC W/AUTO DIFF WBC: CPT | Mod: ER

## 2025-08-02 PROCEDURE — 83880 ASSAY OF NATRIURETIC PEPTIDE: CPT | Mod: ER

## 2025-08-02 PROCEDURE — G0378 HOSPITAL OBSERVATION PER HR: HCPCS | Mod: ER

## 2025-08-02 PROCEDURE — 85379 FIBRIN DEGRADATION QUANT: CPT | Mod: ER

## 2025-08-02 PROCEDURE — 93010 ELECTROCARDIOGRAM REPORT: CPT | Mod: ,,, | Performed by: INTERNAL MEDICINE

## 2025-08-02 PROCEDURE — 96361 HYDRATE IV INFUSION ADD-ON: CPT | Mod: ER

## 2025-08-02 PROCEDURE — 82803 BLOOD GASES ANY COMBINATION: CPT | Mod: ER

## 2025-08-02 PROCEDURE — 63600175 PHARM REV CODE 636 W HCPCS: Mod: ER,MUE | Performed by: EMERGENCY MEDICINE

## 2025-08-02 PROCEDURE — 82803 BLOOD GASES ANY COMBINATION: CPT | Mod: ER,91

## 2025-08-02 PROCEDURE — 87502 INFLUENZA DNA AMP PROBE: CPT | Mod: ER

## 2025-08-02 PROCEDURE — S5010 5% DEXTROSE AND 0.45% SALINE: HCPCS | Mod: ER | Performed by: EMERGENCY MEDICINE

## 2025-08-02 PROCEDURE — 99285 EMERGENCY DEPT VISIT HI MDM: CPT | Mod: 25,ER

## 2025-08-02 PROCEDURE — 83036 HEMOGLOBIN GLYCOSYLATED A1C: CPT | Performed by: EMERGENCY MEDICINE

## 2025-08-02 PROCEDURE — 80053 COMPREHEN METABOLIC PANEL: CPT | Mod: ER

## 2025-08-02 RX ORDER — SODIUM CHLORIDE 9 MG/ML
1000 INJECTION, SOLUTION INTRAVENOUS CONTINUOUS
Status: DISCONTINUED | OUTPATIENT
Start: 2025-08-02 | End: 2025-08-03

## 2025-08-02 RX ORDER — DEXTROSE MONOHYDRATE AND SODIUM CHLORIDE 5; .45 G/100ML; G/100ML
INJECTION, SOLUTION INTRAVENOUS CONTINUOUS PRN
Status: DISCONTINUED | OUTPATIENT
Start: 2025-08-02 | End: 2025-08-03

## 2025-08-02 RX ORDER — ALBUTEROL SULFATE 2.5 MG/.5ML
10 SOLUTION RESPIRATORY (INHALATION)
Status: DISCONTINUED | OUTPATIENT
Start: 2025-08-02 | End: 2025-08-02

## 2025-08-02 RX ORDER — SODIUM CHLORIDE 9 MG/ML
1000 INJECTION, SOLUTION INTRAVENOUS
Status: COMPLETED | OUTPATIENT
Start: 2025-08-02 | End: 2025-08-02

## 2025-08-02 RX ORDER — SODIUM BICARBONATE 1 MEQ/ML
50 SYRINGE (ML) INTRAVENOUS
Status: DISCONTINUED | OUTPATIENT
Start: 2025-08-02 | End: 2025-08-02

## 2025-08-02 RX ORDER — SODIUM CHLORIDE 0.9 % (FLUSH) 0.9 %
10 SYRINGE (ML) INJECTION
Status: DISCONTINUED | OUTPATIENT
Start: 2025-08-02 | End: 2025-08-03

## 2025-08-02 RX ADMIN — INSULIN HUMAN 0.1 UNITS/KG/HR: 1 INJECTION, SOLUTION INTRAVENOUS at 10:08

## 2025-08-02 RX ADMIN — SODIUM CHLORIDE 1000 ML: 9 INJECTION, SOLUTION INTRAVENOUS at 10:08

## 2025-08-02 RX ADMIN — DEXTROSE AND SODIUM CHLORIDE: 5; 450 INJECTION, SOLUTION INTRAVENOUS at 11:08

## 2025-08-02 RX ADMIN — INSULIN HUMAN 5 UNITS: 100 INJECTION, SOLUTION PARENTERAL at 09:08

## 2025-08-02 RX ADMIN — SODIUM CHLORIDE 1000 ML: 9 INJECTION, SOLUTION INTRAVENOUS at 08:08

## 2025-08-02 RX ADMIN — INSULIN HUMAN 6 UNITS: 100 INJECTION, SOLUTION PARENTERAL at 08:08

## 2025-08-02 NOTE — ED PROVIDER NOTES
"Encounter Date: 8/2/2025       History     Chief Complaint   Patient presents with    Hyperglycemia     Pt recently dx with Pneumonitis and placed on prednisone. Pt states for the last couple weeks she had been feeling increasingly more generalized weakness/fatigue. Son also reports she has been drinking a lot of protein drinks and more thirsty.      75-year-old female with history of pancreatic cancer, diabetes, osteoporosis, chronic bronchitis states she has not been feeling well for several weeks.  She was seen by primary care on the 15th of July and she states she was diagnosed with "pneumonitis".  She was prescribed a 3 week taper of prednisone.  She states she took the 1st week and 2 days into the 2nd week ending last Tuesday.  She stopped it because she thought it might bitten causing her to feel worse.  She has had increased thirst.  She has had increased urinary frequency.  No fever.  No difficulty breathing or shortness breath at this time.  No abdominal pain nausea vomiting or diarrhea.  No fever infections.  No URI symptoms.       Review of patient's allergies indicates:   Allergen Reactions    Codeine Palpitations     Past Medical History:   Diagnosis Date    Bronchitis     Cancer     pancreatic    Diabetes mellitus     Fibroids     Nuclear sclerosis of both eyes 11/15/2021    Osteopetrosis     Uterine fibroid      Past Surgical History:   Procedure Laterality Date    CERVICAL BIOPSY  W/ LOOP ELECTRODE EXCISION      ckc  2004    COLONOSCOPY      DISTAL PANCREATECTOMY N/A 7/27/2020    Procedure: PANCREATECTOMY, DISTAL, SUBTOTAL;  Surgeon: GILMA Ellis MD;  Location: [facility] OR;  Service: General;  Laterality: N/A;    ERCP N/A 8/19/2020    Procedure: ERCP (ENDOSCOPIC RETROGRADE CHOLANGIOPANCREATOGRAPHY);  Surgeon: Deion Ivory MD;  Location: [facility] ENDO;  Service: Endoscopy;  Laterality: N/A;  pancreatic duct leak  Rapid Covid test    ERCP N/A 9/16/2020    Procedure: ERCP (ENDOSCOPIC RETROGRADE " CHOLANGIOPANCREATOGRAPHY);  Surgeon: Deion Ivory MD;  Location: [facility];  Service: Endoscopy;  Laterality: N/A;    HYSTERECTOMY      fibroids    WA REMOVAL OF OVARY/TUBE(S)      TUBAL LIGATION       Family History   Problem Relation Name Age of Onset    Cataracts Mother      No Known Problems Father      Breast cancer Cousin  20    Arthritis Sister x5     Cataracts Sister x5     No Known Problems Brother x2     No Known Problems Maternal Grandmother      No Known Problems Maternal Grandfather      Pancreatic cancer Paternal Grandmother  80    No Known Problems Paternal Grandfather      Diabetes Son      No Known Problems Son      No Known Problems Daughter      No Known Problems Daughter      No Known Problems Maternal Aunt      No Known Problems Maternal Uncle      No Known Problems Paternal Aunt      No Known Problems Paternal Uncle       Social History[1]  Review of Systems    Physical Exam     Initial Vitals [08/02/25 1722]   BP Pulse Resp Temp SpO2   (!) 163/73 90 16 98.6 °F (37 °C) 98 %      MAP       --         Physical Exam    Nursing note and vitals reviewed.  Constitutional: She appears well-developed and well-nourished.   HENT:   Dry oropharynx   Eyes: EOM are normal. Pupils are equal, round, and reactive to light.   Neck: Neck supple. No thyromegaly present. No JVD present.   Normal range of motion.  Cardiovascular:  Normal rate, regular rhythm, normal heart sounds and intact distal pulses.     Exam reveals no gallop and no friction rub.       No murmur heard.  Pulmonary/Chest: Breath sounds normal. No respiratory distress. She has no wheezes. She has no rhonchi. She has no rales.   Abdominal: Abdomen is soft. Bowel sounds are normal. She exhibits no distension. There is no abdominal tenderness. There is no rebound.   Musculoskeletal:         General: No tenderness or edema. Normal range of motion.      Cervical back: Normal range of motion and neck supple.     Neurological: She is alert and  oriented to person, place, and time. She has normal strength.   Skin: Skin is warm and dry. Capillary refill takes less than 2 seconds.         ED Course   Procedures  Labs Reviewed   BASIC METABOLIC PANEL - Abnormal       Result Value    Sodium 140      Potassium 4.0      Chloride 104      CO2 26      Glucose 305 (*)     BUN 31 (*)     Creatinine 1.1      Calcium 8.9      Anion Gap 10      eGFR 53 (*)    PHOSPHORUS - Abnormal    Phosphorus Level 2.3 (*)    POCT GLUCOSE - Abnormal    POCT Glucose >500 (*)    POCT URINALYSIS W/O SCOPE - Abnormal    Glucose, UA 3+ (*)     Bilirubin, UA Negative      Ketones, UA 1+ (*)     Spec Grav UA 1.010      Blood, UA 3+ (*)     PH, UA 5.5      Protein, UA Negative      Urobilinogen, UA 0.2      Nitrite, UA Negative      Leukocytes, UA Negative      Color, UA POC Yellow      Clarity, UA, POC Clear     POCT GLUCOSE - Abnormal    POCT Glucose >500 (*)    ISTAT PROCEDURE - Abnormal    POC PH 7.372      POC PCO2 55.1 (*)     POC PO2 17 (*)     POC HCO3 32.0 (*)     POC BE 5 (*)     POC SATURATED O2 23      POC Lactate 1.52      POC TCO2 34 (*)     Sample VENOUS      Site Other      Allens Test N/A     POCT CMP - Abnormal    Albumin, POC 4.3      Alkaline Phosphatase, POC 70      ALT (SGPT), POC 22      AST (SGOT), POC 30      POC BUN 36 (*)     Calcium, POC 10.9 (*)     POC Chloride 93 (*)     POC Creatinine 1.0      POC Glucose 617 (*)     POC Potassium 6.3 (*)     POC Sodium 135      Bilirubin, POC 0.9      POC TCO2 29      Protein, POC 7.8     POCT CMP - Abnormal    Albumin, POC 3.3      Alkaline Phosphatase, POC 55      ALT (SGPT), POC 20      AST (SGOT), POC 24      POC BUN 34 (*)     Calcium, POC 9.8      POC Chloride 101      POC Creatinine 1.0      POC Glucose 518 (*)     POC Potassium 5.0      POC Sodium 137      Bilirubin, POC 0.8      POC TCO2 28      Protein, POC 6.0 (*)    POCT GLUCOSE - Abnormal    POCT Glucose >500 (*)    POCT GLUCOSE - Abnormal    POCT Glucose 310 (*)     POCT CMP - Abnormal    Albumin, POC 3.1      Alkaline Phosphatase, POC 53      ALT (SGPT), POC 20      AST (SGOT), POC 29      POC BUN 29 (*)     Calcium, POC 9.6      POC Chloride 105      POC Creatinine 1.0      POC Glucose 301 (*)     POC Potassium 4.3      POC Sodium 138      Bilirubin, POC 0.8      POC TCO2 29      Protein, POC 5.9 (*)    POCT GLUCOSE - Abnormal    POCT Glucose 232 (*)    POCT GLUCOSE - Abnormal    POCT Glucose 242 (*)    HEMOGLOBIN A1C   POCT CBC    Hematocrit        Hemoglobin        RBC        WBC        MCV        MCH, POC        MCHC        RDW-CV        Platelet Count, POC        MPV       SARS-COV-2 RDRP GENE    POC Rapid COVID Negative       Acceptable Yes     POCT GLUCOSE, HAND-HELD DEVICE   POCT GLUCOSE, HAND-HELD DEVICE   POCT GLUCOSE, HAND-HELD DEVICE   POCT URINALYSIS(INSTRUMENT)   POCT INFLUENZA A/B MOLECULAR   POCT GLUCOSE, HAND-HELD DEVICE   POCT CMP   POCT TROPONIN   POCT B-TYPE NATRIURETIC PEPTIDE (BNP)   POCT D DIMER   POCT RAPID INFLUENZA A/B    Influenza B Ag negative      Inflenza A Ag negative     POCT B-TYPE NATRIURETIC PEPTIDE (BNP)    POC B-Type Natriuretic Peptide 99.0     POCT D DIMER    POC D-     POCT CMP   POCT GLUCOSE MONITORING CONTINUOUS          Imaging Results              X-Ray Chest AP Portable (Final result)  Result time 08/02/25 19:05:53      Final result by Shannan Darnell MD (08/02/25 19:05:53)                   Impression:      No acute intrathoracic abnormality detected.      Electronically signed by: Shannan Darnell  Date:    08/02/2025  Time:    19:05               Narrative:    EXAMINATION:  AP PORTABLE CHEST    CLINICAL HISTORY:  fatigue;    TECHNIQUE:  AP portable chest radiograph was submitted.    COMPARISON:  05/27/2022    FINDINGS:  AP portable chest radiograph demonstrates a cardiac silhouette within normal limits.  There is no focal consolidation, pneumothorax, or pleural effusion.  There is mild atelectasis or  pleural thickening at the minor fissure.  Moderate asymmetrical elevation of right hemidiaphragm is present.  Mild dextroscoliosis is present.                                       Medications   sodium chloride 0.9% flush 10 mL (has no administration in time range)   0.9% NaCl infusion (0 mLs Intravenous Stopped 8/2/25 2357)   dextrose 5 % and 0.45 % NaCl infusion ( Intravenous Verify Only 8/3/25 0200)   insulin regular in 0.9 % NaCl 100 unit/100 mL (1 unit/mL) infusion (0.05 Units/kg/hr × 59 kg Intravenous No Dose/Rate Change 8/3/25 0312)   dextrose 50% injection 25 g (has no administration in time range)   dextrose 50% injection 12.5 g (has no administration in time range)   sodium chloride 0.9% flush 10 mL (has no administration in time range)   enoxaparin injection 40 mg (has no administration in time range)   acetaminophen tablet 650 mg (has no administration in time range)   polyethylene glycol packet 17 g (has no administration in time range)   ondansetron injection 4 mg (has no administration in time range)   melatonin tablet 6 mg (has no administration in time range)   simethicone chewable tablet 80 mg (has no administration in time range)   aluminum-magnesium hydroxide-simethicone 200-200-20 mg/5 mL suspension 30 mL (has no administration in time range)   hydrALAZINE injection 5 mg (has no administration in time range)   cyclobenzaprine tablet 5 mg (has no administration in time range)   DULoxetine DR capsule 30 mg (has no administration in time range)   traMADoL tablet 50 mg (has no administration in time range)   fluconazole 40 mg/ml suspension 100 mg (has no administration in time range)   pantoprazole EC tablet 40 mg (has no administration in time range)   potassium phosphate 20 mmol in D5W 500 mL infusion (has no administration in time range)   sodium chloride 0.9% bolus 1,000 mL 1,000 mL (0 mLs Intravenous Stopped 8/2/25 2220)   insulin regular injection 6 Units 0.06 mL (6 Units Intravenous Given  8/2/25 2044)   0.9% NaCl infusion (0 mLs Intravenous Stopped 8/2/25 2110)   insulin regular injection 5 Units 0.05 mL (5 Units Intravenous Given 8/2/25 2137)     Medical Decision Making  Amount and/or Complexity of Data Reviewed  Labs: ordered.  Radiology: ordered.    Risk  OTC drugs.  Prescription drug management.    Blood sugar is greater than 500.  Will check EKG, troponin due to fatigue complaint.  Will check urinalysis for potential infection.  Will check CBC CMP for renal function and electrolytes and looking for anion gap acidosis.  Also white cell count looking for potential underlying infection however recent steroid use could elevated white blood cell count.  Will go ahead and start giving IV fluids.  IV insulin.  Accu-Chek Q 1 hour.  Will go ahead and check COVID as well.  Patient will be turned over to Dr. Trjuillo to follow, reassess and provide disposition.                       Medical Decision Making:   ED Management:  Kyle Richardson  1800 - assumed care of patient from Dr. Lambert pending labs. In short,  75-year-old female with a history of diabetes is presenting with fatigue, generalized weakness, extreme thirst.  Glucose on arrival was greater than 500.  On BNP, after receiving 6 units insulin, it was 617.  Additionally, potassium is 6.3.  She has peaked T-waves which are new.  No QRS widening.  She appears very dry.  She is getting IV fluids, additional insulin, albuterol, sodium bicarb, and being started on an insulin drip.  I would like to admit her to the ICU for further evaluation and treatment of hyperkalemia, hyperglycemia with significant EKG changes.  Creatinine is 1 but BUN is 36, my suspicion is that this is largely being driven by dehydration rather than new onset renal failure.  She has a historical med of potassium, I am unsure if she is currently taking it.  Additionally she has a history of pancreatic cancer though she did not share this information during interview.  Unclear  if this is contributing to the diabetes.    On repeat CMP without intervention, potassium noted to be 5.  Suspect spurious initial potassium.  Attempted additional IV fluids and insulin.  Glucose remains severely elevated.  Will start insulin drip, continue transferred to [facility] for further evaluation and treatment of elevated glucose.                Clinical Impression:  Final diagnoses:  [R53.83] Fatigue  [R73.9] Hyperglycemia (Primary)          ED Disposition Condition    Observation                       [1]   Social History  Tobacco Use    Smoking status: Never    Smokeless tobacco: Never   Vaping Use    Vaping status: Never Used   Substance Use Topics    Alcohol use: Yes     Comment: rarely     Drug use: No        Kyle Richardson MD  08/03/25 0333

## 2025-08-03 VITALS
HEIGHT: 62 IN | DIASTOLIC BLOOD PRESSURE: 66 MMHG | WEIGHT: 136.44 LBS | HEART RATE: 60 BPM | OXYGEN SATURATION: 96 % | TEMPERATURE: 98 F | BODY MASS INDEX: 25.11 KG/M2 | SYSTOLIC BLOOD PRESSURE: 147 MMHG | RESPIRATION RATE: 17 BRPM

## 2025-08-03 PROBLEM — R07.89 ATYPICAL CHEST PAIN: Status: ACTIVE | Noted: 2025-08-03

## 2025-08-03 PROBLEM — E11.65 TYPE 2 DIABETES MELLITUS WITH HYPERGLYCEMIA, WITHOUT LONG-TERM CURRENT USE OF INSULIN: Status: ACTIVE | Noted: 2021-07-01

## 2025-08-03 PROBLEM — J70.0 PNEUMONITIS, RADIATION: Status: ACTIVE | Noted: 2025-08-03

## 2025-08-03 LAB
ABSOLUTE EOSINOPHIL (OHS): 0.01 K/UL
ABSOLUTE MONOCYTE (OHS): 0.53 K/UL (ref 0.3–1)
ABSOLUTE NEUTROPHIL COUNT (OHS): 7.33 K/UL (ref 1.8–7.7)
ALLENS TEST: ABNORMAL
ANION GAP (OHS): 10 MMOL/L (ref 8–16)
ANION GAP (OHS): 8 MMOL/L (ref 8–16)
AORTIC SIZE INDEX (SOV): 2 CM/M2
AORTIC SIZE INDEX: 1.8 CM/M2
ASCENDING AORTA: 2.9 CM
AV INDEX (PROSTH): 0.91
AV MEAN GRADIENT: 2 MMHG
AV PEAK GRADIENT: 3 MMHG
AV VALVE AREA BY VELOCITY RATIO: 2.5 CM²
AV VALVE AREA: 2.6 CM²
AV VELOCITY RATIO: 0.88
B-OH-BUTYR BLD STRIP-SCNC: 0.2 MMOL/L
BASOPHILS # BLD AUTO: 0.02 K/UL
BASOPHILS NFR BLD AUTO: 0.2 %
BSA FOR ECHO PROCEDURE: 1.65 M2
BUN SERPL-MCNC: 28 MG/DL (ref 8–23)
BUN SERPL-MCNC: 31 MG/DL (ref 8–23)
CALCIUM SERPL-MCNC: 8.4 MG/DL (ref 8.7–10.5)
CALCIUM SERPL-MCNC: 8.9 MG/DL (ref 8.7–10.5)
CHLORIDE SERPL-SCNC: 104 MMOL/L (ref 95–110)
CHLORIDE SERPL-SCNC: 104 MMOL/L (ref 95–110)
CO2 SERPL-SCNC: 26 MMOL/L (ref 23–29)
CO2 SERPL-SCNC: 27 MMOL/L (ref 23–29)
CREAT SERPL-MCNC: 1 MG/DL (ref 0.5–1.4)
CREAT SERPL-MCNC: 1.1 MG/DL (ref 0.5–1.4)
CV ECHO LV RWT: 0.41 CM
DELSYS: ABNORMAL
DOP CALC AO PEAK VEL: 0.8 M/S
DOP CALC AO VTI: 17.1 CM
DOP CALC LVOT AREA: 2.8 CM2
DOP CALC LVOT DIAMETER: 1.9 CM
DOP CALC LVOT PEAK VEL: 0.7 M/S
DOP CALCLVOT PEAK VEL VTI: 15.5 CM
E WAVE DECELERATION TIME: 167 MSEC
E/A RATIO: 0.79
E/E' RATIO: 12 M/S
EAG (OHS): 275 MG/DL (ref 68–131)
ECHO LV POSTERIOR WALL: 0.8 CM (ref 0.6–1.1)
ERYTHROCYTE [DISTWIDTH] IN BLOOD BY AUTOMATED COUNT: 14.5 % (ref 11.5–14.5)
FRACTIONAL SHORTENING: 17.9 % (ref 28–44)
GFR SERPLBLD CREATININE-BSD FMLA CKD-EPI: 53 ML/MIN/1.73/M2
GFR SERPLBLD CREATININE-BSD FMLA CKD-EPI: 59 ML/MIN/1.73/M2
GLUCOSE SERPL-MCNC: 203 MG/DL (ref 70–110)
GLUCOSE SERPL-MCNC: 305 MG/DL (ref 70–110)
HBA1C MFR BLD: 11.2 % (ref 4–5.6)
HCO3 UR-SCNC: 32.2 MMOL/L (ref 24–28)
HCT VFR BLD AUTO: 41.3 % (ref 37–48.5)
HGB BLD-MCNC: 13.2 GM/DL (ref 12–16)
IMM GRANULOCYTES # BLD AUTO: 0.03 K/UL (ref 0–0.04)
IMM GRANULOCYTES NFR BLD AUTO: 0.3 % (ref 0–0.5)
INTERVENTRICULAR SEPTUM: 0.8 CM (ref 0.6–1.1)
IVC DIAMETER: 1.5 CM
IVRT: 148 MSEC
LA MAJOR: 4.3 CM
LA MINOR: 4.3 CM
LACTATE SERPL-SCNC: 2.2 MMOL/L (ref 0.5–2.2)
LACTATE SERPL-SCNC: 2.6 MMOL/L (ref 0.5–2.2)
LEFT ATRIUM SIZE: 3.2 CM
LEFT INTERNAL DIMENSION IN SYSTOLE: 3.2 CM (ref 2.1–4)
LEFT VENTRICLE DIASTOLIC VOLUME INDEX: 40.74 ML/M2
LEFT VENTRICLE DIASTOLIC VOLUME: 66 ML
LEFT VENTRICLE MASS INDEX: 55.4 G/M2
LEFT VENTRICLE SYSTOLIC VOLUME INDEX: 24.7 ML/M2
LEFT VENTRICLE SYSTOLIC VOLUME: 40 ML
LEFT VENTRICULAR INTERNAL DIMENSION IN DIASTOLE: 3.9 CM (ref 3.5–6)
LEFT VENTRICULAR MASS: 89.7 G
LV LATERAL E/E' RATIO: 10.4 M/S
LV SEPTAL E/E' RATIO: 13 M/S
LVED V (TEICH): 66.46 ML
LVES V (TEICH): 39.6 ML
LVOT MG: 1.13 MMHG
LVOT MV: 0.51 CM/S
LYMPHOCYTES # BLD AUTO: 1.48 K/UL (ref 1–4.8)
Lab: 1.8 CM/M
Lab: 2 CM/M
MAGNESIUM SERPL-MCNC: 1.8 MG/DL (ref 1.6–2.6)
MCH RBC QN AUTO: 28 PG (ref 27–31)
MCHC RBC AUTO-ENTMCNC: 32 G/DL (ref 32–36)
MCV RBC AUTO: 88 FL (ref 82–98)
MODE: ABNORMAL
MV PEAK A VEL: 0.66 M/S
MV PEAK E VEL: 0.52 M/S
NUCLEATED RBC (/100WBC) (OHS): 0 /100 WBC
OHS CV CPX PATIENT HEIGHT IN: 62
OHS CV RV/LV RATIO: 0.87 CM
PCO2 BLDA: 54.8 MMHG (ref 35–45)
PH SMN: 7.38 [PH] (ref 7.35–7.45)
PHOSPHATE SERPL-MCNC: 1.8 MG/DL (ref 2.7–4.5)
PHOSPHATE SERPL-MCNC: 2.3 MG/DL (ref 2.7–4.5)
PISA TR MAX VEL: 1.8 M/S
PLATELET # BLD AUTO: 172 K/UL (ref 150–450)
PMV BLD AUTO: 12.1 FL (ref 9.2–12.9)
PO2 BLDA: 20 MMHG (ref 40–60)
POC BE: 6 MMOL/L (ref -2–2)
POC SATURATED O2: 30 % (ref 95–100)
POC TCO2: 34 MMOL/L (ref 24–29)
POCT GLUCOSE: 170 MG/DL (ref 70–110)
POCT GLUCOSE: 201 MG/DL (ref 70–110)
POCT GLUCOSE: 223 MG/DL (ref 70–110)
POCT GLUCOSE: 224 MG/DL (ref 70–110)
POCT GLUCOSE: 230 MG/DL (ref 70–110)
POCT GLUCOSE: 232 MG/DL (ref 70–110)
POCT GLUCOSE: 242 MG/DL (ref 70–110)
POCT GLUCOSE: 289 MG/DL (ref 70–110)
POTASSIUM SERPL-SCNC: 3.7 MMOL/L (ref 3.5–5.1)
POTASSIUM SERPL-SCNC: 4 MMOL/L (ref 3.5–5.1)
PROCALCITONIN SERPL-MCNC: 0.07 NG/ML
PULM VEIN S/D RATIO: 1.32
PV PEAK D VEL: 0.25 M/S
PV PEAK GRADIENT: 2 MMHG
PV PEAK S VEL: 0.33 M/S
PV PEAK VELOCITY: 0.65 M/S
RA MAJOR: 4.39 CM
RA PRESSURE ESTIMATED: 3 MMHG
RBC # BLD AUTO: 4.71 M/UL (ref 4–5.4)
RELATIVE EOSINOPHIL (OHS): 0.1 %
RELATIVE LYMPHOCYTE (OHS): 15.7 % (ref 18–48)
RELATIVE MONOCYTE (OHS): 5.6 % (ref 4–15)
RELATIVE NEUTROPHIL (OHS): 78.1 % (ref 38–73)
RIGHT VENTRICLE DIASTOLIC BASEL DIMENSION: 3.4 CM
RIGHT VENTRICULAR END-DIASTOLIC DIMENSION: 3.42 CM
RV TB RVSP: 5 MMHG
SAMPLE: ABNORMAL
SINUS: 3.2 CM
SITE: ABNORMAL
SODIUM SERPL-SCNC: 139 MMOL/L (ref 136–145)
SODIUM SERPL-SCNC: 140 MMOL/L (ref 136–145)
STJ: 2.5 CM
TDI LATERAL: 0.05 M/S
TDI SEPTAL: 0.04 M/S
TDI: 0.05 M/S
TR MAX PG: 13 MMHG
TRICUSPID ANNULAR PLANE SYSTOLIC EXCURSION: 1.7 CM
TROPONIN I SERPL HS-MCNC: 71 NG/L
TROPONIN I SERPL HS-MCNC: 88 NG/L
TROPONIN I SERPL HS-MCNC: 92 NG/L
TV PEAK GRADIENT: 1 MMHG
TV REST PULMONARY ARTERY PRESSURE: 16 MMHG
WBC # BLD AUTO: 9.4 K/UL (ref 3.9–12.7)
Z-SCORE OF LEFT VENTRICULAR DIMENSION IN END DIASTOLE: -1.61
Z-SCORE OF LEFT VENTRICULAR DIMENSION IN END SYSTOLE: 0.94

## 2025-08-03 PROCEDURE — 96366 THER/PROPH/DIAG IV INF ADDON: CPT | Mod: ER

## 2025-08-03 PROCEDURE — G0378 HOSPITAL OBSERVATION PER HR: HCPCS | Mod: ER

## 2025-08-03 PROCEDURE — 25000003 PHARM REV CODE 250: Performed by: INTERNAL MEDICINE

## 2025-08-03 PROCEDURE — 96366 THER/PROPH/DIAG IV INF ADDON: CPT

## 2025-08-03 PROCEDURE — S5010 5% DEXTROSE AND 0.45% SALINE: HCPCS | Performed by: EMERGENCY MEDICINE

## 2025-08-03 PROCEDURE — 84145 PROCALCITONIN (PCT): CPT | Performed by: INTERNAL MEDICINE

## 2025-08-03 PROCEDURE — 99900035 HC TECH TIME PER 15 MIN (STAT)

## 2025-08-03 PROCEDURE — 84484 ASSAY OF TROPONIN QUANT: CPT | Mod: 91 | Performed by: INTERNAL MEDICINE

## 2025-08-03 PROCEDURE — G0378 HOSPITAL OBSERVATION PER HR: HCPCS

## 2025-08-03 PROCEDURE — 94761 N-INVAS EAR/PLS OXIMETRY MLT: CPT | Mod: XB

## 2025-08-03 PROCEDURE — 84100 ASSAY OF PHOSPHORUS: CPT | Performed by: INTERNAL MEDICINE

## 2025-08-03 PROCEDURE — 82962 GLUCOSE BLOOD TEST: CPT | Mod: ER

## 2025-08-03 PROCEDURE — 36415 COLL VENOUS BLD VENIPUNCTURE: CPT | Performed by: INTERNAL MEDICINE

## 2025-08-03 PROCEDURE — 96365 THER/PROPH/DIAG IV INF INIT: CPT

## 2025-08-03 PROCEDURE — 80048 BASIC METABOLIC PNL TOTAL CA: CPT | Performed by: INTERNAL MEDICINE

## 2025-08-03 PROCEDURE — 82803 BLOOD GASES ANY COMBINATION: CPT

## 2025-08-03 PROCEDURE — 83735 ASSAY OF MAGNESIUM: CPT | Performed by: INTERNAL MEDICINE

## 2025-08-03 PROCEDURE — 63600175 PHARM REV CODE 636 W HCPCS: Performed by: INTERNAL MEDICINE

## 2025-08-03 PROCEDURE — 25000003 PHARM REV CODE 250: Performed by: EMERGENCY MEDICINE

## 2025-08-03 PROCEDURE — 83605 ASSAY OF LACTIC ACID: CPT | Performed by: INTERNAL MEDICINE

## 2025-08-03 PROCEDURE — 96372 THER/PROPH/DIAG INJ SC/IM: CPT | Performed by: INTERNAL MEDICINE

## 2025-08-03 PROCEDURE — 82010 KETONE BODYS QUAN: CPT | Performed by: INTERNAL MEDICINE

## 2025-08-03 PROCEDURE — 93005 ELECTROCARDIOGRAM TRACING: CPT

## 2025-08-03 PROCEDURE — 85025 COMPLETE CBC W/AUTO DIFF WBC: CPT | Performed by: INTERNAL MEDICINE

## 2025-08-03 PROCEDURE — 93010 ELECTROCARDIOGRAM REPORT: CPT | Mod: ,,, | Performed by: INTERNAL MEDICINE

## 2025-08-03 RX ORDER — GLUCAGON 1 MG
1 KIT INJECTION
Status: DISCONTINUED | OUTPATIENT
Start: 2025-08-03 | End: 2025-08-03 | Stop reason: HOSPADM

## 2025-08-03 RX ORDER — ALUMINUM HYDROXIDE, MAGNESIUM HYDROXIDE, AND SIMETHICONE 1200; 120; 1200 MG/30ML; MG/30ML; MG/30ML
30 SUSPENSION ORAL 4 TIMES DAILY PRN
Status: DISCONTINUED | OUTPATIENT
Start: 2025-08-03 | End: 2025-08-03 | Stop reason: HOSPADM

## 2025-08-03 RX ORDER — ENOXAPARIN SODIUM 100 MG/ML
40 INJECTION SUBCUTANEOUS EVERY 24 HOURS
Status: DISCONTINUED | OUTPATIENT
Start: 2025-08-03 | End: 2025-08-03 | Stop reason: HOSPADM

## 2025-08-03 RX ORDER — INSULIN ASPART 100 [IU]/ML
0-5 INJECTION, SOLUTION INTRAVENOUS; SUBCUTANEOUS
Status: DISCONTINUED | OUTPATIENT
Start: 2025-08-03 | End: 2025-08-03 | Stop reason: HOSPADM

## 2025-08-03 RX ORDER — SIMETHICONE 80 MG
1 TABLET,CHEWABLE ORAL 4 TIMES DAILY PRN
Status: DISCONTINUED | OUTPATIENT
Start: 2025-08-03 | End: 2025-08-03 | Stop reason: HOSPADM

## 2025-08-03 RX ORDER — SODIUM CHLORIDE 0.9 % (FLUSH) 0.9 %
10 SYRINGE (ML) INJECTION EVERY 12 HOURS PRN
Status: DISCONTINUED | OUTPATIENT
Start: 2025-08-03 | End: 2025-08-03 | Stop reason: HOSPADM

## 2025-08-03 RX ORDER — TALC
6 POWDER (GRAM) TOPICAL NIGHTLY PRN
Status: DISCONTINUED | OUTPATIENT
Start: 2025-08-03 | End: 2025-08-03 | Stop reason: HOSPADM

## 2025-08-03 RX ORDER — INSULIN GLARGINE 100 [IU]/ML
15 INJECTION, SOLUTION SUBCUTANEOUS DAILY
Status: DISCONTINUED | OUTPATIENT
Start: 2025-08-03 | End: 2025-08-03 | Stop reason: HOSPADM

## 2025-08-03 RX ORDER — TRAMADOL HYDROCHLORIDE 50 MG/1
50 TABLET, FILM COATED ORAL EVERY 6 HOURS PRN
Status: DISCONTINUED | OUTPATIENT
Start: 2025-08-03 | End: 2025-08-03 | Stop reason: HOSPADM

## 2025-08-03 RX ORDER — DULOXETIN HYDROCHLORIDE 30 MG/1
30 CAPSULE, DELAYED RELEASE ORAL NIGHTLY
Status: DISCONTINUED | OUTPATIENT
Start: 2025-08-03 | End: 2025-08-03 | Stop reason: HOSPADM

## 2025-08-03 RX ORDER — ONDANSETRON HYDROCHLORIDE 2 MG/ML
4 INJECTION, SOLUTION INTRAVENOUS EVERY 6 HOURS PRN
Status: DISCONTINUED | OUTPATIENT
Start: 2025-08-03 | End: 2025-08-03 | Stop reason: HOSPADM

## 2025-08-03 RX ORDER — IBUPROFEN 200 MG
24 TABLET ORAL
Status: DISCONTINUED | OUTPATIENT
Start: 2025-08-03 | End: 2025-08-03 | Stop reason: HOSPADM

## 2025-08-03 RX ORDER — ACETAMINOPHEN 325 MG/1
650 TABLET ORAL EVERY 4 HOURS PRN
Status: DISCONTINUED | OUTPATIENT
Start: 2025-08-03 | End: 2025-08-03 | Stop reason: HOSPADM

## 2025-08-03 RX ORDER — PANTOPRAZOLE SODIUM 40 MG/1
40 TABLET, DELAYED RELEASE ORAL DAILY
Status: DISCONTINUED | OUTPATIENT
Start: 2025-08-03 | End: 2025-08-03

## 2025-08-03 RX ORDER — IBUPROFEN 200 MG
16 TABLET ORAL
Status: DISCONTINUED | OUTPATIENT
Start: 2025-08-03 | End: 2025-08-03 | Stop reason: HOSPADM

## 2025-08-03 RX ORDER — CYCLOBENZAPRINE HCL 5 MG
5 TABLET ORAL 3 TIMES DAILY PRN
Status: DISCONTINUED | OUTPATIENT
Start: 2025-08-03 | End: 2025-08-03 | Stop reason: HOSPADM

## 2025-08-03 RX ORDER — POLYETHYLENE GLYCOL 3350 17 G/17G
17 POWDER, FOR SOLUTION ORAL 2 TIMES DAILY PRN
Status: DISCONTINUED | OUTPATIENT
Start: 2025-08-03 | End: 2025-08-03 | Stop reason: HOSPADM

## 2025-08-03 RX ORDER — FLUCONAZOLE 40 MG/ML
100 POWDER, FOR SUSPENSION ORAL DAILY
Status: DISCONTINUED | OUTPATIENT
Start: 2025-08-03 | End: 2025-08-03 | Stop reason: HOSPADM

## 2025-08-03 RX ORDER — HYDRALAZINE HYDROCHLORIDE 20 MG/ML
5 INJECTION INTRAMUSCULAR; INTRAVENOUS EVERY 6 HOURS PRN
Status: DISCONTINUED | OUTPATIENT
Start: 2025-08-03 | End: 2025-08-03 | Stop reason: HOSPADM

## 2025-08-03 RX ORDER — SODIUM CHLORIDE 9 MG/ML
INJECTION, SOLUTION INTRAVENOUS CONTINUOUS
Status: DISCONTINUED | OUTPATIENT
Start: 2025-08-03 | End: 2025-08-03 | Stop reason: HOSPADM

## 2025-08-03 RX ORDER — PANTOPRAZOLE SODIUM 40 MG/1
40 TABLET, DELAYED RELEASE ORAL 2 TIMES DAILY
Status: DISCONTINUED | OUTPATIENT
Start: 2025-08-03 | End: 2025-08-03 | Stop reason: HOSPADM

## 2025-08-03 RX ORDER — METFORMIN HYDROCHLORIDE 500 MG/1
500 TABLET ORAL 2 TIMES DAILY WITH MEALS
Qty: 180 TABLET | Refills: 3 | Status: SHIPPED | OUTPATIENT
Start: 2025-08-03 | End: 2026-08-03

## 2025-08-03 RX ADMIN — INSULIN ASPART 3 UNITS: 100 INJECTION, SOLUTION INTRAVENOUS; SUBCUTANEOUS at 11:08

## 2025-08-03 RX ADMIN — FLUCONAZOLE 100 MG: 40 POWDER, FOR SUSPENSION ORAL at 03:08

## 2025-08-03 RX ADMIN — INSULIN GLARGINE 15 UNITS: 100 INJECTION, SOLUTION SUBCUTANEOUS at 04:08

## 2025-08-03 RX ADMIN — INSULIN ASPART 2 UNITS: 100 INJECTION, SOLUTION INTRAVENOUS; SUBCUTANEOUS at 07:08

## 2025-08-03 RX ADMIN — SODIUM CHLORIDE: 9 INJECTION, SOLUTION INTRAVENOUS at 04:08

## 2025-08-03 RX ADMIN — PANTOPRAZOLE SODIUM 40 MG: 40 TABLET, DELAYED RELEASE ORAL at 09:08

## 2025-08-03 RX ADMIN — DEXTROSE AND SODIUM CHLORIDE: 5; 450 INJECTION, SOLUTION INTRAVENOUS at 01:08

## 2025-08-03 RX ADMIN — POTASSIUM PHOSPHATE, MONOBASIC POTASSIUM PHOSPHATE, DIBASIC INJECTION, 20 MMOL: 236; 224 SOLUTION, CONCENTRATE INTRAVENOUS at 03:08

## 2025-08-03 RX ADMIN — INSULIN HUMAN 0.05 UNITS/KG/HR: 1 INJECTION, SOLUTION INTRAVENOUS at 01:08

## 2025-08-03 NOTE — DISCHARGE INSTRUCTIONS
Stop steroids  Take metformin twice daily  F/u with your PCP and Diabetic doctor      Thank you for trusting [facility] and me with your care.  We are honored that you entrusted us with your healthcare needs. Your satisfaction is very important to us and we hope you have been very pleased with your experience at [facility]. After your discharge you may receive a survey asking you to rate your hospital experience- Please help us by completing this survey. We hope that you have received the very best care possible during your hospitalization at [facility], as your satisfaction is our top priority.    Let me know if there is anything more I can do!!              Edward Wilder MD        Medical Director        Section Head of         Board-Certified IM Attending      PATIENT GENERAL DISCHARGE INFORMATION   Things that YOU are responsible for to Manage Your Care At Home:  1. Getting your prescriptions filled.  2. Taking you medications as directed. (DO NOT MISS ANY DOSES!)  3. Going to your follow-up doctor appointments.                 *This is important because it allows the doctor to monitor your progress and make changes.      If no one calls you for your appointments, please call ([phone]) and reschedule this appointment.   After discharge, if you need assistance, you can call [facility] for 24/7 assistance at [phone]  If you are experience any signs or symptoms, Call your doctor or Call [phone] and come to your nearest Emergency Room.    You should receive a call from [facility] within 48-72 hours to help manage your care after discharge.   Please try to make sure that you answer your phone for this important phone call.

## 2025-08-03 NOTE — SUBJECTIVE & OBJECTIVE
Past Medical History:   Diagnosis Date    Bronchitis     Cancer     pancreatic    Diabetes mellitus     Fibroids     Nuclear sclerosis of both eyes 11/15/2021    Osteopetrosis     Uterine fibroid        Past Surgical History:   Procedure Laterality Date    CERVICAL BIOPSY  W/ LOOP ELECTRODE EXCISION      ckc  2004    COLONOSCOPY      DISTAL PANCREATECTOMY N/A 7/27/2020    Procedure: PANCREATECTOMY, DISTAL, SUBTOTAL;  Surgeon: GILMA Ellis MD;  Location: [facility] OR;  Service: General;  Laterality: N/A;    ERCP N/A 8/19/2020    Procedure: ERCP (ENDOSCOPIC RETROGRADE CHOLANGIOPANCREATOGRAPHY);  Surgeon: Deion Ivory MD;  Location: [facility] ENDO;  Service: Endoscopy;  Laterality: N/A;  pancreatic duct leak  Rapid Covid test    ERCP N/A 9/16/2020    Procedure: ERCP (ENDOSCOPIC RETROGRADE CHOLANGIOPANCREATOGRAPHY);  Surgeon: Deion Ivory MD;  Location: [facility] ENDO;  Service: Endoscopy;  Laterality: N/A;    HYSTERECTOMY      fibroids    MA REMOVAL OF OVARY/TUBE(S)      TUBAL LIGATION         Review of patient's allergies indicates:   Allergen Reactions    Codeine Palpitations       Current Facility-Administered Medications on File Prior to Encounter   Medication    0.9%  NaCl infusion    sodium chloride 0.9% flush 10 mL     Current Outpatient Medications on File Prior to Encounter   Medication Sig    ACCU-CHEK SOFTCLIX LANCETS Misc     alendronate (FOSAMAX) 70 MG tablet TAKE 1 TABLET WEEKLY AS DIRECTED    blood-glucose meter (TRUE METRIX GLUCOSE METER) kit Use as instructed    cetirizine (ZYRTEC) 10 MG tablet Take 10 mg by mouth.    cyclobenzaprine (FLEXERIL) 10 MG tablet     docusate sodium (COLACE) 100 MG capsule Take 1 capsule (100 mg total) by mouth 2 (two) times daily as needed for Constipation.    DULoxetine (CYMBALTA) 30 MG capsule Take 30 mg by mouth every evening.    ID NOW COVID-19 TEST KIT Kit TEST AS DIRECTED TODAY    iron bis-gly/FA/C/B12/Ca/succ (IRON-150 ORAL) Take by mouth once daily.    lancing  device Misc As directed    LIDOcaine-prilocaine (EMLA) cream Apply topically as needed.    meloxicam (MOBIC) 15 MG tablet     omeprazole (PRILOSEC) 20 MG capsule Take 20 mg by mouth every 12 (twelve) hours.    ondansetron (ZOFRAN-ODT) 4 MG TbDL Take 1 tablet (4 mg total) by mouth every 6 (six) hours as needed.    potassium chloride (MICRO-K) 10 MEQ CpSR Take 10 mEq by mouth once daily.    traMADoL (ULTRAM) 50 mg tablet Take 1 tablet (50 mg total) by mouth every 6 (six) hours as needed for Pain.    vitamin D 1000 units Tab Take 1,000 Units by mouth once daily.     Family History       Problem Relation (Age of Onset)    Arthritis Sister    Breast cancer Cousin (20)    Cataracts Mother, Sister    Diabetes Son    No Known Problems Father, Brother, Maternal Grandmother, Maternal Grandfather, Paternal Grandfather, Son, Daughter, Daughter, Maternal Aunt, Maternal Uncle, Paternal Aunt, Paternal Uncle    Pancreatic cancer Paternal Grandmother (80)          Tobacco Use    Smoking status: Never    Smokeless tobacco: Never   Vaping Use    Vaping status: Never Used   Substance and Sexual Activity    Alcohol use: Yes     Comment: rarely     Drug use: No    Sexual activity: Not Currently     Partners: Male     Birth control/protection: None     Review of Systems   Constitutional:  Positive for activity change and fatigue. Negative for appetite change, chills, diaphoresis and fever.   HENT:  Negative for congestion.    Eyes:  Negative for visual disturbance.   Respiratory:  Positive for chest tightness. Negative for cough, shortness of breath and wheezing.    Cardiovascular:  Positive for chest pain. Negative for palpitations and leg swelling.   Gastrointestinal:  Negative for abdominal pain, constipation, diarrhea, nausea and vomiting.   Endocrine: Positive for polyuria.   Genitourinary:  Negative for dysuria.   Musculoskeletal:  Positive for arthralgias. Negative for myalgias.   Neurological:  Positive for weakness. Negative for  dizziness, light-headedness and headaches.   Psychiatric/Behavioral:  The patient is not nervous/anxious.      Objective:     Vital Signs (Most Recent):  Temp: 97.9 °F (36.6 °C) (08/03/25 0400)  Pulse: 61 (08/03/25 0500)  Resp: (!) 28 (08/03/25 0500)  BP: 138/65 (08/03/25 0500)  SpO2: 100 % (08/03/25 0104) Vital Signs (24h Range):  Temp:  [97.8 °F (36.6 °C)-98.6 °F (37 °C)] 97.9 °F (36.6 °C)  Pulse:  [61-90] 61  Resp:  [12-37] 28  SpO2:  [77 %-100 %] 100 %  BP: ()/(55-80) 138/65     Weight: 61.9 kg (136 lb 7.4 oz)  Body mass index is 24.96 kg/m².     Physical Exam  Vitals and nursing note reviewed.   Constitutional:       General: She is not in acute distress.     Appearance: Normal appearance. She is not ill-appearing, toxic-appearing or diaphoretic.      Comments: Frail appearing   HENT:      Head: Normocephalic and atraumatic.      Nose: Nose normal. No congestion or rhinorrhea.      Mouth/Throat:      Mouth: Mucous membranes are dry.      Pharynx: Oropharynx is clear. No oropharyngeal exudate or posterior oropharyngeal erythema.   Eyes:      General: No scleral icterus.     Extraocular Movements: Extraocular movements intact.      Conjunctiva/sclera: Conjunctivae normal.      Pupils: Pupils are equal, round, and reactive to light.   Cardiovascular:      Rate and Rhythm: Normal rate and regular rhythm.      Pulses: Normal pulses.      Heart sounds: No murmur heard.     No friction rub. No gallop.   Pulmonary:      Effort: Pulmonary effort is normal. No respiratory distress.      Breath sounds: Normal breath sounds. No wheezing, rhonchi or rales.   Abdominal:      General: Bowel sounds are normal. There is no distension.      Palpations: Abdomen is soft.      Tenderness: There is no abdominal tenderness. There is no guarding or rebound.   Musculoskeletal:         General: No swelling. Normal range of motion.      Cervical back: Normal range of motion and neck supple.      Right lower leg: No edema.      Left  lower leg: No edema.   Skin:     General: Skin is warm.      Capillary Refill: Capillary refill takes less than 2 seconds.      Coloration: Skin is not pale.   Neurological:      Mental Status: She is alert and oriented to person, place, and time.      Cranial Nerves: No cranial nerve deficit.      Motor: No weakness.      Coordination: Coordination normal.   Psychiatric:         Mood and Affect: Mood normal.         Behavior: Behavior normal.              CRANIAL NERVES     CN III, IV, VI   Pupils are equal, round, and reactive to light.       Recent Results (from the past 24 hours)   POCT glucose    Collection Time: 08/02/25  5:14 PM   Result Value Ref Range    POCT Glucose >500 (HH) 70 - 110 mg/dL   POCT URINALYSIS W/O SCOPE    Collection Time: 08/02/25  6:40 PM   Result Value Ref Range    Glucose, UA 3+ (A) Negative    Bilirubin, UA Negative Negative    Ketones, UA 1+ (A) Negative    Spec Grav UA 1.010 1.005 - 1.030    Blood, UA 3+ (A) Negative    PH, UA 5.5 5.0 - 8.0    Protein, UA Negative Negative    Urobilinogen, UA 0.2 <=1.0 E.U./dL    Nitrite, UA Negative Negative    Leukocytes, UA Negative Negative    Color, UA POC Yellow Yellow, Straw, Virgie    Clarity, UA, POC Clear Clear   POCT Rapid Influenza A/B    Collection Time: 08/02/25  6:42 PM   Result Value Ref Range    Influenza B Ag negative Positive/Negative    Inflenza A Ag negative Positive/Negative   POCT COVID-19 Rapid Screening    Collection Time: 08/02/25  6:54 PM   Result Value Ref Range    POC Rapid COVID Negative Negative     Acceptable Yes    POCT glucose    Collection Time: 08/02/25  8:29 PM   Result Value Ref Range    POCT Glucose >500 (HH) 70 - 110 mg/dL   POCT CBC    Collection Time: 08/02/25  8:30 PM   Result Value Ref Range    Hematocrit      Hemoglobin      RBC      WBC      MCV      MCH, POC      MCHC      RDW-CV      Platelet Count, POC      MPV     POCT CMP    Collection Time: 08/02/25  8:32 PM   Result Value Ref Range     Albumin, POC 4.3 3.3 - 5.5 g/dL    Alkaline Phosphatase, POC 70 42 - 141 U/L    ALT (SGPT), POC 22 10 - 47 U/L    AST (SGOT), POC 30 11 - 38 U/L    POC BUN 36 (H) 7 - 22 mg/dL    Calcium, POC 10.9 (H) 8.0 - 10.3 mg/dL    POC Chloride 93 (L) 98 - 108 mmol/L    POC Creatinine 1.0 0.6 - 1.2 mg/dL    POC Glucose 617 (HH) 73 - 118 mg/dL    POC Potassium 6.3 (HH) 3.6 - 5.1 mmol/L    POC Sodium 135 128 - 145 mmol/L    Bilirubin, POC 0.9 0.2 - 1.6 mg/dL    POC TCO2 29 18 - 33 mmol/L    Protein, POC 7.8 6.4 - 8.1 g/dL   ISTAT PROCEDURE    Collection Time: 08/02/25  8:34 PM   Result Value Ref Range    POC PH 7.372 7.35 - 7.45    POC PCO2 55.1 (H) 35 - 45 mmHg    POC PO2 17 (L) 40 - 60 mmHg    POC HCO3 32.0 (H) 24 - 28 mmol/L    POC BE 5 (H) -2 to 2 mmol/L    POC SATURATED O2 23 95 - 100 %    POC Lactate 1.52 0.5 - 2.2 mmol/L    POC TCO2 34 (H) 24 - 29 mmol/L    Sample VENOUS     Site Other     Allens Test N/A    POCT B-type natriuretic peptide (BNP)    Collection Time: 08/02/25  8:55 PM   Result Value Ref Range    POC B-Type Natriuretic Peptide 99.0 0.0 - 100.0 pg/mL   POCT D Dimer    Collection Time: 08/02/25  8:59 PM   Result Value Ref Range    POC D- 0.0 - 450.0 ng/mL   POCT CMP    Collection Time: 08/02/25  9:13 PM   Result Value Ref Range    Albumin, POC 3.3 3.3 - 5.5 g/dL    Alkaline Phosphatase, POC 55 42 - 141 U/L    ALT (SGPT), POC 20 10 - 47 U/L    AST (SGOT), POC 24 11 - 38 U/L    POC BUN 34 (H) 7 - 22 mg/dL    Calcium, POC 9.8 8.0 - 10.3 mg/dL    POC Chloride 101 98 - 108 mmol/L    POC Creatinine 1.0 0.6 - 1.2 mg/dL    POC Glucose 518 (HH) 73 - 118 mg/dL    POC Potassium 5.0 3.6 - 5.1 mmol/L    POC Sodium 137 128 - 145 mmol/L    Bilirubin, POC 0.8 0.2 - 1.6 mg/dL    POC TCO2 28 18 - 33 mmol/L    Protein, POC 6.0 (L) 6.4 - 8.1 g/dL   POCT glucose    Collection Time: 08/02/25 10:23 PM   Result Value Ref Range    POCT Glucose >500 (HH) 70 - 110 mg/dL   Basic metabolic panel    Collection Time: 08/02/25 11:30 PM    Result Value Ref Range    Sodium 140 136 - 145 mmol/L    Potassium 4.0 3.5 - 5.1 mmol/L    Chloride 104 95 - 110 mmol/L    CO2 26 23 - 29 mmol/L    Glucose 305 (H) 70 - 110 mg/dL    BUN 31 (H) 8 - 23 mg/dL    Creatinine 1.1 0.5 - 1.4 mg/dL    Calcium 8.9 8.7 - 10.5 mg/dL    Anion Gap 10 8 - 16 mmol/L    eGFR 53 (L) >60 mL/min/1.73/m2   Phosphorus    Collection Time: 08/02/25 11:30 PM   Result Value Ref Range    Phosphorus Level 2.3 (L) 2.7 - 4.5 mg/dL   POCT CMP    Collection Time: 08/02/25 11:35 PM   Result Value Ref Range    Albumin, POC 3.1 3.3 - 5.5 g/dL    Alkaline Phosphatase, POC 53 42 - 141 U/L    ALT (SGPT), POC 20 10 - 47 U/L    AST (SGOT), POC 29 11 - 38 U/L    POC BUN 29 (H) 7 - 22 mg/dL    Calcium, POC 9.6 8.0 - 10.3 mg/dL    POC Chloride 105 98 - 108 mmol/L    POC Creatinine 1.0 0.6 - 1.2 mg/dL    POC Glucose 301 (H) 73 - 118 mg/dL    POC Potassium 4.3 3.6 - 5.1 mmol/L    POC Sodium 138 128 - 145 mmol/L    Bilirubin, POC 0.8 0.2 - 1.6 mg/dL    POC TCO2 29 18 - 33 mmol/L    Protein, POC 5.9 (L) 6.4 - 8.1 g/dL   POCT glucose    Collection Time: 08/02/25 11:38 PM   Result Value Ref Range    POCT Glucose 310 (H) 70 - 110 mg/dL   POCT glucose    Collection Time: 08/03/25 12:27 AM   Result Value Ref Range    POCT Glucose 232 (H) 70 - 110 mg/dL   POCT glucose    Collection Time: 08/03/25  1:11 AM   Result Value Ref Range    POCT Glucose 242 (H) 70 - 110 mg/dL   POCT glucose    Collection Time: 08/03/25  2:05 AM   Result Value Ref Range    POCT Glucose 223 (H) 70 - 110 mg/dL   Magnesium    Collection Time: 08/03/25  2:30 AM   Result Value Ref Range    Magnesium  1.8 1.6 - 2.6 mg/dL   Phosphorus    Collection Time: 08/03/25  2:30 AM   Result Value Ref Range    Phosphorus Level 1.8 (L) 2.7 - 4.5 mg/dL   Beta-Hydroxybutyrate, Serum    Collection Time: 08/03/25  2:30 AM   Result Value Ref Range    Beta-Hydroxybutyrate 0.2 <=0.5 mmol/L   Lactic acid, plasma    Collection Time: 08/03/25  2:30 AM   Result Value Ref  Range    Lactic Acid Level 2.6 (H) 0.5 - 2.2 mmol/L   Procalcitonin    Collection Time: 08/03/25  2:30 AM   Result Value Ref Range    Procalcitonin 0.07 <0.25 ng/mL   Basic metabolic panel    Collection Time: 08/03/25  2:30 AM   Result Value Ref Range    Sodium 139 136 - 145 mmol/L    Potassium 3.7 3.5 - 5.1 mmol/L    Chloride 104 95 - 110 mmol/L    CO2 27 23 - 29 mmol/L    Glucose 203 (H) 70 - 110 mg/dL    BUN 28 (H) 8 - 23 mg/dL    Creatinine 1.0 0.5 - 1.4 mg/dL    Calcium 8.4 (L) 8.7 - 10.5 mg/dL    Anion Gap 8 8 - 16 mmol/L    eGFR 59 (L) >60 mL/min/1.73/m2   CBC with Differential    Collection Time: 08/03/25  2:30 AM   Result Value Ref Range    WBC 9.40 3.90 - 12.70 K/uL    RBC 4.71 4.00 - 5.40 M/uL    HGB 13.2 12.0 - 16.0 gm/dL    HCT 41.3 37.0 - 48.5 %    MCV 88 82 - 98 fL    MCH 28.0 27.0 - 31.0 pg    MCHC 32.0 32.0 - 36.0 g/dL    RDW 14.5 11.5 - 14.5 %    Platelet Count 172 150 - 450 K/uL    MPV 12.1 9.2 - 12.9 fL    Nucleated RBC 0 <=0 /100 WBC    Neut % 78.1 (H) 38 - 73 %    Lymph % 15.7 (L) 18 - 48 %    Mono % 5.6 4 - 15 %    Eos % 0.1 <=8 %    Basophil % 0.2 <=1.9 %    Imm Grans % 0.3 0.0 - 0.5 %    Neut # 7.33 1.8 - 7.7 K/uL    Lymph # 1.48 1 - 4.8 K/uL    Mono # 0.53 0.3 - 1 K/uL    Eos # 0.01 <=0.5 K/uL    Baso # 0.02 <=0.2 K/uL    Imm Grans # 0.03 0.00 - 0.04 K/uL   Troponin I High Sensitivity    Collection Time: 08/03/25  2:30 AM   Result Value Ref Range    Troponin High Sensitive 92 (H) <=14 ng/L   POCT glucose    Collection Time: 08/03/25  3:12 AM   Result Value Ref Range    POCT Glucose 224 (H) 70 - 110 mg/dL   ISTAT PROCEDURE    Collection Time: 08/03/25  3:49 AM   Result Value Ref Range    POC PH 7.377 7.35 - 7.45    POC PCO2 54.8 (H) 35 - 45 mmHg    POC PO2 20 (L) 40 - 60 mmHg    POC HCO3 32.2 (H) 24 - 28 mmol/L    POC BE 6 (H) -2 to 2 mmol/L    POC SATURATED O2 30 95 - 100 %    POC TCO2 34 (H) 24 - 29 mmol/L    Sample VENOUS     Site Other     Allens Test N/A     DelSys Room Air     Mode  SPONT    POCT glucose    Collection Time: 08/03/25  3:58 AM   Result Value Ref Range    POCT Glucose 201 (H) 70 - 110 mg/dL   POCT glucose    Collection Time: 08/03/25  4:51 AM   Result Value Ref Range    POCT Glucose 170 (H) 70 - 110 mg/dL       Microbiology Results (last 7 days)       ** No results found for the last 168 hours. **            Imaging Results              X-Ray Chest AP Portable (Final result)  Result time 08/02/25 19:05:53      Final result by Shannan Darnell MD (08/02/25 19:05:53)                   Impression:      No acute intrathoracic abnormality detected.      Electronically signed by: Shannan Darnell  Date:    08/02/2025  Time:    19:05               Narrative:    EXAMINATION:  AP PORTABLE CHEST    CLINICAL HISTORY:  fatigue;    TECHNIQUE:  AP portable chest radiograph was submitted.    COMPARISON:  05/27/2022    FINDINGS:  AP portable chest radiograph demonstrates a cardiac silhouette within normal limits.  There is no focal consolidation, pneumothorax, or pleural effusion.  There is mild atelectasis or pleural thickening at the minor fissure.  Moderate asymmetrical elevation of right hemidiaphragm is present.  Mild dextroscoliosis is present.

## 2025-08-03 NOTE — DISCHARGE SUMMARY
"[facility]  Discharge Summary      Patient Name: Zohra Paulino  MRN: [id number]  CALOS: [id number]  Patient Class: OP- Observation  Admission Date: 8/2/2025  Hospital Length of Stay: 0 days  Discharge Date and Time: 08/03/2025 10:13 AM  Attending Physician: Edward Wilder MD   Discharging Provider: Edward Wilder MD  Primary Care Provider: Davin Crisostomo MD    Primary Care Team: Networked reference to record PCT     HPI:   75 y.o. 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 Apparently had been having severe mid/left chest pressure/sharp discomfort on and off for the past month. Tried to follow up with her oncologist but was unable to get an appointment. F/u with her "breast oncologist" who ordered a mammogram and u/s which were negative.  F/u with her PCP's NP and was rx a Medrol dose pack for 4 weeks. She was only able to tolerate 9 days worth then had to stop due to feeling weak. Increased urination. Does not normally check her sugars at home.  No fevers or chills. NO N/V/Diarrhea. NO SOB/BAHENA. States h/o paroxsymal afib in the past per the patient was only for a brief time and not on chronic tx for this. Denies palpitations.     Vitals stable and afebrile with RA sats >96%.   Labs with WBC 11.5 and stable H/H. Lactic wnl. Procal wnl.  Lytes with unremarkable creatine and liver enyzmes. Bicarb 32  K initially 6.3 but improved after insulin and fluids to 5.   Glucose >500->617. Beta  hydroxbutyrate wnl.   D-dimer wnl. High sensitivity troponin 92 with EKG NSR.     CXR with no acute abnormalities.     Started on IVF by the ER " and given 6units of insulin. Was given 5 more units but still glucose >500.   Eventually started on an insulin drip by the ED and we have been consulted for further management.     Last PET scan 7/10/2025:  1.   No evidence of local recurrence in the pancreatectomy bed.    2.   Stable radiation pneumonitis in the right upper lobe. No evidence of local recurrence.   3.   No other lymphadenopathy or distant metastatic disease.     * No surgery found *      Hospital Course:   Hyperglycemia due to steroid. Stopped before presentation due to intolerance. Glucoses normalized. Will start home metformin.       Stop steroids  Take metformin twice daily  F/u with your PCP and Diabetic doctor      Thank you for trusting [facility] and me with your care.  We are honored that you entrusted us with your healthcare needs. Your satisfaction is very important to us and we hope you have been very pleased with your experience at [facility]. After your discharge you may receive a survey asking you to rate your hospital experience- Please help us by completing this survey. We hope that you have received the very best care possible during your hospitalization at [facility], as your satisfaction is our top priority.    Let me know if there is anything more I can do!!              Edward Wilder MD        Medical Director        Section Head of         Board-Certified IM Attending    Goals of Care Treatment Preferences:  Code Status: Full Code         Consults:     Assessment & Plan  Type 2 diabetes mellitus with hyperglycemia, without long-term current use of insulin  Patient's most recent hemoglobin A1C and blood glucose results are listed below. Repeat A1c ordered and pending.   A1C  Recent Labs   Lab 06/20/23  0950 01/24/24  1311 12/03/24  1346   % HEMOGLOBIN A1C 6.6 H 7.1 H 6.8 H      Fingerstick glucose  Recent Labs     08/03/25  0312 08/03/25  0358 08/03/25  0451 08/03/25  0734   POCTGLUCOSE 224* 201*  170* 230*      Inpatient insulin regimen  insulin glargine U-100 (Lantus) pen 15 Units, Daily, Subcutaneous  insulin aspart U-100 pen 0-5 Units, Before meals & nightly PRN, Subcutaneous per moderate SSI   Weaned off of the insulin drip after arriving to the ICU.   Feel this is due to steroid induced hyperglycemia with possible uncontrolled DM.   Currently not on any home DM meds. F/u on A1c and further tx pending results.   Diet adjusted and will not resume medrol dose pack.        Atypical chest pain  Reviewed PET scan and noted stable right lung pneumonitis.   Given mildly elevated troponin will f/u on serial troponin levels and order an echo.   Monitor on tele.   EKG with NO STEMI and concern for LVH and CHARLEY. Currently chest pain free.     Pneumonitis, radiation  Due to tx fro her med pancreatic cancer to the right lung.  Stable on recent pet scan.   F/u with oncology. HOLD on further steroids for now.     Left ventricular diastolic dysfunction  F/u on echo.   No signs of acute exacerbation.     Chronic kidney disease, stage 3a  Creatinine stable for now. Based on current GFR, CKD stage is stage 3 - GFR 30-59. Most recent creatinine and eGFR are listed below.  Recent Labs     08/02/25  2330 08/03/25  0230   CREATININE 1.1 1.0   EGFRNORACEVR 53* 59*       Plan  - Monitor urine output and serial BMP  - Renally dose medications  - Avoid nephrotoxic medications and procedures    Final Active Diagnoses:    Diagnosis Date Noted POA    PRINCIPAL PROBLEM:  Type 2 diabetes mellitus with hyperglycemia, without long-term current use of insulin [E11.65] 07/01/2021 Yes    Pneumonitis, radiation [J70.0] 08/03/2025 Yes    Atypical chest pain [R07.89] 08/03/2025 Yes    Chronic kidney disease, stage 3a [N18.31] 06/02/2022 Yes    Left ventricular diastolic dysfunction [I51.9] 01/03/2019 Yes      Problems Resolved During this Admission:       Discharged Condition: good    Disposition: Home or Self Care    Follow Up:    Patient  Instructions:      Ambulatory referral/consult to Internal Medicine   Standing Status: Future   Referral Priority: Routine Referral Type: Consultation   Referral Reason: Specialty Services Required   Requested Specialty: Internal Medicine   Number of Visits Requested: 1     Ambulatory referral/consult to Endocrinology   Standing Status: Future   Referral Priority: Routine Referral Type: Consultation   Requested Specialty: Endocrinology   Number of Visits Requested: 1       Significant Diagnostic Studies:   Recent Results (from the past 100 hours)   POCT glucose    Collection Time: 08/02/25  5:14 PM   Result Value Ref Range    POCT Glucose >500 (HH) 70 - 110 mg/dL   POCT URINALYSIS W/O SCOPE    Collection Time: 08/02/25  6:40 PM   Result Value Ref Range    Glucose, UA 3+ (A) Negative    Bilirubin, UA Negative Negative    Ketones, UA 1+ (A) Negative    Spec Grav UA 1.010 1.005 - 1.030    Blood, UA 3+ (A) Negative    PH, UA 5.5 5.0 - 8.0    Protein, UA Negative Negative    Urobilinogen, UA 0.2 <=1.0 E.U./dL    Nitrite, UA Negative Negative    Leukocytes, UA Negative Negative    Color, UA POC Yellow Yellow, Straw, Virgie    Clarity, UA, POC Clear Clear   POCT Rapid Influenza A/B    Collection Time: 08/02/25  6:42 PM   Result Value Ref Range    Influenza B Ag negative Positive/Negative    Inflenza A Ag negative Positive/Negative   POCT COVID-19 Rapid Screening    Collection Time: 08/02/25  6:54 PM   Result Value Ref Range    POC Rapid COVID Negative Negative     Acceptable Yes    POCT glucose    Collection Time: 08/02/25  8:29 PM   Result Value Ref Range    POCT Glucose >500 (HH) 70 - 110 mg/dL   POCT CBC    Collection Time: 08/02/25  8:30 PM   Result Value Ref Range    Hematocrit      Hemoglobin      RBC      WBC      MCV      MCH, POC      MCHC      RDW-CV      Platelet Count, POC      MPV     POCT CMP    Collection Time: 08/02/25  8:32 PM   Result Value Ref Range    Albumin, POC 4.3 3.3 - 5.5 g/dL     Alkaline Phosphatase, POC 70 42 - 141 U/L    ALT (SGPT), POC 22 10 - 47 U/L    AST (SGOT), POC 30 11 - 38 U/L    POC BUN 36 (H) 7 - 22 mg/dL    Calcium, POC 10.9 (H) 8.0 - 10.3 mg/dL    POC Chloride 93 (L) 98 - 108 mmol/L    POC Creatinine 1.0 0.6 - 1.2 mg/dL    POC Glucose 617 (HH) 73 - 118 mg/dL    POC Potassium 6.3 (HH) 3.6 - 5.1 mmol/L    POC Sodium 135 128 - 145 mmol/L    Bilirubin, POC 0.9 0.2 - 1.6 mg/dL    POC TCO2 29 18 - 33 mmol/L    Protein, POC 7.8 6.4 - 8.1 g/dL   ISTAT PROCEDURE    Collection Time: 08/02/25  8:34 PM   Result Value Ref Range    POC PH 7.372 7.35 - 7.45    POC PCO2 55.1 (H) 35 - 45 mmHg    POC PO2 17 (L) 40 - 60 mmHg    POC HCO3 32.0 (H) 24 - 28 mmol/L    POC BE 5 (H) -2 to 2 mmol/L    POC SATURATED O2 23 95 - 100 %    POC Lactate 1.52 0.5 - 2.2 mmol/L    POC TCO2 34 (H) 24 - 29 mmol/L    Sample VENOUS     Site Other     Allens Test N/A    POCT B-type natriuretic peptide (BNP)    Collection Time: 08/02/25  8:55 PM   Result Value Ref Range    POC B-Type Natriuretic Peptide 99.0 0.0 - 100.0 pg/mL   POCT D Dimer    Collection Time: 08/02/25  8:59 PM   Result Value Ref Range    POC D- 0.0 - 450.0 ng/mL   POCT CMP    Collection Time: 08/02/25  9:13 PM   Result Value Ref Range    Albumin, POC 3.3 3.3 - 5.5 g/dL    Alkaline Phosphatase, POC 55 42 - 141 U/L    ALT (SGPT), POC 20 10 - 47 U/L    AST (SGOT), POC 24 11 - 38 U/L    POC BUN 34 (H) 7 - 22 mg/dL    Calcium, POC 9.8 8.0 - 10.3 mg/dL    POC Chloride 101 98 - 108 mmol/L    POC Creatinine 1.0 0.6 - 1.2 mg/dL    POC Glucose 518 (HH) 73 - 118 mg/dL    POC Potassium 5.0 3.6 - 5.1 mmol/L    POC Sodium 137 128 - 145 mmol/L    Bilirubin, POC 0.8 0.2 - 1.6 mg/dL    POC TCO2 28 18 - 33 mmol/L    Protein, POC 6.0 (L) 6.4 - 8.1 g/dL   POCT glucose    Collection Time: 08/02/25 10:23 PM   Result Value Ref Range    POCT Glucose >500 (HH) 70 - 110 mg/dL   Basic metabolic panel    Collection Time: 08/02/25 11:30 PM   Result Value Ref Range    Sodium  140 136 - 145 mmol/L    Potassium 4.0 3.5 - 5.1 mmol/L    Chloride 104 95 - 110 mmol/L    CO2 26 23 - 29 mmol/L    Glucose 305 (H) 70 - 110 mg/dL    BUN 31 (H) 8 - 23 mg/dL    Creatinine 1.1 0.5 - 1.4 mg/dL    Calcium 8.9 8.7 - 10.5 mg/dL    Anion Gap 10 8 - 16 mmol/L    eGFR 53 (L) >60 mL/min/1.73/m2   Phosphorus    Collection Time: 08/02/25 11:30 PM   Result Value Ref Range    Phosphorus Level 2.3 (L) 2.7 - 4.5 mg/dL   POCT CMP    Collection Time: 08/02/25 11:35 PM   Result Value Ref Range    Albumin, POC 3.1 3.3 - 5.5 g/dL    Alkaline Phosphatase, POC 53 42 - 141 U/L    ALT (SGPT), POC 20 10 - 47 U/L    AST (SGOT), POC 29 11 - 38 U/L    POC BUN 29 (H) 7 - 22 mg/dL    Calcium, POC 9.6 8.0 - 10.3 mg/dL    POC Chloride 105 98 - 108 mmol/L    POC Creatinine 1.0 0.6 - 1.2 mg/dL    POC Glucose 301 (H) 73 - 118 mg/dL    POC Potassium 4.3 3.6 - 5.1 mmol/L    POC Sodium 138 128 - 145 mmol/L    Bilirubin, POC 0.8 0.2 - 1.6 mg/dL    POC TCO2 29 18 - 33 mmol/L    Protein, POC 5.9 (L) 6.4 - 8.1 g/dL   POCT glucose    Collection Time: 08/02/25 11:38 PM   Result Value Ref Range    POCT Glucose 310 (H) 70 - 110 mg/dL   POCT glucose    Collection Time: 08/03/25 12:27 AM   Result Value Ref Range    POCT Glucose 232 (H) 70 - 110 mg/dL   POCT glucose    Collection Time: 08/03/25  1:11 AM   Result Value Ref Range    POCT Glucose 242 (H) 70 - 110 mg/dL   POCT glucose    Collection Time: 08/03/25  2:05 AM   Result Value Ref Range    POCT Glucose 223 (H) 70 - 110 mg/dL   Magnesium    Collection Time: 08/03/25  2:30 AM   Result Value Ref Range    Magnesium  1.8 1.6 - 2.6 mg/dL   Phosphorus    Collection Time: 08/03/25  2:30 AM   Result Value Ref Range    Phosphorus Level 1.8 (L) 2.7 - 4.5 mg/dL   Beta-Hydroxybutyrate, Serum    Collection Time: 08/03/25  2:30 AM   Result Value Ref Range    Beta-Hydroxybutyrate 0.2 <=0.5 mmol/L   Lactic acid, plasma    Collection Time: 08/03/25  2:30 AM   Result Value Ref Range    Lactic Acid Level 2.6 (H)  0.5 - 2.2 mmol/L   Procalcitonin    Collection Time: 08/03/25  2:30 AM   Result Value Ref Range    Procalcitonin 0.07 <0.25 ng/mL   Basic metabolic panel    Collection Time: 08/03/25  2:30 AM   Result Value Ref Range    Sodium 139 136 - 145 mmol/L    Potassium 3.7 3.5 - 5.1 mmol/L    Chloride 104 95 - 110 mmol/L    CO2 27 23 - 29 mmol/L    Glucose 203 (H) 70 - 110 mg/dL    BUN 28 (H) 8 - 23 mg/dL    Creatinine 1.0 0.5 - 1.4 mg/dL    Calcium 8.4 (L) 8.7 - 10.5 mg/dL    Anion Gap 8 8 - 16 mmol/L    eGFR 59 (L) >60 mL/min/1.73/m2   CBC with Differential    Collection Time: 08/03/25  2:30 AM   Result Value Ref Range    WBC 9.40 3.90 - 12.70 K/uL    RBC 4.71 4.00 - 5.40 M/uL    HGB 13.2 12.0 - 16.0 gm/dL    HCT 41.3 37.0 - 48.5 %    MCV 88 82 - 98 fL    MCH 28.0 27.0 - 31.0 pg    MCHC 32.0 32.0 - 36.0 g/dL    RDW 14.5 11.5 - 14.5 %    Platelet Count 172 150 - 450 K/uL    MPV 12.1 9.2 - 12.9 fL    Nucleated RBC 0 <=0 /100 WBC    Neut % 78.1 (H) 38 - 73 %    Lymph % 15.7 (L) 18 - 48 %    Mono % 5.6 4 - 15 %    Eos % 0.1 <=8 %    Basophil % 0.2 <=1.9 %    Imm Grans % 0.3 0.0 - 0.5 %    Neut # 7.33 1.8 - 7.7 K/uL    Lymph # 1.48 1 - 4.8 K/uL    Mono # 0.53 0.3 - 1 K/uL    Eos # 0.01 <=0.5 K/uL    Baso # 0.02 <=0.2 K/uL    Imm Grans # 0.03 0.00 - 0.04 K/uL   Troponin I High Sensitivity    Collection Time: 08/03/25  2:30 AM   Result Value Ref Range    Troponin High Sensitive 92 (H) <=14 ng/L   POCT glucose    Collection Time: 08/03/25  3:12 AM   Result Value Ref Range    POCT Glucose 224 (H) 70 - 110 mg/dL   ISTAT PROCEDURE    Collection Time: 08/03/25  3:49 AM   Result Value Ref Range    POC PH 7.377 7.35 - 7.45    POC PCO2 54.8 (H) 35 - 45 mmHg    POC PO2 20 (L) 40 - 60 mmHg    POC HCO3 32.2 (H) 24 - 28 mmol/L    POC BE 6 (H) -2 to 2 mmol/L    POC SATURATED O2 30 95 - 100 %    POC TCO2 34 (H) 24 - 29 mmol/L    Sample VENOUS     Site Other     Allens Test N/A     DelSys Room Air     Mode SPONT    POCT glucose    Collection  Time: 08/03/25  3:58 AM   Result Value Ref Range    POCT Glucose 201 (H) 70 - 110 mg/dL   POCT glucose    Collection Time: 08/03/25  4:51 AM   Result Value Ref Range    POCT Glucose 170 (H) 70 - 110 mg/dL   Lactic Acid, Plasma    Collection Time: 08/03/25  5:45 AM   Result Value Ref Range    Lactic Acid Level 2.2 0.5 - 2.2 mmol/L   Troponin I High Sensitivity    Collection Time: 08/03/25  5:45 AM   Result Value Ref Range    Troponin High Sensitive 88 (H) <=14 ng/L   POCT glucose    Collection Time: 08/03/25  7:34 AM   Result Value Ref Range    POCT Glucose 230 (H) 70 - 110 mg/dL       Microbiology Results (last 7 days)       ** No results found for the last 168 hours. **            Imaging Results              X-Ray Chest AP Portable (Final result)  Result time 08/02/25 19:05:53      Final result by Shannan Darnell MD (08/02/25 19:05:53)                   Impression:      No acute intrathoracic abnormality detected.      Electronically signed by: Shannan Darnell  Date:    08/02/2025  Time:    19:05               Narrative:    EXAMINATION:  AP PORTABLE CHEST    CLINICAL HISTORY:  fatigue;    TECHNIQUE:  AP portable chest radiograph was submitted.    COMPARISON:  05/27/2022    FINDINGS:  AP portable chest radiograph demonstrates a cardiac silhouette within normal limits.  There is no focal consolidation, pneumothorax, or pleural effusion.  There is mild atelectasis or pleural thickening at the minor fissure.  Moderate asymmetrical elevation of right hemidiaphragm is present.  Mild dextroscoliosis is present.                                          Pending Diagnostic Studies:       Procedure Component Value Units Date/Time    EKG 12-lead [[id number]]     Order Status: Sent Lab Status: No result     Echo Saline Bubble? No [[id number]] Resulted: 08/03/25 0647    Order Status: Sent Lab Status: In process Updated: 08/03/25 0647    Hemoglobin A1c if not done in past 3 months [[id number]] Collected: 08/02/25 9851     Order Status: Sent Lab Status: In process Updated: 08/03/25 0029    Specimen: Blood     Troponin I High Sensitivity [[id number]] Collected: 08/03/25 0921    Order Status: Sent Lab Status: In process Updated: 08/03/25 0953    Specimen: Blood            Medications:  Reconciled Home Medications:      Medication List        START taking these medications      metFORMIN 500 MG tablet  Commonly known as: GLUCOPHAGE  Take 1 tablet (500 mg total) by mouth 2 (two) times daily with meals.            CONTINUE taking these medications      ACCU-CHEK SOFTCLIX LANCETS Misc  Generic drug: lancets     alendronate 70 MG tablet  Commonly known as: FOSAMAX  TAKE 1 TABLET WEEKLY AS DIRECTED     blood-glucose meter kit  Commonly known as: TRUE METRIX GLUCOSE METER  Use as instructed     cetirizine 10 MG tablet  Commonly known as: ZYRTEC  Take 10 mg by mouth.     cyclobenzaprine 10 MG tablet  Commonly known as: FLEXERIL     docusate sodium 100 MG capsule  Commonly known as: COLACE  Take 1 capsule (100 mg total) by mouth 2 (two) times daily as needed for Constipation.     DULoxetine 30 MG capsule  Commonly known as: CYMBALTA  Take 30 mg by mouth every evening.     ID NOW COVID-19 TEST KIT Kit  Generic drug: COVID-19 molecular test assay  TEST AS DIRECTED TODAY     IRON-150 ORAL  Take by mouth once daily.     lancing device Misc  As directed     LIDOcaine-prilocaine cream  Commonly known as: EMLA  Apply topically as needed.     meloxicam 15 MG tablet  Commonly known as: MOBIC     omeprazole 20 MG capsule  Commonly known as: PRILOSEC  Take 20 mg by mouth every 12 (twelve) hours.     ondansetron 4 MG Tbdl  Commonly known as: ZOFRAN-ODT  Take 1 tablet (4 mg total) by mouth every 6 (six) hours as needed.     potassium chloride 10 MEQ Cpsr  Commonly known as: MICRO-K  Take 10 mEq by mouth once daily.     traMADoL 50 mg tablet  Commonly known as: ULTRAM  Take 1 tablet (50 mg total) by mouth every 6 (six) hours as needed for Pain.     vitamin D  1000 units Tab  Commonly known as: VITAMIN D3  Take 1,000 Units by mouth once daily.              Indwelling Lines/Drains at time of discharge:   Lines/Drains/Airways       None                       Time spent on the discharge of patient: 35 minutes    Critical care time spent on the evaluation and treatment of severe organ dysfunction, review of pertinent labs and imaging studies, discussions with consulting providers and discussions with patient/family: 0 minutes.     Edward Wilder MD  Department of Hospital Medicine  [facility] [place] - Intensive Care

## 2025-08-03 NOTE — ED NOTES
SECURE CHAT SENT TO DR ISABEL TO REQUEST VERIFICATION OF INSULIN PROTOCOL SECONDARY TO GLUCOSE DECREASING FASTER THAN EXPECTED. ORDERS RECEIVED IN SECURE CHAT TO FOLLOW PROTOCOL NOW AND GET ACCUCHECK IN 30 MINUTES

## 2025-08-03 NOTE — ASSESSMENT & PLAN NOTE
Reviewed PET scan and noted stable right lung pneumonitis.   Given mildly elevated troponin will f/u on serial troponin levels and order an echo.   Monitor on tele.   EKG with NO STEMI and concern for LVH and CHARLEY. Currently chest pain free.

## 2025-08-03 NOTE — ASSESSMENT & PLAN NOTE
Patient's most recent hemoglobin A1C and blood glucose results are listed below. Repeat A1c ordered and pending.   A1C  Recent Labs   Lab 06/20/23  0950 01/24/24  1311 12/03/24  1346   % HEMOGLOBIN A1C 6.6 H 7.1 H 6.8 H      Fingerstick glucose  Recent Labs     08/03/25  0205 08/03/25  0312 08/03/25  0358 08/03/25  0451   POCTGLUCOSE 223* 224* 201* 170*      Inpatient insulin regimen  insulin glargine U-100 (Lantus) pen 15 Units, Daily, Subcutaneous  insulin aspart U-100 pen 0-5 Units, Before meals & nightly PRN, Subcutaneous per moderate SSI   Weaned off of the insulin drip after arriving to the ICU.   Feel this is due to steroid induced hyperglycemia with possible uncontrolled DM.   Currently not on any home DM meds. F/u on A1c and further tx pending results.   Diet adjusted and will not resume medrol dose pack.

## 2025-08-03 NOTE — ASSESSMENT & PLAN NOTE
Patient's most recent hemoglobin A1C and blood glucose results are listed below. Repeat A1c ordered and pending.   A1C  Recent Labs   Lab 06/20/23  0950 01/24/24  1311 12/03/24  1346   % HEMOGLOBIN A1C 6.6 H 7.1 H 6.8 H      Fingerstick glucose  Recent Labs     08/03/25  0312 08/03/25  0358 08/03/25  0451 08/03/25  0734   POCTGLUCOSE 224* 201* 170* 230*      Inpatient insulin regimen  insulin glargine U-100 (Lantus) pen 15 Units, Daily, Subcutaneous  insulin aspart U-100 pen 0-5 Units, Before meals & nightly PRN, Subcutaneous per moderate SSI   Weaned off of the insulin drip after arriving to the ICU.   Feel this is due to steroid induced hyperglycemia with possible uncontrolled DM.   Currently not on any home DM meds. F/u on A1c and further tx pending results.   Diet adjusted and will not resume medrol dose pack.

## 2025-08-03 NOTE — PLAN OF CARE
Case Management Final Discharge Note    Discharge Disposition: Home self care    New DME ordered / company name: None    Relevant SDOH / Transition of Care Barriers:  None    Person available to provide assistance at home when needed and their contact information: George Paulino    Scheduled followup appointment: PCP- pt will contact her PCP when discharge to schedule follow up appointment.    Referrals placed: Endocrinology- Pt  will contact staff to schedule appointment.    Transportation: Pt's family will provide transportation home at time of discharge.    Patient and family educated on discharge services and updated on DC plan. Bedside RN Bryan Michael notified, patient clear to discharge from Case Management Perspective.       08/03/25 1138   Final Note   Assessment Type Final Discharge Note   Anticipated Discharge Disposition Home   What phone number can be called within the next 1-3 days to see how you are doing after discharge? [phone]   Hospital Resources/Appts/Education Provided Appointments scheduled and added to AVS   Post-Acute Status   Discharge Delays None known at this time

## 2025-08-03 NOTE — HPI
"75 y.o. 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 Apparently had been having severe mid/left chest pressure/sharp discomfort on and off for the past month. Tried to follow up with her oncologist but was unable to get an appointment. F/u with her "breast oncologist" who ordered a mammogram and u/s which were negative.  F/u with her PCP's NP and was rx a Medrol dose pack for 4 weeks. She was only able to tolerate 9 days worth then had to stop due to feeling weak. Increased urination. Does not normally check her sugars at home.  No fevers or chills. NO N/V/Diarrhea. NO SOB/BAHENA. States h/o paroxsymal afib in the past per the patient was only for a brief time and not on chronic tx for this. Denies palpitations.     Vitals stable and afebrile with RA sats >96%.   Labs with WBC 11.5 and stable H/H. Lactic wnl. Procal wnl.  Lytes with unremarkable creatine and liver enyzmes. Bicarb 32  K initially 6.3 but improved after insulin and fluids to 5.   Glucose >500->617. Beta  hydroxbutyrate wnl.   D-dimer wnl. High sensitivity troponin 92 with EKG NSR.     CXR with no acute abnormalities.     Started on IVF by the ER and given 6units of insulin. Was given 5 more units but still glucose >500.   Eventually started on an insulin drip by the ED and we have been consulted for further management.     Last PET scan 7/10/2025:  1.   No evidence of local recurrence in the pancreatectomy bed.    2.   Stable radiation pneumonitis in the right upper lobe. No evidence of local recurrence.   3.   No other lymphadenopathy or distant metastatic disease.   "

## 2025-08-03 NOTE — ASSESSMENT & PLAN NOTE
Creatinine stable for now. Based on current GFR, CKD stage is stage 3 - GFR 30-59. Most recent creatinine and eGFR are listed below.  Recent Labs     08/02/25  2330 08/03/25  0230   CREATININE 1.1 1.0   EGFRNORACEVR 53* 59*       Plan  - Monitor urine output and serial BMP  - Renally dose medications  - Avoid nephrotoxic medications and procedures

## 2025-08-03 NOTE — PLAN OF CARE
Case Management Assessment - [facility]    PCP: Aurelia Garcia FNP     Pharmacy:    [facility] [address] [address]  [address]  [address]  [place] [address]  Phone: [phone] Fax: [phone]    Patient Arrived From: Urgent Care     Existing Help at Home: George Paulino (son)     Barriers to Discharge: None    Discharge Plan:    A. Home   B. Home with home      Pt lives with her adult children but is independent. Pt doesn't use any DME's at home. Pt's family will provide transportation home at time of discharge. CM will continue to follow until discharge.     08/03/25 1113   Discharge Assessment   Assessment Type Discharge Planning Assessment   Confirmed/corrected address, phone number and insurance Yes   Confirmed Demographics Correct on Facesheet   Source of Information patient   Communicated KAYLA with patient/caregiver Yes   People in Home child(antoni), adult   Name(s) of People in Home Jhonny Paulino (Son)   Facility Arrived From: Urgent Care   Do you expect to return to your current living situation? Yes   Do you have help at home or someone to help you manage your care at home? Yes   Who are your caregiver(s) and their phone number(s)? George Paulino(son) [phone]   Prior to hospitilization cognitive status: Alert/Oriented   Current cognitive status: Alert/Oriented   Walking or Climbing Stairs Difficulty no   Dressing/Bathing Difficulty no   Equipment Currently Used at Home none   Readmission within 30 days? No   Patient currently being followed by outpatient case management? No   Do you currently have service(s) that help you manage your care at home? No   Do you take prescription medications? Yes   Do you have prescription coverage? Yes   Do you have any problems affording any of your prescribed medications? No   Is the patient taking medications as prescribed? yes   Who is going to help you get home at discharge? Family   How do you get to doctors appointments? car,  drives self   Are you on dialysis? No   Do you take coumadin? No   Discharge Plan A Home   Discharge Plan B Home with family   Transition of Care Barriers None   Physical Activity   On average, how many days per week do you engage in moderate to strenuous exercise (like a brisk walk)? 0 days   On average, how many minutes do you engage in exercise at this level? 0 min   Financial Resource Strain   How hard is it for you to pay for the very basics like food, housing, medical care, and heating? Somewhat   Housing Stability   In the last 12 months, was there a time when you were not able to pay the mortgage or rent on time? N   At any time in the past 12 months, were you homeless or living in a shelter (including now)? N   Transportation Needs   In the past 12 months, has lack of transportation kept you from medical appointments or from getting medications? no   In the past 12 months, has lack of transportation kept you from meetings, work, or from getting things needed for daily living? No   Food Insecurity   Within the past 12 months, you worried that your food would run out before you got the money to buy more. Never true   Within the past 12 months, the food you bought just didn't last and you didn't have money to get more. Never true   Stress   Do you feel stress - tense, restless, nervous, or anxious, or unable to sleep at night because your mind is troubled all the time - these days? Rather much   Social Isolation   How often do you feel lonely or isolated from those around you?  Never   Alcohol Use   Q1: How often do you have a drink containing alcohol? Monthly or l   Q2: How many drinks containing alcohol do you have on a typical day when you are drinking? 1 or 2   Q3: How often do you have six or more drinks on one occasion? Never   Utilities   In the past 12 months has the electric, gas, oil, or water company threatened to shut off services in your home? No   Health Literacy   How often do you need to have  someone help you when you read instructions, pamphlets, or other written material from your doctor or pharmacy? Sometimes

## 2025-08-03 NOTE — H&P
"  [facility]  History & Physical    Patient Name: Zohra Paulino  MRN: [id number]  Patient Class: OP- Observation  Admission Date: 8/2/2025  Attending Physician: Dr. Trina Del Castillo   Primary Care Provider: Davin Crisostomo MD  Patient information was obtained from patient, past medical records, and ER records.     Subjective:     Principal Problem:weakness and hyperglycemia    Chief Complaint:   Chief Complaint   Patient presents with    Hyperglycemia     Pt recently dx with Pneumonitis and placed on prednisone. Pt states for the last couple weeks she had been feeling increasingly more generalized weakness/fatigue. Son also reports she has been drinking a lot of protein drinks and more thirsty.         HPI: 75 y.o. 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 Apparently had been having severe mid/left chest pressure/sharp discomfort on and off for the past month. Tried to follow up with her oncologist but was unable to get an appointment. F/u with her "breast oncologist" who ordered a mammogram and u/s which were negative.  F/u with her PCP's NP and was rx a Medrol dose pack for 4 weeks. She was only able to tolerate 9 days worth then had to stop due to feeling weak. Increased urination. Does not normally check her sugars at home.  No fevers or chills. NO N/V/Diarrhea. NO SOB/BAHENA. States h/o paroxsymal afib in the past per the patient was only for a brief time and not on chronic tx for this. Denies palpitations.     Vitals stable and afebrile with RA sats >96%.   Labs with WBC 11.5 and stable H/H. Lactic wnl. Procal " wnl.  Lytes with unremarkable creatine and liver enyzmes. Bicarb 32  K initially 6.3 but improved after insulin and fluids to 5.   Glucose >500->617. Beta  hydroxbutyrate wnl.   D-dimer wnl. High sensitivity troponin 92 with EKG NSR.     CXR with no acute abnormalities.     Started on IVF by the ER and given 6units of insulin. Was given 5 more units but still glucose >500.   Eventually started on an insulin drip by the ED and we have been consulted for further management.     Last PET scan 7/10/2025:  1.   No evidence of local recurrence in the pancreatectomy bed.    2.   Stable radiation pneumonitis in the right upper lobe. No evidence of local recurrence.   3.   No other lymphadenopathy or distant metastatic disease.     Past Medical History:   Diagnosis Date    Bronchitis     Cancer     pancreatic    Diabetes mellitus     Fibroids     Nuclear sclerosis of both eyes 11/15/2021    Osteopetrosis     Uterine fibroid        Past Surgical History:   Procedure Laterality Date    CERVICAL BIOPSY  W/ LOOP ELECTRODE EXCISION      ckc  2004    COLONOSCOPY      DISTAL PANCREATECTOMY N/A 7/27/2020    Procedure: PANCREATECTOMY, DISTAL, SUBTOTAL;  Surgeon: GILMA Ellis MD;  Location: [facility] OR;  Service: General;  Laterality: N/A;    ERCP N/A 8/19/2020    Procedure: ERCP (ENDOSCOPIC RETROGRADE CHOLANGIOPANCREATOGRAPHY);  Surgeon: Deion Ivory MD;  Location: [facility] ENDO;  Service: Endoscopy;  Laterality: N/A;  pancreatic duct leak  Rapid Covid test    ERCP N/A 9/16/2020    Procedure: ERCP (ENDOSCOPIC RETROGRADE CHOLANGIOPANCREATOGRAPHY);  Surgeon: Deion Ivory MD;  Location: [facility] ENDO;  Service: Endoscopy;  Laterality: N/A;    HYSTERECTOMY      fibroids    KS REMOVAL OF OVARY/TUBE(S)      TUBAL LIGATION         Review of patient's allergies indicates:   Allergen Reactions    Codeine Palpitations       Current Facility-Administered Medications on File Prior to Encounter   Medication    0.9%  NaCl infusion     sodium chloride 0.9% flush 10 mL     Current Outpatient Medications on File Prior to Encounter   Medication Sig    ACCU-CHEK SOFTCLIX LANCETS Misc     alendronate (FOSAMAX) 70 MG tablet TAKE 1 TABLET WEEKLY AS DIRECTED    blood-glucose meter (TRUE METRIX GLUCOSE METER) kit Use as instructed    cetirizine (ZYRTEC) 10 MG tablet Take 10 mg by mouth.    cyclobenzaprine (FLEXERIL) 10 MG tablet     docusate sodium (COLACE) 100 MG capsule Take 1 capsule (100 mg total) by mouth 2 (two) times daily as needed for Constipation.    DULoxetine (CYMBALTA) 30 MG capsule Take 30 mg by mouth every evening.    ID NOW COVID-19 TEST KIT Kit TEST AS DIRECTED TODAY    iron bis-gly/FA/C/B12/Ca/succ (IRON-150 ORAL) Take by mouth once daily.    lancing device Misc As directed    LIDOcaine-prilocaine (EMLA) cream Apply topically as needed.    meloxicam (MOBIC) 15 MG tablet     omeprazole (PRILOSEC) 20 MG capsule Take 20 mg by mouth every 12 (twelve) hours.    ondansetron (ZOFRAN-ODT) 4 MG TbDL Take 1 tablet (4 mg total) by mouth every 6 (six) hours as needed.    potassium chloride (MICRO-K) 10 MEQ CpSR Take 10 mEq by mouth once daily.    traMADoL (ULTRAM) 50 mg tablet Take 1 tablet (50 mg total) by mouth every 6 (six) hours as needed for Pain.    vitamin D 1000 units Tab Take 1,000 Units by mouth once daily.     Family History       Problem Relation (Age of Onset)    Arthritis Sister    Breast cancer Cousin (20)    Cataracts Mother, Sister    Diabetes Son    No Known Problems Father, Brother, Maternal Grandmother, Maternal Grandfather, Paternal Grandfather, Son, Daughter, Daughter, Maternal Aunt, Maternal Uncle, Paternal Aunt, Paternal Uncle    Pancreatic cancer Paternal Grandmother (80)          Tobacco Use    Smoking status: Never    Smokeless tobacco: Never   Vaping Use    Vaping status: Never Used   Substance and Sexual Activity    Alcohol use: Yes     Comment: rarely     Drug use: No    Sexual activity: Not Currently     Partners: Male      Birth control/protection: None     Review of Systems   Constitutional:  Positive for activity change and fatigue. Negative for appetite change, chills, diaphoresis and fever.   HENT:  Negative for congestion.    Eyes:  Negative for visual disturbance.   Respiratory:  Positive for chest tightness. Negative for cough, shortness of breath and wheezing.    Cardiovascular:  Positive for chest pain. Negative for palpitations and leg swelling.   Gastrointestinal:  Negative for abdominal pain, constipation, diarrhea, nausea and vomiting.   Endocrine: Positive for polyuria.   Genitourinary:  Negative for dysuria.   Musculoskeletal:  Positive for arthralgias. Negative for myalgias.   Neurological:  Positive for weakness. Negative for dizziness, light-headedness and headaches.   Psychiatric/Behavioral:  The patient is not nervous/anxious.      Objective:     Vital Signs (Most Recent):  Temp: 97.9 °F (36.6 °C) (08/03/25 0400)  Pulse: 61 (08/03/25 0500)  Resp: (!) 28 (08/03/25 0500)  BP: 138/65 (08/03/25 0500)  SpO2: 100 % (08/03/25 0104) Vital Signs (24h Range):  Temp:  [97.8 °F (36.6 °C)-98.6 °F (37 °C)] 97.9 °F (36.6 °C)  Pulse:  [61-90] 61  Resp:  [12-37] 28  SpO2:  [77 %-100 %] 100 %  BP: ()/(55-80) 138/65     Weight: 61.9 kg (136 lb 7.4 oz)  Body mass index is 24.96 kg/m².     Physical Exam  Vitals and nursing note reviewed.   Constitutional:       General: She is not in acute distress.     Appearance: Normal appearance. She is not ill-appearing, toxic-appearing or diaphoretic.      Comments: Frail appearing   HENT:      Head: Normocephalic and atraumatic.      Nose: Nose normal. No congestion or rhinorrhea.      Mouth/Throat:      Mouth: Mucous membranes are dry.      Pharynx: Oropharynx is clear. No oropharyngeal exudate or posterior oropharyngeal erythema.   Eyes:      General: No scleral icterus.     Extraocular Movements: Extraocular movements intact.      Conjunctiva/sclera: Conjunctivae normal.       Pupils: Pupils are equal, round, and reactive to light.   Cardiovascular:      Rate and Rhythm: Normal rate and regular rhythm.      Pulses: Normal pulses.      Heart sounds: No murmur heard.     No friction rub. No gallop.   Pulmonary:      Effort: Pulmonary effort is normal. No respiratory distress.      Breath sounds: Normal breath sounds. No wheezing, rhonchi or rales.   Abdominal:      General: Bowel sounds are normal. There is no distension.      Palpations: Abdomen is soft.      Tenderness: There is no abdominal tenderness. There is no guarding or rebound.   Musculoskeletal:         General: No swelling. Normal range of motion.      Cervical back: Normal range of motion and neck supple.      Right lower leg: No edema.      Left lower leg: No edema.   Skin:     General: Skin is warm.      Capillary Refill: Capillary refill takes less than 2 seconds.      Coloration: Skin is not pale.   Neurological:      Mental Status: She is alert and oriented to person, place, and time.      Cranial Nerves: No cranial nerve deficit.      Motor: No weakness.      Coordination: Coordination normal.   Psychiatric:         Mood and Affect: Mood normal.         Behavior: Behavior normal.              CRANIAL NERVES     CN III, IV, VI   Pupils are equal, round, and reactive to light.       Recent Results (from the past 24 hours)   POCT glucose    Collection Time: 08/02/25  5:14 PM   Result Value Ref Range    POCT Glucose >500 (HH) 70 - 110 mg/dL   POCT URINALYSIS W/O SCOPE    Collection Time: 08/02/25  6:40 PM   Result Value Ref Range    Glucose, UA 3+ (A) Negative    Bilirubin, UA Negative Negative    Ketones, UA 1+ (A) Negative    Spec Grav UA 1.010 1.005 - 1.030    Blood, UA 3+ (A) Negative    PH, UA 5.5 5.0 - 8.0    Protein, UA Negative Negative    Urobilinogen, UA 0.2 <=1.0 E.U./dL    Nitrite, UA Negative Negative    Leukocytes, UA Negative Negative    Color, UA POC Yellow Yellow, Straw, Virgie    Clarity, UA, POC Clear Clear    POCT Rapid Influenza A/B    Collection Time: 08/02/25  6:42 PM   Result Value Ref Range    Influenza B Ag negative Positive/Negative    Inflenza A Ag negative Positive/Negative   POCT COVID-19 Rapid Screening    Collection Time: 08/02/25  6:54 PM   Result Value Ref Range    POC Rapid COVID Negative Negative     Acceptable Yes    POCT glucose    Collection Time: 08/02/25  8:29 PM   Result Value Ref Range    POCT Glucose >500 (HH) 70 - 110 mg/dL   POCT CBC    Collection Time: 08/02/25  8:30 PM   Result Value Ref Range    Hematocrit      Hemoglobin      RBC      WBC      MCV      MCH, POC      MCHC      RDW-CV      Platelet Count, POC      MPV     POCT CMP    Collection Time: 08/02/25  8:32 PM   Result Value Ref Range    Albumin, POC 4.3 3.3 - 5.5 g/dL    Alkaline Phosphatase, POC 70 42 - 141 U/L    ALT (SGPT), POC 22 10 - 47 U/L    AST (SGOT), POC 30 11 - 38 U/L    POC BUN 36 (H) 7 - 22 mg/dL    Calcium, POC 10.9 (H) 8.0 - 10.3 mg/dL    POC Chloride 93 (L) 98 - 108 mmol/L    POC Creatinine 1.0 0.6 - 1.2 mg/dL    POC Glucose 617 (HH) 73 - 118 mg/dL    POC Potassium 6.3 (HH) 3.6 - 5.1 mmol/L    POC Sodium 135 128 - 145 mmol/L    Bilirubin, POC 0.9 0.2 - 1.6 mg/dL    POC TCO2 29 18 - 33 mmol/L    Protein, POC 7.8 6.4 - 8.1 g/dL   ISTAT PROCEDURE    Collection Time: 08/02/25  8:34 PM   Result Value Ref Range    POC PH 7.372 7.35 - 7.45    POC PCO2 55.1 (H) 35 - 45 mmHg    POC PO2 17 (L) 40 - 60 mmHg    POC HCO3 32.0 (H) 24 - 28 mmol/L    POC BE 5 (H) -2 to 2 mmol/L    POC SATURATED O2 23 95 - 100 %    POC Lactate 1.52 0.5 - 2.2 mmol/L    POC TCO2 34 (H) 24 - 29 mmol/L    Sample VENOUS     Site Other     Allens Test N/A    POCT B-type natriuretic peptide (BNP)    Collection Time: 08/02/25  8:55 PM   Result Value Ref Range    POC B-Type Natriuretic Peptide 99.0 0.0 - 100.0 pg/mL   POCT D Dimer    Collection Time: 08/02/25  8:59 PM   Result Value Ref Range    POC D- 0.0 - 450.0 ng/mL   POCT CMP     Collection Time: 08/02/25  9:13 PM   Result Value Ref Range    Albumin, POC 3.3 3.3 - 5.5 g/dL    Alkaline Phosphatase, POC 55 42 - 141 U/L    ALT (SGPT), POC 20 10 - 47 U/L    AST (SGOT), POC 24 11 - 38 U/L    POC BUN 34 (H) 7 - 22 mg/dL    Calcium, POC 9.8 8.0 - 10.3 mg/dL    POC Chloride 101 98 - 108 mmol/L    POC Creatinine 1.0 0.6 - 1.2 mg/dL    POC Glucose 518 (HH) 73 - 118 mg/dL    POC Potassium 5.0 3.6 - 5.1 mmol/L    POC Sodium 137 128 - 145 mmol/L    Bilirubin, POC 0.8 0.2 - 1.6 mg/dL    POC TCO2 28 18 - 33 mmol/L    Protein, POC 6.0 (L) 6.4 - 8.1 g/dL   POCT glucose    Collection Time: 08/02/25 10:23 PM   Result Value Ref Range    POCT Glucose >500 (HH) 70 - 110 mg/dL   Basic metabolic panel    Collection Time: 08/02/25 11:30 PM   Result Value Ref Range    Sodium 140 136 - 145 mmol/L    Potassium 4.0 3.5 - 5.1 mmol/L    Chloride 104 95 - 110 mmol/L    CO2 26 23 - 29 mmol/L    Glucose 305 (H) 70 - 110 mg/dL    BUN 31 (H) 8 - 23 mg/dL    Creatinine 1.1 0.5 - 1.4 mg/dL    Calcium 8.9 8.7 - 10.5 mg/dL    Anion Gap 10 8 - 16 mmol/L    eGFR 53 (L) >60 mL/min/1.73/m2   Phosphorus    Collection Time: 08/02/25 11:30 PM   Result Value Ref Range    Phosphorus Level 2.3 (L) 2.7 - 4.5 mg/dL   POCT CMP    Collection Time: 08/02/25 11:35 PM   Result Value Ref Range    Albumin, POC 3.1 3.3 - 5.5 g/dL    Alkaline Phosphatase, POC 53 42 - 141 U/L    ALT (SGPT), POC 20 10 - 47 U/L    AST (SGOT), POC 29 11 - 38 U/L    POC BUN 29 (H) 7 - 22 mg/dL    Calcium, POC 9.6 8.0 - 10.3 mg/dL    POC Chloride 105 98 - 108 mmol/L    POC Creatinine 1.0 0.6 - 1.2 mg/dL    POC Glucose 301 (H) 73 - 118 mg/dL    POC Potassium 4.3 3.6 - 5.1 mmol/L    POC Sodium 138 128 - 145 mmol/L    Bilirubin, POC 0.8 0.2 - 1.6 mg/dL    POC TCO2 29 18 - 33 mmol/L    Protein, POC 5.9 (L) 6.4 - 8.1 g/dL   POCT glucose    Collection Time: 08/02/25 11:38 PM   Result Value Ref Range    POCT Glucose 310 (H) 70 - 110 mg/dL   POCT glucose    Collection Time:  08/03/25 12:27 AM   Result Value Ref Range    POCT Glucose 232 (H) 70 - 110 mg/dL   POCT glucose    Collection Time: 08/03/25  1:11 AM   Result Value Ref Range    POCT Glucose 242 (H) 70 - 110 mg/dL   POCT glucose    Collection Time: 08/03/25  2:05 AM   Result Value Ref Range    POCT Glucose 223 (H) 70 - 110 mg/dL   Magnesium    Collection Time: 08/03/25  2:30 AM   Result Value Ref Range    Magnesium  1.8 1.6 - 2.6 mg/dL   Phosphorus    Collection Time: 08/03/25  2:30 AM   Result Value Ref Range    Phosphorus Level 1.8 (L) 2.7 - 4.5 mg/dL   Beta-Hydroxybutyrate, Serum    Collection Time: 08/03/25  2:30 AM   Result Value Ref Range    Beta-Hydroxybutyrate 0.2 <=0.5 mmol/L   Lactic acid, plasma    Collection Time: 08/03/25  2:30 AM   Result Value Ref Range    Lactic Acid Level 2.6 (H) 0.5 - 2.2 mmol/L   Procalcitonin    Collection Time: 08/03/25  2:30 AM   Result Value Ref Range    Procalcitonin 0.07 <0.25 ng/mL   Basic metabolic panel    Collection Time: 08/03/25  2:30 AM   Result Value Ref Range    Sodium 139 136 - 145 mmol/L    Potassium 3.7 3.5 - 5.1 mmol/L    Chloride 104 95 - 110 mmol/L    CO2 27 23 - 29 mmol/L    Glucose 203 (H) 70 - 110 mg/dL    BUN 28 (H) 8 - 23 mg/dL    Creatinine 1.0 0.5 - 1.4 mg/dL    Calcium 8.4 (L) 8.7 - 10.5 mg/dL    Anion Gap 8 8 - 16 mmol/L    eGFR 59 (L) >60 mL/min/1.73/m2   CBC with Differential    Collection Time: 08/03/25  2:30 AM   Result Value Ref Range    WBC 9.40 3.90 - 12.70 K/uL    RBC 4.71 4.00 - 5.40 M/uL    HGB 13.2 12.0 - 16.0 gm/dL    HCT 41.3 37.0 - 48.5 %    MCV 88 82 - 98 fL    MCH 28.0 27.0 - 31.0 pg    MCHC 32.0 32.0 - 36.0 g/dL    RDW 14.5 11.5 - 14.5 %    Platelet Count 172 150 - 450 K/uL    MPV 12.1 9.2 - 12.9 fL    Nucleated RBC 0 <=0 /100 WBC    Neut % 78.1 (H) 38 - 73 %    Lymph % 15.7 (L) 18 - 48 %    Mono % 5.6 4 - 15 %    Eos % 0.1 <=8 %    Basophil % 0.2 <=1.9 %    Imm Grans % 0.3 0.0 - 0.5 %    Neut # 7.33 1.8 - 7.7 K/uL    Lymph # 1.48 1 - 4.8 K/uL     Mono # 0.53 0.3 - 1 K/uL    Eos # 0.01 <=0.5 K/uL    Baso # 0.02 <=0.2 K/uL    Imm Grans # 0.03 0.00 - 0.04 K/uL   Troponin I High Sensitivity    Collection Time: 08/03/25  2:30 AM   Result Value Ref Range    Troponin High Sensitive 92 (H) <=14 ng/L   POCT glucose    Collection Time: 08/03/25  3:12 AM   Result Value Ref Range    POCT Glucose 224 (H) 70 - 110 mg/dL   ISTAT PROCEDURE    Collection Time: 08/03/25  3:49 AM   Result Value Ref Range    POC PH 7.377 7.35 - 7.45    POC PCO2 54.8 (H) 35 - 45 mmHg    POC PO2 20 (L) 40 - 60 mmHg    POC HCO3 32.2 (H) 24 - 28 mmol/L    POC BE 6 (H) -2 to 2 mmol/L    POC SATURATED O2 30 95 - 100 %    POC TCO2 34 (H) 24 - 29 mmol/L    Sample VENOUS     Site Other     Allens Test N/A     DelSys Room Air     Mode SPONT    POCT glucose    Collection Time: 08/03/25  3:58 AM   Result Value Ref Range    POCT Glucose 201 (H) 70 - 110 mg/dL   POCT glucose    Collection Time: 08/03/25  4:51 AM   Result Value Ref Range    POCT Glucose 170 (H) 70 - 110 mg/dL       Microbiology Results (last 7 days)       ** No results found for the last 168 hours. **            Imaging Results              X-Ray Chest AP Portable (Final result)  Result time 08/02/25 19:05:53      Final result by Shannan Darnell MD (08/02/25 19:05:53)                   Impression:      No acute intrathoracic abnormality detected.      Electronically signed by: Shannan Darnell  Date:    08/02/2025  Time:    19:05               Narrative:    EXAMINATION:  AP PORTABLE CHEST    CLINICAL HISTORY:  fatigue;    TECHNIQUE:  AP portable chest radiograph was submitted.    COMPARISON:  05/27/2022    FINDINGS:  AP portable chest radiograph demonstrates a cardiac silhouette within normal limits.  There is no focal consolidation, pneumothorax, or pleural effusion.  There is mild atelectasis or pleural thickening at the minor fissure.  Moderate asymmetrical elevation of right hemidiaphragm is present.  Mild dextroscoliosis is  present.                                        Assessment/Plan:     Assessment & Plan  Type 2 diabetes mellitus with hyperglycemia, without long-term current use of insulin  Patient's most recent hemoglobin A1C and blood glucose results are listed below. Repeat A1c ordered and pending.   A1C  Recent Labs   Lab 06/20/23  0950 01/24/24  1311 12/03/24  1346   % HEMOGLOBIN A1C 6.6 H 7.1 H 6.8 H      Fingerstick glucose  Recent Labs     08/03/25  0205 08/03/25  0312 08/03/25  0358 08/03/25  0451   POCTGLUCOSE 223* 224* 201* 170*      Inpatient insulin regimen  insulin glargine U-100 (Lantus) pen 15 Units, Daily, Subcutaneous  insulin aspart U-100 pen 0-5 Units, Before meals & nightly PRN, Subcutaneous per moderate SSI   Weaned off of the insulin drip after arriving to the ICU.   Feel this is due to steroid induced hyperglycemia with possible uncontrolled DM.   Currently not on any home DM meds. F/u on A1c and further tx pending results.   Diet adjusted and will not resume medrol dose pack.        Atypical chest pain  Reviewed PET scan and noted stable right lung pneumonitis.   Given mildly elevated troponin will f/u on serial troponin levels and order an echo.   Monitor on tele.   EKG with NO STEMI and concern for LVH and CHARLEY. Currently chest pain free.     Pneumonitis, radiation  Due to tx fro her med pancreatic cancer to the right lung.  Stable on recent pet scan.   F/u with oncology. HOLD on further steroids for now.     Left ventricular diastolic dysfunction  F/u on echo.   No signs of acute exacerbation.     Chronic kidney disease, stage 3a  Creatinine stable for now. Based on current GFR, CKD stage is stage 3 - GFR 30-59. Most recent creatinine and eGFR are listed below.  Recent Labs     08/02/25  2330 08/03/25  0230   CREATININE 1.1 1.0   EGFRNORACEVR 53* 59*       Plan  - Monitor urine output and serial BMP  - Renally dose medications  - Avoid nephrotoxic medications and procedures    VTE Risk Mitigation (From  admission, onward)           Ordered     enoxaparin injection 40 mg  Daily         08/03/25 0151     IP VTE HIGH RISK PATIENT  Once         08/03/25 0151     Place sequential compression device  Until discontinued         08/03/25 0151                CODE STATUS: FULL CODE     Critical care time spent on the evaluation and treatment of severe organ dysfunction, review of pertinent labs and imaging studies, discussions with consulting providers and discussions with patient/family: 65 minutes.     On 08/03/2025, patient should be placed in hospital observation services under my care.       Trina Del Castillo MD  Department of Hospital Medicine  [facility] - [place] - Intensive Care

## 2025-08-03 NOTE — PLAN OF CARE
Patient remains in ICU, no longer on insulin gtt, receiving normal saline at 125ml/hr. POC reviewed with patient. Patient remains free of injury and falls.    Problem: Hospitalized Older Adult  Goal: Optimal Cognitive Function  Outcome: Progressing  Goal: Fluid and Electrolyte Balance  Outcome: Progressing  Goal: Improved Oral Intake  Outcome: Progressing  Goal: Adequate Sleep/Rest  Outcome: Progressing     Problem: Adult Inpatient Plan of Care  Goal: Plan of Care Review  Outcome: Progressing     Problem: Diabetes Comorbidity  Goal: Blood Glucose Level Within Targeted Range  Outcome: Progressing

## 2025-08-03 NOTE — ED NOTES
Rounding on the patient has been done.   she has been updated on the plan of care and her current status.  Necessary items were placed with in her reach   The call bell remains at the bedside for any additional patient needs.   The patient is resting comfortably on the stretcher  Airway, Breathing, Circulation intact.

## 2025-08-03 NOTE — EICU
Brief Note     75 yr old female   Lung CA / Pancreatic CA   Pneumonitis - was recently on steroids     C/o weakness/ fatigue     Possible radiation pneumonitis   Radiation esophagitis ?   Oral Thrush   DM / uncontrolled/ hyperglycemia     Insulin infusion ongoing   Blood glucose has started to improve   Start fluconazole for thrush   May need PPI for esophagitis   IVF   Monitor urine output

## 2025-08-03 NOTE — ASSESSMENT & PLAN NOTE
Due to tx fro her med pancreatic cancer to the right lung.  Stable on recent pet scan.   F/u with oncology. HOLD on further steroids for now.

## 2025-08-03 NOTE — NURSING
[facility]  Nurses Note -- 4 Eyes      8/3/2025       Skin assessed on: Q Shift      [x] No Pressure Injuries Present    [x]Prevention Measures Documented    [] Yes LDA  for Pressure Injury Previously documented     [] Yes New Pressure Injury Discovered   [] LDA for New Pressure Injury Added      Attending RN:  Enedelia Snyder RN     Second RN:  TYESHA Lala

## 2025-08-04 LAB
OHS QRS DURATION: 82 MS
OHS QTC CALCULATION: 396 MS

## 2025-08-05 LAB
OHS QRS DURATION: 82 MS
OHS QTC CALCULATION: 392 MS

## (undated) DEVICE — APPLICATOR CHLORAPREP ORN 26ML

## (undated) DEVICE — HEMOSTAT VITAGEL RT 4.5

## (undated) DEVICE — APPLICATOR LAPAROSCOPIC EXTEND

## (undated) DEVICE — SUT PROLENE 5-0 36IN C-1

## (undated) DEVICE — KIT WING PAD POSITIONING

## (undated) DEVICE — SUT PROLENE 2-0 SH 36IN BLU

## (undated) DEVICE — NDL HYPDRM 23X15

## (undated) DEVICE — TRAY FOLEY 16FR INFECTION CONT

## (undated) DEVICE — SEE MEDLINE ITEM 157116

## (undated) DEVICE — SPONGE LAP 18X18 PREWASHED

## (undated) DEVICE — DRESSING LEUKOPLAST FLEX 1X3IN

## (undated) DEVICE — SUT PROLENE 3-0 SH DA 36 BL

## (undated) DEVICE — COVER OVERHEAD SURG LT BLUE

## (undated) DEVICE — DRAPE COLUMN DAVINCI XI

## (undated) DEVICE — SOL ELECTROLUBE ANTI-STIC

## (undated) DEVICE — SEE MEDLINE ITEM 157117

## (undated) DEVICE — CLOSURE SKIN STERI STRIP 1/2X4

## (undated) DEVICE — ADHESIVE DERMABOND ADVANCED

## (undated) DEVICE — YANKAUER OPEN TIP W/O VENT

## (undated) DEVICE — DRAIN CHANNEL ROUND 19FR

## (undated) DEVICE — COVER TIP CURVED SCISSORS XI

## (undated) DEVICE — UNDERGLOVES BIOGEL PI SZ 7 LF

## (undated) DEVICE — SUT 2 60IN PROLENE BL MONO

## (undated) DEVICE — SEE MEDLINE ITEM 152622

## (undated) DEVICE — SUT PROLENE 6-0 C-1 30IN BL

## (undated) DEVICE — APPLICATOR EXTENDED MALLEABLE

## (undated) DEVICE — JELLY KY LUBRICATING 5G PACKET

## (undated) DEVICE — MANIFOLD 4 PORT

## (undated) DEVICE — KIT SURGIFLO HEMOSTATIC MATRIX

## (undated) DEVICE — DRAPE INCISE IOBAN 2 23X23IN

## (undated) DEVICE — SEE MEDLINE ITEM 156923

## (undated) DEVICE — GLOVE SURG BIOGEL LATEX SZ 7.5

## (undated) DEVICE — SUT MONOCRYL 3-0 PS-1

## (undated) DEVICE — SOL CLEARIFY VISUALIZATION LAP

## (undated) DEVICE — INSERT CUSHIONPRONE VIEW LARGE

## (undated) DEVICE — Device

## (undated) DEVICE — SUT ETHIBOND EXCEL 1 CTX 18

## (undated) DEVICE — GLOVE BIOGEL SKINSENSE PI 7.0

## (undated) DEVICE — GLOVE PROTEXIS NEOPRENE 7.5

## (undated) DEVICE — SUT PROLENE 2-0 36IN MH BLU

## (undated) DEVICE — SUT MCRYL PLUS 4-0 PS2 27IN

## (undated) DEVICE — ELECTRODE REM PLYHSV RETURN 9

## (undated) DEVICE — SUT 4/0 27IN PDS II VIO MON

## (undated) DEVICE — SEALER LIGASURE OPEN 5MM 23CM

## (undated) DEVICE — SUT 2-0 12-18IN SILK

## (undated) DEVICE — EVACUATOR WOUND BULB 100CC

## (undated) DEVICE — SEE MEDLINE ITEM 157125

## (undated) DEVICE — KIT POWDER ABSORBABLE GELATIN

## (undated) DEVICE — SYR 10CC LUER LOCK

## (undated) DEVICE — SUT 0 27IN CHROMIC GUT BP

## (undated) DEVICE — OBTURATOR 8MM BLADELESS

## (undated) DEVICE — SET TRI-LUMEN FILTERED TUBE

## (undated) DEVICE — SUT 1 36IN PDS II VIO MONO

## (undated) DEVICE — SUT CTD VICRYL 2.0

## (undated) DEVICE — NDL INSUF ULTRA VERESS 120MM

## (undated) DEVICE — BELLOW CANN HEMOBLAST 1.65GR

## (undated) DEVICE — SEAL UNIVERSAL 5MM-8MM XI

## (undated) DEVICE — SUT SILK 0 STRANDS 30IN BLK

## (undated) DEVICE — CATH URETHRAL RED RUBBER 18FR

## (undated) DEVICE — SUT 4-0 12-30IN SILK

## (undated) DEVICE — SUPPORT ULNA NERVE PROTECTOR

## (undated) DEVICE — PACK DRAPE UNIVERSAL CONVERTOR

## (undated) DEVICE — PORT ACCESS 8MM W/120MM LOW

## (undated) DEVICE — RELOAD 60MM ARTICULATING VAS

## (undated) DEVICE — SOL NS 1000CC

## (undated) DEVICE — POSITIONER HEAD ADULT

## (undated) DEVICE — SEE MEDLINE ITEM 156955

## (undated) DEVICE — TOWEL OR DISP STRL BLUE 4/PK

## (undated) DEVICE — MANIPULATOR VCARE PLUS 34MM

## (undated) DEVICE — DRAPE ARM DAVINCI XI

## (undated) DEVICE — SUT PROLENE 3-0 30SH

## (undated) DEVICE — SUT SILK 3-0 STRANDS 30IN

## (undated) DEVICE — SHELL POWER SIGNIA

## (undated) DEVICE — SUT PROLENE 3-0 36 MHMH

## (undated) DEVICE — IRRIGATOR ENDOSCOPY DISP.

## (undated) DEVICE — SOL WATER STRL IRR 1000ML

## (undated) DEVICE — DRESSING AQUACEL SACRAL 9 X 9

## (undated) DEVICE — DEVICE TRI STAPLE RAD MED 2.0